# Patient Record
Sex: MALE | Race: WHITE | NOT HISPANIC OR LATINO | URBAN - METROPOLITAN AREA
[De-identification: names, ages, dates, MRNs, and addresses within clinical notes are randomized per-mention and may not be internally consistent; named-entity substitution may affect disease eponyms.]

---

## 2018-07-24 VITALS
SYSTOLIC BLOOD PRESSURE: 132 MMHG | TEMPERATURE: 98 F | DIASTOLIC BLOOD PRESSURE: 91 MMHG | HEART RATE: 120 BPM | RESPIRATION RATE: 40 BRPM | HEIGHT: 77 IN | OXYGEN SATURATION: 96 % | WEIGHT: 165.35 LBS

## 2018-07-25 ENCOUNTER — INPATIENT (INPATIENT)
Facility: HOSPITAL | Age: 43
LOS: 48 days | Discharge: HOME HEALTH | End: 2018-09-12
Attending: STUDENT IN AN ORGANIZED HEALTH CARE EDUCATION/TRAINING PROGRAM | Admitting: STUDENT IN AN ORGANIZED HEALTH CARE EDUCATION/TRAINING PROGRAM
Payer: MEDICAID

## 2018-07-25 DIAGNOSIS — J86.9 PYOTHORAX WITHOUT FISTULA: ICD-10-CM

## 2018-07-25 DIAGNOSIS — J91.8 PLEURAL EFFUSION IN OTHER CONDITIONS CLASSIFIED ELSEWHERE: ICD-10-CM

## 2018-07-25 LAB
ALBUMIN SERPL ELPH-MCNC: 2.5 G/DL — LOW (ref 3.3–5)
ALP SERPL-CCNC: 100 U/L — SIGNIFICANT CHANGE UP (ref 40–120)
ALT FLD-CCNC: 76 U/L — HIGH (ref 4–41)
APTT BLD: 38 SEC — HIGH (ref 27.5–37.4)
APTT BLD: 44 SEC — HIGH (ref 27.5–37.4)
APTT BLD: 46.4 SEC — HIGH (ref 27.5–37.4)
APTT BLD: 52.6 SEC — HIGH (ref 27.5–37.4)
AST SERPL-CCNC: 86 U/L — HIGH (ref 4–40)
BASOPHILS # BLD AUTO: 0.04 K/UL — SIGNIFICANT CHANGE UP (ref 0–0.2)
BASOPHILS NFR BLD AUTO: 0.5 % — SIGNIFICANT CHANGE UP (ref 0–2)
BILIRUB SERPL-MCNC: 1.3 MG/DL — HIGH (ref 0.2–1.2)
BLD GP AB SCN SERPL QL: NEGATIVE — SIGNIFICANT CHANGE UP
BUN SERPL-MCNC: 17 MG/DL — SIGNIFICANT CHANGE UP (ref 7–23)
CALCIUM SERPL-MCNC: 7.7 MG/DL — LOW (ref 8.4–10.5)
CHLORIDE SERPL-SCNC: 97 MMOL/L — LOW (ref 98–107)
CO2 SERPL-SCNC: 20 MMOL/L — LOW (ref 22–31)
CREAT SERPL-MCNC: 0.73 MG/DL — SIGNIFICANT CHANGE UP (ref 0.5–1.3)
EOSINOPHIL # BLD AUTO: 0 K/UL — SIGNIFICANT CHANGE UP (ref 0–0.5)
EOSINOPHIL NFR BLD AUTO: 0 % — SIGNIFICANT CHANGE UP (ref 0–6)
GLUCOSE SERPL-MCNC: 110 MG/DL — HIGH (ref 70–99)
HCT VFR BLD CALC: 44 % — SIGNIFICANT CHANGE UP (ref 39–50)
HCT VFR BLD CALC: 44.2 % — SIGNIFICANT CHANGE UP (ref 39–50)
HCT VFR BLD CALC: 46.4 % — SIGNIFICANT CHANGE UP (ref 39–50)
HGB BLD-MCNC: 15 G/DL — SIGNIFICANT CHANGE UP (ref 13–17)
HGB BLD-MCNC: 15.2 G/DL — SIGNIFICANT CHANGE UP (ref 13–17)
HGB BLD-MCNC: 16.1 G/DL — SIGNIFICANT CHANGE UP (ref 13–17)
IMM GRANULOCYTES # BLD AUTO: 0.12 # — SIGNIFICANT CHANGE UP
IMM GRANULOCYTES NFR BLD AUTO: 1.6 % — HIGH (ref 0–1.5)
INR BLD: 1.3 — HIGH (ref 0.88–1.17)
INR BLD: 1.44 — HIGH (ref 0.88–1.17)
INR BLD: 1.68 — HIGH (ref 0.88–1.17)
LYMPHOCYTES # BLD AUTO: 0.49 K/UL — LOW (ref 1–3.3)
LYMPHOCYTES # BLD AUTO: 6.4 % — LOW (ref 13–44)
MAGNESIUM SERPL-MCNC: 1.9 MG/DL — SIGNIFICANT CHANGE UP (ref 1.6–2.6)
MCHC RBC-ENTMCNC: 33.9 % — SIGNIFICANT CHANGE UP (ref 32–36)
MCHC RBC-ENTMCNC: 34.3 PG — HIGH (ref 27–34)
MCHC RBC-ENTMCNC: 34.3 PG — HIGH (ref 27–34)
MCHC RBC-ENTMCNC: 34.4 PG — HIGH (ref 27–34)
MCHC RBC-ENTMCNC: 34.5 % — SIGNIFICANT CHANGE UP (ref 32–36)
MCHC RBC-ENTMCNC: 34.7 % — SIGNIFICANT CHANGE UP (ref 32–36)
MCV RBC AUTO: 101.1 FL — HIGH (ref 80–100)
MCV RBC AUTO: 98.7 FL — SIGNIFICANT CHANGE UP (ref 80–100)
MCV RBC AUTO: 99.5 FL — SIGNIFICANT CHANGE UP (ref 80–100)
MONOCYTES # BLD AUTO: 0.68 K/UL — SIGNIFICANT CHANGE UP (ref 0–0.9)
MONOCYTES NFR BLD AUTO: 8.9 % — SIGNIFICANT CHANGE UP (ref 2–14)
NEUTROPHILS # BLD AUTO: 6.33 K/UL — SIGNIFICANT CHANGE UP (ref 1.8–7.4)
NEUTROPHILS NFR BLD AUTO: 82.6 % — HIGH (ref 43–77)
NRBC # FLD: 0 — SIGNIFICANT CHANGE UP
NRBC # FLD: 0 — SIGNIFICANT CHANGE UP
NRBC # FLD: 0.03 — SIGNIFICANT CHANGE UP
PHOSPHATE SERPL-MCNC: 5.8 MG/DL — HIGH (ref 2.5–4.5)
PLATELET # BLD AUTO: 125 K/UL — LOW (ref 150–400)
PLATELET # BLD AUTO: 164 K/UL — SIGNIFICANT CHANGE UP (ref 150–400)
PLATELET # BLD AUTO: 168 K/UL — SIGNIFICANT CHANGE UP (ref 150–400)
PMV BLD: 10.7 FL — SIGNIFICANT CHANGE UP (ref 7–13)
PMV BLD: 10.9 FL — SIGNIFICANT CHANGE UP (ref 7–13)
PMV BLD: 10.9 FL — SIGNIFICANT CHANGE UP (ref 7–13)
POTASSIUM SERPL-MCNC: 3.4 MMOL/L — LOW (ref 3.5–5.3)
POTASSIUM SERPL-SCNC: 3.4 MMOL/L — LOW (ref 3.5–5.3)
PROT SERPL-MCNC: 5.9 G/DL — LOW (ref 6–8.3)
PROTHROM AB SERPL-ACNC: 15 SEC — HIGH (ref 9.8–13.1)
PROTHROM AB SERPL-ACNC: 16.7 SEC — HIGH (ref 9.8–13.1)
PROTHROM AB SERPL-ACNC: 18.8 SEC — HIGH (ref 9.8–13.1)
RBC # BLD: 4.37 M/UL — SIGNIFICANT CHANGE UP (ref 4.2–5.8)
RBC # BLD: 4.42 M/UL — SIGNIFICANT CHANGE UP (ref 4.2–5.8)
RBC # BLD: 4.7 M/UL — SIGNIFICANT CHANGE UP (ref 4.2–5.8)
RBC # FLD: 12 % — SIGNIFICANT CHANGE UP (ref 10.3–14.5)
RBC # FLD: 12 % — SIGNIFICANT CHANGE UP (ref 10.3–14.5)
RBC # FLD: 12.3 % — SIGNIFICANT CHANGE UP (ref 10.3–14.5)
RH IG SCN BLD-IMP: NEGATIVE — SIGNIFICANT CHANGE UP
RH IG SCN BLD-IMP: NEGATIVE — SIGNIFICANT CHANGE UP
SODIUM SERPL-SCNC: 137 MMOL/L — SIGNIFICANT CHANGE UP (ref 135–145)
WBC # BLD: 5.26 K/UL — SIGNIFICANT CHANGE UP (ref 3.8–10.5)
WBC # BLD: 7.66 K/UL — SIGNIFICANT CHANGE UP (ref 3.8–10.5)
WBC # BLD: 8.89 K/UL — SIGNIFICANT CHANGE UP (ref 3.8–10.5)
WBC # FLD AUTO: 5.26 K/UL — SIGNIFICANT CHANGE UP (ref 3.8–10.5)
WBC # FLD AUTO: 7.66 K/UL — SIGNIFICANT CHANGE UP (ref 3.8–10.5)
WBC # FLD AUTO: 8.89 K/UL — SIGNIFICANT CHANGE UP (ref 3.8–10.5)

## 2018-07-25 PROCEDURE — 71045 X-RAY EXAM CHEST 1 VIEW: CPT | Mod: 26,76

## 2018-07-25 PROCEDURE — 71275 CT ANGIOGRAPHY CHEST: CPT | Mod: 26

## 2018-07-25 PROCEDURE — 99291 CRITICAL CARE FIRST HOUR: CPT

## 2018-07-25 PROCEDURE — 99223 1ST HOSP IP/OBS HIGH 75: CPT | Mod: 57

## 2018-07-25 PROCEDURE — 75635 CT ANGIO ABDOMINAL ARTERIES: CPT | Mod: 26

## 2018-07-25 PROCEDURE — 99222 1ST HOSP IP/OBS MODERATE 55: CPT

## 2018-07-25 RX ORDER — THIAMINE MONONITRATE (VIT B1) 100 MG
100 TABLET ORAL DAILY
Qty: 0 | Refills: 0 | Status: DISCONTINUED | OUTPATIENT
Start: 2018-07-25 | End: 2018-07-29

## 2018-07-25 RX ORDER — HYDROMORPHONE HYDROCHLORIDE 2 MG/ML
1 INJECTION INTRAMUSCULAR; INTRAVENOUS; SUBCUTANEOUS ONCE
Qty: 0 | Refills: 0 | Status: DISCONTINUED | OUTPATIENT
Start: 2018-07-25 | End: 2018-07-25

## 2018-07-25 RX ORDER — MORPHINE SULFATE 50 MG/1
4 CAPSULE, EXTENDED RELEASE ORAL ONCE
Qty: 0 | Refills: 0 | Status: DISCONTINUED | OUTPATIENT
Start: 2018-07-25 | End: 2018-07-25

## 2018-07-25 RX ORDER — SODIUM CHLORIDE 9 MG/ML
1000 INJECTION, SOLUTION INTRAVENOUS
Qty: 0 | Refills: 0 | Status: DISCONTINUED | OUTPATIENT
Start: 2018-07-25 | End: 2018-07-25

## 2018-07-25 RX ORDER — HYDROMORPHONE HYDROCHLORIDE 2 MG/ML
0.5 INJECTION INTRAMUSCULAR; INTRAVENOUS; SUBCUTANEOUS ONCE
Qty: 0 | Refills: 0 | Status: DISCONTINUED | OUTPATIENT
Start: 2018-07-25 | End: 2018-07-25

## 2018-07-25 RX ORDER — PIPERACILLIN AND TAZOBACTAM 4; .5 G/20ML; G/20ML
3.38 INJECTION, POWDER, LYOPHILIZED, FOR SOLUTION INTRAVENOUS ONCE
Qty: 0 | Refills: 0 | Status: COMPLETED | OUTPATIENT
Start: 2018-07-25 | End: 2018-07-25

## 2018-07-25 RX ORDER — POTASSIUM CHLORIDE 20 MEQ
10 PACKET (EA) ORAL
Qty: 0 | Refills: 0 | Status: COMPLETED | OUTPATIENT
Start: 2018-07-25 | End: 2018-07-25

## 2018-07-25 RX ORDER — KETOROLAC TROMETHAMINE 30 MG/ML
30 SYRINGE (ML) INJECTION ONCE
Qty: 0 | Refills: 0 | Status: DISCONTINUED | OUTPATIENT
Start: 2018-07-25 | End: 2018-07-25

## 2018-07-25 RX ORDER — ACETAMINOPHEN 500 MG
1000 TABLET ORAL ONCE
Qty: 0 | Refills: 0 | Status: COMPLETED | OUTPATIENT
Start: 2018-07-24 | End: 2018-07-25

## 2018-07-25 RX ORDER — PANTOPRAZOLE SODIUM 20 MG/1
40 TABLET, DELAYED RELEASE ORAL DAILY
Qty: 0 | Refills: 0 | Status: DISCONTINUED | OUTPATIENT
Start: 2018-07-25 | End: 2018-09-06

## 2018-07-25 RX ORDER — HEPARIN SODIUM 5000 [USP'U]/ML
1000 INJECTION INTRAVENOUS; SUBCUTANEOUS
Qty: 25000 | Refills: 0 | Status: DISCONTINUED | OUTPATIENT
Start: 2018-07-25 | End: 2018-07-25

## 2018-07-25 RX ORDER — VANCOMYCIN HCL 1 G
1000 VIAL (EA) INTRAVENOUS ONCE
Qty: 0 | Refills: 0 | Status: COMPLETED | OUTPATIENT
Start: 2018-07-25 | End: 2018-07-25

## 2018-07-25 RX ORDER — SENNA PLUS 8.6 MG/1
2 TABLET ORAL AT BEDTIME
Qty: 0 | Refills: 0 | Status: DISCONTINUED | OUTPATIENT
Start: 2018-07-25 | End: 2018-07-28

## 2018-07-25 RX ORDER — HEPARIN SODIUM 5000 [USP'U]/ML
2500 INJECTION INTRAVENOUS; SUBCUTANEOUS ONCE
Qty: 0 | Refills: 0 | Status: COMPLETED | OUTPATIENT
Start: 2018-07-25 | End: 2018-07-25

## 2018-07-25 RX ORDER — SODIUM CHLORIDE 9 MG/ML
3 INJECTION INTRAMUSCULAR; INTRAVENOUS; SUBCUTANEOUS EVERY 8 HOURS
Qty: 0 | Refills: 0 | Status: DISCONTINUED | OUTPATIENT
Start: 2018-07-25 | End: 2018-09-12

## 2018-07-25 RX ORDER — MAGNESIUM SULFATE 500 MG/ML
1 VIAL (ML) INJECTION ONCE
Qty: 0 | Refills: 0 | Status: COMPLETED | OUTPATIENT
Start: 2018-07-25 | End: 2018-07-25

## 2018-07-25 RX ORDER — THIAMINE MONONITRATE (VIT B1) 100 MG
100 TABLET ORAL ONCE
Qty: 0 | Refills: 0 | Status: COMPLETED | OUTPATIENT
Start: 2018-07-25 | End: 2018-07-25

## 2018-07-25 RX ORDER — HEPARIN SODIUM 5000 [USP'U]/ML
5000 INJECTION INTRAVENOUS; SUBCUTANEOUS EVERY 8 HOURS
Qty: 0 | Refills: 0 | Status: DISCONTINUED | OUTPATIENT
Start: 2018-07-25 | End: 2018-07-25

## 2018-07-25 RX ORDER — ACETAMINOPHEN 500 MG
1000 TABLET ORAL ONCE
Qty: 0 | Refills: 0 | Status: COMPLETED | OUTPATIENT
Start: 2018-07-25 | End: 2018-07-25

## 2018-07-25 RX ORDER — VANCOMYCIN HCL 1 G
1000 VIAL (EA) INTRAVENOUS EVERY 12 HOURS
Qty: 0 | Refills: 0 | Status: DISCONTINUED | OUTPATIENT
Start: 2018-07-25 | End: 2018-07-28

## 2018-07-25 RX ORDER — OXYCODONE AND ACETAMINOPHEN 5; 325 MG/1; MG/1
2 TABLET ORAL EVERY 6 HOURS
Qty: 0 | Refills: 0 | Status: DISCONTINUED | OUTPATIENT
Start: 2018-07-25 | End: 2018-07-29

## 2018-07-25 RX ORDER — IPRATROPIUM BROMIDE 0.2 MG/ML
500 SOLUTION, NON-ORAL INHALATION EVERY 6 HOURS
Qty: 0 | Refills: 0 | Status: DISCONTINUED | OUTPATIENT
Start: 2018-07-25 | End: 2018-07-27

## 2018-07-25 RX ORDER — PIPERACILLIN AND TAZOBACTAM 4; .5 G/20ML; G/20ML
3.38 INJECTION, POWDER, LYOPHILIZED, FOR SOLUTION INTRAVENOUS EVERY 8 HOURS
Qty: 0 | Refills: 0 | Status: DISCONTINUED | OUTPATIENT
Start: 2018-07-25 | End: 2018-07-30

## 2018-07-25 RX ORDER — HEPARIN SODIUM 5000 [USP'U]/ML
1200 INJECTION INTRAVENOUS; SUBCUTANEOUS
Qty: 25000 | Refills: 0 | Status: DISCONTINUED | OUTPATIENT
Start: 2018-07-25 | End: 2018-07-26

## 2018-07-25 RX ORDER — SODIUM CHLORIDE 9 MG/ML
1000 INJECTION, SOLUTION INTRAVENOUS
Qty: 0 | Refills: 0 | Status: DISCONTINUED | OUTPATIENT
Start: 2018-07-25 | End: 2018-07-27

## 2018-07-25 RX ORDER — SODIUM CHLORIDE 9 MG/ML
500 INJECTION INTRAMUSCULAR; INTRAVENOUS; SUBCUTANEOUS ONCE
Qty: 0 | Refills: 0 | Status: COMPLETED | OUTPATIENT
Start: 2018-07-25 | End: 2018-07-25

## 2018-07-25 RX ORDER — METOCLOPRAMIDE HCL 10 MG
10 TABLET ORAL ONCE
Qty: 0 | Refills: 0 | Status: COMPLETED | OUTPATIENT
Start: 2018-07-25 | End: 2018-07-25

## 2018-07-25 RX ORDER — THIAMINE MONONITRATE (VIT B1) 100 MG
TABLET ORAL
Qty: 0 | Refills: 0 | Status: DISCONTINUED | OUTPATIENT
Start: 2018-07-25 | End: 2018-07-29

## 2018-07-25 RX ORDER — ALBUTEROL 90 UG/1
2 AEROSOL, METERED ORAL EVERY 4 HOURS
Qty: 0 | Refills: 0 | Status: DISCONTINUED | OUTPATIENT
Start: 2018-07-25 | End: 2018-07-27

## 2018-07-25 RX ORDER — VANCOMYCIN HCL 1 G
VIAL (EA) INTRAVENOUS
Qty: 0 | Refills: 0 | Status: DISCONTINUED | OUTPATIENT
Start: 2018-07-25 | End: 2018-07-28

## 2018-07-25 RX ORDER — DOCUSATE SODIUM 100 MG
100 CAPSULE ORAL THREE TIMES A DAY
Qty: 0 | Refills: 0 | Status: DISCONTINUED | OUTPATIENT
Start: 2018-07-25 | End: 2018-07-28

## 2018-07-25 RX ADMIN — HEPARIN SODIUM 10 UNIT(S)/HR: 5000 INJECTION INTRAVENOUS; SUBCUTANEOUS at 09:40

## 2018-07-25 RX ADMIN — SODIUM CHLORIDE 3 MILLILITER(S): 9 INJECTION INTRAMUSCULAR; INTRAVENOUS; SUBCUTANEOUS at 13:04

## 2018-07-25 RX ADMIN — MORPHINE SULFATE 4 MILLIGRAM(S): 50 CAPSULE, EXTENDED RELEASE ORAL at 08:45

## 2018-07-25 RX ADMIN — SODIUM CHLORIDE 1000 MILLILITER(S): 9 INJECTION INTRAMUSCULAR; INTRAVENOUS; SUBCUTANEOUS at 14:32

## 2018-07-25 RX ADMIN — MORPHINE SULFATE 4 MILLIGRAM(S): 50 CAPSULE, EXTENDED RELEASE ORAL at 09:00

## 2018-07-25 RX ADMIN — Medication 500 MICROGRAM(S): at 16:07

## 2018-07-25 RX ADMIN — HYDROMORPHONE HYDROCHLORIDE 0.5 MILLIGRAM(S): 2 INJECTION INTRAMUSCULAR; INTRAVENOUS; SUBCUTANEOUS at 21:35

## 2018-07-25 RX ADMIN — MORPHINE SULFATE 4 MILLIGRAM(S): 50 CAPSULE, EXTENDED RELEASE ORAL at 12:30

## 2018-07-25 RX ADMIN — Medication 500 MICROGRAM(S): at 10:01

## 2018-07-25 RX ADMIN — Medication 500 MICROGRAM(S): at 05:42

## 2018-07-25 RX ADMIN — Medication 250 MILLIGRAM(S): at 21:05

## 2018-07-25 RX ADMIN — HEPARIN SODIUM 5000 UNIT(S): 5000 INJECTION INTRAVENOUS; SUBCUTANEOUS at 06:00

## 2018-07-25 RX ADMIN — SODIUM CHLORIDE 3 MILLILITER(S): 9 INJECTION INTRAMUSCULAR; INTRAVENOUS; SUBCUTANEOUS at 22:07

## 2018-07-25 RX ADMIN — Medication 10 MILLIGRAM(S): at 05:31

## 2018-07-25 RX ADMIN — HYDROMORPHONE HYDROCHLORIDE 1 MILLIGRAM(S): 2 INJECTION INTRAMUSCULAR; INTRAVENOUS; SUBCUTANEOUS at 05:30

## 2018-07-25 RX ADMIN — Medication 500 MICROGRAM(S): at 22:20

## 2018-07-25 RX ADMIN — HYDROMORPHONE HYDROCHLORIDE 1 MILLIGRAM(S): 2 INJECTION INTRAMUSCULAR; INTRAVENOUS; SUBCUTANEOUS at 05:45

## 2018-07-25 RX ADMIN — Medication 100 MILLIEQUIVALENT(S): at 06:00

## 2018-07-25 RX ADMIN — OXYCODONE AND ACETAMINOPHEN 2 TABLET(S): 5; 325 TABLET ORAL at 05:00

## 2018-07-25 RX ADMIN — SODIUM CHLORIDE 3 MILLILITER(S): 9 INJECTION INTRAMUSCULAR; INTRAVENOUS; SUBCUTANEOUS at 06:00

## 2018-07-25 RX ADMIN — OXYCODONE AND ACETAMINOPHEN 2 TABLET(S): 5; 325 TABLET ORAL at 04:30

## 2018-07-25 RX ADMIN — Medication 30 MILLIGRAM(S): at 21:35

## 2018-07-25 RX ADMIN — PIPERACILLIN AND TAZOBACTAM 25 GRAM(S): 4; .5 INJECTION, POWDER, LYOPHILIZED, FOR SOLUTION INTRAVENOUS at 22:10

## 2018-07-25 RX ADMIN — Medication 100 MILLIEQUIVALENT(S): at 07:15

## 2018-07-25 RX ADMIN — HYDROMORPHONE HYDROCHLORIDE 0.5 MILLIGRAM(S): 2 INJECTION INTRAMUSCULAR; INTRAVENOUS; SUBCUTANEOUS at 21:01

## 2018-07-25 RX ADMIN — HEPARIN SODIUM 2500 UNIT(S): 5000 INJECTION INTRAVENOUS; SUBCUTANEOUS at 09:39

## 2018-07-25 RX ADMIN — SODIUM CHLORIDE 50 MILLILITER(S): 9 INJECTION, SOLUTION INTRAVENOUS at 07:17

## 2018-07-25 RX ADMIN — Medication 100 MILLIGRAM(S): at 21:05

## 2018-07-25 RX ADMIN — Medication 100 MILLIGRAM(S): at 06:53

## 2018-07-25 RX ADMIN — Medication 100 MILLIGRAM(S): at 11:55

## 2018-07-25 RX ADMIN — Medication 100 MILLIEQUIVALENT(S): at 04:45

## 2018-07-25 RX ADMIN — MORPHINE SULFATE 4 MILLIGRAM(S): 50 CAPSULE, EXTENDED RELEASE ORAL at 12:45

## 2018-07-25 RX ADMIN — HYDROMORPHONE HYDROCHLORIDE 1 MILLIGRAM(S): 2 INJECTION INTRAMUSCULAR; INTRAVENOUS; SUBCUTANEOUS at 02:45

## 2018-07-25 RX ADMIN — Medication 250 MILLIGRAM(S): at 10:15

## 2018-07-25 RX ADMIN — Medication 100 MILLIGRAM(S): at 13:04

## 2018-07-25 RX ADMIN — SODIUM CHLORIDE 100 MILLILITER(S): 9 INJECTION, SOLUTION INTRAVENOUS at 21:01

## 2018-07-25 RX ADMIN — PIPERACILLIN AND TAZOBACTAM 25 GRAM(S): 4; .5 INJECTION, POWDER, LYOPHILIZED, FOR SOLUTION INTRAVENOUS at 14:31

## 2018-07-25 RX ADMIN — PIPERACILLIN AND TAZOBACTAM 200 GRAM(S): 4; .5 INJECTION, POWDER, LYOPHILIZED, FOR SOLUTION INTRAVENOUS at 12:31

## 2018-07-25 RX ADMIN — Medication 100 MILLIGRAM(S): at 06:00

## 2018-07-25 RX ADMIN — HEPARIN SODIUM 12 UNIT(S)/HR: 5000 INJECTION INTRAVENOUS; SUBCUTANEOUS at 16:50

## 2018-07-25 RX ADMIN — Medication 400 MILLIGRAM(S): at 00:00

## 2018-07-25 RX ADMIN — Medication 400 MILLIGRAM(S): at 21:03

## 2018-07-25 RX ADMIN — HYDROMORPHONE HYDROCHLORIDE 1 MILLIGRAM(S): 2 INJECTION INTRAMUSCULAR; INTRAVENOUS; SUBCUTANEOUS at 02:30

## 2018-07-25 RX ADMIN — Medication 1000 MILLIGRAM(S): at 21:35

## 2018-07-25 RX ADMIN — Medication 1000 MILLIGRAM(S): at 00:15

## 2018-07-25 RX ADMIN — PANTOPRAZOLE SODIUM 40 MILLIGRAM(S): 20 TABLET, DELAYED RELEASE ORAL at 11:55

## 2018-07-25 RX ADMIN — Medication 30 MILLIGRAM(S): at 21:01

## 2018-07-25 RX ADMIN — Medication 100 GRAM(S): at 04:45
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
## 2018-07-25 NOTE — PROVIDER CONTACT NOTE (OTHER) - ASSESSMENT
Pt tachypneic and tachycardic at this time, with complaints of right sided chest pain, otherwise stable.

## 2018-07-25 NOTE — H&P ADULT - NSHPPHYSICALEXAM_GEN_ALL_CORE
General: WN/WD NAD  Neurology: A&Ox3, nonfocal, SWAN x 4  Eyes: PERRLA/ EOMI, Gross vision intact  ENT/Neck: Neck supple, trachea midline, No JVD, Gross hearing intact  Respiratory: decreased right lateral   CV: RRR, S1S2, no murmurs, rubs or gallops  Abdominal: Soft, NT, ND +BS,   Extremities: No edema, + peripheral pulses  Skin: No Rashes, Hematoma, Ecchymosis

## 2018-07-25 NOTE — H&P ADULT - NSHPLABSRESULTS_GEN_ALL_CORE
CT chest: multiple pleural loculations some with gas concerning for empyema.  The largest collection is noted in the right paraspinal region, associated with consolidation and possible associated necrosis of the posterior segment of the right upper lobe.  Also noted on CT scan age indeterminant pulmonary arterial filling defects.  Also there is dependent right lower lobe airspace consolidation. CT chest: multiple pleural loculations some with gas concerning for empyema.  The largest collection is noted in the right paraspinal region, associated with consolidation and possible associated necrosis of the posterior segment of the right upper lobe.  Also noted on CT scan age indeterminant pulmonary arterial filling defects.  Also there is dependent right lower lobe airspace consolidation.  (07-25 @ 03:00)                      15.2  8.89 )-----------( 168                 44.0    Neutrophils = -- (--%)  Lymphocytes = -- (--%)  Eosinophils = -- (--%)  Basophils = -- (--%)  Monocytes = -- (--%)  Bands = --%    07-25    137  |  97<L>  |  17  ----------------------------<  110<H>  3.4<L>   |  20<L>  |  0.73    Ca    7.7<L>      25 Jul 2018 03:00  Phos  5.8     07-25  Mg     1.9     07-25    TPro  5.9<L>  /  Alb  2.5<L>  /  TBili  1.3<H>  /  DBili  x   /  AST  86<H>  /  ALT  76<H>  /  AlkPhos  100  07-25    ( 25 Jul 2018 03:00 )   PT: 15.0 SEC;   INR: 1.30 ;       PTT:44.0 SEC
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
## 2018-07-25 NOTE — H&P ADULT - NSHPREVIEWOFSYSTEMS_GEN_ALL_CORE
REVIEW OF SYSTEMS      General:No Weight change/ Fatigue/ HA/Dizzy	    Skin/Breast: No Rashes/ Lesions/ Masses  	  Ophthalmologic: No Blurry vision/ Glaucoma/ Blindness  	  ENMT: No Hearing loss/ Drainage/ Lesions	    Respiratory and Thorax: No Cough/ Wheezing/ SOB/ Hemoptysis/ Sputum production  	  Cardiovascular: No Chest pain/ Palpitations/ Diaphoresis	    Gastrointestinal: No Nausea/ Vomiting/ Constipation/ Appetite Change +right upper quadrant pain radiating to back 	    Genitourinary: No Heamturia/ Dysuria/ Frequency change/ Impotence	    Musculoskeletal: No Pain/ Weakness/ Claudication	    Neurological: No Seizures/ TIA/CVA/ Parastesias	    Psychiatric: No Dementia/ Depression/ SI/HI	    Hematology/Lymphatics: No hx of bleeding/ Edema	    Endocrine:	No Hyperglycemia/ Hypoglycemia    Allergic/Immunologic:	 No Anaphylaxis/ Intolerance/ Recent illnesses
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
## 2018-07-26 ENCOUNTER — TRANSCRIPTION ENCOUNTER (OUTPATIENT)
Age: 43
End: 2018-07-26

## 2018-07-26 LAB
ALBUMIN SERPL ELPH-MCNC: 2 G/DL — LOW (ref 3.3–5)
ALP SERPL-CCNC: 63 U/L — SIGNIFICANT CHANGE UP (ref 40–120)
ALT FLD-CCNC: 49 U/L — HIGH (ref 4–41)
ANISOCYTOSIS BLD QL: SLIGHT — SIGNIFICANT CHANGE UP
APTT BLD: 53.6 SEC — HIGH (ref 27.5–37.4)
APTT BLD: 57.5 SEC — HIGH (ref 27.5–37.4)
AST SERPL-CCNC: 57 U/L — HIGH (ref 4–40)
BASE EXCESS BLDA CALC-SCNC: 0.9 MMOL/L — SIGNIFICANT CHANGE UP
BASOPHILS # BLD AUTO: 0.02 K/UL — SIGNIFICANT CHANGE UP (ref 0–0.2)
BASOPHILS NFR BLD AUTO: 0.3 % — SIGNIFICANT CHANGE UP (ref 0–2)
BASOPHILS NFR SPEC: 0 % — SIGNIFICANT CHANGE UP (ref 0–2)
BILIRUB SERPL-MCNC: 0.9 MG/DL — SIGNIFICANT CHANGE UP (ref 0.2–1.2)
BLASTS # FLD: 0 % — SIGNIFICANT CHANGE UP (ref 0–0)
BODY FLUID TYPE: SIGNIFICANT CHANGE UP
BUN SERPL-MCNC: 21 MG/DL — SIGNIFICANT CHANGE UP (ref 7–23)
BUN SERPL-MCNC: 22 MG/DL — SIGNIFICANT CHANGE UP (ref 7–23)
BURR CELLS BLD QL SMEAR: PRESENT — SIGNIFICANT CHANGE UP
CALCIUM SERPL-MCNC: 7.7 MG/DL — LOW (ref 8.4–10.5)
CALCIUM SERPL-MCNC: 7.8 MG/DL — LOW (ref 8.4–10.5)
CHLORIDE SERPL-SCNC: 99 MMOL/L — SIGNIFICANT CHANGE UP (ref 98–107)
CHLORIDE SERPL-SCNC: 99 MMOL/L — SIGNIFICANT CHANGE UP (ref 98–107)
CLARITY SPEC: SIGNIFICANT CHANGE UP
CO2 SERPL-SCNC: 24 MMOL/L — SIGNIFICANT CHANGE UP (ref 22–31)
CO2 SERPL-SCNC: 25 MMOL/L — SIGNIFICANT CHANGE UP (ref 22–31)
COLOR FLD: YELLOW — SIGNIFICANT CHANGE UP
CREAT SERPL-MCNC: 0.53 MG/DL — SIGNIFICANT CHANGE UP (ref 0.5–1.3)
CREAT SERPL-MCNC: 0.62 MG/DL — SIGNIFICANT CHANGE UP (ref 0.5–1.3)
EOSINOPHIL # BLD AUTO: 0.06 K/UL — SIGNIFICANT CHANGE UP (ref 0–0.5)
EOSINOPHIL NFR BLD AUTO: 1 % — SIGNIFICANT CHANGE UP (ref 0–6)
EOSINOPHIL NFR FLD: 2 % — SIGNIFICANT CHANGE UP (ref 0–6)
GIANT PLATELETS BLD QL SMEAR: PRESENT — SIGNIFICANT CHANGE UP
GLUCOSE FLD-MCNC: < 2 MG/DL — SIGNIFICANT CHANGE UP
GLUCOSE SERPL-MCNC: 112 MG/DL — HIGH (ref 70–99)
GLUCOSE SERPL-MCNC: 82 MG/DL — SIGNIFICANT CHANGE UP (ref 70–99)
GRAM STN FLD: SIGNIFICANT CHANGE UP
HCO3 BLDA-SCNC: 25 MMOL/L — SIGNIFICANT CHANGE UP (ref 22–26)
HCT VFR BLD CALC: 43.5 % — SIGNIFICANT CHANGE UP (ref 39–50)
HGB BLD-MCNC: 14.8 G/DL — SIGNIFICANT CHANGE UP (ref 13–17)
IMM GRANULOCYTES # BLD AUTO: 0.31 # — SIGNIFICANT CHANGE UP
IMM GRANULOCYTES NFR BLD AUTO: 5.4 % — HIGH (ref 0–1.5)
INR BLD: 1.24 — HIGH (ref 0.88–1.17)
INR BLD: 1.33 — HIGH (ref 0.88–1.17)
LDH SERPL L TO P-CCNC: SIGNIFICANT CHANGE UP U/L
LYMPHOCYTES # BLD AUTO: 0.92 K/UL — LOW (ref 1–3.3)
LYMPHOCYTES # BLD AUTO: 16.1 % — SIGNIFICANT CHANGE UP (ref 13–44)
LYMPHOCYTES NFR FLD: 10 % — SIGNIFICANT CHANGE UP
LYMPHOCYTES NFR SPEC AUTO: 20 % — SIGNIFICANT CHANGE UP (ref 13–44)
MACROCYTES BLD QL: SLIGHT — SIGNIFICANT CHANGE UP
MAGNESIUM SERPL-MCNC: 2.4 MG/DL — SIGNIFICANT CHANGE UP (ref 1.6–2.6)
MCHC RBC-ENTMCNC: 33.7 PG — SIGNIFICANT CHANGE UP (ref 27–34)
MCHC RBC-ENTMCNC: 34 % — SIGNIFICANT CHANGE UP (ref 32–36)
MCV RBC AUTO: 99.1 FL — SIGNIFICANT CHANGE UP (ref 80–100)
METAMYELOCYTES # FLD: 0 % — SIGNIFICANT CHANGE UP (ref 0–1)
MONOCYTES # BLD AUTO: 0.78 K/UL — SIGNIFICANT CHANGE UP (ref 0–0.9)
MONOCYTES # FLD: 22 % — SIGNIFICANT CHANGE UP
MONOCYTES NFR BLD AUTO: 13.6 % — SIGNIFICANT CHANGE UP (ref 2–14)
MONOCYTES NFR BLD: 15 % — HIGH (ref 2–9)
MYELOCYTES NFR BLD: 0 % — SIGNIFICANT CHANGE UP (ref 0–0)
NEUTROPHIL AB SER-ACNC: 55 % — SIGNIFICANT CHANGE UP (ref 43–77)
NEUTROPHILS # BLD AUTO: 3.64 K/UL — SIGNIFICANT CHANGE UP (ref 1.8–7.4)
NEUTROPHILS NFR BLD AUTO: 63.6 % — SIGNIFICANT CHANGE UP (ref 43–77)
NEUTS BAND # BLD: 3 % — SIGNIFICANT CHANGE UP (ref 0–6)
NEUTS SEG NFR FLD MANUAL: 68 % — SIGNIFICANT CHANGE UP
NRBC # FLD: 0.05 — SIGNIFICANT CHANGE UP
OTHER - HEMATOLOGY %: 0 — SIGNIFICANT CHANGE UP
PCO2 BLDA: 42 MMHG — SIGNIFICANT CHANGE UP (ref 35–48)
PH BLDA: 7.4 PH — SIGNIFICANT CHANGE UP (ref 7.35–7.45)
PHOSPHATE SERPL-MCNC: 2.5 MG/DL — SIGNIFICANT CHANGE UP (ref 2.5–4.5)
PLATELET # BLD AUTO: 159 K/UL — SIGNIFICANT CHANGE UP (ref 150–400)
PLATELET COUNT - ESTIMATE: NORMAL — SIGNIFICANT CHANGE UP
PMV BLD: 11.1 FL — SIGNIFICANT CHANGE UP (ref 7–13)
PO2 BLDA: 64 MMHG — LOW (ref 83–108)
POIKILOCYTOSIS BLD QL AUTO: SLIGHT — SIGNIFICANT CHANGE UP
POLYCHROMASIA BLD QL SMEAR: SLIGHT — SIGNIFICANT CHANGE UP
POTASSIUM SERPL-MCNC: 3.3 MMOL/L — LOW (ref 3.5–5.3)
POTASSIUM SERPL-MCNC: 3.7 MMOL/L — SIGNIFICANT CHANGE UP (ref 3.5–5.3)
POTASSIUM SERPL-SCNC: 3.3 MMOL/L — LOW (ref 3.5–5.3)
POTASSIUM SERPL-SCNC: 3.7 MMOL/L — SIGNIFICANT CHANGE UP (ref 3.5–5.3)
PROMYELOCYTES # FLD: 0 % — SIGNIFICANT CHANGE UP (ref 0–0)
PROT SERPL-MCNC: 5.7 G/DL — LOW (ref 6–8.3)
PROTHROM AB SERPL-ACNC: 13.8 SEC — HIGH (ref 9.8–13.1)
PROTHROM AB SERPL-ACNC: 15.4 SEC — HIGH (ref 9.8–13.1)
RBC # BLD: 4.39 M/UL — SIGNIFICANT CHANGE UP (ref 4.2–5.8)
RBC # FLD: 12.5 % — SIGNIFICANT CHANGE UP (ref 10.3–14.5)
RCV VOL RI: HIGH CELL/UL (ref 0–5)
SAO2 % BLDA: 91.1 % — LOW (ref 95–99)
SMUDGE CELLS # BLD: PRESENT — SIGNIFICANT CHANGE UP
SODIUM SERPL-SCNC: 136 MMOL/L — SIGNIFICANT CHANGE UP (ref 135–145)
SODIUM SERPL-SCNC: 137 MMOL/L — SIGNIFICANT CHANGE UP (ref 135–145)
SPECIMEN SOURCE: SIGNIFICANT CHANGE UP
SPECIMEN SOURCE: SIGNIFICANT CHANGE UP
TOTAL CELLS COUNTED, BODY FLUID: 100 CELLS — SIGNIFICANT CHANGE UP
TOTAL NUCLEATED CELL COUNT, BODY FLUID: HIGH CELL/UL (ref 0–5)
VANCOMYCIN TROUGH SERPL-MCNC: 5.7 UG/ML — LOW (ref 10–20)
VARIANT LYMPHS # BLD: 4 % — SIGNIFICANT CHANGE UP
WBC # BLD: 5.73 K/UL — SIGNIFICANT CHANGE UP (ref 3.8–10.5)
WBC # FLD AUTO: 5.73 K/UL — SIGNIFICANT CHANGE UP (ref 3.8–10.5)

## 2018-07-26 PROCEDURE — 71045 X-RAY EXAM CHEST 1 VIEW: CPT | Mod: 26

## 2018-07-26 PROCEDURE — 93306 TTE W/DOPPLER COMPLETE: CPT | Mod: 26

## 2018-07-26 PROCEDURE — 99291 CRITICAL CARE FIRST HOUR: CPT

## 2018-07-26 RX ORDER — DEXMEDETOMIDINE HYDROCHLORIDE IN 0.9% SODIUM CHLORIDE 4 UG/ML
0.5 INJECTION INTRAVENOUS
Qty: 200 | Refills: 0 | Status: DISCONTINUED | OUTPATIENT
Start: 2018-07-26 | End: 2018-08-02

## 2018-07-26 RX ORDER — IPRATROPIUM/ALBUTEROL SULFATE 18-103MCG
3 AEROSOL WITH ADAPTER (GRAM) INHALATION EVERY 6 HOURS
Qty: 0 | Refills: 0 | Status: DISCONTINUED | OUTPATIENT
Start: 2018-07-26 | End: 2018-07-26

## 2018-07-26 RX ORDER — POTASSIUM CHLORIDE 20 MEQ
40 PACKET (EA) ORAL EVERY 4 HOURS
Qty: 0 | Refills: 0 | Status: COMPLETED | OUTPATIENT
Start: 2018-07-26 | End: 2018-07-26

## 2018-07-26 RX ORDER — HEPARIN SODIUM 5000 [USP'U]/ML
1350 INJECTION INTRAVENOUS; SUBCUTANEOUS
Qty: 25000 | Refills: 0 | Status: DISCONTINUED | OUTPATIENT
Start: 2018-07-26 | End: 2018-07-26

## 2018-07-26 RX ORDER — ALBUMIN HUMAN 25 %
250 VIAL (ML) INTRAVENOUS ONCE
Qty: 0 | Refills: 0 | Status: COMPLETED | OUTPATIENT
Start: 2018-07-26 | End: 2018-07-26

## 2018-07-26 RX ORDER — HEPARIN SODIUM 5000 [USP'U]/ML
1350 INJECTION INTRAVENOUS; SUBCUTANEOUS
Qty: 25000 | Refills: 0 | Status: DISCONTINUED | OUTPATIENT
Start: 2018-07-26 | End: 2018-08-06

## 2018-07-26 RX ORDER — HYDROMORPHONE HYDROCHLORIDE 2 MG/ML
0.5 INJECTION INTRAMUSCULAR; INTRAVENOUS; SUBCUTANEOUS ONCE
Qty: 0 | Refills: 0 | Status: DISCONTINUED | OUTPATIENT
Start: 2018-07-26 | End: 2018-07-26

## 2018-07-26 RX ORDER — ACETAMINOPHEN 500 MG
1000 TABLET ORAL ONCE
Qty: 0 | Refills: 0 | Status: COMPLETED | OUTPATIENT
Start: 2018-07-26 | End: 2018-07-26

## 2018-07-26 RX ORDER — KETOROLAC TROMETHAMINE 30 MG/ML
15 SYRINGE (ML) INJECTION EVERY 6 HOURS
Qty: 0 | Refills: 0 | Status: DISCONTINUED | OUTPATIENT
Start: 2018-07-26 | End: 2018-07-26

## 2018-07-26 RX ORDER — KETOROLAC TROMETHAMINE 30 MG/ML
30 SYRINGE (ML) INJECTION ONCE
Qty: 0 | Refills: 0 | Status: DISCONTINUED | OUTPATIENT
Start: 2018-07-26 | End: 2018-07-26

## 2018-07-26 RX ADMIN — HYDROMORPHONE HYDROCHLORIDE 0.5 MILLIGRAM(S): 2 INJECTION INTRAMUSCULAR; INTRAVENOUS; SUBCUTANEOUS at 07:10

## 2018-07-26 RX ADMIN — DEXMEDETOMIDINE HYDROCHLORIDE IN 0.9% SODIUM CHLORIDE 9.38 MICROGRAM(S)/KG/HR: 4 INJECTION INTRAVENOUS at 05:49

## 2018-07-26 RX ADMIN — PIPERACILLIN AND TAZOBACTAM 25 GRAM(S): 4; .5 INJECTION, POWDER, LYOPHILIZED, FOR SOLUTION INTRAVENOUS at 14:13

## 2018-07-26 RX ADMIN — Medication 1 MILLIGRAM(S): at 03:36

## 2018-07-26 RX ADMIN — SODIUM CHLORIDE 100 MILLILITER(S): 9 INJECTION, SOLUTION INTRAVENOUS at 08:04

## 2018-07-26 RX ADMIN — Medication 15 MILLIGRAM(S): at 18:46

## 2018-07-26 RX ADMIN — Medication 30 MILLIGRAM(S): at 07:10

## 2018-07-26 RX ADMIN — Medication 1000 MILLILITER(S): at 12:57

## 2018-07-26 RX ADMIN — Medication 400 MILLIGRAM(S): at 16:17

## 2018-07-26 RX ADMIN — Medication 1000 MILLIGRAM(S): at 16:32

## 2018-07-26 RX ADMIN — Medication 1000 MILLILITER(S): at 16:18

## 2018-07-26 RX ADMIN — SODIUM CHLORIDE 3 MILLILITER(S): 9 INJECTION INTRAMUSCULAR; INTRAVENOUS; SUBCUTANEOUS at 21:03

## 2018-07-26 RX ADMIN — DEXMEDETOMIDINE HYDROCHLORIDE IN 0.9% SODIUM CHLORIDE 9.38 MICROGRAM(S)/KG/HR: 4 INJECTION INTRAVENOUS at 08:03

## 2018-07-26 RX ADMIN — HYDROMORPHONE HYDROCHLORIDE 0.5 MILLIGRAM(S): 2 INJECTION INTRAMUSCULAR; INTRAVENOUS; SUBCUTANEOUS at 06:55

## 2018-07-26 RX ADMIN — Medication 500 MICROGRAM(S): at 16:30

## 2018-07-26 RX ADMIN — PIPERACILLIN AND TAZOBACTAM 25 GRAM(S): 4; .5 INJECTION, POWDER, LYOPHILIZED, FOR SOLUTION INTRAVENOUS at 06:00

## 2018-07-26 RX ADMIN — SODIUM CHLORIDE 100 MILLILITER(S): 9 INJECTION, SOLUTION INTRAVENOUS at 20:45

## 2018-07-26 RX ADMIN — HEPARIN SODIUM 13.5 UNIT(S)/HR: 5000 INJECTION INTRAVENOUS; SUBCUTANEOUS at 20:46

## 2018-07-26 RX ADMIN — Medication 500 MICROGRAM(S): at 03:59

## 2018-07-26 RX ADMIN — Medication 100 MILLIGRAM(S): at 12:56

## 2018-07-26 RX ADMIN — Medication 100 MILLIGRAM(S): at 14:13

## 2018-07-26 RX ADMIN — Medication 40 MILLIEQUIVALENT(S): at 21:04

## 2018-07-26 RX ADMIN — Medication 1000 MILLIGRAM(S): at 07:08

## 2018-07-26 RX ADMIN — Medication 250 MILLIGRAM(S): at 10:15

## 2018-07-26 RX ADMIN — Medication 400 MILLIGRAM(S): at 06:53

## 2018-07-26 RX ADMIN — Medication 30 MILLIGRAM(S): at 06:55

## 2018-07-26 RX ADMIN — Medication 40 MILLIEQUIVALENT(S): at 23:32

## 2018-07-26 RX ADMIN — Medication 250 MILLIGRAM(S): at 23:30

## 2018-07-26 RX ADMIN — SODIUM CHLORIDE 3 MILLILITER(S): 9 INJECTION INTRAMUSCULAR; INTRAVENOUS; SUBCUTANEOUS at 14:11

## 2018-07-26 RX ADMIN — PANTOPRAZOLE SODIUM 40 MILLIGRAM(S): 20 TABLET, DELAYED RELEASE ORAL at 12:56

## 2018-07-26 RX ADMIN — SODIUM CHLORIDE 3 MILLILITER(S): 9 INJECTION INTRAMUSCULAR; INTRAVENOUS; SUBCUTANEOUS at 06:00

## 2018-07-26 RX ADMIN — Medication 500 MICROGRAM(S): at 21:42

## 2018-07-26 RX ADMIN — PIPERACILLIN AND TAZOBACTAM 25 GRAM(S): 4; .5 INJECTION, POWDER, LYOPHILIZED, FOR SOLUTION INTRAVENOUS at 21:04

## 2018-07-26 RX ADMIN — Medication 100 MILLIGRAM(S): at 21:04

## 2018-07-26 RX ADMIN — Medication 500 MICROGRAM(S): at 10:55

## 2018-07-26 RX ADMIN — Medication 15 MILLIGRAM(S): at 19:01
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
## 2018-07-27 ENCOUNTER — APPOINTMENT (OUTPATIENT)
Dept: THORACIC SURGERY | Facility: HOSPITAL | Age: 43
End: 2018-07-27

## 2018-07-27 LAB
APTT BLD: 47.2 SEC — HIGH (ref 27.5–37.4)
BUN SERPL-MCNC: 16 MG/DL — SIGNIFICANT CHANGE UP (ref 7–23)
CA-I BLD-SCNC: 1.04 MMOL/L — SIGNIFICANT CHANGE UP (ref 1.03–1.23)
CALCIUM SERPL-MCNC: 8.2 MG/DL — LOW (ref 8.4–10.5)
CHLORIDE SERPL-SCNC: 100 MMOL/L — SIGNIFICANT CHANGE UP (ref 98–107)
CO2 SERPL-SCNC: 23 MMOL/L — SIGNIFICANT CHANGE UP (ref 22–31)
CREAT SERPL-MCNC: 0.5 MG/DL — SIGNIFICANT CHANGE UP (ref 0.5–1.3)
CULTURE - ACID FAST SMEAR CONCENTRATED: SIGNIFICANT CHANGE UP
GLUCOSE SERPL-MCNC: 90 MG/DL — SIGNIFICANT CHANGE UP (ref 70–99)
GRAM STN SPT: SIGNIFICANT CHANGE UP
GRAM STN WND: SIGNIFICANT CHANGE UP
HCT VFR BLD CALC: 39.9 % — SIGNIFICANT CHANGE UP (ref 39–50)
HGB BLD-MCNC: 13.4 G/DL — SIGNIFICANT CHANGE UP (ref 13–17)
INR BLD: 1.19 — HIGH (ref 0.88–1.17)
MAGNESIUM SERPL-MCNC: 2.3 MG/DL — SIGNIFICANT CHANGE UP (ref 1.6–2.6)
MCHC RBC-ENTMCNC: 33 PG — SIGNIFICANT CHANGE UP (ref 27–34)
MCHC RBC-ENTMCNC: 33.6 % — SIGNIFICANT CHANGE UP (ref 32–36)
MCV RBC AUTO: 98.3 FL — SIGNIFICANT CHANGE UP (ref 80–100)
NRBC # FLD: 0.06 — SIGNIFICANT CHANGE UP
PH FLD: 6.6 PH — SIGNIFICANT CHANGE UP
PHOSPHATE SERPL-MCNC: 1.7 MG/DL — LOW (ref 2.5–4.5)
PLATELET # BLD AUTO: 173 K/UL — SIGNIFICANT CHANGE UP (ref 150–400)
PMV BLD: 11 FL — SIGNIFICANT CHANGE UP (ref 7–13)
POTASSIUM SERPL-MCNC: 3.9 MMOL/L — SIGNIFICANT CHANGE UP (ref 3.5–5.3)
POTASSIUM SERPL-SCNC: 3.9 MMOL/L — SIGNIFICANT CHANGE UP (ref 3.5–5.3)
PROTHROM AB SERPL-ACNC: 13.7 SEC — HIGH (ref 9.8–13.1)
RBC # BLD: 4.06 M/UL — LOW (ref 4.2–5.8)
RBC # FLD: 12.4 % — SIGNIFICANT CHANGE UP (ref 10.3–14.5)
SODIUM SERPL-SCNC: 137 MMOL/L — SIGNIFICANT CHANGE UP (ref 135–145)
SPECIMEN SOURCE: SIGNIFICANT CHANGE UP
WBC # BLD: 9.94 K/UL — SIGNIFICANT CHANGE UP (ref 3.8–10.5)
WBC # FLD AUTO: 9.94 K/UL — SIGNIFICANT CHANGE UP (ref 3.8–10.5)

## 2018-07-27 PROCEDURE — 32100 EXPLORATION OF CHEST: CPT | Mod: 59

## 2018-07-27 PROCEDURE — 99291 CRITICAL CARE FIRST HOUR: CPT

## 2018-07-27 PROCEDURE — 88331 PATH CONSLTJ SURG 1 BLK 1SPC: CPT | Mod: 26

## 2018-07-27 PROCEDURE — 31624 DX BRONCHOSCOPE/LAVAGE: CPT

## 2018-07-27 PROCEDURE — 32551 INSERTION OF CHEST TUBE: CPT | Mod: 59

## 2018-07-27 PROCEDURE — 88300 SURGICAL PATH GROSS: CPT | Mod: 26

## 2018-07-27 PROCEDURE — 88305 TISSUE EXAM BY PATHOLOGIST: CPT | Mod: 26

## 2018-07-27 PROCEDURE — 99292 CRITICAL CARE ADDL 30 MIN: CPT

## 2018-07-27 PROCEDURE — 32220 RELEASE OF LUNG: CPT | Mod: 59

## 2018-07-27 PROCEDURE — 36620 INSERTION CATHETER ARTERY: CPT

## 2018-07-27 PROCEDURE — 32124 EXPLORE CHEST FREE ADHESIONS: CPT

## 2018-07-27 PROCEDURE — 71045 X-RAY EXAM CHEST 1 VIEW: CPT | Mod: 26,76

## 2018-07-27 RX ORDER — POTASSIUM CHLORIDE 20 MEQ
10 PACKET (EA) ORAL
Qty: 0 | Refills: 0 | Status: COMPLETED | OUTPATIENT
Start: 2018-07-27 | End: 2018-07-27

## 2018-07-27 RX ORDER — MIDAZOLAM HYDROCHLORIDE 1 MG/ML
0.5 INJECTION, SOLUTION INTRAMUSCULAR; INTRAVENOUS
Qty: 0 | Refills: 0 | Status: DISCONTINUED | OUTPATIENT
Start: 2018-07-27 | End: 2018-07-27

## 2018-07-27 RX ORDER — PROPOFOL 10 MG/ML
5 INJECTION, EMULSION INTRAVENOUS
Qty: 1000 | Refills: 0 | Status: DISCONTINUED | OUTPATIENT
Start: 2018-07-27 | End: 2018-08-27

## 2018-07-27 RX ORDER — FENTANYL CITRATE 50 UG/ML
1 INJECTION INTRAVENOUS
Qty: 2500 | Refills: 0 | Status: DISCONTINUED | OUTPATIENT
Start: 2018-07-27 | End: 2018-07-30

## 2018-07-27 RX ORDER — POTASSIUM CHLORIDE 20 MEQ
40 PACKET (EA) ORAL ONCE
Qty: 0 | Refills: 0 | Status: DISCONTINUED | OUTPATIENT
Start: 2018-07-27 | End: 2018-07-27

## 2018-07-27 RX ORDER — HYDROMORPHONE HYDROCHLORIDE 2 MG/ML
0.5 INJECTION INTRAMUSCULAR; INTRAVENOUS; SUBCUTANEOUS ONCE
Qty: 0 | Refills: 0 | Status: DISCONTINUED | OUTPATIENT
Start: 2018-07-27 | End: 2018-07-27

## 2018-07-27 RX ORDER — IPRATROPIUM BROMIDE 0.2 MG/ML
2 SOLUTION, NON-ORAL INHALATION EVERY 6 HOURS
Qty: 0 | Refills: 0 | Status: DISCONTINUED | OUTPATIENT
Start: 2018-07-27 | End: 2018-09-12

## 2018-07-27 RX ORDER — HALOPERIDOL DECANOATE 100 MG/ML
5 INJECTION INTRAMUSCULAR ONCE
Qty: 0 | Refills: 0 | Status: COMPLETED | OUTPATIENT
Start: 2018-07-27 | End: 2018-07-27

## 2018-07-27 RX ORDER — MIDAZOLAM HYDROCHLORIDE 1 MG/ML
1 INJECTION, SOLUTION INTRAMUSCULAR; INTRAVENOUS ONCE
Qty: 0 | Refills: 0 | Status: DISCONTINUED | OUTPATIENT
Start: 2018-07-27 | End: 2018-07-27

## 2018-07-27 RX ORDER — METOPROLOL TARTRATE 50 MG
2.5 TABLET ORAL ONCE
Qty: 0 | Refills: 0 | Status: COMPLETED | OUTPATIENT
Start: 2018-07-27 | End: 2018-07-27

## 2018-07-27 RX ORDER — ACETAMINOPHEN 500 MG
1000 TABLET ORAL ONCE
Qty: 0 | Refills: 0 | Status: COMPLETED | OUTPATIENT
Start: 2018-07-27 | End: 2018-07-28

## 2018-07-27 RX ORDER — ALBUTEROL 90 UG/1
2 AEROSOL, METERED ORAL EVERY 6 HOURS
Qty: 0 | Refills: 0 | Status: DISCONTINUED | OUTPATIENT
Start: 2018-07-27 | End: 2018-08-17

## 2018-07-27 RX ORDER — ACETAMINOPHEN 500 MG
1000 TABLET ORAL ONCE
Qty: 0 | Refills: 0 | Status: COMPLETED | OUTPATIENT
Start: 2018-07-27 | End: 2018-07-27

## 2018-07-27 RX ADMIN — Medication 2.5 MILLIGRAM(S): at 07:11

## 2018-07-27 RX ADMIN — Medication 250 MILLIGRAM(S): at 21:19

## 2018-07-27 RX ADMIN — Medication 100 MILLIEQUIVALENT(S): at 08:18

## 2018-07-27 RX ADMIN — HYDROMORPHONE HYDROCHLORIDE 0.5 MILLIGRAM(S): 2 INJECTION INTRAMUSCULAR; INTRAVENOUS; SUBCUTANEOUS at 01:15

## 2018-07-27 RX ADMIN — Medication 100 MILLIEQUIVALENT(S): at 09:39

## 2018-07-27 RX ADMIN — Medication 1 MILLIGRAM(S): at 06:20

## 2018-07-27 RX ADMIN — Medication 400 MILLIGRAM(S): at 08:45

## 2018-07-27 RX ADMIN — Medication 2 PUFF(S): at 15:01

## 2018-07-27 RX ADMIN — PIPERACILLIN AND TAZOBACTAM 25 GRAM(S): 4; .5 INJECTION, POWDER, LYOPHILIZED, FOR SOLUTION INTRAVENOUS at 21:20

## 2018-07-27 RX ADMIN — MIDAZOLAM HYDROCHLORIDE 1 MILLIGRAM(S): 1 INJECTION, SOLUTION INTRAMUSCULAR; INTRAVENOUS at 05:00

## 2018-07-27 RX ADMIN — Medication 2 PUFF(S): at 10:56

## 2018-07-27 RX ADMIN — HYDROMORPHONE HYDROCHLORIDE 0.5 MILLIGRAM(S): 2 INJECTION INTRAMUSCULAR; INTRAVENOUS; SUBCUTANEOUS at 01:00

## 2018-07-27 RX ADMIN — HEPARIN SODIUM 14.5 UNIT(S)/HR: 5000 INJECTION INTRAVENOUS; SUBCUTANEOUS at 08:00

## 2018-07-27 RX ADMIN — Medication 2 PUFF(S): at 23:01

## 2018-07-27 RX ADMIN — PROPOFOL 2.25 MICROGRAM(S)/KG/MIN: 10 INJECTION, EMULSION INTRAVENOUS at 21:37

## 2018-07-27 RX ADMIN — HALOPERIDOL DECANOATE 5 MILLIGRAM(S): 100 INJECTION INTRAMUSCULAR at 05:30

## 2018-07-27 RX ADMIN — HALOPERIDOL DECANOATE 5 MILLIGRAM(S): 100 INJECTION INTRAMUSCULAR at 05:45

## 2018-07-27 RX ADMIN — SODIUM CHLORIDE 3 MILLILITER(S): 9 INJECTION INTRAMUSCULAR; INTRAVENOUS; SUBCUTANEOUS at 13:15

## 2018-07-27 RX ADMIN — FENTANYL CITRATE 1 MICROGRAM(S)/KG/HR: 50 INJECTION INTRAVENOUS at 21:38

## 2018-07-27 RX ADMIN — PIPERACILLIN AND TAZOBACTAM 25 GRAM(S): 4; .5 INJECTION, POWDER, LYOPHILIZED, FOR SOLUTION INTRAVENOUS at 13:33

## 2018-07-27 RX ADMIN — PIPERACILLIN AND TAZOBACTAM 25 GRAM(S): 4; .5 INJECTION, POWDER, LYOPHILIZED, FOR SOLUTION INTRAVENOUS at 07:11

## 2018-07-27 RX ADMIN — Medication 500 MICROGRAM(S): at 03:55

## 2018-07-27 RX ADMIN — FENTANYL CITRATE 7.5 MICROGRAM(S)/KG/HR: 50 INJECTION INTRAVENOUS at 21:37

## 2018-07-27 RX ADMIN — Medication 100 MILLIEQUIVALENT(S): at 10:52

## 2018-07-27 RX ADMIN — SODIUM CHLORIDE 3 MILLILITER(S): 9 INJECTION INTRAMUSCULAR; INTRAVENOUS; SUBCUTANEOUS at 05:12

## 2018-07-27 RX ADMIN — Medication 100 MILLIGRAM(S): at 11:06

## 2018-07-27 RX ADMIN — Medication 250 MILLIGRAM(S): at 09:39

## 2018-07-27 RX ADMIN — PANTOPRAZOLE SODIUM 40 MILLIGRAM(S): 20 TABLET, DELAYED RELEASE ORAL at 11:06

## 2018-07-27 RX ADMIN — PROPOFOL 2.25 MICROGRAM(S)/KG/MIN: 10 INJECTION, EMULSION INTRAVENOUS at 08:00

## 2018-07-27 RX ADMIN — SODIUM CHLORIDE 3 MILLILITER(S): 9 INJECTION INTRAMUSCULAR; INTRAVENOUS; SUBCUTANEOUS at 21:19

## 2018-07-27 RX ADMIN — DEXMEDETOMIDINE HYDROCHLORIDE IN 0.9% SODIUM CHLORIDE 9.38 MICROGRAM(S)/KG/HR: 4 INJECTION INTRAVENOUS at 05:00

## 2018-07-27 RX ADMIN — DEXMEDETOMIDINE HYDROCHLORIDE IN 0.9% SODIUM CHLORIDE 9.38 MICROGRAM(S)/KG/HR: 4 INJECTION INTRAVENOUS at 08:00

## 2018-07-27 RX ADMIN — DEXMEDETOMIDINE HYDROCHLORIDE IN 0.9% SODIUM CHLORIDE 9.38 MICROGRAM(S)/KG/HR: 4 INJECTION INTRAVENOUS at 21:37
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
## 2018-07-27 NOTE — BRIEF OPERATIVE NOTE - PROCEDURE
<<-----Click on this checkbox to enter Procedure Drainage of lung abscess  07/27/2018  right upper lobe  Active  AMATTIA  Decortication of right lung  07/27/2018    Active  AMATTIA  Right thoracotomy  07/27/2018    Active  AMATTIA  VATS (video-assisted thoracoscopic surgery)  07/27/2018  right  Active  AMATTIA  Flexible bronchoscopy  07/27/2018    Active  AMATTIA
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
## 2018-07-28 LAB
APTT BLD: 56.1 SEC — HIGH (ref 27.5–37.4)
APTT BLD: 56.9 SEC — HIGH (ref 27.5–37.4)
BASE EXCESS BLDA CALC-SCNC: -0.1 MMOL/L — SIGNIFICANT CHANGE UP
BASE EXCESS BLDA CALC-SCNC: -0.8 MMOL/L — SIGNIFICANT CHANGE UP
BASE EXCESS BLDA CALC-SCNC: 0.2 MMOL/L — SIGNIFICANT CHANGE UP
BUN SERPL-MCNC: 11 MG/DL — SIGNIFICANT CHANGE UP (ref 7–23)
CA-I BLD-SCNC: 1.01 MMOL/L — LOW (ref 1.03–1.23)
CALCIUM SERPL-MCNC: 7.7 MG/DL — LOW (ref 8.4–10.5)
CHLORIDE SERPL-SCNC: 104 MMOL/L — SIGNIFICANT CHANGE UP (ref 98–107)
CO2 SERPL-SCNC: 22 MMOL/L — SIGNIFICANT CHANGE UP (ref 22–31)
CREAT SERPL-MCNC: 0.47 MG/DL — LOW (ref 0.5–1.3)
CULTURE - ACID FAST SMEAR CONCENTRATED: SIGNIFICANT CHANGE UP
GLUCOSE SERPL-MCNC: 100 MG/DL — HIGH (ref 70–99)
HCO3 BLDA-SCNC: 24 MMOL/L — SIGNIFICANT CHANGE UP (ref 22–26)
HCO3 BLDA-SCNC: 24 MMOL/L — SIGNIFICANT CHANGE UP (ref 22–26)
HCO3 BLDA-SCNC: 25 MMOL/L — SIGNIFICANT CHANGE UP (ref 22–26)
HCT VFR BLD CALC: 40.2 % — SIGNIFICANT CHANGE UP (ref 39–50)
HCT VFR BLD CALC: 43.6 % — SIGNIFICANT CHANGE UP (ref 39–50)
HGB BLD-MCNC: 12.9 G/DL — LOW (ref 13–17)
HGB BLD-MCNC: 14.3 G/DL — SIGNIFICANT CHANGE UP (ref 13–17)
LACTATE SERPL-SCNC: 1.5 MMOL/L — SIGNIFICANT CHANGE UP (ref 0.5–2)
MAGNESIUM SERPL-MCNC: 2 MG/DL — SIGNIFICANT CHANGE UP (ref 1.6–2.6)
MCHC RBC-ENTMCNC: 32.1 % — SIGNIFICANT CHANGE UP (ref 32–36)
MCHC RBC-ENTMCNC: 32.4 PG — SIGNIFICANT CHANGE UP (ref 27–34)
MCHC RBC-ENTMCNC: 32.8 % — SIGNIFICANT CHANGE UP (ref 32–36)
MCHC RBC-ENTMCNC: 33.4 PG — SIGNIFICANT CHANGE UP (ref 27–34)
MCV RBC AUTO: 101 FL — HIGH (ref 80–100)
MCV RBC AUTO: 101.9 FL — HIGH (ref 80–100)
NRBC # FLD: 0 — SIGNIFICANT CHANGE UP
NRBC # FLD: 0.02 — SIGNIFICANT CHANGE UP
PCO2 BLDA: 38 MMHG — SIGNIFICANT CHANGE UP (ref 35–48)
PCO2 BLDA: 39 MMHG — SIGNIFICANT CHANGE UP (ref 35–48)
PCO2 BLDA: 41 MMHG — SIGNIFICANT CHANGE UP (ref 35–48)
PH BLDA: 7.39 PH — SIGNIFICANT CHANGE UP (ref 7.35–7.45)
PH BLDA: 7.4 PH — SIGNIFICANT CHANGE UP (ref 7.35–7.45)
PH BLDA: 7.41 PH — SIGNIFICANT CHANGE UP (ref 7.35–7.45)
PHOSPHATE SERPL-MCNC: 3.7 MG/DL — SIGNIFICANT CHANGE UP (ref 2.5–4.5)
PLATELET # BLD AUTO: 159 K/UL — SIGNIFICANT CHANGE UP (ref 150–400)
PLATELET # BLD AUTO: 168 K/UL — SIGNIFICANT CHANGE UP (ref 150–400)
PMV BLD: 10.6 FL — SIGNIFICANT CHANGE UP (ref 7–13)
PMV BLD: 11 FL — SIGNIFICANT CHANGE UP (ref 7–13)
PO2 BLDA: 203 MMHG — HIGH (ref 83–108)
PO2 BLDA: 64 MMHG — LOW (ref 83–108)
PO2 BLDA: 94 MMHG — SIGNIFICANT CHANGE UP (ref 83–108)
POTASSIUM SERPL-MCNC: 4.1 MMOL/L — SIGNIFICANT CHANGE UP (ref 3.5–5.3)
POTASSIUM SERPL-SCNC: 4.1 MMOL/L — SIGNIFICANT CHANGE UP (ref 3.5–5.3)
RBC # BLD: 3.98 M/UL — LOW (ref 4.2–5.8)
RBC # BLD: 4.28 M/UL — SIGNIFICANT CHANGE UP (ref 4.2–5.8)
RBC # FLD: 13.2 % — SIGNIFICANT CHANGE UP (ref 10.3–14.5)
RBC # FLD: 13.2 % — SIGNIFICANT CHANGE UP (ref 10.3–14.5)
RH IG SCN BLD-IMP: NEGATIVE — SIGNIFICANT CHANGE UP
SAO2 % BLDA: 93.4 % — LOW (ref 95–99)
SAO2 % BLDA: 97.5 % — SIGNIFICANT CHANGE UP (ref 95–99)
SAO2 % BLDA: 98.9 % — SIGNIFICANT CHANGE UP (ref 95–99)
SODIUM SERPL-SCNC: 138 MMOL/L — SIGNIFICANT CHANGE UP (ref 135–145)
SPECIMEN SOURCE: SIGNIFICANT CHANGE UP
SPECIMEN SOURCE: SIGNIFICANT CHANGE UP
VANCOMYCIN TROUGH SERPL-MCNC: 5.2 UG/ML — LOW (ref 10–20)
WBC # BLD: 11.2 K/UL — HIGH (ref 3.8–10.5)
WBC # BLD: 13.8 K/UL — HIGH (ref 3.8–10.5)
WBC # FLD AUTO: 11.2 K/UL — HIGH (ref 3.8–10.5)
WBC # FLD AUTO: 13.8 K/UL — HIGH (ref 3.8–10.5)

## 2018-07-28 PROCEDURE — 71045 X-RAY EXAM CHEST 1 VIEW: CPT | Mod: 26

## 2018-07-28 PROCEDURE — 99291 CRITICAL CARE FIRST HOUR: CPT

## 2018-07-28 RX ORDER — SODIUM CHLORIDE 9 MG/ML
1000 INJECTION, SOLUTION INTRAVENOUS
Qty: 0 | Refills: 0 | Status: DISCONTINUED | OUTPATIENT
Start: 2018-07-28 | End: 2018-07-29

## 2018-07-28 RX ORDER — SODIUM CHLORIDE 9 MG/ML
1000 INJECTION, SOLUTION INTRAVENOUS
Qty: 0 | Refills: 0 | Status: DISCONTINUED | OUTPATIENT
Start: 2018-07-28 | End: 2018-07-28

## 2018-07-28 RX ORDER — ACETAMINOPHEN 500 MG
1000 TABLET ORAL ONCE
Qty: 0 | Refills: 0 | Status: COMPLETED | OUTPATIENT
Start: 2018-07-28 | End: 2018-07-29

## 2018-07-28 RX ORDER — DOCUSATE SODIUM 100 MG
300 CAPSULE ORAL AT BEDTIME
Qty: 0 | Refills: 0 | Status: DISCONTINUED | OUTPATIENT
Start: 2018-07-28 | End: 2018-08-03

## 2018-07-28 RX ORDER — IPRATROPIUM BROMIDE 0.2 MG/ML
2 SOLUTION, NON-ORAL INHALATION EVERY 6 HOURS
Qty: 0 | Refills: 0 | Status: DISCONTINUED | OUTPATIENT
Start: 2018-07-28 | End: 2018-07-28

## 2018-07-28 RX ORDER — INSULIN LISPRO 100/ML
VIAL (ML) SUBCUTANEOUS
Qty: 0 | Refills: 0 | Status: DISCONTINUED | OUTPATIENT
Start: 2018-07-28 | End: 2018-07-28

## 2018-07-28 RX ORDER — IPRATROPIUM/ALBUTEROL SULFATE 18-103MCG
3 AEROSOL WITH ADAPTER (GRAM) INHALATION EVERY 6 HOURS
Qty: 0 | Refills: 0 | Status: DISCONTINUED | OUTPATIENT
Start: 2018-07-28 | End: 2018-07-28

## 2018-07-28 RX ORDER — ALBUMIN HUMAN 25 %
250 VIAL (ML) INTRAVENOUS ONCE
Qty: 0 | Refills: 0 | Status: COMPLETED | OUTPATIENT
Start: 2018-07-28 | End: 2018-07-28

## 2018-07-28 RX ORDER — SODIUM CHLORIDE 9 MG/ML
1000 INJECTION, SOLUTION INTRAVENOUS
Qty: 0 | Refills: 0 | Status: DISCONTINUED | OUTPATIENT
Start: 2018-07-28 | End: 2018-07-30

## 2018-07-28 RX ORDER — SODIUM CHLORIDE 9 MG/ML
4 INJECTION INTRAMUSCULAR; INTRAVENOUS; SUBCUTANEOUS EVERY 6 HOURS
Qty: 0 | Refills: 0 | Status: DISCONTINUED | OUTPATIENT
Start: 2018-07-28 | End: 2018-07-29

## 2018-07-28 RX ORDER — SODIUM CHLORIDE 9 MG/ML
250 INJECTION, SOLUTION INTRAVENOUS ONCE
Qty: 0 | Refills: 0 | Status: COMPLETED | OUTPATIENT
Start: 2018-07-28 | End: 2018-07-28

## 2018-07-28 RX ORDER — INSULIN LISPRO 100/ML
VIAL (ML) SUBCUTANEOUS EVERY 6 HOURS
Qty: 0 | Refills: 0 | Status: DISCONTINUED | OUTPATIENT
Start: 2018-07-28 | End: 2018-08-31

## 2018-07-28 RX ORDER — CHLORHEXIDINE GLUCONATE 213 G/1000ML
15 SOLUTION TOPICAL
Qty: 0 | Refills: 0 | Status: DISCONTINUED | OUTPATIENT
Start: 2018-07-28 | End: 2018-09-12

## 2018-07-28 RX ORDER — DORNASE ALFA 1 MG/ML
2.5 SOLUTION RESPIRATORY (INHALATION) DAILY
Qty: 0 | Refills: 0 | Status: DISCONTINUED | OUTPATIENT
Start: 2018-07-28 | End: 2018-07-28

## 2018-07-28 RX ORDER — PHENYLEPHRINE HYDROCHLORIDE 10 MG/ML
0.2 INJECTION INTRAVENOUS
Qty: 40 | Refills: 0 | Status: DISCONTINUED | OUTPATIENT
Start: 2018-07-28 | End: 2018-07-29

## 2018-07-28 RX ORDER — SODIUM CHLORIDE 9 MG/ML
250 INJECTION, SOLUTION INTRAVENOUS
Qty: 0 | Refills: 0 | Status: DISCONTINUED | OUTPATIENT
Start: 2018-07-28 | End: 2018-07-30

## 2018-07-28 RX ORDER — VANCOMYCIN HCL 1 G
1500 VIAL (EA) INTRAVENOUS EVERY 12 HOURS
Qty: 0 | Refills: 0 | Status: DISCONTINUED | OUTPATIENT
Start: 2018-07-28 | End: 2018-07-31

## 2018-07-28 RX ORDER — ALBUMIN HUMAN 25 %
250 VIAL (ML) INTRAVENOUS
Qty: 0 | Refills: 0 | Status: DISCONTINUED | OUTPATIENT
Start: 2018-07-28 | End: 2018-07-29

## 2018-07-28 RX ADMIN — FENTANYL CITRATE 7.5 MICROGRAM(S)/KG/HR: 50 INJECTION INTRAVENOUS at 07:00

## 2018-07-28 RX ADMIN — HEPARIN SODIUM 14.5 UNIT(S)/HR: 5000 INJECTION INTRAVENOUS; SUBCUTANEOUS at 06:00

## 2018-07-28 RX ADMIN — Medication 300 MILLIGRAM(S): at 21:23

## 2018-07-28 RX ADMIN — Medication 2 PUFF(S): at 10:38

## 2018-07-28 RX ADMIN — Medication 300 MILLIGRAM(S): at 14:59

## 2018-07-28 RX ADMIN — FENTANYL CITRATE 7.5 MICROGRAM(S)/KG/HR: 50 INJECTION INTRAVENOUS at 13:31

## 2018-07-28 RX ADMIN — Medication 2 PUFF(S): at 17:21

## 2018-07-28 RX ADMIN — PANTOPRAZOLE SODIUM 40 MILLIGRAM(S): 20 TABLET, DELAYED RELEASE ORAL at 13:30

## 2018-07-28 RX ADMIN — DEXMEDETOMIDINE HYDROCHLORIDE IN 0.9% SODIUM CHLORIDE 9.38 MICROGRAM(S)/KG/HR: 4 INJECTION INTRAVENOUS at 21:23

## 2018-07-28 RX ADMIN — SODIUM CHLORIDE 3 MILLILITER(S): 9 INJECTION INTRAMUSCULAR; INTRAVENOUS; SUBCUTANEOUS at 13:26

## 2018-07-28 RX ADMIN — SODIUM CHLORIDE 1000 MILLILITER(S): 9 INJECTION, SOLUTION INTRAVENOUS at 06:02

## 2018-07-28 RX ADMIN — HEPARIN SODIUM 14.5 UNIT(S)/HR: 5000 INJECTION INTRAVENOUS; SUBCUTANEOUS at 21:24

## 2018-07-28 RX ADMIN — PROPOFOL 2.25 MICROGRAM(S)/KG/MIN: 10 INJECTION, EMULSION INTRAVENOUS at 21:24

## 2018-07-28 RX ADMIN — DEXMEDETOMIDINE HYDROCHLORIDE IN 0.9% SODIUM CHLORIDE 9.38 MICROGRAM(S)/KG/HR: 4 INJECTION INTRAVENOUS at 07:00

## 2018-07-28 RX ADMIN — Medication 100 MILLIGRAM(S): at 13:30

## 2018-07-28 RX ADMIN — SODIUM CHLORIDE 100 MILLILITER(S): 9 INJECTION, SOLUTION INTRAVENOUS at 13:31

## 2018-07-28 RX ADMIN — DEXMEDETOMIDINE HYDROCHLORIDE IN 0.9% SODIUM CHLORIDE 9.38 MICROGRAM(S)/KG/HR: 4 INJECTION INTRAVENOUS at 13:31

## 2018-07-28 RX ADMIN — PIPERACILLIN AND TAZOBACTAM 25 GRAM(S): 4; .5 INJECTION, POWDER, LYOPHILIZED, FOR SOLUTION INTRAVENOUS at 05:36

## 2018-07-28 RX ADMIN — PIPERACILLIN AND TAZOBACTAM 25 GRAM(S): 4; .5 INJECTION, POWDER, LYOPHILIZED, FOR SOLUTION INTRAVENOUS at 13:30

## 2018-07-28 RX ADMIN — FENTANYL CITRATE 7.5 MICROGRAM(S)/KG/HR: 50 INJECTION INTRAVENOUS at 21:23

## 2018-07-28 RX ADMIN — Medication 2 PUFF(S): at 21:42

## 2018-07-28 RX ADMIN — CHLORHEXIDINE GLUCONATE 15 MILLILITER(S): 213 SOLUTION TOPICAL at 18:03

## 2018-07-28 RX ADMIN — Medication 2 PUFF(S): at 04:40

## 2018-07-28 RX ADMIN — Medication 1000 MILLILITER(S): at 13:32

## 2018-07-28 RX ADMIN — PIPERACILLIN AND TAZOBACTAM 25 GRAM(S): 4; .5 INJECTION, POWDER, LYOPHILIZED, FOR SOLUTION INTRAVENOUS at 21:23

## 2018-07-28 RX ADMIN — Medication 1 MILLIGRAM(S): at 17:59

## 2018-07-28 RX ADMIN — SODIUM CHLORIDE 100 MILLILITER(S): 9 INJECTION, SOLUTION INTRAVENOUS at 21:24

## 2018-07-28 RX ADMIN — SODIUM CHLORIDE 3 MILLILITER(S): 9 INJECTION INTRAMUSCULAR; INTRAVENOUS; SUBCUTANEOUS at 05:37

## 2018-07-28 RX ADMIN — Medication 400 MILLIGRAM(S): at 08:00

## 2018-07-28 RX ADMIN — PROPOFOL 2.25 MICROGRAM(S)/KG/MIN: 10 INJECTION, EMULSION INTRAVENOUS at 13:31

## 2018-07-28 RX ADMIN — PROPOFOL 2.25 MICROGRAM(S)/KG/MIN: 10 INJECTION, EMULSION INTRAVENOUS at 07:00

## 2018-07-28 RX ADMIN — FENTANYL CITRATE 1 MICROGRAM(S)/KG/HR: 50 INJECTION INTRAVENOUS at 07:00

## 2018-07-28 RX ADMIN — SODIUM CHLORIDE 3 MILLILITER(S): 9 INJECTION INTRAMUSCULAR; INTRAVENOUS; SUBCUTANEOUS at 21:25

## 2018-07-28 RX ADMIN — Medication 1500 MILLILITER(S): at 19:02
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
## 2018-07-28 NOTE — PROGRESS NOTE ADULT - SUBJECTIVE AND OBJECTIVE BOX
Vascular Surgery Progress Note     S: Patient was seen and examined during morning rounds.      MEDICATIONS  (STANDING):  ALBUTerol    90 MICROgram(s) HFA Inhaler 2 Puff(s) Inhalation every 6 hours  chlorhexidine 0.12% Liquid 15 milliLiter(s) Swish and Spit two times a day  dexmedetomidine Infusion 0.5 MICROgram(s)/kG/Hr (9.375 mL/Hr) IV Continuous <Continuous>  docusate sodium Liquid 300 milliGRAM(s) Oral at bedtime  fentaNYL   Infusion 1 MICROgram(s)/kG/Hr (7.5 mL/Hr) IV Continuous <Continuous>  heparin  Infusion 1350 Unit(s)/Hr (14.5 mL/Hr) IV Continuous <Continuous>  insulin lispro (HumaLOG) corrective regimen sliding scale   SubCutaneous every 6 hours  ipratropium 17 MICROgram(s) HFA Inhaler 2 Puff(s) Inhalation every 6 hours  lactated ringers. 1000 milliLiter(s) (100 mL/Hr) IV Continuous <Continuous>  LORazepam   Injectable 1 milliGRAM(s) IntraMuscular every 6 hours  multivitamin/thiamine/folic acid in sodium chloride 0.9% 1000 milliLiter(s) (100 mL/Hr) IV Continuous <Continuous>  pantoprazole  Injectable 40 milliGRAM(s) IV Push daily  phenylephrine    Infusion 0.2 MICROgram(s)/kG/Min (5.625 mL/Hr) IV Continuous <Continuous>  piperacillin/tazobactam IVPB. 3.375 Gram(s) IV Intermittent every 8 hours  propofol Infusion 5 MICROgram(s)/kG/Min (2.25 mL/Hr) IV Continuous <Continuous>  sodium chloride 0.9% lock flush 3 milliLiter(s) IV Push every 8 hours  sodium chloride 3%  Inhalation 4 milliLiter(s) Inhalation every 6 hours  thiamine Injectable      thiamine Injectable 100 milliGRAM(s) IV Push daily  vancomycin  IVPB 1500 milliGRAM(s) IV Intermittent every 12 hours    MEDICATIONS  (PRN):  acetaminophen  IVPB. 1000 milliGRAM(s) IV Intermittent once PRN Moderate Pain (4 - 6)  albumin human  5% IVPB 250 milliLiter(s) IV Intermittent every 30 minutes PRN for SBP<95  ketorolac   Injectable 15 milliGRAM(s) IV Push every 6 hours PRN Moderate Pain (4 - 6)  lactated ringers Bolus 250 milliLiter(s) IV Bolus every 30 minutes PRN for SBP<195  oxyCODONE    5 mG/acetaminophen 325 mG 2 Tablet(s) Oral every 6 hours PRN Severe Pain (7 - 10)      Vital Signs Last 24 Hrs  T(C): 37.4 (28 Jul 2018 18:00), Max: 38.7 (28 Jul 2018 08:00)  T(F): 99.4 (28 Jul 2018 18:00), Max: 101.6 (28 Jul 2018 08:00)  HR: 86 (28 Jul 2018 19:00) (81 - 94)  BP: 97/61 (28 Jul 2018 19:00) (90/59 - 113/75)  BP(mean): 68 (28 Jul 2018 19:00) (66 - 86)  RR: 27 (28 Jul 2018 19:00) (16 - 33)  SpO2: 95% (28 Jul 2018 19:00) (94% - 100%)    07-27-18 @ 07:01  -  07-28-18 @ 07:00  --------------------------------------------------------  IN: 2512.5 mL / OUT: 1750 mL / NET: 762.5 mL    07-28-18 @ 07:01  -  07-28-18 @ 19:43  --------------------------------------------------------  IN: 3456.7 mL / OUT: 735 mL / NET: 2721.7 mL        Physical Exam  Gen: NAD, awake and alert  Head/Neck: NC/AT  Resp: Respirations nonlabored  Neuro: Speaking in clear, full sentences  Extremeties: Palpable DP on L, vascular exam remains unchanged     LABS:                        12.9   13.80 )-----------( 159      ( 28 Jul 2018 13:00 )             40.2     07-28    138  |  104  |  11  ----------------------------<  100<H>  4.1   |  22  |  0.47<L>    Ca    7.7<L>      28 Jul 2018 02:45  Phos  3.7     07-28  Mg     2.0     07-28 Vascular Surgery Progress Note     S: Patient was seen and examined during morning rounds.      MEDICATIONS  (STANDING):  ALBUTerol    90 MICROgram(s) HFA Inhaler 2 Puff(s) Inhalation every 6 hours  chlorhexidine 0.12% Liquid 15 milliLiter(s) Swish and Spit two times a day  dexmedetomidine Infusion 0.5 MICROgram(s)/kG/Hr (9.375 mL/Hr) IV Continuous <Continuous>  docusate sodium Liquid 300 milliGRAM(s) Oral at bedtime  fentaNYL   Infusion 1 MICROgram(s)/kG/Hr (7.5 mL/Hr) IV Continuous <Continuous>  heparin  Infusion 1350 Unit(s)/Hr (14.5 mL/Hr) IV Continuous <Continuous>  insulin lispro (HumaLOG) corrective regimen sliding scale   SubCutaneous every 6 hours  ipratropium 17 MICROgram(s) HFA Inhaler 2 Puff(s) Inhalation every 6 hours  lactated ringers. 1000 milliLiter(s) (100 mL/Hr) IV Continuous <Continuous>  LORazepam   Injectable 1 milliGRAM(s) IntraMuscular every 6 hours  multivitamin/thiamine/folic acid in sodium chloride 0.9% 1000 milliLiter(s) (100 mL/Hr) IV Continuous <Continuous>  pantoprazole  Injectable 40 milliGRAM(s) IV Push daily  phenylephrine    Infusion 0.2 MICROgram(s)/kG/Min (5.625 mL/Hr) IV Continuous <Continuous>  piperacillin/tazobactam IVPB. 3.375 Gram(s) IV Intermittent every 8 hours  propofol Infusion 5 MICROgram(s)/kG/Min (2.25 mL/Hr) IV Continuous <Continuous>  sodium chloride 0.9% lock flush 3 milliLiter(s) IV Push every 8 hours  sodium chloride 3%  Inhalation 4 milliLiter(s) Inhalation every 6 hours  thiamine Injectable      thiamine Injectable 100 milliGRAM(s) IV Push daily  vancomycin  IVPB 1500 milliGRAM(s) IV Intermittent every 12 hours    MEDICATIONS  (PRN):  acetaminophen  IVPB. 1000 milliGRAM(s) IV Intermittent once PRN Moderate Pain (4 - 6)  albumin human  5% IVPB 250 milliLiter(s) IV Intermittent every 30 minutes PRN for SBP<95  ketorolac   Injectable 15 milliGRAM(s) IV Push every 6 hours PRN Moderate Pain (4 - 6)  lactated ringers Bolus 250 milliLiter(s) IV Bolus every 30 minutes PRN for SBP<195  oxyCODONE    5 mG/acetaminophen 325 mG 2 Tablet(s) Oral every 6 hours PRN Severe Pain (7 - 10)      Vital Signs Last 24 Hrs  T(C): 37.4 (28 Jul 2018 18:00), Max: 38.7 (28 Jul 2018 08:00)  T(F): 99.4 (28 Jul 2018 18:00), Max: 101.6 (28 Jul 2018 08:00)  HR: 86 (28 Jul 2018 19:00) (81 - 94)  BP: 97/61 (28 Jul 2018 19:00) (90/59 - 113/75)  BP(mean): 68 (28 Jul 2018 19:00) (66 - 86)  RR: 27 (28 Jul 2018 19:00) (16 - 33)  SpO2: 95% (28 Jul 2018 19:00) (94% - 100%)    07-27-18 @ 07:01  -  07-28-18 @ 07:00  --------------------------------------------------------  IN: 2512.5 mL / OUT: 1750 mL / NET: 762.5 mL    07-28-18 @ 07:01  -  07-28-18 @ 19:43  --------------------------------------------------------  IN: 3456.7 mL / OUT: 735 mL / NET: 2721.7 mL        Physical Exam  Gen: NAD, awake and alert  Head/Neck: NC/AT  Resp: Respirations nonlabored  Neuro: Speaking in clear, full sentences  Extremeties: On the LLE - DP signal, popliteal signal, palpable femoral    LABS:                        12.9   13.80 )-----------( 159      ( 28 Jul 2018 13:00 )             40.2     07-28    138  |  104  |  11  ----------------------------<  100<H>  4.1   |  22  |  0.47<L>    Ca    7.7<L>      28 Jul 2018 02:45  Phos  3.7     07-28  Mg     2.0     07-28 Vascular Surgery Progress Note     S: Patient was seen and examined during morning rounds.      MEDICATIONS  (STANDING):  ALBUTerol    90 MICROgram(s) HFA Inhaler 2 Puff(s) Inhalation every 6 hours  chlorhexidine 0.12% Liquid 15 milliLiter(s) Swish and Spit two times a day  dexmedetomidine Infusion 0.5 MICROgram(s)/kG/Hr (9.375 mL/Hr) IV Continuous <Continuous>  docusate sodium Liquid 300 milliGRAM(s) Oral at bedtime  fentaNYL   Infusion 1 MICROgram(s)/kG/Hr (7.5 mL/Hr) IV Continuous <Continuous>  heparin  Infusion 1350 Unit(s)/Hr (14.5 mL/Hr) IV Continuous <Continuous>  insulin lispro (HumaLOG) corrective regimen sliding scale   SubCutaneous every 6 hours  ipratropium 17 MICROgram(s) HFA Inhaler 2 Puff(s) Inhalation every 6 hours  lactated ringers. 1000 milliLiter(s) (100 mL/Hr) IV Continuous <Continuous>  LORazepam   Injectable 1 milliGRAM(s) IntraMuscular every 6 hours  multivitamin/thiamine/folic acid in sodium chloride 0.9% 1000 milliLiter(s) (100 mL/Hr) IV Continuous <Continuous>  pantoprazole  Injectable 40 milliGRAM(s) IV Push daily  phenylephrine    Infusion 0.2 MICROgram(s)/kG/Min (5.625 mL/Hr) IV Continuous <Continuous>  piperacillin/tazobactam IVPB. 3.375 Gram(s) IV Intermittent every 8 hours  propofol Infusion 5 MICROgram(s)/kG/Min (2.25 mL/Hr) IV Continuous <Continuous>  sodium chloride 0.9% lock flush 3 milliLiter(s) IV Push every 8 hours  sodium chloride 3%  Inhalation 4 milliLiter(s) Inhalation every 6 hours  thiamine Injectable      thiamine Injectable 100 milliGRAM(s) IV Push daily  vancomycin  IVPB 1500 milliGRAM(s) IV Intermittent every 12 hours    MEDICATIONS  (PRN):  acetaminophen  IVPB. 1000 milliGRAM(s) IV Intermittent once PRN Moderate Pain (4 - 6)  albumin human  5% IVPB 250 milliLiter(s) IV Intermittent every 30 minutes PRN for SBP<95  ketorolac   Injectable 15 milliGRAM(s) IV Push every 6 hours PRN Moderate Pain (4 - 6)  lactated ringers Bolus 250 milliLiter(s) IV Bolus every 30 minutes PRN for SBP<195  oxyCODONE    5 mG/acetaminophen 325 mG 2 Tablet(s) Oral every 6 hours PRN Severe Pain (7 - 10)      Vital Signs Last 24 Hrs  T(C): 37.4 (28 Jul 2018 18:00), Max: 38.7 (28 Jul 2018 08:00)  T(F): 99.4 (28 Jul 2018 18:00), Max: 101.6 (28 Jul 2018 08:00)  HR: 86 (28 Jul 2018 19:00) (81 - 94)  BP: 97/61 (28 Jul 2018 19:00) (90/59 - 113/75)  BP(mean): 68 (28 Jul 2018 19:00) (66 - 86)  RR: 27 (28 Jul 2018 19:00) (16 - 33)  SpO2: 95% (28 Jul 2018 19:00) (94% - 100%)    07-27-18 @ 07:01  -  07-28-18 @ 07:00  --------------------------------------------------------  IN: 2512.5 mL / OUT: 1750 mL / NET: 762.5 mL    07-28-18 @ 07:01  -  07-28-18 @ 19:43  --------------------------------------------------------  IN: 3456.7 mL / OUT: 735 mL / NET: 2721.7 mL        Physical Exam  Gen: NAD, awake and alert  Head/Neck: NC/AT  Resp: Respirations nonlabored  Extremeties: On the LLE - DP signal, popliteal signal, palpable femoral    LABS:                        12.9   13.80 )-----------( 159      ( 28 Jul 2018 13:00 )             40.2     07-28    138  |  104  |  11  ----------------------------<  100<H>  4.1   |  22  |  0.47<L>    Ca    7.7<L>      28 Jul 2018 02:45  Phos  3.7     07-28  Mg     2.0     07-28

## 2018-07-28 NOTE — PROGRESS NOTE ADULT - SUBJECTIVE AND OBJECTIVE BOX
Infectious Diseases progress note:    Subjective: Pt intubated, sedated.  (+) fever.  s/p vats/decortication yesterday    ROS:  Pt intubated, nonverbal    Allergies    No Known Allergies    Intolerances        ANTIBIOTICS/RELEVANT:  antimicrobials  piperacillin/tazobactam IVPB. 3.375 Gram(s) IV Intermittent every 8 hours  vancomycin  IVPB 1500 milliGRAM(s) IV Intermittent every 12 hours    immunologic:    OTHER:  ALBUTerol    90 MICROgram(s) HFA Inhaler 2 Puff(s) Inhalation every 6 hours  dexmedetomidine Infusion 0.5 MICROgram(s)/kG/Hr IV Continuous <Continuous>  docusate sodium 100 milliGRAM(s) Oral three times a day  fentaNYL   Infusion 1 MICROgram(s)/kG/Hr IV Continuous <Continuous>  heparin  Infusion 1350 Unit(s)/Hr IV Continuous <Continuous>  insulin lispro (HumaLOG) corrective regimen sliding scale   SubCutaneous three times a day before meals  ipratropium 17 MICROgram(s) HFA Inhaler 2 Puff(s) Inhalation every 6 hours  ketorolac   Injectable 15 milliGRAM(s) IV Push every 6 hours PRN  lactated ringers. 1000 milliLiter(s) IV Continuous <Continuous>  oxyCODONE    5 mG/acetaminophen 325 mG 2 Tablet(s) Oral every 6 hours PRN  pantoprazole  Injectable 40 milliGRAM(s) IV Push daily  propofol Infusion 5 MICROgram(s)/kG/Min IV Continuous <Continuous>  senna 2 Tablet(s) Oral at bedtime PRN  sodium chloride 0.9% lock flush 3 milliLiter(s) IV Push every 8 hours  thiamine Injectable      thiamine Injectable 100 milliGRAM(s) IV Push daily      Objective:  Vital Signs Last 24 Hrs  T(C): 37.8 (28 Jul 2018 10:00), Max: 38.7 (28 Jul 2018 08:00)  T(F): 100 (28 Jul 2018 10:00), Max: 101.6 (28 Jul 2018 08:00)  HR: 91 (28 Jul 2018 11:13) (81 - 97)  BP: 104/68 (28 Jul 2018 11:00) (91/59 - 126/92)  BP(mean): 77 (28 Jul 2018 11:00) (66 - 100)  RR: 16 (28 Jul 2018 11:00) (15 - 33)  SpO2: 98% (28 Jul 2018 11:13) (98% - 100%)    PHYSICAL EXAM:  Constitutional: intubated  Eyes:MEMO, EOMI  Ear/Nose/Throat: no thrush, mucositis.  Moist mucous membranes, ETT in place  Neck:no JVD, no lymphadenopathy, supple  Respiratory: chest tubes in place  Cardiovascular: S1S2 RRR, no murmurs  Gastrointestinal:soft, nontender,  nondistended (+) BS  Extremities:no e/e/c  Skin:  no rashes, open wounds or ulcerations        LABS:                        14.3   11.20 )-----------( 168      ( 28 Jul 2018 02:45 )             43.6     07-28    138  |  104  |  11  ----------------------------<  100<H>  4.1   |  22  |  0.47<L>    Ca    7.7<L>      28 Jul 2018 02:45  Phos  3.7     07-28  Mg     2.0     07-28      PT/INR - ( 27 Jul 2018 04:20 )   PT: 13.7 SEC;   INR: 1.19          PTT - ( 27 Jul 2018 04:20 )  PTT:47.2 SEC            Vancomycin Level, Trough: 5.2 ug/mL (07-28 @ 09:43)  Vancomycin Level, Trough: 5.7 ug/mL (07-26 @ 22:00)              MICROBIOLOGY:    Culture - Respiratory with Gram Stain (07.27.18 @ 22:26)    Culture - Respiratory:   CULTURE IN PROGRESS, FURTHER REPORT TO FOLLOW.    Gram Stain Sputum:   WBC^White Blood Cells  QNTY CELLS IN GRAM STAIN: RARE (1+)  NOS^No Organisms Seen    Specimen Source: LUNG - LOWER LOBE RIGHT      Culture - Surg Site Aerob/Anaer w/Gm St (07.27.18 @ 22:22)    Gram Stain Wound:   WBC^White Blood Cells  QNTY CELLS IN GRAM STAIN: NO CELLS SEEN  NOS^No Organisms Seen    Specimen Source: ABSCESS      Culture - Blood (07.27.18 @ 11:03)    Culture - Blood:   NO ORGANISMS ISOLATED  NO ORGANISMS ISOLATED AT 24 HOURS    Specimen Source: BLOOD ARTERIAL    Culture - Acid Fast - Body Fluid w/Smear (07.26.18 @ 15:24)    Culture - Acid Fast Smear Concentrated:   AFB SMEAR= NO ACID FAST BACILLI SEEN    Specimen Source: PLEURAL FLUID    Culture - Body Fluid with Gram Stain (07.26.18 @ 10:59)    Culture - Body Fluid:   NO GROWTH - PRELIMINARY RESULTS  NO ORGANISMS ISOLATED AT 24 HOURS    Gram Stain:   NOS^No Organisms Seen  WBC^White Blood Cells  QNTY CELLS IN GRAM STAIN: MANY (4+)    Specimen Source: PLEURAL FLUID    Culture - Blood (07.25.18 @ 03:25)    Culture - Blood:   NO ORGANISMS ISOLATED  NO ORGANISMS ISOLATED AT 72 HRS.    Specimen Source: BLOOD PERIPHERAL          RADIOLOGY & ADDITIONAL STUDIES:    < from: Xray Chest 1 View- PORTABLE-Routine (07.28.18 @ 06:40) >  INTERPRETATION:     Since the last exam, theright-sided pigtail catheter has been removed   and two right-sided Sonny catheters introduced. Decreased right effusion   without complicating pneumothorax.  Left lung free of focal   consolidations. Heart size is stable and the endotracheal tube isin   place.      COMPARISON:  July 27      IMPRESSION:  Follow-up study post removal of pigtail catheter and   right-sided chest tubes placed.    < end of copied text >

## 2018-07-29 LAB
ALBUMIN SERPL ELPH-MCNC: 1.8 G/DL — LOW (ref 3.3–5)
ALP SERPL-CCNC: 179 U/L — HIGH (ref 40–120)
ALT FLD-CCNC: 28 U/L — SIGNIFICANT CHANGE UP (ref 4–41)
APTT BLD: 59.8 SEC — HIGH (ref 27.5–37.4)
AST SERPL-CCNC: 61 U/L — HIGH (ref 4–40)
BASE EXCESS BLDA CALC-SCNC: -1.3 MMOL/L — SIGNIFICANT CHANGE UP
BASE EXCESS BLDA CALC-SCNC: -2.4 MMOL/L — SIGNIFICANT CHANGE UP
BILIRUB SERPL-MCNC: 1.7 MG/DL — HIGH (ref 0.2–1.2)
BUN SERPL-MCNC: 11 MG/DL — SIGNIFICANT CHANGE UP (ref 7–23)
BUN SERPL-MCNC: 7 MG/DL — SIGNIFICANT CHANGE UP (ref 7–23)
CA-I BLD-SCNC: 0.98 MMOL/L — LOW (ref 1.03–1.23)
CA-I BLDA-SCNC: 1.08 MMOL/L — LOW (ref 1.15–1.29)
CALCIUM SERPL-MCNC: 7.2 MG/DL — LOW (ref 8.4–10.5)
CALCIUM SERPL-MCNC: 7.4 MG/DL — LOW (ref 8.4–10.5)
CHLORIDE SERPL-SCNC: 102 MMOL/L — SIGNIFICANT CHANGE UP (ref 98–107)
CHLORIDE SERPL-SCNC: 103 MMOL/L — SIGNIFICANT CHANGE UP (ref 98–107)
CO2 SERPL-SCNC: 20 MMOL/L — LOW (ref 22–31)
CO2 SERPL-SCNC: 21 MMOL/L — LOW (ref 22–31)
CREAT SERPL-MCNC: 0.51 MG/DL — SIGNIFICANT CHANGE UP (ref 0.5–1.3)
CREAT SERPL-MCNC: 0.94 MG/DL — SIGNIFICANT CHANGE UP (ref 0.5–1.3)
GLUCOSE BLDA-MCNC: 113 MG/DL — HIGH (ref 70–99)
GLUCOSE SERPL-MCNC: 111 MG/DL — HIGH (ref 70–99)
GLUCOSE SERPL-MCNC: 170 MG/DL — HIGH (ref 70–99)
HBA1C BLD-MCNC: 5.7 % — HIGH (ref 4–5.6)
HCO3 BLDA-SCNC: 23 MMOL/L — SIGNIFICANT CHANGE UP (ref 22–26)
HCO3 BLDA-SCNC: 23 MMOL/L — SIGNIFICANT CHANGE UP (ref 22–26)
HCT VFR BLD CALC: 36.2 % — LOW (ref 39–50)
HCT VFR BLD CALC: 36.2 % — LOW (ref 39–50)
HCT VFR BLDA CALC: 38.5 % — LOW (ref 39–51)
HGB BLD-MCNC: 11.8 G/DL — LOW (ref 13–17)
HGB BLD-MCNC: 12.1 G/DL — LOW (ref 13–17)
HGB BLDA-MCNC: 12.5 G/DL — LOW (ref 13–17)
INR BLD: 1.26 — HIGH (ref 0.88–1.17)
LACTATE BLDA-SCNC: 1.2 MMOL/L — SIGNIFICANT CHANGE UP (ref 0.5–2)
MAGNESIUM SERPL-MCNC: 1.9 MG/DL — SIGNIFICANT CHANGE UP (ref 1.6–2.6)
MAGNESIUM SERPL-MCNC: 2 MG/DL — SIGNIFICANT CHANGE UP (ref 1.6–2.6)
MCHC RBC-ENTMCNC: 32.5 PG — SIGNIFICANT CHANGE UP (ref 27–34)
MCHC RBC-ENTMCNC: 32.6 % — SIGNIFICANT CHANGE UP (ref 32–36)
MCHC RBC-ENTMCNC: 32.9 PG — SIGNIFICANT CHANGE UP (ref 27–34)
MCHC RBC-ENTMCNC: 33.4 % — SIGNIFICANT CHANGE UP (ref 32–36)
MCV RBC AUTO: 98.4 FL — SIGNIFICANT CHANGE UP (ref 80–100)
MCV RBC AUTO: 99.7 FL — SIGNIFICANT CHANGE UP (ref 80–100)
NRBC # FLD: 0 — SIGNIFICANT CHANGE UP
NRBC # FLD: 0.02 — SIGNIFICANT CHANGE UP
PCO2 BLDA: 36 MMHG — SIGNIFICANT CHANGE UP (ref 35–48)
PCO2 BLDA: 39 MMHG — SIGNIFICANT CHANGE UP (ref 35–48)
PH BLDA: 7.39 PH — SIGNIFICANT CHANGE UP (ref 7.35–7.45)
PH BLDA: 7.4 PH — SIGNIFICANT CHANGE UP (ref 7.35–7.45)
PHOSPHATE SERPL-MCNC: 2.5 MG/DL — SIGNIFICANT CHANGE UP (ref 2.5–4.5)
PHOSPHATE SERPL-MCNC: 4.8 MG/DL — HIGH (ref 2.5–4.5)
PLATELET # BLD AUTO: 150 K/UL — SIGNIFICANT CHANGE UP (ref 150–400)
PLATELET # BLD AUTO: 156 K/UL — SIGNIFICANT CHANGE UP (ref 150–400)
PMV BLD: 11.4 FL — SIGNIFICANT CHANGE UP (ref 7–13)
PMV BLD: 11.4 FL — SIGNIFICANT CHANGE UP (ref 7–13)
PO2 BLDA: 92 MMHG — SIGNIFICANT CHANGE UP (ref 83–108)
PO2 BLDA: 94 MMHG — SIGNIFICANT CHANGE UP (ref 83–108)
POTASSIUM BLDA-SCNC: 3.4 MMOL/L — SIGNIFICANT CHANGE UP (ref 3.4–4.5)
POTASSIUM SERPL-MCNC: 3 MMOL/L — LOW (ref 3.5–5.3)
POTASSIUM SERPL-MCNC: 3.8 MMOL/L — SIGNIFICANT CHANGE UP (ref 3.5–5.3)
POTASSIUM SERPL-SCNC: 3 MMOL/L — LOW (ref 3.5–5.3)
POTASSIUM SERPL-SCNC: 3.8 MMOL/L — SIGNIFICANT CHANGE UP (ref 3.5–5.3)
PROT SERPL-MCNC: 4.9 G/DL — LOW (ref 6–8.3)
PROTHROM AB SERPL-ACNC: 14.5 SEC — HIGH (ref 9.8–13.1)
RBC # BLD: 3.63 M/UL — LOW (ref 4.2–5.8)
RBC # BLD: 3.68 M/UL — LOW (ref 4.2–5.8)
RBC # FLD: 13.2 % — SIGNIFICANT CHANGE UP (ref 10.3–14.5)
RBC # FLD: 13.3 % — SIGNIFICANT CHANGE UP (ref 10.3–14.5)
SAO2 % BLDA: 96.4 % — SIGNIFICANT CHANGE UP (ref 95–99)
SAO2 % BLDA: 97.2 % — SIGNIFICANT CHANGE UP (ref 95–99)
SODIUM BLDA-SCNC: 132 MMOL/L — LOW (ref 136–146)
SODIUM SERPL-SCNC: 136 MMOL/L — SIGNIFICANT CHANGE UP (ref 135–145)
SODIUM SERPL-SCNC: 137 MMOL/L — SIGNIFICANT CHANGE UP (ref 135–145)
VANCOMYCIN TROUGH SERPL-MCNC: 14.3 UG/ML — SIGNIFICANT CHANGE UP (ref 10–20)
WBC # BLD: 15.62 K/UL — HIGH (ref 3.8–10.5)
WBC # BLD: 16.35 K/UL — HIGH (ref 3.8–10.5)
WBC # FLD AUTO: 15.62 K/UL — HIGH (ref 3.8–10.5)
WBC # FLD AUTO: 16.35 K/UL — HIGH (ref 3.8–10.5)

## 2018-07-29 PROCEDURE — 99292 CRITICAL CARE ADDL 30 MIN: CPT

## 2018-07-29 PROCEDURE — 99291 CRITICAL CARE FIRST HOUR: CPT

## 2018-07-29 PROCEDURE — 71045 X-RAY EXAM CHEST 1 VIEW: CPT | Mod: 26

## 2018-07-29 RX ORDER — POTASSIUM CHLORIDE 20 MEQ
10 PACKET (EA) ORAL
Qty: 0 | Refills: 0 | Status: COMPLETED | OUTPATIENT
Start: 2018-07-29 | End: 2018-07-29

## 2018-07-29 RX ORDER — ACETAMINOPHEN 500 MG
1000 TABLET ORAL ONCE
Qty: 0 | Refills: 0 | Status: COMPLETED | OUTPATIENT
Start: 2018-07-29 | End: 2018-07-29

## 2018-07-29 RX ORDER — FUROSEMIDE 40 MG
10 TABLET ORAL ONCE
Qty: 0 | Refills: 0 | Status: COMPLETED | OUTPATIENT
Start: 2018-07-29 | End: 2018-07-29

## 2018-07-29 RX ORDER — MAGNESIUM SULFATE 500 MG/ML
2 VIAL (ML) INJECTION ONCE
Qty: 0 | Refills: 0 | Status: COMPLETED | OUTPATIENT
Start: 2018-07-29 | End: 2018-07-29

## 2018-07-29 RX ORDER — POTASSIUM PHOSPHATE, MONOBASIC POTASSIUM PHOSPHATE, DIBASIC 236; 224 MG/ML; MG/ML
15 INJECTION, SOLUTION INTRAVENOUS ONCE
Qty: 0 | Refills: 0 | Status: COMPLETED | OUTPATIENT
Start: 2018-07-29 | End: 2018-07-29

## 2018-07-29 RX ORDER — CALCIUM GLUCONATE 100 MG/ML
2 VIAL (ML) INTRAVENOUS ONCE
Qty: 0 | Refills: 0 | Status: COMPLETED | OUTPATIENT
Start: 2018-07-29 | End: 2018-07-29

## 2018-07-29 RX ORDER — CALCIUM GLUCONATE 100 MG/ML
1 VIAL (ML) INTRAVENOUS ONCE
Qty: 0 | Refills: 0 | Status: COMPLETED | OUTPATIENT
Start: 2018-07-29 | End: 2018-07-29

## 2018-07-29 RX ORDER — FUROSEMIDE 40 MG
10 TABLET ORAL EVERY 8 HOURS
Qty: 0 | Refills: 0 | Status: COMPLETED | OUTPATIENT
Start: 2018-07-29 | End: 2018-07-30

## 2018-07-29 RX ORDER — ACETAMINOPHEN 500 MG
1000 TABLET ORAL ONCE
Qty: 0 | Refills: 0 | Status: COMPLETED | OUTPATIENT
Start: 2018-07-29 | End: 2018-07-30

## 2018-07-29 RX ORDER — DEXTROSE 50 % IN WATER 50 %
100 SYRINGE (ML) INTRAVENOUS ONCE
Qty: 0 | Refills: 0 | Status: COMPLETED | OUTPATIENT
Start: 2018-07-29 | End: 2018-07-29

## 2018-07-29 RX ADMIN — Medication 1 MILLIGRAM(S): at 00:54

## 2018-07-29 RX ADMIN — Medication 1 MILLIGRAM(S): at 06:08

## 2018-07-29 RX ADMIN — PIPERACILLIN AND TAZOBACTAM 25 GRAM(S): 4; .5 INJECTION, POWDER, LYOPHILIZED, FOR SOLUTION INTRAVENOUS at 22:31

## 2018-07-29 RX ADMIN — PIPERACILLIN AND TAZOBACTAM 25 GRAM(S): 4; .5 INJECTION, POWDER, LYOPHILIZED, FOR SOLUTION INTRAVENOUS at 13:01

## 2018-07-29 RX ADMIN — Medication 400 MILLIGRAM(S): at 21:03

## 2018-07-29 RX ADMIN — Medication 300 MILLIGRAM(S): at 22:31

## 2018-07-29 RX ADMIN — Medication 2 PUFF(S): at 10:50

## 2018-07-29 RX ADMIN — CHLORHEXIDINE GLUCONATE 15 MILLILITER(S): 213 SOLUTION TOPICAL at 06:07

## 2018-07-29 RX ADMIN — PROPOFOL 2.25 MICROGRAM(S)/KG/MIN: 10 INJECTION, EMULSION INTRAVENOUS at 20:32

## 2018-07-29 RX ADMIN — SODIUM CHLORIDE 3 MILLILITER(S): 9 INJECTION INTRAMUSCULAR; INTRAVENOUS; SUBCUTANEOUS at 13:01

## 2018-07-29 RX ADMIN — Medication 50 GRAM(S): at 14:32

## 2018-07-29 RX ADMIN — Medication 200 GRAM(S): at 08:05

## 2018-07-29 RX ADMIN — Medication 100 MILLIEQUIVALENT(S): at 09:11

## 2018-07-29 RX ADMIN — PROPOFOL 2.25 MICROGRAM(S)/KG/MIN: 10 INJECTION, EMULSION INTRAVENOUS at 08:05

## 2018-07-29 RX ADMIN — Medication 300 MILLIGRAM(S): at 01:30

## 2018-07-29 RX ADMIN — Medication 1 MILLIGRAM(S): at 18:35

## 2018-07-29 RX ADMIN — Medication 100 MILLIEQUIVALENT(S): at 08:05

## 2018-07-29 RX ADMIN — Medication 10 MILLIGRAM(S): at 22:32

## 2018-07-29 RX ADMIN — CHLORHEXIDINE GLUCONATE 15 MILLILITER(S): 213 SOLUTION TOPICAL at 18:35

## 2018-07-29 RX ADMIN — Medication 100 MILLIEQUIVALENT(S): at 14:32

## 2018-07-29 RX ADMIN — Medication 10 MILLIGRAM(S): at 08:03

## 2018-07-29 RX ADMIN — HEPARIN SODIUM 14.5 UNIT(S)/HR: 5000 INJECTION INTRAVENOUS; SUBCUTANEOUS at 08:04

## 2018-07-29 RX ADMIN — FENTANYL CITRATE 1 MICROGRAM(S)/KG/HR: 50 INJECTION INTRAVENOUS at 08:15

## 2018-07-29 RX ADMIN — Medication 400 MILLIGRAM(S): at 10:50

## 2018-07-29 RX ADMIN — Medication 100 MILLIEQUIVALENT(S): at 15:23

## 2018-07-29 RX ADMIN — Medication 2 PUFF(S): at 16:45

## 2018-07-29 RX ADMIN — Medication 2 PUFF(S): at 22:35

## 2018-07-29 RX ADMIN — Medication 2 PUFF(S): at 03:23

## 2018-07-29 RX ADMIN — FENTANYL CITRATE 7.5 MICROGRAM(S)/KG/HR: 50 INJECTION INTRAVENOUS at 20:32

## 2018-07-29 RX ADMIN — PANTOPRAZOLE SODIUM 40 MILLIGRAM(S): 20 TABLET, DELAYED RELEASE ORAL at 13:00

## 2018-07-29 RX ADMIN — Medication 10 MILLIGRAM(S): at 20:29

## 2018-07-29 RX ADMIN — Medication 300 MILLIGRAM(S): at 13:49

## 2018-07-29 RX ADMIN — DEXMEDETOMIDINE HYDROCHLORIDE IN 0.9% SODIUM CHLORIDE 9.38 MICROGRAM(S)/KG/HR: 4 INJECTION INTRAVENOUS at 20:32

## 2018-07-29 RX ADMIN — SODIUM CHLORIDE 3 MILLILITER(S): 9 INJECTION INTRAMUSCULAR; INTRAVENOUS; SUBCUTANEOUS at 06:09

## 2018-07-29 RX ADMIN — Medication 10 MILLIGRAM(S): at 12:01

## 2018-07-29 RX ADMIN — Medication 1 MILLIGRAM(S): at 13:00

## 2018-07-29 RX ADMIN — DEXMEDETOMIDINE HYDROCHLORIDE IN 0.9% SODIUM CHLORIDE 9.38 MICROGRAM(S)/KG/HR: 4 INJECTION INTRAVENOUS at 08:04

## 2018-07-29 RX ADMIN — SODIUM CHLORIDE 100 MILLILITER(S): 9 INJECTION, SOLUTION INTRAVENOUS at 22:19

## 2018-07-29 RX ADMIN — Medication 100 MILLIEQUIVALENT(S): at 09:41

## 2018-07-29 RX ADMIN — PIPERACILLIN AND TAZOBACTAM 25 GRAM(S): 4; .5 INJECTION, POWDER, LYOPHILIZED, FOR SOLUTION INTRAVENOUS at 06:07

## 2018-07-29 RX ADMIN — FENTANYL CITRATE 7.5 MICROGRAM(S)/KG/HR: 50 INJECTION INTRAVENOUS at 08:00

## 2018-07-29 RX ADMIN — SODIUM CHLORIDE 3 MILLILITER(S): 9 INJECTION INTRAMUSCULAR; INTRAVENOUS; SUBCUTANEOUS at 22:27

## 2018-07-29 RX ADMIN — Medication 400 MILLIGRAM(S): at 04:00

## 2018-07-29 RX ADMIN — POTASSIUM PHOSPHATE, MONOBASIC POTASSIUM PHOSPHATE, DIBASIC 62.5 MILLIMOLE(S): 236; 224 INJECTION, SOLUTION INTRAVENOUS at 08:04

## 2018-07-29 RX ADMIN — Medication 200 GRAM(S): at 14:32

## 2018-07-29 RX ADMIN — Medication 1000 MILLIGRAM(S): at 04:30

## 2018-07-29 RX ADMIN — Medication 10 MILLIGRAM(S): at 13:00

## 2018-07-29 RX ADMIN — Medication 100 MILLILITER(S): at 01:22

## 2018-07-29 NOTE — DIETITIAN INITIAL EVALUATION ADULT. - OTHER INFO
Nutrition assessment initiated for critical care LOS. Pt. sedated, intubated , noted 1+ L & R hand edema , per medical hx.  no recent wt. change, was on PO diet .
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
## 2018-07-29 NOTE — DIETITIAN INITIAL EVALUATION ADULT. - PERTINENT MEDS FT
Phenylephrine , Precedex, Fentanyl, Propofol, BANANA BAG, Vancomycin, Lasix, Humalog, Colace, Protonix , Zosyn

## 2018-07-30 LAB
ALBUMIN SERPL ELPH-MCNC: 1.6 G/DL — LOW (ref 3.3–5)
ALP SERPL-CCNC: 191 U/L — HIGH (ref 40–120)
ALT FLD-CCNC: 24 U/L — SIGNIFICANT CHANGE UP (ref 4–41)
APTT BLD: 69.4 SEC — HIGH (ref 27.5–37.4)
AST SERPL-CCNC: 53 U/L — HIGH (ref 4–40)
BACTERIA BLD CULT: SIGNIFICANT CHANGE UP
BACTERIA SPT RESP CULT: SIGNIFICANT CHANGE UP
BASE EXCESS BLDA CALC-SCNC: -4.9 MMOL/L — SIGNIFICANT CHANGE UP
BILIRUB SERPL-MCNC: 1.8 MG/DL — HIGH (ref 0.2–1.2)
BUN SERPL-MCNC: 14 MG/DL — SIGNIFICANT CHANGE UP (ref 7–23)
CA-I BLDA-SCNC: 1.08 MMOL/L — LOW (ref 1.15–1.29)
CALCIUM SERPL-MCNC: 7.5 MG/DL — LOW (ref 8.4–10.5)
CHLORIDE SERPL-SCNC: 100 MMOL/L — SIGNIFICANT CHANGE UP (ref 98–107)
CO2 SERPL-SCNC: 19 MMOL/L — LOW (ref 22–31)
CREAT SERPL-MCNC: 1.33 MG/DL — HIGH (ref 0.5–1.3)
GLUCOSE BLDA-MCNC: 130 MG/DL — HIGH (ref 70–99)
GLUCOSE SERPL-MCNC: 131 MG/DL — HIGH (ref 70–99)
HCO3 BLDA-SCNC: 21 MMOL/L — LOW (ref 22–26)
HCT VFR BLD CALC: 36.9 % — LOW (ref 39–50)
HCT VFR BLDA CALC: 36.5 % — LOW (ref 39–51)
HGB BLD-MCNC: 12.2 G/DL — LOW (ref 13–17)
HGB BLDA-MCNC: 11.9 G/DL — LOW (ref 13–17)
INR BLD: 1.21 — HIGH (ref 0.88–1.17)
LACTATE BLDA-SCNC: 1.3 MMOL/L — SIGNIFICANT CHANGE UP (ref 0.5–2)
MAGNESIUM SERPL-MCNC: 2.2 MG/DL — SIGNIFICANT CHANGE UP (ref 1.6–2.6)
MCHC RBC-ENTMCNC: 32.4 PG — SIGNIFICANT CHANGE UP (ref 27–34)
MCHC RBC-ENTMCNC: 33.1 % — SIGNIFICANT CHANGE UP (ref 32–36)
MCV RBC AUTO: 98.1 FL — SIGNIFICANT CHANGE UP (ref 80–100)
NRBC # FLD: 0 — SIGNIFICANT CHANGE UP
PCO2 BLDA: 35 MMHG — SIGNIFICANT CHANGE UP (ref 35–48)
PH BLDA: 7.36 PH — SIGNIFICANT CHANGE UP (ref 7.35–7.45)
PHOSPHATE SERPL-MCNC: 4.7 MG/DL — HIGH (ref 2.5–4.5)
PLATELET # BLD AUTO: 186 K/UL — SIGNIFICANT CHANGE UP (ref 150–400)
PMV BLD: 11.5 FL — SIGNIFICANT CHANGE UP (ref 7–13)
PO2 BLDA: 140 MMHG — HIGH (ref 83–108)
POTASSIUM BLDA-SCNC: 2.8 MMOL/L — CRITICAL LOW (ref 3.4–4.5)
POTASSIUM SERPL-MCNC: 3.2 MMOL/L — LOW (ref 3.5–5.3)
POTASSIUM SERPL-SCNC: 3.2 MMOL/L — LOW (ref 3.5–5.3)
PROT SERPL-MCNC: 4.9 G/DL — LOW (ref 6–8.3)
PROTHROM AB SERPL-ACNC: 13.5 SEC — HIGH (ref 9.8–13.1)
RBC # BLD: 3.76 M/UL — LOW (ref 4.2–5.8)
RBC # FLD: 13.5 % — SIGNIFICANT CHANGE UP (ref 10.3–14.5)
SAO2 % BLDA: 98.6 % — SIGNIFICANT CHANGE UP (ref 95–99)
SODIUM BLDA-SCNC: 131 MMOL/L — LOW (ref 136–146)
SODIUM SERPL-SCNC: 135 MMOL/L — SIGNIFICANT CHANGE UP (ref 135–145)
WBC # BLD: 21.48 K/UL — HIGH (ref 3.8–10.5)
WBC # FLD AUTO: 21.48 K/UL — HIGH (ref 3.8–10.5)

## 2018-07-30 PROCEDURE — 71045 X-RAY EXAM CHEST 1 VIEW: CPT | Mod: 26

## 2018-07-30 PROCEDURE — 99292 CRITICAL CARE ADDL 30 MIN: CPT

## 2018-07-30 PROCEDURE — 99291 CRITICAL CARE FIRST HOUR: CPT

## 2018-07-30 RX ORDER — MEROPENEM 1 G/30ML
500 INJECTION INTRAVENOUS ONCE
Qty: 0 | Refills: 0 | Status: COMPLETED | OUTPATIENT
Start: 2018-07-30 | End: 2018-07-30

## 2018-07-30 RX ORDER — POTASSIUM CHLORIDE 20 MEQ
10 PACKET (EA) ORAL
Qty: 0 | Refills: 0 | Status: COMPLETED | OUTPATIENT
Start: 2018-07-30 | End: 2018-07-30

## 2018-07-30 RX ORDER — PHENYLEPHRINE HYDROCHLORIDE 10 MG/ML
0.1 INJECTION INTRAVENOUS
Qty: 40 | Refills: 0 | Status: DISCONTINUED | OUTPATIENT
Start: 2018-07-30 | End: 2018-08-07

## 2018-07-30 RX ORDER — SODIUM CHLORIDE 9 MG/ML
1000 INJECTION, SOLUTION INTRAVENOUS
Qty: 0 | Refills: 0 | Status: DISCONTINUED | OUTPATIENT
Start: 2018-07-31 | End: 2018-08-06

## 2018-07-30 RX ORDER — ACETAMINOPHEN 500 MG
1000 TABLET ORAL ONCE
Qty: 0 | Refills: 0 | Status: DISCONTINUED | OUTPATIENT
Start: 2018-07-30 | End: 2018-08-02

## 2018-07-30 RX ORDER — MEROPENEM 1 G/30ML
INJECTION INTRAVENOUS
Qty: 0 | Refills: 0 | Status: DISCONTINUED | OUTPATIENT
Start: 2018-07-30 | End: 2018-08-01

## 2018-07-30 RX ORDER — MEROPENEM 1 G/30ML
500 INJECTION INTRAVENOUS EVERY 8 HOURS
Qty: 0 | Refills: 0 | Status: DISCONTINUED | OUTPATIENT
Start: 2018-07-31 | End: 2018-08-01

## 2018-07-30 RX ORDER — CALCIUM GLUCONATE 100 MG/ML
1 VIAL (ML) INTRAVENOUS ONCE
Qty: 0 | Refills: 0 | Status: COMPLETED | OUTPATIENT
Start: 2018-07-30 | End: 2018-07-30

## 2018-07-30 RX ORDER — METOPROLOL TARTRATE 50 MG
2.5 TABLET ORAL EVERY 6 HOURS
Qty: 0 | Refills: 0 | Status: DISCONTINUED | OUTPATIENT
Start: 2018-07-30 | End: 2018-08-06

## 2018-07-30 RX ORDER — ONDANSETRON 8 MG/1
4 TABLET, FILM COATED ORAL EVERY 6 HOURS
Qty: 0 | Refills: 0 | Status: DISCONTINUED | OUTPATIENT
Start: 2018-07-30 | End: 2018-09-12

## 2018-07-30 RX ORDER — METOPROLOL TARTRATE 50 MG
2.5 TABLET ORAL EVERY 4 HOURS
Qty: 0 | Refills: 0 | Status: DISCONTINUED | OUTPATIENT
Start: 2018-07-30 | End: 2018-07-30

## 2018-07-30 RX ORDER — SODIUM CHLORIDE 9 MG/ML
1000 INJECTION, SOLUTION INTRAVENOUS
Qty: 0 | Refills: 0 | Status: DISCONTINUED | OUTPATIENT
Start: 2018-07-30 | End: 2018-07-30

## 2018-07-30 RX ORDER — FENTANYL CITRATE 50 UG/ML
1 INJECTION INTRAVENOUS
Qty: 2500 | Refills: 0 | Status: DISCONTINUED | OUTPATIENT
Start: 2018-07-30 | End: 2018-08-06

## 2018-07-30 RX ADMIN — PIPERACILLIN AND TAZOBACTAM 25 GRAM(S): 4; .5 INJECTION, POWDER, LYOPHILIZED, FOR SOLUTION INTRAVENOUS at 05:19

## 2018-07-30 RX ADMIN — PHENYLEPHRINE HYDROCHLORIDE 2.81 MICROGRAM(S)/KG/MIN: 10 INJECTION INTRAVENOUS at 01:07

## 2018-07-30 RX ADMIN — Medication 2 PUFF(S): at 23:19

## 2018-07-30 RX ADMIN — PHENYLEPHRINE HYDROCHLORIDE 2.81 MICROGRAM(S)/KG/MIN: 10 INJECTION INTRAVENOUS at 19:54

## 2018-07-30 RX ADMIN — HEPARIN SODIUM 14.5 UNIT(S)/HR: 5000 INJECTION INTRAVENOUS; SUBCUTANEOUS at 08:00

## 2018-07-30 RX ADMIN — Medication 1 MILLIGRAM(S): at 23:44

## 2018-07-30 RX ADMIN — Medication 100 MILLIEQUIVALENT(S): at 04:55

## 2018-07-30 RX ADMIN — PANTOPRAZOLE SODIUM 40 MILLIGRAM(S): 20 TABLET, DELAYED RELEASE ORAL at 12:18

## 2018-07-30 RX ADMIN — Medication 300 MILLIGRAM(S): at 14:36

## 2018-07-30 RX ADMIN — SODIUM CHLORIDE 3 MILLILITER(S): 9 INJECTION INTRAMUSCULAR; INTRAVENOUS; SUBCUTANEOUS at 14:14

## 2018-07-30 RX ADMIN — Medication 1 MILLIGRAM(S): at 18:38

## 2018-07-30 RX ADMIN — Medication 100 MILLIEQUIVALENT(S): at 06:17

## 2018-07-30 RX ADMIN — Medication 1 MILLIGRAM(S): at 12:19

## 2018-07-30 RX ADMIN — Medication 2.5 MILLIGRAM(S): at 09:45

## 2018-07-30 RX ADMIN — SODIUM CHLORIDE 3 MILLILITER(S): 9 INJECTION INTRAMUSCULAR; INTRAVENOUS; SUBCUTANEOUS at 05:15

## 2018-07-30 RX ADMIN — Medication 300 MILLIGRAM(S): at 22:08

## 2018-07-30 RX ADMIN — Medication 1 MILLIGRAM(S): at 05:19

## 2018-07-30 RX ADMIN — DEXMEDETOMIDINE HYDROCHLORIDE IN 0.9% SODIUM CHLORIDE 9.38 MICROGRAM(S)/KG/HR: 4 INJECTION INTRAVENOUS at 19:55

## 2018-07-30 RX ADMIN — FENTANYL CITRATE 1 MICROGRAM(S)/KG/HR: 50 INJECTION INTRAVENOUS at 08:15

## 2018-07-30 RX ADMIN — Medication 2 PUFF(S): at 16:30

## 2018-07-30 RX ADMIN — FENTANYL CITRATE 7.5 MICROGRAM(S)/KG/HR: 50 INJECTION INTRAVENOUS at 19:54

## 2018-07-30 RX ADMIN — PHENYLEPHRINE HYDROCHLORIDE 2.81 MICROGRAM(S)/KG/MIN: 10 INJECTION INTRAVENOUS at 08:00

## 2018-07-30 RX ADMIN — Medication 10 MILLIGRAM(S): at 22:09

## 2018-07-30 RX ADMIN — Medication 200 GRAM(S): at 04:08

## 2018-07-30 RX ADMIN — CHLORHEXIDINE GLUCONATE 15 MILLILITER(S): 213 SOLUTION TOPICAL at 05:15

## 2018-07-30 RX ADMIN — Medication 300 MILLIGRAM(S): at 01:11

## 2018-07-30 RX ADMIN — Medication 10 MILLIGRAM(S): at 14:27

## 2018-07-30 RX ADMIN — HEPARIN SODIUM 14.5 UNIT(S)/HR: 5000 INJECTION INTRAVENOUS; SUBCUTANEOUS at 19:55

## 2018-07-30 RX ADMIN — FENTANYL CITRATE 1 MICROGRAM(S)/KG/HR: 50 INJECTION INTRAVENOUS at 21:36

## 2018-07-30 RX ADMIN — PIPERACILLIN AND TAZOBACTAM 25 GRAM(S): 4; .5 INJECTION, POWDER, LYOPHILIZED, FOR SOLUTION INTRAVENOUS at 16:04

## 2018-07-30 RX ADMIN — Medication 400 MILLIGRAM(S): at 09:45

## 2018-07-30 RX ADMIN — Medication 2 PUFF(S): at 03:32

## 2018-07-30 RX ADMIN — Medication 100 MILLIEQUIVALENT(S): at 04:08

## 2018-07-30 RX ADMIN — CHLORHEXIDINE GLUCONATE 15 MILLILITER(S): 213 SOLUTION TOPICAL at 18:37

## 2018-07-30 RX ADMIN — PROPOFOL 2.25 MICROGRAM(S)/KG/MIN: 10 INJECTION, EMULSION INTRAVENOUS at 08:00

## 2018-07-30 RX ADMIN — Medication 1 MILLIGRAM(S): at 01:12

## 2018-07-30 RX ADMIN — MEROPENEM 100 MILLIGRAM(S): 1 INJECTION INTRAVENOUS at 22:56

## 2018-07-30 RX ADMIN — FENTANYL CITRATE 7.5 MICROGRAM(S)/KG/HR: 50 INJECTION INTRAVENOUS at 08:00

## 2018-07-30 RX ADMIN — Medication 2 PUFF(S): at 10:50

## 2018-07-30 RX ADMIN — SODIUM CHLORIDE 3 MILLILITER(S): 9 INJECTION INTRAMUSCULAR; INTRAVENOUS; SUBCUTANEOUS at 21:36

## 2018-07-30 RX ADMIN — Medication 1000 MILLIGRAM(S): at 10:00

## 2018-07-30 RX ADMIN — Medication 10 MILLIGRAM(S): at 05:19

## 2018-07-30 NOTE — PROGRESS NOTE ADULT - SUBJECTIVE AND OBJECTIVE BOX
Infectious Diseases progress note:    Subjective:  Intubated/sedated.  Pt noted to be combative/agitated earlier and placed back on propofol.  WBC elevated, pt with fever.      ROS:  intubated/sedated.  Unable to assess    Allergies    No Known Allergies    Intolerances        ANTIBIOTICS/RELEVANT:  antimicrobials  piperacillin/tazobactam IVPB. 3.375 Gram(s) IV Intermittent every 8 hours  vancomycin  IVPB 1500 milliGRAM(s) IV Intermittent every 12 hours    immunologic:    OTHER:  ALBUTerol    90 MICROgram(s) HFA Inhaler 2 Puff(s) Inhalation every 6 hours  chlorhexidine 0.12% Liquid 15 milliLiter(s) Swish and Spit two times a day  dexmedetomidine Infusion 0.5 MICROgram(s)/kG/Hr IV Continuous <Continuous>  docusate sodium Liquid 300 milliGRAM(s) Oral at bedtime  fentaNYL   Infusion 1 MICROgram(s)/kG/Hr IV Continuous <Continuous>  furosemide   Injectable 10 milliGRAM(s) IV Push every 8 hours  heparin  Infusion 1350 Unit(s)/Hr IV Continuous <Continuous>  insulin lispro (HumaLOG) corrective regimen sliding scale   SubCutaneous every 6 hours  ipratropium 17 MICROgram(s) HFA Inhaler 2 Puff(s) Inhalation every 6 hours  ketorolac   Injectable 15 milliGRAM(s) IV Push every 6 hours PRN  lactated ringers Bolus 250 milliLiter(s) IV Bolus every 30 minutes PRN  LORazepam   Injectable 1 milliGRAM(s) IV Push every 4 hours PRN  LORazepam   Injectable 1 milliGRAM(s) IV Push every 6 hours  metoprolol tartrate Injectable 2.5 milliGRAM(s) IV Push every 6 hours PRN  ondansetron Injectable 4 milliGRAM(s) IV Push every 6 hours PRN  pantoprazole  Injectable 40 milliGRAM(s) IV Push daily  phenylephrine    Infusion 0.1 MICROgram(s)/kG/Min IV Continuous <Continuous>  propofol Infusion 5 MICROgram(s)/kG/Min IV Continuous <Continuous>  sodium chloride 0.9% lock flush 3 milliLiter(s) IV Push every 8 hours      Objective:  Vital Signs Last 24 Hrs  T(C): 37.8 (30 Jul 2018 12:00), Max: 38.2 (30 Jul 2018 08:00)  T(F): 100 (30 Jul 2018 12:00), Max: 100.7 (30 Jul 2018 08:00)  HR: 89 (30 Jul 2018 15:00) (73 - 119)  BP: 106/65 (30 Jul 2018 13:05) (93/59 - 123/78)  BP(mean): 73 (30 Jul 2018 13:05) (64 - 89)  RR: 22 (30 Jul 2018 15:00) (18 - 37)  SpO2: 100% (30 Jul 2018 15:00) (95% - 100%)    PHYSICAL EXAM:  Constitutional: intubated/sedated  Eyes:MEMO, EOMI  Ear/Nose/Throat: no thrush, mucositis.  Moist mucous membranes, ETT in place	  Neck:no JVD, no lymphadenopathy, supple  Respiratory: Chest tubes x 4  Cardiovascular: S1S2 RRR, no murmurs  Gastrointestinal:soft, nontender,  nondistended (+) BS  Extremities:no e/e/c  Skin:  no rashes, open wounds or ulcerations        LABS:                        12.2   21.48 )-----------( 186      ( 30 Jul 2018 02:20 )             36.9     07-30    135  |  100  |  14  ----------------------------<  131<H>  3.2<L>   |  19<L>  |  1.33<H>    Ca    7.5<L>      30 Jul 2018 02:20  Phos  4.7     07-30  Mg     2.2     07-30    TPro  4.9<L>  /  Alb  1.6<L>  /  TBili  1.8<H>  /  DBili  x   /  AST  53<H>  /  ALT  24  /  AlkPhos  191<H>  07-30    PT/INR - ( 30 Jul 2018 02:20 )   PT: 13.5 SEC;   INR: 1.21          PTT - ( 30 Jul 2018 02:20 )  PTT:69.4 SEC            Vancomycin Level, Trough: 14.3 ug/mL (07-29 @ 11:18)  Vancomycin Level, Trough: 5.2 ug/mL (07-28 @ 09:43)  Vancomycin Level, Trough: 5.7 ug/mL (07-26 @ 22:00)              MICROBIOLOGY:      Culture - Acid Fast Smear Concentrated (07.27.18 @ 22:26)    Culture - Acid Fast Smear Concentrated:   AFB SMEAR= NO ACID FAST BACILLI SEEN    Specimen Source: LUNG - LOWER LOBE RIGHT    Culture - Respiratory with Gram Stain (07.27.18 @ 22:26)    Gram Stain Sputum:   WBC^White Blood Cells  QNTY CELLS IN GRAM STAIN: RARE (1+)  NOS^No Organisms Seen    Culture - Respiratory:   NO ORGANISMS ISOLATED AT 24 HOURS  NO ORGANISMS ISOLATED AT 48 HRS.    Specimen Source: LUNG - LOWER LOBE RIGHT    Culture - Surg Site Aerob/Anaer w/Gm St (07.27.18 @ 22:22)    Gram Stain Wound:   WBC^White Blood Cells  QNTY CELLS IN GRAM STAIN: NO CELLS SEEN  NOS^No Organisms Seen    Culture - Surgical Site:   NO ORGANISMS ISOLATED AT 24 HOURS  CULTURE IN PROGRESS, FURTHER REPORT TO FOLLOW.    Specimen Source: ABSCESS    Culture - Blood (07.27.18 @ 11:03)    Culture - Blood:   NO ORGANISMS ISOLATED  NO ORGANISMS ISOLATED AT 72 HRS.    Specimen Source: BLOOD ARTERIAL    Culture - Acid Fast - Body Fluid w/Smear (07.26.18 @ 15:24)    Culture - Acid Fast Smear Concentrated:   AFB SMEAR= NO ACID FAST BACILLI SEEN    Specimen Source: PLEURAL FLUID    Culture - Blood (07.25.18 @ 03:25)    Culture - Blood:   NO ORGANISMS ISOLATED    Specimen Source: BLOOD PERIPHERAL        RADIOLOGY & ADDITIONAL STUDIES:    < from: Xray Chest 1 View- PORTABLE-Routine (07.30.18 @ 07:16) >  INTERPRETATION:     The endotracheal, entericand right-sided chest tubes are unchanged from   the last study. Loculated right effusion appears slightly decreased from   the last exam but there is interstitial edema are about the same. Heart   size is stable. No pneumothorax.      COMPARISON:  July 29      IMPRESSION:  Follow-up with tubes in place and slight improvement in   loculated right effusion although pulmonary edema appears to be present.    < end of copied text >
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
## 2018-07-31 LAB
APTT BLD: 56.3 SEC — HIGH (ref 27.5–37.4)
BACTERIA FLD CULT: SIGNIFICANT CHANGE UP
BASE EXCESS BLDA CALC-SCNC: -5.3 MMOL/L — SIGNIFICANT CHANGE UP
BUN SERPL-MCNC: 21 MG/DL — SIGNIFICANT CHANGE UP (ref 7–23)
CA-I BLD-SCNC: 0.99 MMOL/L — LOW (ref 1.03–1.23)
CALCIUM SERPL-MCNC: 7.5 MG/DL — LOW (ref 8.4–10.5)
CHLORIDE SERPL-SCNC: 101 MMOL/L — SIGNIFICANT CHANGE UP (ref 98–107)
CO2 SERPL-SCNC: 18 MMOL/L — LOW (ref 22–31)
CREAT SERPL-MCNC: 1.42 MG/DL — HIGH (ref 0.5–1.3)
CULTURE - SURGICAL SITE: SIGNIFICANT CHANGE UP
GLUCOSE SERPL-MCNC: 131 MG/DL — HIGH (ref 70–99)
HCO3 BLDA-SCNC: 20 MMOL/L — LOW (ref 22–26)
HCT VFR BLD CALC: 32.4 % — LOW (ref 39–50)
HGB BLD-MCNC: 10.7 G/DL — LOW (ref 13–17)
INR BLD: 1.2 — HIGH (ref 0.88–1.17)
MAGNESIUM SERPL-MCNC: 2.2 MG/DL — SIGNIFICANT CHANGE UP (ref 1.6–2.6)
MCHC RBC-ENTMCNC: 32.5 PG — SIGNIFICANT CHANGE UP (ref 27–34)
MCHC RBC-ENTMCNC: 33 % — SIGNIFICANT CHANGE UP (ref 32–36)
MCV RBC AUTO: 98.5 FL — SIGNIFICANT CHANGE UP (ref 80–100)
NRBC # FLD: 0.04 — SIGNIFICANT CHANGE UP
PCO2 BLDA: 33 MMHG — LOW (ref 35–48)
PH BLDA: 7.38 PH — SIGNIFICANT CHANGE UP (ref 7.35–7.45)
PHOSPHATE SERPL-MCNC: 5.6 MG/DL — HIGH (ref 2.5–4.5)
PLATELET # BLD AUTO: 207 K/UL — SIGNIFICANT CHANGE UP (ref 150–400)
PMV BLD: 11.6 FL — SIGNIFICANT CHANGE UP (ref 7–13)
PO2 BLDA: 111 MMHG — HIGH (ref 83–108)
POTASSIUM SERPL-MCNC: 3.8 MMOL/L — SIGNIFICANT CHANGE UP (ref 3.5–5.3)
POTASSIUM SERPL-SCNC: 3.8 MMOL/L — SIGNIFICANT CHANGE UP (ref 3.5–5.3)
PROTHROM AB SERPL-ACNC: 13.9 SEC — HIGH (ref 9.8–13.1)
RBC # BLD: 3.29 M/UL — LOW (ref 4.2–5.8)
RBC # FLD: 13.8 % — SIGNIFICANT CHANGE UP (ref 10.3–14.5)
SAO2 % BLDA: 98.1 % — SIGNIFICANT CHANGE UP (ref 95–99)
SODIUM SERPL-SCNC: 136 MMOL/L — SIGNIFICANT CHANGE UP (ref 135–145)
SPECIMEN SOURCE: SIGNIFICANT CHANGE UP
SPECIMEN SOURCE: SIGNIFICANT CHANGE UP
VANCOMYCIN TROUGH SERPL-MCNC: 20.8 UG/ML — HIGH (ref 10–20)
VANCOMYCIN TROUGH SERPL-MCNC: 33.3 UG/ML — CRITICAL HIGH (ref 10–20)
WBC # BLD: 18.37 K/UL — HIGH (ref 3.8–10.5)
WBC # FLD AUTO: 18.37 K/UL — HIGH (ref 3.8–10.5)

## 2018-07-31 PROCEDURE — 99292 CRITICAL CARE ADDL 30 MIN: CPT

## 2018-07-31 PROCEDURE — 71045 X-RAY EXAM CHEST 1 VIEW: CPT | Mod: 26

## 2018-07-31 PROCEDURE — 99291 CRITICAL CARE FIRST HOUR: CPT

## 2018-07-31 RX ORDER — CALCIUM GLUCONATE 100 MG/ML
2 VIAL (ML) INTRAVENOUS ONCE
Qty: 0 | Refills: 0 | Status: COMPLETED | OUTPATIENT
Start: 2018-07-31 | End: 2018-07-31

## 2018-07-31 RX ORDER — FUROSEMIDE 40 MG
10 TABLET ORAL ONCE
Qty: 0 | Refills: 0 | Status: COMPLETED | OUTPATIENT
Start: 2018-07-31 | End: 2018-07-31

## 2018-07-31 RX ORDER — FUROSEMIDE 40 MG
20 TABLET ORAL EVERY 6 HOURS
Qty: 0 | Refills: 0 | Status: DISCONTINUED | OUTPATIENT
Start: 2018-07-31 | End: 2018-07-31

## 2018-07-31 RX ORDER — VANCOMYCIN HCL 1 G
1250 VIAL (EA) INTRAVENOUS EVERY 12 HOURS
Qty: 0 | Refills: 0 | Status: DISCONTINUED | OUTPATIENT
Start: 2018-07-31 | End: 2018-08-05

## 2018-07-31 RX ORDER — FUROSEMIDE 40 MG
20 TABLET ORAL ONCE
Qty: 0 | Refills: 0 | Status: COMPLETED | OUTPATIENT
Start: 2018-07-31 | End: 2018-07-31

## 2018-07-31 RX ADMIN — Medication 20 MILLIGRAM(S): at 05:08

## 2018-07-31 RX ADMIN — Medication 1 MILLIGRAM(S): at 11:18

## 2018-07-31 RX ADMIN — Medication 2 PUFF(S): at 15:11

## 2018-07-31 RX ADMIN — Medication 200 GRAM(S): at 06:51

## 2018-07-31 RX ADMIN — FENTANYL CITRATE 7.5 MICROGRAM(S)/KG/HR: 50 INJECTION INTRAVENOUS at 11:14

## 2018-07-31 RX ADMIN — Medication 1 MILLIGRAM(S): at 23:49

## 2018-07-31 RX ADMIN — Medication 1 MILLIGRAM(S): at 18:17

## 2018-07-31 RX ADMIN — CHLORHEXIDINE GLUCONATE 15 MILLILITER(S): 213 SOLUTION TOPICAL at 17:51

## 2018-07-31 RX ADMIN — MEROPENEM 100 MILLIGRAM(S): 1 INJECTION INTRAVENOUS at 13:08

## 2018-07-31 RX ADMIN — FENTANYL CITRATE 1 MICROGRAM(S)/KG/HR: 50 INJECTION INTRAVENOUS at 20:30

## 2018-07-31 RX ADMIN — PHENYLEPHRINE HYDROCHLORIDE 2.81 MICROGRAM(S)/KG/MIN: 10 INJECTION INTRAVENOUS at 11:14

## 2018-07-31 RX ADMIN — PROPOFOL 2.25 MICROGRAM(S)/KG/MIN: 10 INJECTION, EMULSION INTRAVENOUS at 11:15

## 2018-07-31 RX ADMIN — PANTOPRAZOLE SODIUM 40 MILLIGRAM(S): 20 TABLET, DELAYED RELEASE ORAL at 11:18

## 2018-07-31 RX ADMIN — SODIUM CHLORIDE 40 MILLILITER(S): 9 INJECTION, SOLUTION INTRAVENOUS at 23:49

## 2018-07-31 RX ADMIN — Medication 1 MILLIGRAM(S): at 05:08

## 2018-07-31 RX ADMIN — SODIUM CHLORIDE 40 MILLILITER(S): 9 INJECTION, SOLUTION INTRAVENOUS at 19:47

## 2018-07-31 RX ADMIN — DEXMEDETOMIDINE HYDROCHLORIDE IN 0.9% SODIUM CHLORIDE 9.38 MICROGRAM(S)/KG/HR: 4 INJECTION INTRAVENOUS at 19:47

## 2018-07-31 RX ADMIN — Medication 300 MILLIGRAM(S): at 22:31

## 2018-07-31 RX ADMIN — Medication 2 PUFF(S): at 10:11

## 2018-07-31 RX ADMIN — MEROPENEM 100 MILLIGRAM(S): 1 INJECTION INTRAVENOUS at 22:31

## 2018-07-31 RX ADMIN — DEXMEDETOMIDINE HYDROCHLORIDE IN 0.9% SODIUM CHLORIDE 9.38 MICROGRAM(S)/KG/HR: 4 INJECTION INTRAVENOUS at 11:14

## 2018-07-31 RX ADMIN — PROPOFOL 2.25 MICROGRAM(S)/KG/MIN: 10 INJECTION, EMULSION INTRAVENOUS at 02:47

## 2018-07-31 RX ADMIN — Medication 2 PUFF(S): at 03:29

## 2018-07-31 RX ADMIN — PROPOFOL 2.25 MICROGRAM(S)/KG/MIN: 10 INJECTION, EMULSION INTRAVENOUS at 19:48

## 2018-07-31 RX ADMIN — HEPARIN SODIUM 14.5 UNIT(S)/HR: 5000 INJECTION INTRAVENOUS; SUBCUTANEOUS at 19:49

## 2018-07-31 RX ADMIN — MEROPENEM 100 MILLIGRAM(S): 1 INJECTION INTRAVENOUS at 05:39

## 2018-07-31 RX ADMIN — FENTANYL CITRATE 1 MICROGRAM(S)/KG/HR: 50 INJECTION INTRAVENOUS at 11:37

## 2018-07-31 RX ADMIN — Medication 20 MILLIGRAM(S): at 22:31

## 2018-07-31 RX ADMIN — Medication 2 PUFF(S): at 22:26

## 2018-07-31 RX ADMIN — SODIUM CHLORIDE 3 MILLILITER(S): 9 INJECTION INTRAMUSCULAR; INTRAVENOUS; SUBCUTANEOUS at 22:28

## 2018-07-31 RX ADMIN — SODIUM CHLORIDE 40 MILLILITER(S): 9 INJECTION, SOLUTION INTRAVENOUS at 01:00

## 2018-07-31 RX ADMIN — SODIUM CHLORIDE 3 MILLILITER(S): 9 INJECTION INTRAMUSCULAR; INTRAVENOUS; SUBCUTANEOUS at 05:08

## 2018-07-31 RX ADMIN — Medication 10 MILLIGRAM(S): at 18:53

## 2018-07-31 RX ADMIN — PHENYLEPHRINE HYDROCHLORIDE 2.81 MICROGRAM(S)/KG/MIN: 10 INJECTION INTRAVENOUS at 19:52

## 2018-07-31 RX ADMIN — SODIUM CHLORIDE 3 MILLILITER(S): 9 INJECTION INTRAMUSCULAR; INTRAVENOUS; SUBCUTANEOUS at 11:13

## 2018-07-31 RX ADMIN — CHLORHEXIDINE GLUCONATE 15 MILLILITER(S): 213 SOLUTION TOPICAL at 05:08

## 2018-07-31 RX ADMIN — FENTANYL CITRATE 7.5 MICROGRAM(S)/KG/HR: 50 INJECTION INTRAVENOUS at 19:47

## 2018-07-31 NOTE — CHART NOTE - NSCHARTNOTEFT_GEN_A_CORE
Source:  nursing & EMR     Diet : NPO with tube feed - Jevity 1.2 @ 55 ml/hr 24hrs     Patient remans intubated, sedated, on EN @ GOAL , TOLERATING , will continue EN .      Enteral : 1584 kcal & 73 gm protein /d       Current Weight: Weight (kg): 75 (07-27 @ 17:24)- % Weight Change    Pertinent Medications: MEDICATIONS  (STANDING):  ALBUTerol    90 MICROgram(s) HFA Inhaler 2 Puff(s) Inhalation every 6 hours  chlorhexidine 0.12% Liquid 15 milliLiter(s) Swish and Spit two times a day  dexmedetomidine Infusion 0.5 MICROgram(s)/kG/Hr (9.375 mL/Hr) IV Continuous <Continuous>  docusate sodium Liquid 300 milliGRAM(s) Oral at bedtime  fentaNYL   Infusion 1 MICROgram(s)/kG/Hr (7.5 mL/Hr) IV Continuous <Continuous>  heparin  Infusion 1350 Unit(s)/Hr (14.5 mL/Hr) IV Continuous <Continuous>  insulin lispro (HumaLOG) corrective regimen sliding scale   SubCutaneous every 6 hours  ipratropium 17 MICROgram(s) HFA Inhaler 2 Puff(s) Inhalation every 6 hours  LORazepam   Injectable 1 milliGRAM(s) IV Push every 6 hours  meropenem  IVPB      meropenem  IVPB 500 milliGRAM(s) IV Intermittent every 8 hours  multivitamin/thiamine/folic acid in sodium chloride 0.9% 1000 milliLiter(s) (40 mL/Hr) IV Continuous <Continuous>  pantoprazole  Injectable 40 milliGRAM(s) IV Push daily  phenylephrine    Infusion 0.1 MICROgram(s)/kG/Min (2.813 mL/Hr) IV Continuous <Continuous>  propofol Infusion 5 MICROgram(s)/kG/Min (2.25 mL/Hr) IV Continuous <Continuous>  sodium chloride 0.9% lock flush 3 milliLiter(s) IV Push every 8 hours  vancomycin  IVPB 1500 milliGRAM(s) IV Intermittent every 12 hours    MEDICATIONS  (PRN):  acetaminophen  IVPB. 1000 milliGRAM(s) IV Intermittent once PRN Moderate Pain (4 - 6)  ketorolac   Injectable 15 milliGRAM(s) IV Push every 6 hours PRN Moderate Pain (4 - 6)  metoprolol tartrate Injectable 2.5 milliGRAM(s) IV Push every 6 hours PRN for HR>110  ondansetron Injectable 4 milliGRAM(s) IV Push every 6 hours PRN Nausea and/or Vomiting    Pertinent Labs:  07-31 Na136 mmol/L Glu 131 mg/dL<H> K+ 3.8 mmol/L Cr  1.42 mg/dL<H> BUN 21 mg/dL 07-31 Phos 5.6 mg/dL<H> 07-30 Alb 1.6 g/dL<L> 07-29 [id number] 5.7 %<H>      Skin: intact    Estimated Needs:  -  no change since previous assessment       Recommend - current EN     Monitoring and Evaluation:  Tolerance to diet prescription, weights and follow up per protocol
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
## 2018-07-31 NOTE — PROGRESS NOTE ADULT - SUBJECTIVE AND OBJECTIVE BOX
Infectious Diseases progress note:    Subjective: Pt remains intubated and sedated.  Still with occasional fevers.  WBC improved today.  No reported diarrhea.  Vanco held due to supratherapeutic level today.      ROS:  nonverbal, intubated/sedated    Allergies    No Known Allergies    Intolerances        ANTIBIOTICS/RELEVANT:  antimicrobials  meropenem  IVPB      meropenem  IVPB 500 milliGRAM(s) IV Intermittent every 8 hours  vancomycin  IVPB 1250 milliGRAM(s) IV Intermittent every 12 hours    immunologic:    OTHER:  acetaminophen  IVPB. 1000 milliGRAM(s) IV Intermittent once PRN  ALBUTerol    90 MICROgram(s) HFA Inhaler 2 Puff(s) Inhalation every 6 hours  chlorhexidine 0.12% Liquid 15 milliLiter(s) Swish and Spit two times a day  dexmedetomidine Infusion 0.5 MICROgram(s)/kG/Hr IV Continuous <Continuous>  docusate sodium Liquid 300 milliGRAM(s) Oral at bedtime  fentaNYL   Infusion 1 MICROgram(s)/kG/Hr IV Continuous <Continuous>  heparin  Infusion 1350 Unit(s)/Hr IV Continuous <Continuous>  insulin lispro (HumaLOG) corrective regimen sliding scale   SubCutaneous every 6 hours  ipratropium 17 MICROgram(s) HFA Inhaler 2 Puff(s) Inhalation every 6 hours  ketorolac   Injectable 15 milliGRAM(s) IV Push every 6 hours PRN  LORazepam   Injectable 1 milliGRAM(s) IV Push every 6 hours  metoprolol tartrate Injectable 2.5 milliGRAM(s) IV Push every 6 hours PRN  multivitamin/thiamine/folic acid in sodium chloride 0.9% 1000 milliLiter(s) IV Continuous <Continuous>  ondansetron Injectable 4 milliGRAM(s) IV Push every 6 hours PRN  pantoprazole  Injectable 40 milliGRAM(s) IV Push daily  phenylephrine    Infusion 0.1 MICROgram(s)/kG/Min IV Continuous <Continuous>  propofol Infusion 5 MICROgram(s)/kG/Min IV Continuous <Continuous>  sodium chloride 0.9% lock flush 3 milliLiter(s) IV Push every 8 hours      Objective:  Vital Signs Last 24 Hrs  T(C): 36.9 (31 Jul 2018 16:00), Max: 38.5 (31 Jul 2018 10:00)  T(F): 98.5 (31 Jul 2018 16:00), Max: 101.3 (31 Jul 2018 10:00)  HR: 87 (31 Jul 2018 19:07) (80 - 98)  BP: 102/57 (31 Jul 2018 11:00) (102/57 - 102/57)  BP(mean): 68 (31 Jul 2018 11:00) (68 - 68)  RR: 21 (31 Jul 2018 19:00) (18 - 24)  SpO2: 98% (31 Jul 2018 19:07) (96% - 100%)    PHYSICAL EXAM:  Constitutional: Intubated/sedated  Eyes:MEMO, EOMI  Ear/Nose/Throat: no thrush, mucositis.  Moist mucous membranes	  Neck:no JVD, no lymphadenopathy, supple  Respiratory: rt sided chest tubes x 2  Cardiovascular: S1S2 RRR, no murmurs  Gastrointestinal:soft, nontender,  nondistended (+) BS  Extremities:no e/e/c  Skin:  no rashes, open wounds or ulcerations        LABS:                        10.7   18.37 )-----------( 207      ( 31 Jul 2018 03:11 )             32.4     07-31    136  |  101  |  21  ----------------------------<  131<H>  3.8   |  18<L>  |  1.42<H>    Ca    7.5<L>      31 Jul 2018 03:11  Phos  5.6     07-31  Mg     2.2     07-31    TPro  4.9<L>  /  Alb  1.6<L>  /  TBili  1.8<H>  /  DBili  x   /  AST  53<H>  /  ALT  24  /  AlkPhos  191<H>  07-30    PT/INR - ( 31 Jul 2018 04:00 )   PT: 13.9 SEC;   INR: 1.20          PTT - ( 31 Jul 2018 04:00 )  PTT:56.3 SEC            Vancomycin Level, Trough: 20.8 ug/mL (07-31 @ 11:26)  Vancomycin Level, Trough: 33.3 ug/mL (07-30 @ 23:45)  Vancomycin Level, Trough: 14.3 ug/mL (07-29 @ 11:18)  Vancomycin Level, Trough: 5.2 ug/mL (07-28 @ 09:43)  Vancomycin Level, Trough: 5.7 ug/mL (07-26 @ 22:00)              MICROBIOLOGY:    Culture - Yeast and Fungus (07.27.18 @ 22:26)    Culture - Yeast and Fungus:   CULTURE NEGATIVE FOR YEASTS AND MOLDS   AFTER 3 DAYS    Specimen Source: LUNG - LOWER LOBE RIGHT    Culture - Acid Fast Smear Concentrated (07.27.18 @ 22:26)    Specimen Source: LUNG - LOWER LOBE RIGHT    Culture - Acid Fast Smear Concentrated:   AFB SMEAR= NO ACID FAST BACILLI SEEN    Culture - Respiratory with Gram Stain (07.27.18 @ 22:26)    Gram Stain Sputum:   WBC^White Blood Cells  QNTY CELLS IN GRAM STAIN: RARE (1+)  NOS^No Organisms Seen    Culture - Respiratory:   NO ORGANISMS ISOLATED AT 24 HOURS  NO ORGANISMS ISOLATED AT 48 HRS.    Specimen Source: LUNG - LOWER LOBE RIGHT    Culture - Yeast and Fungus (07.27.18 @ 22:22)    Culture - Yeast and Fungus:   CULTURE NEGATIVE FOR YEASTS AND MOLDS   AFTER 3 DAYS    Specimen Source: ABSCESS    Culture - Body Fluid with Gram Stain (07.26.18 @ 10:59)    Culture - Body Fluid:   NO GROWTH - PRELIMINARY RESULTS  NO ORGANISMS ISOLATED AT 24 HOURS  CULTURE IN PROGRESS, FURTHER REPORT TO FOLLOW.  PRESUMPTIVE ANAEROBE  PRE SANJUANITA^PREVOTELLA ORIS  STRAAC^Streptococcus constellatus  FNUC^Fusobacterium nucleatum    Gram Stain:   NOS^No Organisms Seen  WBC^White Blood Cells  QNTY CELLS IN GRAM STAIN: MANY (4+)    Specimen Source: PLEURAL FLUID          RADIOLOGY & ADDITIONAL STUDIES:    < from: Xray Chest 1 View- PORTABLE-Routine (07.31.18 @ 07:48) >  IMPRESSION: There is an ET tube with the tip above the luzmaria. Enteric   tube courses below the left hemidiaphragm although the tip is not imaged.   There is a right-sided chest as on the prior study. There is a small   loculated right pleural effusion likely associated with right lung base   areas of atelectasis predominantly unchanged since the prior study. There   is no pneumothorax.    < end of copied text >
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
## 2018-07-31 NOTE — PROGRESS NOTE ADULT - SUBJECTIVE AND OBJECTIVE BOX
Surgery Progress Note    S: Patient seen and examined. Patient started on kip for hypotension.     O:T(C): 37.3 (07-31-18 @ 04:00), Max: 37.8 (07-30-18 @ 12:00)  HR: 91 (07-31-18 @ 07:04) (80 - 119)  BP: 106/65 (07-30-18 @ 13:05) (97/48 - 106/65)  BP(mean): 73 (07-30-18 @ 13:05) (64 - 73)  ABP: 106/61 (07-31-18 @ 07:00) (98/49 - 136/73)  ABP(mean): 75 (07-31-18 @ 07:00) (65 - 102)  RR: 23 (07-31-18 @ 07:00) (20 - 27)  SpO2: 97% (07-31-18 @ 07:04) (96% - 100%)  Wt(kg): --  CVP(mm Hg): --  CI: --  CAPILLARY BLOOD GLUCOSE      POCT Blood Glucose.: 121 mg/dL (31 Jul 2018 05:16)  POCT Blood Glucose.: 115 mg/dL (30 Jul 2018 23:43)  POCT Blood Glucose.: 107 mg/dL (30 Jul 2018 18:55)  POCT Blood Glucose.: 101 mg/dL (30 Jul 2018 12:16)   N/A      07-30 @ 07:01  -  07-31 @ 07:00  --------------------------------------------------------  IN:    dexmedetomidine Infusion: 169.2 mL    Enteral Tube Flush: 180 mL    fentaNYL  Infusion: 67.5 mL    fentaNYL  Infusion: 22.5 mL    heparin Infusion: 348 mL    IV PiggyBack: 900 mL    Jevity: 1210 mL    multivitamin/thiamine/folic acid in sodium chloride 0.9%: 100 mL    multivitamin/thiamine/folic acid in sodium chloride 0.9%: 280 mL    phenylephrine   Infusion: 382.4 mL    propofol Infusion: 126 mL  Total IN: 3785.6 mL    OUT:    Chest Tube: 55 mL    Chest Tube: 30 mL    Indwelling Catheter - Urethral: 1600 mL  Total OUT: 1685 mL    Total NET: 2100.6 mL            MEDICATIONS  (STANDING):  acetaminophen  IVPB 1000 milliGRAM(s) IV Intermittent once  ALBUTerol    90 MICROgram(s) HFA Inhaler 2 Puff(s) Inhalation every 6 hours  dexmedetomidine Infusion 0.5 MICROgram(s)/kG/Hr (9.375 mL/Hr) IV Continuous <Continuous>  docusate sodium 100 milliGRAM(s) Oral three times a day  heparin  Infusion 1350 Unit(s)/Hr (14.5 mL/Hr) IV Continuous <Continuous>  ipratropium 17 MICROgram(s) HFA Inhaler 2 Puff(s) Inhalation every 6 hours  pantoprazole  Injectable 40 milliGRAM(s) IV Push daily  piperacillin/tazobactam IVPB. 3.375 Gram(s) IV Intermittent every 8 hours  potassium chloride  10 mEq/100 mL IVPB 10 milliEquivalent(s) IV Intermittent every 1 hour  propofol Infusion 5 MICROgram(s)/kG/Min (2.25 mL/Hr) IV Continuous <Continuous>  sodium chloride 0.9% lock flush 3 milliLiter(s) IV Push every 8 hours  thiamine Injectable      thiamine Injectable 100 milliGRAM(s) IV Push daily  vancomycin  IVPB 1000 milliGRAM(s) IV Intermittent every 12 hours  vancomycin  IVPB        MEDICATIONS  (PRN):  ketorolac   Injectable 15 milliGRAM(s) IV Push every 6 hours PRN Moderate Pain (4 - 6)  oxyCODONE    5 mG/acetaminophen 325 mG 2 Tablet(s) Oral every 6 hours PRN Severe Pain (7 - 10)  senna 2 Tablet(s) Oral at bedtime PRN Constipation                            13.4   9.94  )-----------( 173      ( 27 Jul 2018 04:20 )             39.9       07-27    137  |  100  |  16  ----------------------------<  90  3.9   |  23  |  0.50    Ca    8.2<L>      27 Jul 2018 04:20  Phos  1.7     07-27  Mg     2.3     07-27    TPro  5.7<L>  /  Alb  2.0<L>  /  TBili  0.9  /  DBili  x   /  AST  57<H>  /  ALT  49<H>  /  AlkPhos  63  07-26      Physical Exam:  Gen: Laying in bed, NAD  Resp: Unlabored breathing, intubated  Abd: soft, NTND  Extremities: feet cool L > R, 2-3 second capillary refill bilaterally  Vascular: R leg with palpable femoral, popliteal, PT, and DP; L leg palpable femoral, doppler popliteal, doppler AT & DP
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
## 2018-08-01 LAB
APTT BLD: 55.6 SEC — HIGH (ref 27.5–37.4)
BACTERIA BLD CULT: SIGNIFICANT CHANGE UP
BASE EXCESS BLDA CALC-SCNC: -3.4 MMOL/L — SIGNIFICANT CHANGE UP
BUN SERPL-MCNC: 28 MG/DL — HIGH (ref 7–23)
CA-I BLD-SCNC: 1.02 MMOL/L — LOW (ref 1.03–1.23)
CALCIUM SERPL-MCNC: 7.8 MG/DL — LOW (ref 8.4–10.5)
CHLORIDE SERPL-SCNC: 104 MMOL/L — SIGNIFICANT CHANGE UP (ref 98–107)
CO2 SERPL-SCNC: 18 MMOL/L — LOW (ref 22–31)
CREAT SERPL-MCNC: 1.37 MG/DL — HIGH (ref 0.5–1.3)
GLUCOSE SERPL-MCNC: 124 MG/DL — HIGH (ref 70–99)
HCO3 BLDA-SCNC: 22 MMOL/L — SIGNIFICANT CHANGE UP (ref 22–26)
HCT VFR BLD CALC: 31.4 % — LOW (ref 39–50)
HGB BLD-MCNC: 10.3 G/DL — LOW (ref 13–17)
INR BLD: 1.16 — SIGNIFICANT CHANGE UP (ref 0.88–1.17)
MAGNESIUM SERPL-MCNC: 2.3 MG/DL — SIGNIFICANT CHANGE UP (ref 1.6–2.6)
MCHC RBC-ENTMCNC: 32.4 PG — SIGNIFICANT CHANGE UP (ref 27–34)
MCHC RBC-ENTMCNC: 32.8 % — SIGNIFICANT CHANGE UP (ref 32–36)
MCV RBC AUTO: 98.7 FL — SIGNIFICANT CHANGE UP (ref 80–100)
NRBC # FLD: 0.04 — SIGNIFICANT CHANGE UP
PCO2 BLDA: 38 MMHG — SIGNIFICANT CHANGE UP (ref 35–48)
PH BLDA: 7.37 PH — SIGNIFICANT CHANGE UP (ref 7.35–7.45)
PHOSPHATE SERPL-MCNC: 4.9 MG/DL — HIGH (ref 2.5–4.5)
PLATELET # BLD AUTO: 256 K/UL — SIGNIFICANT CHANGE UP (ref 150–400)
PMV BLD: 11.3 FL — SIGNIFICANT CHANGE UP (ref 7–13)
PO2 BLDA: 98 MMHG — SIGNIFICANT CHANGE UP (ref 83–108)
POTASSIUM SERPL-MCNC: 4.2 MMOL/L — SIGNIFICANT CHANGE UP (ref 3.5–5.3)
POTASSIUM SERPL-SCNC: 4.2 MMOL/L — SIGNIFICANT CHANGE UP (ref 3.5–5.3)
PROTHROM AB SERPL-ACNC: 12.9 SEC — SIGNIFICANT CHANGE UP (ref 9.8–13.1)
RBC # BLD: 3.18 M/UL — LOW (ref 4.2–5.8)
RBC # FLD: 13.9 % — SIGNIFICANT CHANGE UP (ref 10.3–14.5)
SAO2 % BLDA: 96.6 % — SIGNIFICANT CHANGE UP (ref 95–99)
SODIUM SERPL-SCNC: 137 MMOL/L — SIGNIFICANT CHANGE UP (ref 135–145)
SPECIMEN SOURCE: SIGNIFICANT CHANGE UP
VANCOMYCIN TROUGH SERPL-MCNC: 14 UG/ML — SIGNIFICANT CHANGE UP (ref 10–20)
WBC # BLD: 21.2 K/UL — HIGH (ref 3.8–10.5)
WBC # FLD AUTO: 21.2 K/UL — HIGH (ref 3.8–10.5)

## 2018-08-01 PROCEDURE — 71045 X-RAY EXAM CHEST 1 VIEW: CPT | Mod: 26,76

## 2018-08-01 PROCEDURE — 99291 CRITICAL CARE FIRST HOUR: CPT

## 2018-08-01 RX ORDER — NYSTATIN CREAM 100000 [USP'U]/G
1 CREAM TOPICAL
Qty: 0 | Refills: 0 | Status: DISCONTINUED | OUTPATIENT
Start: 2018-08-01 | End: 2018-09-12

## 2018-08-01 RX ORDER — AZTREONAM 2 G
1000 VIAL (EA) INJECTION EVERY 8 HOURS
Qty: 0 | Refills: 0 | Status: DISCONTINUED | OUTPATIENT
Start: 2018-08-01 | End: 2018-08-14

## 2018-08-01 RX ORDER — AZTREONAM 2 G
VIAL (EA) INJECTION
Qty: 0 | Refills: 0 | Status: DISCONTINUED | OUTPATIENT
Start: 2018-08-01 | End: 2018-08-14

## 2018-08-01 RX ORDER — AZTREONAM 2 G
1000 VIAL (EA) INJECTION ONCE
Qty: 0 | Refills: 0 | Status: COMPLETED | OUTPATIENT
Start: 2018-08-01 | End: 2018-08-01

## 2018-08-01 RX ORDER — METRONIDAZOLE 500 MG
500 TABLET ORAL EVERY 8 HOURS
Qty: 0 | Refills: 0 | Status: DISCONTINUED | OUTPATIENT
Start: 2018-08-01 | End: 2018-08-08

## 2018-08-01 RX ORDER — ACETAMINOPHEN 500 MG
1000 TABLET ORAL ONCE
Qty: 0 | Refills: 0 | Status: COMPLETED | OUTPATIENT
Start: 2018-08-01 | End: 2018-08-02

## 2018-08-01 RX ORDER — ACETAMINOPHEN 500 MG
1000 TABLET ORAL ONCE
Qty: 0 | Refills: 0 | Status: COMPLETED | OUTPATIENT
Start: 2018-08-01 | End: 2018-08-01

## 2018-08-01 RX ADMIN — PANTOPRAZOLE SODIUM 40 MILLIGRAM(S): 20 TABLET, DELAYED RELEASE ORAL at 11:26

## 2018-08-01 RX ADMIN — Medication 400 MILLIGRAM(S): at 08:07

## 2018-08-01 RX ADMIN — Medication 100 MILLIGRAM(S): at 15:00

## 2018-08-01 RX ADMIN — DEXMEDETOMIDINE HYDROCHLORIDE IN 0.9% SODIUM CHLORIDE 9.38 MICROGRAM(S)/KG/HR: 4 INJECTION INTRAVENOUS at 11:27

## 2018-08-01 RX ADMIN — Medication 50 MILLIGRAM(S): at 13:27

## 2018-08-01 RX ADMIN — SODIUM CHLORIDE 40 MILLILITER(S): 9 INJECTION, SOLUTION INTRAVENOUS at 19:31

## 2018-08-01 RX ADMIN — CHLORHEXIDINE GLUCONATE 15 MILLILITER(S): 213 SOLUTION TOPICAL at 05:31

## 2018-08-01 RX ADMIN — FENTANYL CITRATE 1 MICROGRAM(S)/KG/HR: 50 INJECTION INTRAVENOUS at 19:32

## 2018-08-01 RX ADMIN — MEROPENEM 100 MILLIGRAM(S): 1 INJECTION INTRAVENOUS at 05:31

## 2018-08-01 RX ADMIN — PROPOFOL 2.25 MICROGRAM(S)/KG/MIN: 10 INJECTION, EMULSION INTRAVENOUS at 11:27

## 2018-08-01 RX ADMIN — NYSTATIN CREAM 1 APPLICATION(S): 100000 CREAM TOPICAL at 17:34

## 2018-08-01 RX ADMIN — CHLORHEXIDINE GLUCONATE 15 MILLILITER(S): 213 SOLUTION TOPICAL at 17:14

## 2018-08-01 RX ADMIN — SODIUM CHLORIDE 3 MILLILITER(S): 9 INJECTION INTRAMUSCULAR; INTRAVENOUS; SUBCUTANEOUS at 22:21

## 2018-08-01 RX ADMIN — Medication 2 PUFF(S): at 05:10

## 2018-08-01 RX ADMIN — SODIUM CHLORIDE 3 MILLILITER(S): 9 INJECTION INTRAMUSCULAR; INTRAVENOUS; SUBCUTANEOUS at 13:19

## 2018-08-01 RX ADMIN — Medication 250 MILLIGRAM(S): at 17:34

## 2018-08-01 RX ADMIN — Medication 1 MILLIGRAM(S): at 05:31

## 2018-08-01 RX ADMIN — Medication 300 MILLIGRAM(S): at 22:21

## 2018-08-01 RX ADMIN — HEPARIN SODIUM 14.5 UNIT(S)/HR: 5000 INJECTION INTRAVENOUS; SUBCUTANEOUS at 19:31

## 2018-08-01 RX ADMIN — Medication 1 MILLIGRAM(S): at 11:26

## 2018-08-01 RX ADMIN — Medication 50 MILLIGRAM(S): at 22:20

## 2018-08-01 RX ADMIN — FENTANYL CITRATE 1 MICROGRAM(S)/KG/HR: 50 INJECTION INTRAVENOUS at 11:28

## 2018-08-01 RX ADMIN — PROPOFOL 2.25 MICROGRAM(S)/KG/MIN: 10 INJECTION, EMULSION INTRAVENOUS at 19:32

## 2018-08-01 RX ADMIN — PHENYLEPHRINE HYDROCHLORIDE 2.81 MICROGRAM(S)/KG/MIN: 10 INJECTION INTRAVENOUS at 11:27

## 2018-08-01 RX ADMIN — PHENYLEPHRINE HYDROCHLORIDE 2.81 MICROGRAM(S)/KG/MIN: 10 INJECTION INTRAVENOUS at 19:31

## 2018-08-01 RX ADMIN — Medication 250 MILLIGRAM(S): at 05:31

## 2018-08-01 RX ADMIN — Medication 1 MILLIGRAM(S): at 18:13

## 2018-08-01 RX ADMIN — SODIUM CHLORIDE 3 MILLILITER(S): 9 INJECTION INTRAMUSCULAR; INTRAVENOUS; SUBCUTANEOUS at 05:31

## 2018-08-01 RX ADMIN — HEPARIN SODIUM 14.5 UNIT(S)/HR: 5000 INJECTION INTRAVENOUS; SUBCUTANEOUS at 11:27

## 2018-08-01 RX ADMIN — DEXMEDETOMIDINE HYDROCHLORIDE IN 0.9% SODIUM CHLORIDE 9.38 MICROGRAM(S)/KG/HR: 4 INJECTION INTRAVENOUS at 19:33

## 2018-08-01 RX ADMIN — Medication 2 PUFF(S): at 16:31

## 2018-08-01 RX ADMIN — FENTANYL CITRATE 7.5 MICROGRAM(S)/KG/HR: 50 INJECTION INTRAVENOUS at 11:27

## 2018-08-01 RX ADMIN — Medication 100 MILLIGRAM(S): at 22:20

## 2018-08-01 RX ADMIN — Medication 2 PUFF(S): at 11:46

## 2018-08-01 RX ADMIN — Medication 2 PUFF(S): at 22:23

## 2018-08-01 RX ADMIN — FENTANYL CITRATE 7.5 MICROGRAM(S)/KG/HR: 50 INJECTION INTRAVENOUS at 19:31
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
## 2018-08-01 NOTE — PROGRESS NOTE ADULT - SUBJECTIVE AND OBJECTIVE BOX
Surgery Progress Note    S: Patient seen and examined. Patient not tolerating CPAP trials.     O:T(C): 37.3 (07-31-18 @ 04:00), Max: 37.8 (07-30-18 @ 12:00)  HR: 91 (07-31-18 @ 07:04) (80 - 119)  BP: 106/65 (07-30-18 @ 13:05) (97/48 - 106/65)  BP(mean): 73 (07-30-18 @ 13:05) (64 - 73)  ABP: 106/61 (07-31-18 @ 07:00) (98/49 - 136/73)  ABP(mean): 75 (07-31-18 @ 07:00) (65 - 102)  RR: 23 (07-31-18 @ 07:00) (20 - 27)  SpO2: 97% (07-31-18 @ 07:04) (96% - 100%)  Wt(kg): --  CVP(mm Hg): --  CI: --  CAPILLARY BLOOD GLUCOSE      POCT Blood Glucose.: 121 mg/dL (31 Jul 2018 05:16)  POCT Blood Glucose.: 115 mg/dL (30 Jul 2018 23:43)  POCT Blood Glucose.: 107 mg/dL (30 Jul 2018 18:55)  POCT Blood Glucose.: 101 mg/dL (30 Jul 2018 12:16)   N/A      07-30 @ 07:01  -  07-31 @ 07:00  --------------------------------------------------------  IN:    dexmedetomidine Infusion: 169.2 mL    Enteral Tube Flush: 180 mL    fentaNYL  Infusion: 67.5 mL    fentaNYL  Infusion: 22.5 mL    heparin Infusion: 348 mL    IV PiggyBack: 900 mL    Jevity: 1210 mL    multivitamin/thiamine/folic acid in sodium chloride 0.9%: 100 mL    multivitamin/thiamine/folic acid in sodium chloride 0.9%: 280 mL    phenylephrine   Infusion: 382.4 mL    propofol Infusion: 126 mL  Total IN: 3785.6 mL    OUT:    Chest Tube: 55 mL    Chest Tube: 30 mL    Indwelling Catheter - Urethral: 1600 mL  Total OUT: 1685 mL    Total NET: 2100.6 mL            MEDICATIONS  (STANDING):  acetaminophen  IVPB 1000 milliGRAM(s) IV Intermittent once  ALBUTerol    90 MICROgram(s) HFA Inhaler 2 Puff(s) Inhalation every 6 hours  dexmedetomidine Infusion 0.5 MICROgram(s)/kG/Hr (9.375 mL/Hr) IV Continuous <Continuous>  docusate sodium 100 milliGRAM(s) Oral three times a day  heparin  Infusion 1350 Unit(s)/Hr (14.5 mL/Hr) IV Continuous <Continuous>  ipratropium 17 MICROgram(s) HFA Inhaler 2 Puff(s) Inhalation every 6 hours  pantoprazole  Injectable 40 milliGRAM(s) IV Push daily  piperacillin/tazobactam IVPB. 3.375 Gram(s) IV Intermittent every 8 hours  potassium chloride  10 mEq/100 mL IVPB 10 milliEquivalent(s) IV Intermittent every 1 hour  propofol Infusion 5 MICROgram(s)/kG/Min (2.25 mL/Hr) IV Continuous <Continuous>  sodium chloride 0.9% lock flush 3 milliLiter(s) IV Push every 8 hours  thiamine Injectable      thiamine Injectable 100 milliGRAM(s) IV Push daily  vancomycin  IVPB 1000 milliGRAM(s) IV Intermittent every 12 hours  vancomycin  IVPB        MEDICATIONS  (PRN):  ketorolac   Injectable 15 milliGRAM(s) IV Push every 6 hours PRN Moderate Pain (4 - 6)  oxyCODONE    5 mG/acetaminophen 325 mG 2 Tablet(s) Oral every 6 hours PRN Severe Pain (7 - 10)  senna 2 Tablet(s) Oral at bedtime PRN Constipation                            13.4   9.94  )-----------( 173      ( 27 Jul 2018 04:20 )             39.9       07-27    137  |  100  |  16  ----------------------------<  90  3.9   |  23  |  0.50    Ca    8.2<L>      27 Jul 2018 04:20  Phos  1.7     07-27  Mg     2.3     07-27    TPro  5.7<L>  /  Alb  2.0<L>  /  TBili  0.9  /  DBili  x   /  AST  57<H>  /  ALT  49<H>  /  AlkPhos  63  07-26      Physical Exam:  Gen: Laying in bed, NAD on fent/prop  Resp: Unlabored breathing, intubated  Abd: soft, NTND  Extremities: feet cool L > R, 2-3 second capillary refill bilaterally  Vascular: R leg with palpable femoral, popliteal, PT, and DP; L leg palpable femoral, doppler popliteal, doppler AT & DP

## 2018-08-01 NOTE — PROGRESS NOTE ADULT - SUBJECTIVE AND OBJECTIVE BOX
Infectious Diseases progress note:    Subjective: WBC elevated.  Still with fever 101.7 this AM.  s/p chest tube removal x 1.  Noted to have maculpapular rash over b/l forearms, L thigh, lower abd and lower back.  Meropenem d/c'd and switched to azactam/flagyl after d/w CTU attg.    ROS:  intubated, sedated    Allergies    No Known Allergies    Intolerances        ANTIBIOTICS/RELEVANT:  antimicrobials  aztreonam  IVPB      aztreonam  IVPB 1000 milliGRAM(s) IV Intermittent every 8 hours  metroNIDAZOLE  IVPB 500 milliGRAM(s) IV Intermittent every 8 hours  vancomycin  IVPB 1250 milliGRAM(s) IV Intermittent every 12 hours    immunologic:    OTHER:  acetaminophen  IVPB. 1000 milliGRAM(s) IV Intermittent once PRN  ALBUTerol    90 MICROgram(s) HFA Inhaler 2 Puff(s) Inhalation every 6 hours  chlorhexidine 0.12% Liquid 15 milliLiter(s) Swish and Spit two times a day  dexmedetomidine Infusion 0.5 MICROgram(s)/kG/Hr IV Continuous <Continuous>  docusate sodium Liquid 300 milliGRAM(s) Oral at bedtime  fentaNYL   Infusion 1 MICROgram(s)/kG/Hr IV Continuous <Continuous>  heparin  Infusion 1350 Unit(s)/Hr IV Continuous <Continuous>  insulin lispro (HumaLOG) corrective regimen sliding scale   SubCutaneous every 6 hours  ipratropium 17 MICROgram(s) HFA Inhaler 2 Puff(s) Inhalation every 6 hours  LORazepam   Injectable 1 milliGRAM(s) IV Push every 6 hours  metoprolol tartrate Injectable 2.5 milliGRAM(s) IV Push every 6 hours PRN  multivitamin/thiamine/folic acid in sodium chloride 0.9% 1000 milliLiter(s) IV Continuous <Continuous>  nystatin Powder 1 Application(s) Topical two times a day  ondansetron Injectable 4 milliGRAM(s) IV Push every 6 hours PRN  pantoprazole  Injectable 40 milliGRAM(s) IV Push daily  phenylephrine    Infusion 0.1 MICROgram(s)/kG/Min IV Continuous <Continuous>  propofol Infusion 5 MICROgram(s)/kG/Min IV Continuous <Continuous>  sodium chloride 0.9% lock flush 3 milliLiter(s) IV Push every 8 hours      Objective:  Vital Signs Last 24 Hrs  T(C): 37.4 (01 Aug 2018 16:00), Max: 38.7 (01 Aug 2018 08:00)  T(F): 99.4 (01 Aug 2018 16:00), Max: 101.7 (01 Aug 2018 08:00)  HR: 93 (01 Aug 2018 18:00) (80 - 100)  BP: 98/60 (01 Aug 2018 08:00) (98/60 - 102/63)  BP(mean): 69 (01 Aug 2018 08:00) (69 - 71)  RR: 20 (01 Aug 2018 18:00) (16 - 28)  SpO2: 96% (01 Aug 2018 18:00) (94% - 100%)    PHYSICAL EXAM:  Constitutional: intubated/sedated  Eyes:MEMO, EOMI  Ear/Nose/Throat: no thrush, mucositis.  Moist mucous membranes	  Neck:no JVD, no lymphadenopathy, supple  Respiratory: CTA emi, chest tube x 1  Cardiovascular: S1S2 RRR, no murmurs  Gastrointestinal:soft, nontender,  nondistended (+) BS  Extremities:no e/e/c  Skin:  erythematous maculopapular blanching rash over b/l forearms/upper arms, L thigh, lower abd/groin and b/l torso        LABS:                        10.3   21.20 )-----------( 256      ( 01 Aug 2018 03:00 )             31.4     08-01    137  |  104  |  28<H>  ----------------------------<  124<H>  4.2   |  18<L>  |  1.37<H>    Ca    7.8<L>      01 Aug 2018 03:00  Phos  4.9     08-01  Mg     2.3     08-01      PT/INR - ( 01 Aug 2018 03:00 )   PT: 12.9 SEC;   INR: 1.16          PTT - ( 01 Aug 2018 03:00 )  PTT:55.6 SEC            Vancomycin Level, Trough: 14.0 ug/mL (08-01 @ 03:00)  Vancomycin Level, Trough: 20.8 ug/mL (07-31 @ 11:26)  Vancomycin Level, Trough: 33.3 ug/mL (07-30 @ 23:45)  Vancomycin Level, Trough: 14.3 ug/mL (07-29 @ 11:18)  Vancomycin Level, Trough: 5.2 ug/mL (07-28 @ 09:43)            Culture - Yeast and Fungus (07.27.18 @ 22:22)    Culture - Yeast and Fungus:   CULTURE NEGATIVE FOR YEASTS AND MOLDS   AFTER 3 DAYS    Specimen Source: ABSCESS      MICROBIOLOGY:    Culture - Blood (07.31.18 @ 07:06)    Culture - Blood:   NO ORGANISMS ISOLATED  NO ORGANISMS ISOLATED AT 24 HOURS    Specimen Source: BLOOD    Culture - Yeast and Fungus (07.27.18 @ 22:26)    Culture - Yeast and Fungus:   CULTURE NEGATIVE FOR YEASTS AND MOLDS   AFTER 3 DAYS    Specimen Source: LUNG - LOWER LOBE RIGHT    Culture - Acid Fast Smear Concentrated (07.27.18 @ 22:26)    Specimen Source: LUNG - LOWER LOBE RIGHT    Culture - Acid Fast Smear Concentrated:   AFB SMEAR= NO ACID FAST BACILLI SEEN    Culture - Respiratory with Gram Stain (07.27.18 @ 22:26)    Culture - Respiratory:   NO ORGANISMS ISOLATED AT 24 HOURS  NO ORGANISMS ISOLATED AT 48 HRS.    Gram Stain Sputum:   WBC^White Blood Cells  QNTY CELLS IN GRAM STAIN: RARE (1+)  NOS^No Organisms Seen    Specimen Source: LUNG - LOWER LOBE RIGHT    Culture - Surg Site Aerob/Anaer w/Gm St (07.27.18 @ 22:22)    Culture - Surgical Site:   NO ORGANISMS ISOLATED AT 24 HOURS  CULTURE IN PROGRESS, FURTHER REPORT TO FOLLOW.  STRAAC^Streptococcus constellatus    Gram Stain Wound:   WBC^White Blood Cells  QNTY CELLS IN GRAM STAIN: NO CELLS SEEN  NOS^No Organisms Seen    Specimen Source: ABSCESS    Culture - Body Fluid with Gram Stain (07.26.18 @ 10:59)    Culture - Body Fluid:   NO GROWTH - PRELIMINARY RESULTS  NO ORGANISMS ISOLATED AT 24 HOURS  CULTURE IN PROGRESS, FURTHER REPORT TO FOLLOW.  PRESUMPTIVE ANAEROBE  PRE SANJUANITA^PREVOTELLA ORIS  STRAAC^Streptococcus constellatus  FNUC^Fusobacterium nucleatum    Gram Stain:   NOS^No Organisms Seen  WBC^White Blood Cells  QNTY CELLS IN GRAM STAIN: MANY (4+)    Specimen Source: PLEURAL FLUID    Culture - Blood (07.25.18 @ 03:25)    Culture - Blood:   NO ORGANISMS ISOLATED    Specimen Source: BLOOD PERIPHERAL          RADIOLOGY & ADDITIONAL STUDIES:    < from: Xray Chest 1 View- PORTABLE-Routine (08.01.18 @ 06:53) >    IMPRESSION:  Follow-up with chest tubes and place and residual pleural   fluid on the right side.    < end of copied text >

## 2018-08-02 LAB
APTT BLD: 51.6 SEC — HIGH (ref 27.5–37.4)
BASE EXCESS BLDA CALC-SCNC: -2.4 MMOL/L — SIGNIFICANT CHANGE UP
BLD GP AB SCN SERPL QL: NEGATIVE — SIGNIFICANT CHANGE UP
BUN SERPL-MCNC: 33 MG/DL — HIGH (ref 7–23)
C DIFF TOX GENS STL QL NAA+PROBE: SIGNIFICANT CHANGE UP
CA-I BLD-SCNC: 1.07 MMOL/L — SIGNIFICANT CHANGE UP (ref 1.03–1.23)
CALCIUM SERPL-MCNC: 7.7 MG/DL — LOW (ref 8.4–10.5)
CHLORIDE BLDA-SCNC: 111 MMOL/L — HIGH (ref 96–108)
CHLORIDE SERPL-SCNC: 105 MMOL/L — SIGNIFICANT CHANGE UP (ref 98–107)
CO2 SERPL-SCNC: 20 MMOL/L — LOW (ref 22–31)
CREAT BLDA-MCNC: 1.3 MG/DL — SIGNIFICANT CHANGE UP (ref 0.5–1.3)
CREAT SERPL-MCNC: 1.26 MG/DL — SIGNIFICANT CHANGE UP (ref 0.5–1.3)
GLUCOSE BLDA-MCNC: 117 MG/DL — HIGH (ref 70–99)
GLUCOSE SERPL-MCNC: 112 MG/DL — HIGH (ref 70–99)
GRAM STN SPT: SIGNIFICANT CHANGE UP
HCO3 BLDA-SCNC: 22 MMOL/L — SIGNIFICANT CHANGE UP (ref 22–26)
HCT VFR BLD CALC: 29.7 % — LOW (ref 39–50)
HCT VFR BLDA CALC: 30.4 % — LOW (ref 39–51)
HGB BLD-MCNC: 10 G/DL — LOW (ref 13–17)
HGB BLDA-MCNC: 9.8 G/DL — LOW (ref 13–17)
INR BLD: 1.21 — HIGH (ref 0.88–1.17)
LACTATE BLDA-SCNC: 1.4 MMOL/L — SIGNIFICANT CHANGE UP (ref 0.5–2)
MAGNESIUM SERPL-MCNC: 2.6 MG/DL — SIGNIFICANT CHANGE UP (ref 1.6–2.6)
MCHC RBC-ENTMCNC: 33.3 PG — SIGNIFICANT CHANGE UP (ref 27–34)
MCHC RBC-ENTMCNC: 33.7 % — SIGNIFICANT CHANGE UP (ref 32–36)
MCV RBC AUTO: 99 FL — SIGNIFICANT CHANGE UP (ref 80–100)
NRBC # FLD: 0.03 — SIGNIFICANT CHANGE UP
PCO2 BLDA: 38 MMHG — SIGNIFICANT CHANGE UP (ref 35–48)
PH BLDA: 7.38 PH — SIGNIFICANT CHANGE UP (ref 7.35–7.45)
PHOSPHATE SERPL-MCNC: 4.6 MG/DL — HIGH (ref 2.5–4.5)
PLATELET # BLD AUTO: 276 K/UL — SIGNIFICANT CHANGE UP (ref 150–400)
PMV BLD: 11.3 FL — SIGNIFICANT CHANGE UP (ref 7–13)
PO2 BLDA: 120 MMHG — HIGH (ref 83–108)
POTASSIUM BLDA-SCNC: 3.9 MMOL/L — SIGNIFICANT CHANGE UP (ref 3.4–4.5)
POTASSIUM SERPL-MCNC: 4.3 MMOL/L — SIGNIFICANT CHANGE UP (ref 3.5–5.3)
POTASSIUM SERPL-SCNC: 4.3 MMOL/L — SIGNIFICANT CHANGE UP (ref 3.5–5.3)
PROTHROM AB SERPL-ACNC: 14 SEC — HIGH (ref 9.8–13.1)
RBC # BLD: 3 M/UL — LOW (ref 4.2–5.8)
RBC # FLD: 14 % — SIGNIFICANT CHANGE UP (ref 10.3–14.5)
RH IG SCN BLD-IMP: NEGATIVE — SIGNIFICANT CHANGE UP
SAO2 % BLDA: 98.2 % — SIGNIFICANT CHANGE UP (ref 95–99)
SODIUM BLDA-SCNC: 136 MMOL/L — SIGNIFICANT CHANGE UP (ref 136–146)
SODIUM SERPL-SCNC: 137 MMOL/L — SIGNIFICANT CHANGE UP (ref 135–145)
SPECIMEN SOURCE: SIGNIFICANT CHANGE UP
WBC # BLD: 19.09 K/UL — HIGH (ref 3.8–10.5)
WBC # FLD AUTO: 19.09 K/UL — HIGH (ref 3.8–10.5)

## 2018-08-02 PROCEDURE — 99291 CRITICAL CARE FIRST HOUR: CPT

## 2018-08-02 PROCEDURE — 74176 CT ABD & PELVIS W/O CONTRAST: CPT | Mod: 26

## 2018-08-02 PROCEDURE — 71250 CT THORAX DX C-: CPT | Mod: 26

## 2018-08-02 PROCEDURE — 71045 X-RAY EXAM CHEST 1 VIEW: CPT | Mod: 26

## 2018-08-02 RX ORDER — ALBUMIN HUMAN 25 %
250 VIAL (ML) INTRAVENOUS ONCE
Qty: 0 | Refills: 0 | Status: COMPLETED | OUTPATIENT
Start: 2018-08-02 | End: 2018-08-02

## 2018-08-02 RX ORDER — MIDAZOLAM HYDROCHLORIDE 1 MG/ML
0.03 INJECTION, SOLUTION INTRAMUSCULAR; INTRAVENOUS
Qty: 100 | Refills: 0 | Status: DISCONTINUED | OUTPATIENT
Start: 2018-08-02 | End: 2018-08-03

## 2018-08-02 RX ORDER — ACETAMINOPHEN 500 MG
1000 TABLET ORAL ONCE
Qty: 0 | Refills: 0 | Status: COMPLETED | OUTPATIENT
Start: 2018-08-02 | End: 2018-08-02

## 2018-08-02 RX ADMIN — FENTANYL CITRATE 7.5 MICROGRAM(S)/KG/HR: 50 INJECTION INTRAVENOUS at 08:56

## 2018-08-02 RX ADMIN — MIDAZOLAM HYDROCHLORIDE 2 MG/KG/HR: 1 INJECTION, SOLUTION INTRAMUSCULAR; INTRAVENOUS at 19:36

## 2018-08-02 RX ADMIN — Medication 50 MILLIGRAM(S): at 22:00

## 2018-08-02 RX ADMIN — SODIUM CHLORIDE 3 MILLILITER(S): 9 INJECTION INTRAMUSCULAR; INTRAVENOUS; SUBCUTANEOUS at 22:19

## 2018-08-02 RX ADMIN — Medication 400 MILLIGRAM(S): at 00:01

## 2018-08-02 RX ADMIN — Medication 100 MILLIGRAM(S): at 14:42

## 2018-08-02 RX ADMIN — SODIUM CHLORIDE 40 MILLILITER(S): 9 INJECTION, SOLUTION INTRAVENOUS at 08:56

## 2018-08-02 RX ADMIN — SODIUM CHLORIDE 3 MILLILITER(S): 9 INJECTION INTRAMUSCULAR; INTRAVENOUS; SUBCUTANEOUS at 14:09

## 2018-08-02 RX ADMIN — HEPARIN SODIUM 14.5 UNIT(S)/HR: 5000 INJECTION INTRAVENOUS; SUBCUTANEOUS at 08:57

## 2018-08-02 RX ADMIN — Medication 1 MILLIGRAM(S): at 05:45

## 2018-08-02 RX ADMIN — Medication 400 MILLIGRAM(S): at 08:00

## 2018-08-02 RX ADMIN — Medication 2 PUFF(S): at 03:48

## 2018-08-02 RX ADMIN — SODIUM CHLORIDE 40 MILLILITER(S): 9 INJECTION, SOLUTION INTRAVENOUS at 19:38

## 2018-08-02 RX ADMIN — Medication 2 PUFF(S): at 23:49

## 2018-08-02 RX ADMIN — FENTANYL CITRATE 7.5 MICROGRAM(S)/KG/HR: 50 INJECTION INTRAVENOUS at 19:37

## 2018-08-02 RX ADMIN — Medication 2.5 MILLIGRAM(S): at 23:58

## 2018-08-02 RX ADMIN — Medication 250 MILLIGRAM(S): at 17:34

## 2018-08-02 RX ADMIN — PROPOFOL 2.25 MICROGRAM(S)/KG/MIN: 10 INJECTION, EMULSION INTRAVENOUS at 19:37

## 2018-08-02 RX ADMIN — NYSTATIN CREAM 1 APPLICATION(S): 100000 CREAM TOPICAL at 05:46

## 2018-08-02 RX ADMIN — Medication 50 MILLIGRAM(S): at 14:42

## 2018-08-02 RX ADMIN — Medication 50 MILLIGRAM(S): at 05:46

## 2018-08-02 RX ADMIN — PANTOPRAZOLE SODIUM 40 MILLIGRAM(S): 20 TABLET, DELAYED RELEASE ORAL at 11:40

## 2018-08-02 RX ADMIN — Medication 100 MILLIGRAM(S): at 05:46

## 2018-08-02 RX ADMIN — Medication 100 MILLIGRAM(S): at 22:30

## 2018-08-02 RX ADMIN — CHLORHEXIDINE GLUCONATE 15 MILLILITER(S): 213 SOLUTION TOPICAL at 05:47

## 2018-08-02 RX ADMIN — Medication 250 MILLILITER(S): at 16:38

## 2018-08-02 RX ADMIN — SODIUM CHLORIDE 3 MILLILITER(S): 9 INJECTION INTRAMUSCULAR; INTRAVENOUS; SUBCUTANEOUS at 05:32

## 2018-08-02 RX ADMIN — Medication 2 PUFF(S): at 15:50

## 2018-08-02 RX ADMIN — PROPOFOL 2.25 MICROGRAM(S)/KG/MIN: 10 INJECTION, EMULSION INTRAVENOUS at 08:57

## 2018-08-02 RX ADMIN — HEPARIN SODIUM 14.5 UNIT(S)/HR: 5000 INJECTION INTRAVENOUS; SUBCUTANEOUS at 19:37

## 2018-08-02 RX ADMIN — Medication 1 MILLIGRAM(S): at 00:01

## 2018-08-02 RX ADMIN — Medication 2 PUFF(S): at 11:43

## 2018-08-02 RX ADMIN — Medication 250 MILLIGRAM(S): at 06:47

## 2018-08-02 RX ADMIN — MIDAZOLAM HYDROCHLORIDE 2 MG/KG/HR: 1 INJECTION, SOLUTION INTRAMUSCULAR; INTRAVENOUS at 12:03

## 2018-08-02 RX ADMIN — NYSTATIN CREAM 1 APPLICATION(S): 100000 CREAM TOPICAL at 18:55

## 2018-08-02 RX ADMIN — SODIUM CHLORIDE 40 MILLILITER(S): 9 INJECTION, SOLUTION INTRAVENOUS at 00:01

## 2018-08-02 RX ADMIN — CHLORHEXIDINE GLUCONATE 15 MILLILITER(S): 213 SOLUTION TOPICAL at 17:34

## 2018-08-02 RX ADMIN — DEXMEDETOMIDINE HYDROCHLORIDE IN 0.9% SODIUM CHLORIDE 9.38 MICROGRAM(S)/KG/HR: 4 INJECTION INTRAVENOUS at 08:56

## 2018-08-02 NOTE — BRIEF OPERATIVE NOTE - PROCEDURE
<<-----Click on this checkbox to enter Procedure Flexible bronchoscopy in adult  08/02/2018    Active  [id number]
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
## 2018-08-02 NOTE — PROGRESS NOTE ADULT - SUBJECTIVE AND OBJECTIVE BOX
Infectious Diseases progress note:    Subjective:  WBC slightly improved.  Rash appears worse.  s/p CT chest/a/p - new collection in right posterior chest wall.  Pt intubated/sedated    ROS:  intubated    Allergies    No Known Allergies    Intolerances        ANTIBIOTICS/RELEVANT:  antimicrobials  aztreonam  IVPB      aztreonam  IVPB 1000 milliGRAM(s) IV Intermittent every 8 hours  metroNIDAZOLE  IVPB 500 milliGRAM(s) IV Intermittent every 8 hours  vancomycin  IVPB 1250 milliGRAM(s) IV Intermittent every 12 hours    immunologic:    OTHER:  ALBUTerol    90 MICROgram(s) HFA Inhaler 2 Puff(s) Inhalation every 6 hours  chlorhexidine 0.12% Liquid 15 milliLiter(s) Swish and Spit two times a day  docusate sodium Liquid 300 milliGRAM(s) Oral at bedtime  fentaNYL   Infusion 1 MICROgram(s)/kG/Hr IV Continuous <Continuous>  heparin  Infusion 1350 Unit(s)/Hr IV Continuous <Continuous>  insulin lispro (HumaLOG) corrective regimen sliding scale   SubCutaneous every 6 hours  ipratropium 17 MICROgram(s) HFA Inhaler 2 Puff(s) Inhalation every 6 hours  metoprolol tartrate Injectable 2.5 milliGRAM(s) IV Push every 6 hours PRN  midazolam Infusion 0.027 mG/kG/Hr IV Continuous <Continuous>  multivitamin/thiamine/folic acid in sodium chloride 0.9% 1000 milliLiter(s) IV Continuous <Continuous>  nystatin Powder 1 Application(s) Topical two times a day  ondansetron Injectable 4 milliGRAM(s) IV Push every 6 hours PRN  pantoprazole  Injectable 40 milliGRAM(s) IV Push daily  phenylephrine    Infusion 0.1 MICROgram(s)/kG/Min IV Continuous <Continuous>  propofol Infusion 5 MICROgram(s)/kG/Min IV Continuous <Continuous>  sodium chloride 0.9% lock flush 3 milliLiter(s) IV Push every 8 hours      Objective:  Vital Signs Last 24 Hrs  T(C): 37.7 (02 Aug 2018 20:00), Max: 39.6 (02 Aug 2018 08:00)  T(F): 99.9 (02 Aug 2018 20:00), Max: 103.2 (02 Aug 2018 08:00)  HR: 119 (02 Aug 2018 23:50) (84 - 119)  BP: 110/61 (02 Aug 2018 14:00) (110/61 - 116/65)  BP(mean): 71 (02 Aug 2018 14:00) (71 - 77)  RR: 22 (02 Aug 2018 23:00) (18 - 25)  SpO2: 98% (02 Aug 2018 23:50) (94% - 100%)    PHYSICAL EXAM:  Constitutional: intubated  Eyes:MEMO, EOMI  Ear/Nose/Throat: no thrush, mucositis.  Moist mucous membranes	  Neck:no JVD, no lymphadenopathy, supple  Respiratory: CTA emi, chest tubes in place  Cardiovascular: S1S2 RRR, no murmurs  Gastrointestinal:soft, nontender,  nondistended (+) BS  Extremities:no e/e/c  Skin:  erythematous rash over chest abd, extremities, back        LABS:                        10.0   19.09 )-----------( 276      ( 02 Aug 2018 02:30 )             29.7     08-02    137  |  105  |  33<H>  ----------------------------<  112<H>  4.3   |  20<L>  |  1.26    Ca    7.7<L>      02 Aug 2018 02:30  Phos  4.6     08-02  Mg     2.6     08-02      PT/INR - ( 02 Aug 2018 02:30 )   PT: 14.0 SEC;   INR: 1.21          PTT - ( 02 Aug 2018 02:30 )  PTT:51.6 SEC            Vancomycin Level, Trough: 14.0 ug/mL (08-01 @ 03:00)  Vancomycin Level, Trough: 20.8 ug/mL (07-31 @ 11:26)  Vancomycin Level, Trough: 33.3 ug/mL (07-30 @ 23:45)  Vancomycin Level, Trough: 14.3 ug/mL (07-29 @ 11:18)              MICROBIOLOGY:    Culture - Respiratory with Gram Stain (08.02.18 @ 12:43)    Gram Stain Sputum:   NOS^No Organisms Seen  WBC^White Blood Cells  QNTY CELLS IN GRAM STAIN: RARE (1+)    Specimen Source: BRONCHIAL LAVAGE    Culture - Blood (07.31.18 @ 07:06)    Culture - Blood:   NO ORGANISMS ISOLATED  NO ORGANISMS ISOLATED AT 48 HRS.    Specimen Source: BLOOD    Culture - Yeast and Fungus (07.27.18 @ 22:26)    Culture - Yeast and Fungus:   CULTURE NEGATIVE FOR YEASTS AND MOLDS   AFTER 3 DAYS    Specimen Source: LUNG - LOWER LOBE RIGHT    Culture - Acid Fast Smear Concentrated (07.27.18 @ 22:26)    Specimen Source: LUNG - LOWER LOBE RIGHT    Culture - Acid Fast Smear Concentrated:   AFB SMEAR= NO ACID FAST BACILLI SEEN    Culture - Respiratory with Gram Stain (07.27.18 @ 22:26)    Culture - Respiratory:   NO ORGANISMS ISOLATED AT 24 HOURS  NO ORGANISMS ISOLATED AT 48 HRS.    Gram Stain Sputum:   WBC^White Blood Cells  QNTY CELLS IN GRAM STAIN: RARE (1+)  NOS^No Organisms Seen    Specimen Source: LUNG - LOWER LOBE RIGHT    Culture - Surg Site Aerob/Anaer w/Gm St (07.27.18 @ 22:22)    Gram Stain Wound:   WBC^White Blood Cells  QNTY CELLS IN GRAM STAIN: NO CELLS SEEN  NOS^No Organisms Seen    Culture - Surgical Site:   NO ORGANISMS ISOLATED AT 24 HOURS  CULTURE IN PROGRESS, FURTHER REPORT TO FOLLOW.  STRAAC^Streptococcus constellatus    Specimen Source: ABSCESS          RADIOLOGY & ADDITIONAL STUDIES:  < from: CT Chest No Cont (08.02.18 @ 13:55) >  IMPRESSION:   New dense collection in the right posterior chest wall, measuring 9.2 x   8.7 cm, probably hematoma.    Decreased size of complex loculated right pleural effusion with largest   component measuring up to 9.3 cm, possibly postsurgical change or   persistent empyema. A right chest tube is in place, not in communication   with this collection.    Pulmonary edema.    Trace pelvic ascites.    < end of copied text >
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
## 2018-08-03 LAB
ALBUMIN SERPL ELPH-MCNC: 1.9 G/DL — LOW (ref 3.3–5)
ALP SERPL-CCNC: 224 U/L — HIGH (ref 40–120)
ALT FLD-CCNC: 46 U/L — HIGH (ref 4–41)
APTT BLD: 62.4 SEC — HIGH (ref 27.5–37.4)
AST SERPL-CCNC: 81 U/L — HIGH (ref 4–40)
BASE EXCESS BLDA CALC-SCNC: -3 MMOL/L — SIGNIFICANT CHANGE UP
BASE EXCESS BLDA CALC-SCNC: -3.1 MMOL/L — SIGNIFICANT CHANGE UP
BILIRUB SERPL-MCNC: 1.7 MG/DL — HIGH (ref 0.2–1.2)
BUN SERPL-MCNC: 36 MG/DL — HIGH (ref 7–23)
CA-I BLDA-SCNC: 1.1 MMOL/L — LOW (ref 1.15–1.29)
CA-I BLDA-SCNC: 1.16 MMOL/L — SIGNIFICANT CHANGE UP (ref 1.15–1.29)
CALCIUM SERPL-MCNC: 7.8 MG/DL — LOW (ref 8.4–10.5)
CHLORIDE SERPL-SCNC: 104 MMOL/L — SIGNIFICANT CHANGE UP (ref 98–107)
CO2 SERPL-SCNC: 20 MMOL/L — LOW (ref 22–31)
CREAT SERPL-MCNC: 1.3 MG/DL — SIGNIFICANT CHANGE UP (ref 0.5–1.3)
GLUCOSE BLDA-MCNC: 107 MG/DL — HIGH (ref 70–99)
GLUCOSE BLDA-MCNC: 108 MG/DL — HIGH (ref 70–99)
GLUCOSE SERPL-MCNC: 105 MG/DL — HIGH (ref 70–99)
HCO3 BLDA-SCNC: 22 MMOL/L — SIGNIFICANT CHANGE UP (ref 22–26)
HCO3 BLDA-SCNC: 22 MMOL/L — SIGNIFICANT CHANGE UP (ref 22–26)
HCT VFR BLD CALC: 23.1 % — LOW (ref 39–50)
HCT VFR BLD CALC: 24.2 % — LOW (ref 39–50)
HCT VFR BLDA CALC: 24.2 % — LOW (ref 39–51)
HCT VFR BLDA CALC: 25.8 % — LOW (ref 39–51)
HGB BLD-MCNC: 7.7 G/DL — LOW (ref 13–17)
HGB BLD-MCNC: 8 G/DL — LOW (ref 13–17)
HGB BLDA-MCNC: 7.8 G/DL — LOW (ref 13–17)
HGB BLDA-MCNC: 8.3 G/DL — LOW (ref 13–17)
INR BLD: 1.27 — HIGH (ref 0.88–1.17)
LACTATE BLDA-SCNC: 1.3 MMOL/L — SIGNIFICANT CHANGE UP (ref 0.5–2)
LACTATE BLDA-SCNC: 1.4 MMOL/L — SIGNIFICANT CHANGE UP (ref 0.5–2)
MAGNESIUM SERPL-MCNC: 2.5 MG/DL — SIGNIFICANT CHANGE UP (ref 1.6–2.6)
MCHC RBC-ENTMCNC: 32.4 PG — SIGNIFICANT CHANGE UP (ref 27–34)
MCHC RBC-ENTMCNC: 33.1 % — SIGNIFICANT CHANGE UP (ref 32–36)
MCHC RBC-ENTMCNC: 33.3 % — SIGNIFICANT CHANGE UP (ref 32–36)
MCHC RBC-ENTMCNC: 33.8 PG — SIGNIFICANT CHANGE UP (ref 27–34)
MCV RBC AUTO: 101.3 FL — HIGH (ref 80–100)
MCV RBC AUTO: 98 FL — SIGNIFICANT CHANGE UP (ref 80–100)
NRBC # FLD: 0.03 — SIGNIFICANT CHANGE UP
NRBC # FLD: 0.04 — SIGNIFICANT CHANGE UP
PCO2 BLDA: 37 MMHG — SIGNIFICANT CHANGE UP (ref 35–48)
PCO2 BLDA: 40 MMHG — SIGNIFICANT CHANGE UP (ref 35–48)
PH BLDA: 7.35 PH — SIGNIFICANT CHANGE UP (ref 7.35–7.45)
PH BLDA: 7.38 PH — SIGNIFICANT CHANGE UP (ref 7.35–7.45)
PHOSPHATE SERPL-MCNC: 4.7 MG/DL — HIGH (ref 2.5–4.5)
PLATELET # BLD AUTO: 307 K/UL — SIGNIFICANT CHANGE UP (ref 150–400)
PLATELET # BLD AUTO: 333 K/UL — SIGNIFICANT CHANGE UP (ref 150–400)
PMV BLD: 11.1 FL — SIGNIFICANT CHANGE UP (ref 7–13)
PMV BLD: 11.3 FL — SIGNIFICANT CHANGE UP (ref 7–13)
PO2 BLDA: 115 MMHG — HIGH (ref 83–108)
PO2 BLDA: 163 MMHG — HIGH (ref 83–108)
POTASSIUM BLDA-SCNC: 3.8 MMOL/L — SIGNIFICANT CHANGE UP (ref 3.4–4.5)
POTASSIUM BLDA-SCNC: 3.9 MMOL/L — SIGNIFICANT CHANGE UP (ref 3.4–4.5)
POTASSIUM SERPL-MCNC: 4.1 MMOL/L — SIGNIFICANT CHANGE UP (ref 3.5–5.3)
POTASSIUM SERPL-SCNC: 4.1 MMOL/L — SIGNIFICANT CHANGE UP (ref 3.5–5.3)
PROT SERPL-MCNC: 5.2 G/DL — LOW (ref 6–8.3)
PROTHROM AB SERPL-ACNC: 14.2 SEC — HIGH (ref 9.8–13.1)
RBC # BLD: 2.28 M/UL — LOW (ref 4.2–5.8)
RBC # BLD: 2.47 M/UL — LOW (ref 4.2–5.8)
RBC # FLD: 14.2 % — SIGNIFICANT CHANGE UP (ref 10.3–14.5)
RBC # FLD: 14.2 % — SIGNIFICANT CHANGE UP (ref 10.3–14.5)
SAO2 % BLDA: 98 % — SIGNIFICANT CHANGE UP (ref 95–99)
SAO2 % BLDA: 99.1 % — HIGH (ref 95–99)
SODIUM BLDA-SCNC: 134 MMOL/L — LOW (ref 136–146)
SODIUM BLDA-SCNC: 138 MMOL/L — SIGNIFICANT CHANGE UP (ref 136–146)
SODIUM SERPL-SCNC: 138 MMOL/L — SIGNIFICANT CHANGE UP (ref 135–145)
SPECIMEN SOURCE: SIGNIFICANT CHANGE UP
VANCOMYCIN TROUGH SERPL-MCNC: 27.8 UG/ML — CRITICAL HIGH (ref 10–20)
WBC # BLD: 19.71 K/UL — HIGH (ref 3.8–10.5)
WBC # BLD: 20.1 K/UL — HIGH (ref 3.8–10.5)
WBC # FLD AUTO: 19.71 K/UL — HIGH (ref 3.8–10.5)
WBC # FLD AUTO: 20.1 K/UL — HIGH (ref 3.8–10.5)

## 2018-08-03 PROCEDURE — 71045 X-RAY EXAM CHEST 1 VIEW: CPT | Mod: 26

## 2018-08-03 PROCEDURE — 99292 CRITICAL CARE ADDL 30 MIN: CPT

## 2018-08-03 PROCEDURE — 99291 CRITICAL CARE FIRST HOUR: CPT

## 2018-08-03 RX ORDER — DIPHENHYDRAMINE HCL 50 MG
50 CAPSULE ORAL ONCE
Qty: 0 | Refills: 0 | Status: COMPLETED | OUTPATIENT
Start: 2018-08-03 | End: 2018-08-03

## 2018-08-03 RX ORDER — HYDROCORTISONE 1 %
1 OINTMENT (GRAM) TOPICAL ONCE
Qty: 0 | Refills: 0 | Status: COMPLETED | OUTPATIENT
Start: 2018-08-03 | End: 2018-08-03

## 2018-08-03 RX ORDER — ACETAMINOPHEN 500 MG
1000 TABLET ORAL ONCE
Qty: 0 | Refills: 0 | Status: COMPLETED | OUTPATIENT
Start: 2018-08-03 | End: 2018-08-03

## 2018-08-03 RX ORDER — MIDAZOLAM HYDROCHLORIDE 1 MG/ML
0.04 INJECTION, SOLUTION INTRAMUSCULAR; INTRAVENOUS
Qty: 100 | Refills: 0 | Status: DISCONTINUED | OUTPATIENT
Start: 2018-08-03 | End: 2018-08-08

## 2018-08-03 RX ORDER — DIPHENHYDRAMINE HCL 50 MG
25 CAPSULE ORAL EVERY 6 HOURS
Qty: 0 | Refills: 0 | Status: COMPLETED | OUTPATIENT
Start: 2018-08-03 | End: 2018-08-05

## 2018-08-03 RX ADMIN — Medication 100 MILLIGRAM(S): at 21:41

## 2018-08-03 RX ADMIN — Medication 50 MILLIGRAM(S): at 05:22

## 2018-08-03 RX ADMIN — Medication 2 PUFF(S): at 16:44

## 2018-08-03 RX ADMIN — Medication 50 MILLIGRAM(S): at 14:34

## 2018-08-03 RX ADMIN — Medication 100 MILLIGRAM(S): at 14:14

## 2018-08-03 RX ADMIN — Medication 25 MILLIGRAM(S): at 14:03

## 2018-08-03 RX ADMIN — Medication 2 PUFF(S): at 04:53

## 2018-08-03 RX ADMIN — Medication 50 MILLIGRAM(S): at 01:36

## 2018-08-03 RX ADMIN — Medication 400 MILLIGRAM(S): at 20:15

## 2018-08-03 RX ADMIN — SODIUM CHLORIDE 3 MILLILITER(S): 9 INJECTION INTRAMUSCULAR; INTRAVENOUS; SUBCUTANEOUS at 05:22

## 2018-08-03 RX ADMIN — CHLORHEXIDINE GLUCONATE 15 MILLILITER(S): 213 SOLUTION TOPICAL at 05:21

## 2018-08-03 RX ADMIN — Medication 100 MILLIGRAM(S): at 05:22

## 2018-08-03 RX ADMIN — HEPARIN SODIUM 14.5 UNIT(S)/HR: 5000 INJECTION INTRAVENOUS; SUBCUTANEOUS at 08:02

## 2018-08-03 RX ADMIN — SODIUM CHLORIDE 3 MILLILITER(S): 9 INJECTION INTRAMUSCULAR; INTRAVENOUS; SUBCUTANEOUS at 14:33

## 2018-08-03 RX ADMIN — Medication 2 PUFF(S): at 23:17

## 2018-08-03 RX ADMIN — SODIUM CHLORIDE 40 MILLILITER(S): 9 INJECTION, SOLUTION INTRAVENOUS at 08:00

## 2018-08-03 RX ADMIN — Medication 25 MILLIGRAM(S): at 18:15

## 2018-08-03 RX ADMIN — NYSTATIN CREAM 1 APPLICATION(S): 100000 CREAM TOPICAL at 18:15

## 2018-08-03 RX ADMIN — SODIUM CHLORIDE 3 MILLILITER(S): 9 INJECTION INTRAMUSCULAR; INTRAVENOUS; SUBCUTANEOUS at 21:41

## 2018-08-03 RX ADMIN — Medication 50 MILLIGRAM(S): at 21:41

## 2018-08-03 RX ADMIN — Medication 400 MILLIGRAM(S): at 08:00

## 2018-08-03 RX ADMIN — CHLORHEXIDINE GLUCONATE 15 MILLILITER(S): 213 SOLUTION TOPICAL at 18:15

## 2018-08-03 RX ADMIN — PANTOPRAZOLE SODIUM 40 MILLIGRAM(S): 20 TABLET, DELAYED RELEASE ORAL at 12:01

## 2018-08-03 RX ADMIN — Medication 2 PUFF(S): at 10:52

## 2018-08-03 RX ADMIN — PROPOFOL 2.25 MICROGRAM(S)/KG/MIN: 10 INJECTION, EMULSION INTRAVENOUS at 08:00

## 2018-08-03 RX ADMIN — NYSTATIN CREAM 1 APPLICATION(S): 100000 CREAM TOPICAL at 05:22

## 2018-08-03 RX ADMIN — MIDAZOLAM HYDROCHLORIDE 2 MG/KG/HR: 1 INJECTION, SOLUTION INTRAMUSCULAR; INTRAVENOUS at 08:00

## 2018-08-03 RX ADMIN — Medication 1 APPLICATION(S): at 03:19

## 2018-08-03 RX ADMIN — FENTANYL CITRATE 7.5 MICROGRAM(S)/KG/HR: 50 INJECTION INTRAVENOUS at 08:00

## 2018-08-03 NOTE — PROGRESS NOTE ADULT - SUBJECTIVE AND OBJECTIVE BOX
Infectious Diseases progress note:    Subjective: WBC elevated, still with fever.  Pt with diffuse rash, looks the same as yesterday.  Intubated    ROS:  intubated/sedated    Allergies    No Known Allergies    Intolerances        ANTIBIOTICS/RELEVANT:  antimicrobials  aztreonam  IVPB      aztreonam  IVPB 1000 milliGRAM(s) IV Intermittent every 8 hours  metroNIDAZOLE  IVPB 500 milliGRAM(s) IV Intermittent every 8 hours  vancomycin  IVPB 1250 milliGRAM(s) IV Intermittent every 12 hours    immunologic:    OTHER:  ALBUTerol    90 MICROgram(s) HFA Inhaler 2 Puff(s) Inhalation every 6 hours  chlorhexidine 0.12% Liquid 15 milliLiter(s) Swish and Spit two times a day  diphenhydrAMINE   Injectable 25 milliGRAM(s) IV Push every 6 hours  fentaNYL   Infusion 1 MICROgram(s)/kG/Hr IV Continuous <Continuous>  heparin  Infusion 1350 Unit(s)/Hr IV Continuous <Continuous>  insulin lispro (HumaLOG) corrective regimen sliding scale   SubCutaneous every 6 hours  ipratropium 17 MICROgram(s) HFA Inhaler 2 Puff(s) Inhalation every 6 hours  metoprolol tartrate Injectable 2.5 milliGRAM(s) IV Push every 6 hours PRN  midazolam Infusion 0.04 mG/kG/Hr IV Continuous <Continuous>  multivitamin/thiamine/folic acid in sodium chloride 0.9% 1000 milliLiter(s) IV Continuous <Continuous>  nystatin Powder 1 Application(s) Topical two times a day  ondansetron Injectable 4 milliGRAM(s) IV Push every 6 hours PRN  pantoprazole  Injectable 40 milliGRAM(s) IV Push daily  phenylephrine    Infusion 0.1 MICROgram(s)/kG/Min IV Continuous <Continuous>  propofol Infusion 5 MICROgram(s)/kG/Min IV Continuous <Continuous>  sodium chloride 0.9% lock flush 3 milliLiter(s) IV Push every 8 hours      Objective:  Vital Signs Last 24 Hrs  T(C): 38.2 (03 Aug 2018 16:00), Max: 39 (03 Aug 2018 08:00)  T(F): 100.7 (03 Aug 2018 16:00), Max: 102.2 (03 Aug 2018 08:00)  HR: 108 (03 Aug 2018 17:00) (99 - 119)  BP: --  BP(mean): --  RR: 33 (03 Aug 2018 17:00) (18 - 35)  SpO2: 98% (03 Aug 2018 17:00) (97% - 100%)    PHYSICAL EXAM:  Constitutional: intubated  Eyes:MEMO, EOMI  Ear/Nose/Throat: no thrush, mucositis.  Moist mucous membranes	  Neck:no JVD, no lymphadenopathy, supple  Respiratory: CTA emi, chest tube  Cardiovascular: S1S2 RRR, no murmurs  Gastrointestinal:soft, nontender,  nondistended (+) BS  Extremities:no e/e/c  Skin:  no rashes, open wounds or ulcerations        LABS:                        8.0    19.71 )-----------( 307      ( 03 Aug 2018 03:00 )             24.2     08-03    138  |  104  |  36<H>  ----------------------------<  105<H>  4.1   |  20<L>  |  1.30    Ca    7.8<L>      03 Aug 2018 03:00  Phos  4.7     08-03  Mg     2.5     08-03    TPro  5.2<L>  /  Alb  1.9<L>  /  TBili  1.7<H>  /  DBili  x   /  AST  81<H>  /  ALT  46<H>  /  AlkPhos  224<H>  08-03    PT/INR - ( 03 Aug 2018 03:00 )   PT: 14.2 SEC;   INR: 1.27          PTT - ( 03 Aug 2018 03:00 )  PTT:62.4 SEC            Vancomycin Level, Trough: 27.8 ug/mL (08-03 @ 03:00)  Vancomycin Level, Trough: 14.0 ug/mL (08-01 @ 03:00)  Vancomycin Level, Trough: 20.8 ug/mL (07-31 @ 11:26)  Vancomycin Level, Trough: 33.3 ug/mL (07-30 @ 23:45)              MICROBIOLOGY:      Culture - Respiratory with Gram Stain (08.02.18 @ 12:43)    Culture - Respiratory:   NO GROWTH - PRELIMINARY RESULTS    Gram Stain Sputum:   NOS^No Organisms Seen  WBC^White Blood Cells  QNTY CELLS IN GRAM STAIN: RARE (1+)    Specimen Source: BRONCHIAL LAVAGE    Culture - Blood (07.31.18 @ 07:06)    Culture - Blood:   NO ORGANISMS ISOLATED  NO ORGANISMS ISOLATED AT 72 HRS.    Specimen Source: BLOOD    Culture - Yeast and Fungus (07.27.18 @ 22:26)    Culture - Yeast and Fungus:   CULTURE NEGATIVE FOR YEASTS AND MOLDS   AFTER 3 DAYS  CULTURE NEGATIVE FOR YEASTS AND MOLDS   AFTER 1 WEEK    Specimen Source: LUNG - LOWER LOBE RIGHT        RADIOLOGY & ADDITIONAL STUDIES:    < from: Xray Chest 1 View- PORTABLE-Routine (08.03.18 @ 06:55) >  IMPRESSION: There is an ET tube with the tip above the luzmaria. Enteric   tube courses below the left hemidiaphragm although the tip is not imaged.   There is a right-sided chest as on the prior study. There is a small   loculated right pleural effusion predominantly unchanged since the prior   study. There is no pneumothorax.    < end of copied text >
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
## 2018-08-03 NOTE — PROGRESS NOTE ADULT - SUBJECTIVE AND OBJECTIVE BOX
Surgery Progress Note    S: Patient seen and examined. S/p BAL at bedside.     O:  Vital Signs Last 24 Hrs  T(C): 37.9 (03 Aug 2018 10:00), Max: 39 (03 Aug 2018 08:00)  T(F): 100.2 (03 Aug 2018 10:00), Max: 102.2 (03 Aug 2018 08:00)  HR: 108 (03 Aug 2018 10:50) (91 - 119)  BP: 110/61 (02 Aug 2018 14:00) (110/61 - 110/61)  BP(mean): 71 (02 Aug 2018 14:00) (71 - 71)  RR: 23 (03 Aug 2018 05:00) (18 - 25)  SpO2: 98% (03 Aug 2018 10:50) (95% - 100%)    CBC (08-03 @ 03:00)                          8.0<L>                   19.71<H>  )--------------(  307        --    % Neuts, --    % Lymphs, ANC: --                              24.2<L>  CBC (08-02 @ 02:30)                          10.0<L>                   19.09<H>  )--------------(  276        --    % Neuts, --    % Lymphs, ANC: --                              29.7<L>    BMP (08-03 @ 03:00)       138     |  104     |  36<H> 			Ca++ --      Ca 7.8<L>       ---------------------------------( 105<H>		Mg 2.5          4.1     |  20<L>   |  1.30  			Ph 4.7<H>  BMP (08-02 @ 02:30)       137     |  105     |  33<H> 			Ca++ 1.07    Ca 7.7<L>       ---------------------------------( 112<H>		Mg 2.6          4.3     |  20<L>   |  1.26  			Ph 4.6<H>    LFTs (08-03 @ 03:00)      TPro 5.2<L> / Alb 1.9<L> / TBili 1.7<H> / DBili -- / AST 81<H> / ALT 46<H> / AlkPhos 224<H>    Coags (08-03 @ 03:00)  aPTT 62.4<H> / INR 1.27<H> / PT 14.2<H>  Coags (08-02 @ 02:30)  aPTT 51.6<H> / INR 1.21<H> / PT 14.0<H>      ABG (08-03 @ 03:00)     7.38 / 37 / 163<H> / 22 / -3.0 / 99.1<H>%     Lactate: 1.3   ABG (08-02 @ 02:30)     7.38 / 38 / 120<H> / 22 / -2.4 / 98.2%     Lactate: 1.4         -> BRONCHIAL LAVAGE Culture (08-02 @ 12:43)       NOS^No Organisms Seen  WBC^White Blood Cells  QNTY CELLS IN GRAM STAIN: RARE (1+)      NO GROWTH - PRELIMINARY RESULTS  NG    -> BLOOD Culture (07-31 @ 07:06)     NG    NG  NG    -> LUNG - LOWER LOBE RIGHT Culture (07-27 @ 22:26)       WBC^White Blood Cells  QNTY CELLS IN GRAM STAIN: RARE (1+)  NOS^No Organisms Seen      NO ORGANISMS ISOLATED AT 24 HOURS  NO ORGANISMS ISOLATED AT 48 HRS.  NG    -> ABSCESS Culture (07-27 @ 22:22)       WBC^White Blood Cells  QNTY CELLS IN GRAM STAIN: NO CELLS SEEN  NOS^No Organisms Seen      NO ORGANISMS ISOLATED AT 24 HOURS  CULTURE IN PROGRESS, FURTHER REPORT TO FOLLOW.  STRAAC^Streptococcus constellatus  NG    -> BLOOD ARTERIAL Culture (07-27 @ 11:03)     NG    NG  NG    -> PLEURAL FLUID Culture (07-26 @ 15:24)     NG    NG  NG    -> PLEURAL FLUID Culture (07-26 @ 10:59)       NOS^No Organisms Seen  WBC^White Blood Cells  QNTY CELLS IN GRAM STAIN: MANY (4+)      NO GROWTH - PRELIMINARY RESULTS  NO ORGANISMS ISOLATED AT 24 HOURS  CULTURE IN PROGRESS, FURTHER REPORT TO FOLLOW.  PRESUMPTIVE ANAEROBE  PRE SANJUANITA^PREVOTELLA ORIS  STRAAC^Streptococcus constellatus  FNUC^Fusobacterium nucleatum  NG    Physical Exam:  Gen: Laying in bed, NAD on fent/prop  Resp: Unlabored breathing, intubated  Abd: soft, NTND  Extremities: feet cool L > R, 2-3 second capillary refill bilaterally  Vascular: R leg with femoral, popliteal, PT, and DP; L leg femoral, doppler popliteal, doppler AT & DP
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
## 2018-08-04 LAB
ALBUMIN SERPL ELPH-MCNC: 2 G/DL — LOW (ref 3.3–5)
ALP SERPL-CCNC: 235 U/L — HIGH (ref 40–120)
ALT FLD-CCNC: 43 U/L — HIGH (ref 4–41)
APTT BLD: 65.3 SEC — HIGH (ref 27.5–37.4)
AST SERPL-CCNC: 75 U/L — HIGH (ref 4–40)
BASE EXCESS BLDA CALC-SCNC: -3.3 MMOL/L — SIGNIFICANT CHANGE UP
BILIRUB SERPL-MCNC: 1.7 MG/DL — HIGH (ref 0.2–1.2)
BUN SERPL-MCNC: 37 MG/DL — HIGH (ref 7–23)
CA-I BLDA-SCNC: 1.19 MMOL/L — SIGNIFICANT CHANGE UP (ref 1.15–1.29)
CALCIUM SERPL-MCNC: 7.8 MG/DL — LOW (ref 8.4–10.5)
CHLORIDE SERPL-SCNC: 106 MMOL/L — SIGNIFICANT CHANGE UP (ref 98–107)
CO2 SERPL-SCNC: 21 MMOL/L — LOW (ref 22–31)
CREAT SERPL-MCNC: 1.29 MG/DL — SIGNIFICANT CHANGE UP (ref 0.5–1.3)
GLUCOSE BLDA-MCNC: 113 MG/DL — HIGH (ref 70–99)
GLUCOSE SERPL-MCNC: 112 MG/DL — HIGH (ref 70–99)
HCO3 BLDA-SCNC: 22 MMOL/L — SIGNIFICANT CHANGE UP (ref 22–26)
HCT VFR BLD CALC: 25.1 % — LOW (ref 39–50)
HCT VFR BLDA CALC: 23.8 % — LOW (ref 39–51)
HGB BLD-MCNC: 8.4 G/DL — LOW (ref 13–17)
HGB BLDA-MCNC: 7.6 G/DL — LOW (ref 13–17)
INR BLD: 1.26 — HIGH (ref 0.88–1.17)
LACTATE BLDA-SCNC: 1.8 MMOL/L — SIGNIFICANT CHANGE UP (ref 0.5–2)
MAGNESIUM SERPL-MCNC: 2.8 MG/DL — HIGH (ref 1.6–2.6)
MCHC RBC-ENTMCNC: 33.5 % — SIGNIFICANT CHANGE UP (ref 32–36)
MCHC RBC-ENTMCNC: 34.1 PG — HIGH (ref 27–34)
MCV RBC AUTO: 102 FL — HIGH (ref 80–100)
NRBC # FLD: 0.06 — SIGNIFICANT CHANGE UP
PCO2 BLDA: 44 MMHG — SIGNIFICANT CHANGE UP (ref 35–48)
PH BLDA: 7.32 PH — LOW (ref 7.35–7.45)
PHOSPHATE SERPL-MCNC: 4.7 MG/DL — HIGH (ref 2.5–4.5)
PLATELET # BLD AUTO: 348 K/UL — SIGNIFICANT CHANGE UP (ref 150–400)
PMV BLD: 11.1 FL — SIGNIFICANT CHANGE UP (ref 7–13)
PO2 BLDA: 121 MMHG — HIGH (ref 83–108)
POTASSIUM BLDA-SCNC: 4.2 MMOL/L — SIGNIFICANT CHANGE UP (ref 3.4–4.5)
POTASSIUM SERPL-MCNC: 4.2 MMOL/L — SIGNIFICANT CHANGE UP (ref 3.5–5.3)
POTASSIUM SERPL-SCNC: 4.2 MMOL/L — SIGNIFICANT CHANGE UP (ref 3.5–5.3)
PROT SERPL-MCNC: 5.5 G/DL — LOW (ref 6–8.3)
PROTHROM AB SERPL-ACNC: 14.5 SEC — HIGH (ref 9.8–13.1)
RBC # BLD: 2.46 M/UL — LOW (ref 4.2–5.8)
RBC # FLD: 14.3 % — SIGNIFICANT CHANGE UP (ref 10.3–14.5)
SAO2 % BLDA: 98.5 % — SIGNIFICANT CHANGE UP (ref 95–99)
SODIUM BLDA-SCNC: 141 MMOL/L — SIGNIFICANT CHANGE UP (ref 136–146)
SODIUM SERPL-SCNC: 140 MMOL/L — SIGNIFICANT CHANGE UP (ref 135–145)
VANCOMYCIN TROUGH SERPL-MCNC: 13.3 UG/ML — SIGNIFICANT CHANGE UP (ref 10–20)
WBC # BLD: 21.29 K/UL — HIGH (ref 3.8–10.5)
WBC # FLD AUTO: 21.29 K/UL — HIGH (ref 3.8–10.5)

## 2018-08-04 PROCEDURE — 99291 CRITICAL CARE FIRST HOUR: CPT

## 2018-08-04 PROCEDURE — 71045 X-RAY EXAM CHEST 1 VIEW: CPT | Mod: 26

## 2018-08-04 RX ORDER — FUROSEMIDE 40 MG
20 TABLET ORAL ONCE
Qty: 0 | Refills: 0 | Status: COMPLETED | OUTPATIENT
Start: 2018-08-04 | End: 2018-08-04

## 2018-08-04 RX ORDER — ACETAMINOPHEN 500 MG
650 TABLET ORAL EVERY 6 HOURS
Qty: 0 | Refills: 0 | Status: DISCONTINUED | OUTPATIENT
Start: 2018-08-04 | End: 2018-09-06

## 2018-08-04 RX ORDER — ACETAMINOPHEN 500 MG
1000 TABLET ORAL ONCE
Qty: 0 | Refills: 0 | Status: COMPLETED | OUTPATIENT
Start: 2018-08-04 | End: 2018-08-04

## 2018-08-04 RX ORDER — ACETAMINOPHEN 500 MG
1000 TABLET ORAL ONCE
Qty: 0 | Refills: 0 | Status: COMPLETED | OUTPATIENT
Start: 2018-08-03 | End: 2018-08-03

## 2018-08-04 RX ORDER — ACETAMINOPHEN 500 MG
650 TABLET ORAL EVERY 6 HOURS
Qty: 0 | Refills: 0 | Status: DISCONTINUED | OUTPATIENT
Start: 2018-08-04 | End: 2018-08-04

## 2018-08-04 RX ADMIN — PANTOPRAZOLE SODIUM 40 MILLIGRAM(S): 20 TABLET, DELAYED RELEASE ORAL at 13:01

## 2018-08-04 RX ADMIN — Medication 25 MILLIGRAM(S): at 13:01

## 2018-08-04 RX ADMIN — Medication 25 MILLIGRAM(S): at 17:50

## 2018-08-04 RX ADMIN — FENTANYL CITRATE 7.5 MICROGRAM(S)/KG/HR: 50 INJECTION INTRAVENOUS at 21:07

## 2018-08-04 RX ADMIN — CHLORHEXIDINE GLUCONATE 15 MILLILITER(S): 213 SOLUTION TOPICAL at 05:24

## 2018-08-04 RX ADMIN — SODIUM CHLORIDE 3 MILLILITER(S): 9 INJECTION INTRAMUSCULAR; INTRAVENOUS; SUBCUTANEOUS at 13:35

## 2018-08-04 RX ADMIN — Medication 50 MILLIGRAM(S): at 06:40

## 2018-08-04 RX ADMIN — PROPOFOL 2.25 MICROGRAM(S)/KG/MIN: 10 INJECTION, EMULSION INTRAVENOUS at 21:08

## 2018-08-04 RX ADMIN — HEPARIN SODIUM 14.5 UNIT(S)/HR: 5000 INJECTION INTRAVENOUS; SUBCUTANEOUS at 21:07

## 2018-08-04 RX ADMIN — Medication 25 MILLIGRAM(S): at 05:23

## 2018-08-04 RX ADMIN — SODIUM CHLORIDE 40 MILLILITER(S): 9 INJECTION, SOLUTION INTRAVENOUS at 21:07

## 2018-08-04 RX ADMIN — Medication 250 MILLIGRAM(S): at 17:58

## 2018-08-04 RX ADMIN — Medication 2 PUFF(S): at 15:58

## 2018-08-04 RX ADMIN — Medication 100 MILLIGRAM(S): at 21:25

## 2018-08-04 RX ADMIN — Medication 2 PUFF(S): at 10:25

## 2018-08-04 RX ADMIN — Medication 650 MILLIGRAM(S): at 20:00

## 2018-08-04 RX ADMIN — NYSTATIN CREAM 1 APPLICATION(S): 100000 CREAM TOPICAL at 17:50

## 2018-08-04 RX ADMIN — SODIUM CHLORIDE 3 MILLILITER(S): 9 INJECTION INTRAMUSCULAR; INTRAVENOUS; SUBCUTANEOUS at 22:00

## 2018-08-04 RX ADMIN — Medication 50 MILLIGRAM(S): at 14:38

## 2018-08-04 RX ADMIN — SODIUM CHLORIDE 3 MILLILITER(S): 9 INJECTION INTRAMUSCULAR; INTRAVENOUS; SUBCUTANEOUS at 05:24

## 2018-08-04 RX ADMIN — Medication 25 MILLIGRAM(S): at 00:30

## 2018-08-04 RX ADMIN — MIDAZOLAM HYDROCHLORIDE 3 MG/KG/HR: 1 INJECTION, SOLUTION INTRAMUSCULAR; INTRAVENOUS at 21:07

## 2018-08-04 RX ADMIN — Medication 250 MILLIGRAM(S): at 07:00

## 2018-08-04 RX ADMIN — Medication 50 MILLIGRAM(S): at 21:27

## 2018-08-04 RX ADMIN — Medication 2 PUFF(S): at 04:04

## 2018-08-04 RX ADMIN — NYSTATIN CREAM 1 APPLICATION(S): 100000 CREAM TOPICAL at 05:23

## 2018-08-04 RX ADMIN — Medication 100 MILLIGRAM(S): at 05:24

## 2018-08-04 RX ADMIN — Medication 20 MILLIGRAM(S): at 03:00

## 2018-08-04 RX ADMIN — CHLORHEXIDINE GLUCONATE 15 MILLILITER(S): 213 SOLUTION TOPICAL at 17:50

## 2018-08-04 RX ADMIN — Medication 100 MILLIGRAM(S): at 13:36

## 2018-08-04 RX ADMIN — Medication 400 MILLIGRAM(S): at 10:00

## 2018-08-04 RX ADMIN — Medication 2 PUFF(S): at 22:06
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
## 2018-08-04 NOTE — PROGRESS NOTE ADULT - SUBJECTIVE AND OBJECTIVE BOX
Infectious Diseases progress note:    Subjective: Elevated WBC, intermittent fevers.  Pt with bodily rash, appears about the same.      ROS:  intubated, unable to assess    Allergies    No Known Allergies    Intolerances        ANTIBIOTICS/RELEVANT:  antimicrobials  aztreonam  IVPB      aztreonam  IVPB 1000 milliGRAM(s) IV Intermittent every 8 hours  metroNIDAZOLE  IVPB 500 milliGRAM(s) IV Intermittent every 8 hours  vancomycin  IVPB 1250 milliGRAM(s) IV Intermittent every 12 hours    immunologic:    OTHER:  acetaminophen    Suspension 650 milliGRAM(s) Oral every 6 hours PRN  ALBUTerol    90 MICROgram(s) HFA Inhaler 2 Puff(s) Inhalation every 6 hours  chlorhexidine 0.12% Liquid 15 milliLiter(s) Swish and Spit two times a day  diphenhydrAMINE   Injectable 25 milliGRAM(s) IV Push every 6 hours  fentaNYL   Infusion 1 MICROgram(s)/kG/Hr IV Continuous <Continuous>  heparin  Infusion 1350 Unit(s)/Hr IV Continuous <Continuous>  insulin lispro (HumaLOG) corrective regimen sliding scale   SubCutaneous every 6 hours  ipratropium 17 MICROgram(s) HFA Inhaler 2 Puff(s) Inhalation every 6 hours  metoprolol tartrate Injectable 2.5 milliGRAM(s) IV Push every 6 hours PRN  midazolam Infusion 0.04 mG/kG/Hr IV Continuous <Continuous>  multivitamin/thiamine/folic acid in sodium chloride 0.9% 1000 milliLiter(s) IV Continuous <Continuous>  nystatin Powder 1 Application(s) Topical two times a day  ondansetron Injectable 4 milliGRAM(s) IV Push every 6 hours PRN  pantoprazole  Injectable 40 milliGRAM(s) IV Push daily  phenylephrine    Infusion 0.1 MICROgram(s)/kG/Min IV Continuous <Continuous>  propofol Infusion 5 MICROgram(s)/kG/Min IV Continuous <Continuous>  sodium chloride 0.9% lock flush 3 milliLiter(s) IV Push every 8 hours      Objective:  Vital Signs Last 24 Hrs  T(C): 38.6 (04 Aug 2018 19:23), Max: 38.6 (04 Aug 2018 19:23)  T(F): 101.4 (04 Aug 2018 19:23), Max: 101.4 (04 Aug 2018 19:23)  HR: 108 (04 Aug 2018 19:00) (86 - 112)  BP: 110/55 (03 Aug 2018 21:00) (110/55 - 110/55)  BP(mean): 69 (03 Aug 2018 21:00) (69 - 69)  RR: 33 (04 Aug 2018 19:00) (16 - 33)  SpO2: 100% (04 Aug 2018 19:00) (95% - 100%)    PHYSICAL EXAM:  Constitutional: intubated  Eyes:MEMO, EOMI  Ear/Nose/Throat: no thrush, mucositis.  Moist mucous membranes	  Neck:no JVD, no lymphadenopathy, supple  Respiratory: CTA emi  Cardiovascular: S1S2 RRR, no murmurs  Gastrointestinal:soft, nontender,  nondistended (+) BS  Extremities:no e/e/c  Skin:  erythamatous bodily rash, appears the same        LABS:                        8.4    21.29 )-----------( 348      ( 04 Aug 2018 03:30 )             25.1     08-04    140  |  106  |  37<H>  ----------------------------<  112<H>  4.2   |  21<L>  |  1.29    Ca    7.8<L>      04 Aug 2018 03:30  Phos  4.7     08-04  Mg     2.8     08-04    TPro  5.5<L>  /  Alb  2.0<L>  /  TBili  1.7<H>  /  DBili  x   /  AST  75<H>  /  ALT  43<H>  /  AlkPhos  235<H>  08-04    PT/INR - ( 04 Aug 2018 03:30 )   PT: 14.5 SEC;   INR: 1.26          PTT - ( 04 Aug 2018 03:30 )  PTT:65.3 SEC            Vancomycin Level, Trough: 13.3 ug/mL (08-04 @ 05:20)  Vancomycin Level, Trough: 27.8 ug/mL (08-03 @ 03:00)  Vancomycin Level, Trough: 14.0 ug/mL (08-01 @ 03:00)  Vancomycin Level, Trough: 20.8 ug/mL (07-31 @ 11:26)  Vancomycin Level, Trough: 33.3 ug/mL (07-30 @ 23:45)              MICROBIOLOGY:    Culture - Respiratory with Gram Stain (08.02.18 @ 12:43)    Culture - Respiratory:   NO ORGANISMS ISOLATED AT 24 HOURS    Gram Stain Sputum:   NOS^No Organisms Seen  WBC^White Blood Cells  QNTY CELLS IN GRAM STAIN: RARE (1+)    Specimen Source: BRONCHIAL LAVAGE    Culture - Blood (07.31.18 @ 07:06)    Culture - Blood:   NO ORGANISMS ISOLATED  NO ORGANISMS ISOLATED AT 96 HOURS    Specimen Source: BLOOD    Culture - Yeast and Fungus (07.27.18 @ 22:26)    Culture - Yeast and Fungus:   CULTURE NEGATIVE FOR YEASTS AND MOLDS   AFTER 3 DAYS  CULTURE NEGATIVE FOR YEASTS AND MOLDS   AFTER 1 WEEK    Specimen Source: LUNG - LOWER LOBE RIGHT    Culture - Acid Fast - Other w/Smear (07.27.18 @ 22:26)    Culture - Acid Fast - Other w/Smear:   ------------------ PRELIMINARY --------------------             NO ACID FAST BACILLI ISOLATED  AFTER ONE WEEK'S INCUBATION    Specimen Source: LUNG - LOWER LOBE RIGHT          RADIOLOGY & ADDITIONAL STUDIES:      < from: Xray Chest 1 View- PORTABLE-Routine (08.04.18 @ 05:54) >    IMPRESSION:   1. Tubes and lines in place.  2. Decreasing postop right effusion.     < end of copied text >      < from: Xray Chest 1 View- PORTABLE-Routine (08.03.18 @ 06:55) >  IMPRESSION: There is an ET tube with the tip above the luzmaria. Enteric   tube courses below the left hemidiaphragm although the tip is not imaged.   There is a right-sided chest as on the prior study. There is a small   loculated right pleural effusion predominantly unchanged since the prior   study. There is no pneumothorax.    < end of copied text >          < from: CT Chest No Cont (08.02.18 @ 13:55) >  IMPRESSION:   New dense collection in the right posterior chest wall, measuring 9.2 x   8.7 cm, probably hematoma.    Decreased size of complex loculated right pleural effusion with largest   component measuring up to 9.3 cm, possibly postsurgical change or   persistent empyema. A right chest tube is in place, not in communication   with this collection.    Pulmonary edema.    Trace pelvic ascites.    < end of copied text >

## 2018-08-04 NOTE — PROGRESS NOTE ADULT - SUBJECTIVE AND OBJECTIVE BOX
Surgery Progress Note    S: Patient seen and examined. Continues to require ventilatory support.     O:  Vital Signs Last 24 Hrs  T(C): 38.4 (04 Aug 2018 12:00), Max: 38.4 (04 Aug 2018 08:00)  T(F): 101.1 (04 Aug 2018 12:00), Max: 101.1 (04 Aug 2018 08:00)  HR: 101 (04 Aug 2018 15:56) (86 - 112)  BP: 110/55 (03 Aug 2018 21:00) (110/55 - 110/55)  BP(mean): 69 (03 Aug 2018 21:00) (69 - 69)  RR: 22 (04 Aug 2018 15:00) (16 - 33)  SpO2: 99% (04 Aug 2018 15:56) (95% - 100%)    Physical Exam:  Gen: Laying in bed, NAD on fent/prop  Resp: Unlabored breathing, intubated  Abd: soft, NTND  Extremities: feet cool L > R, 2-3 second capillary refill bilaterally  Vascular: R leg with femoral, popliteal, PT, and DP; L leg femoral, doppler popliteal, doppler AT & DP    CBC (08-04 @ 03:30)                          8.4<L>                   21.29<H>  )--------------(  348        --    % Neuts, --    % Lymphs, ANC: --                              25.1<L>  CBC (08-03 @ 23:24)                          7.7<L>                   20.10<H>  )--------------(  333        --    % Neuts, --    % Lymphs, ANC: --                              23.1<L>    BMP (08-04 @ 03:30)       140     |  106     |  37<H> 			Ca++ --      Ca 7.8<L>       ---------------------------------( 112<H>		Mg 2.8<H>       4.2     |  21<L>   |  1.29  			Ph 4.7<H>    LFTs (08-04 @ 03:30)      TPro 5.5<L> / Alb 2.0<L> / TBili 1.7<H> / DBili -- / AST 75<H> / ALT 43<H> / AlkPhos 235<H>    Coags (08-04 @ 03:30)  aPTT 65.3<H> / INR 1.26<H> / PT 14.5<H>    ABG (08-04 @ 03:30)     7.32<L> / 44 / 121<H> / 22 / -3.3 / 98.5%     Lactate: 1.8   ABG (08-03 @ 23:24)     7.35 / 40 / 115<H> / 22 / -3.1 / 98.0%     Lactate: 1.4     -> BRONCHIAL LAVAGE Culture (08-02 @ 12:43)       NOS^No Organisms Seen  WBC^White Blood Cells  QNTY CELLS IN GRAM STAIN: RARE (1+)      NO ORGANISMS ISOLATED AT 24 HOURS  NG    -> BLOOD Culture (07-31 @ 07:06)     NG    NG  NG    -> LUNG - LOWER LOBE RIGHT Culture (07-27 @ 22:26)       WBC^White Blood Cells  QNTY CELLS IN GRAM STAIN: RARE (1+)  NOS^No Organisms Seen      NO ORGANISMS ISOLATED AT 24 HOURS  NO ORGANISMS ISOLATED AT 48 HRS.  NG    -> ABSCESS Culture (07-27 @ 22:22)       WBC^White Blood Cells  QNTY CELLS IN GRAM STAIN: NO CELLS SEEN  NOS^No Organisms Seen      NO ORGANISMS ISOLATED AT 24 HOURS  CULTURE IN PROGRESS, FURTHER REPORT TO FOLLOW.  STRAAC^Streptococcus constellatus  NG    -> BLOOD ARTERIAL Culture (07-27 @ 11:03)     NG    NG  NG    -> PLEURAL FLUID Culture (07-26 @ 15:24)     NG    NG  NG    -> PLEURAL FLUID Culture (07-26 @ 10:59)       NOS^No Organisms Seen  WBC^White Blood Cells  QNTY CELLS IN GRAM STAIN: MANY (4+)      NO GROWTH - PRELIMINARY RESULTS  NO ORGANISMS ISOLATED AT 24 HOURS  CULTURE IN PROGRESS, FURTHER REPORT TO FOLLOW.  PRESUMPTIVE ANAEROBE  PRE SANJUANITA^PREVOTELLA ORIS  STRAAC^Streptococcus constellatus  FNUC^Fusobacterium nucleatum  NG
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
## 2018-08-04 NOTE — PROGRESS NOTE ADULT - SUBJECTIVE AND OBJECTIVE BOX
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 with  Protonix   Zofran / Reglan for nausea - PRN	  NPO    =======================    ENDOCRIN  =====================  Glycemic monitoring  F/S with coverage  ===================HEMATOLOGIC/ONCOLOGIC =============  Monitor chest tube output. No signs of active bleeding.   Follow CBC, coags  in AM  DVT prophylaxis with SCD, sc Heparin    ========================INFECTIOUS DISEASE===============   Monitor for fever / leukocytosis.  All surgical incision / chest tube  sites look clean  Pleural fluid cultures growing Strep constellatus and Fusobacterium.  Fungal/AFB negative   ABx Aztreonam, Flagyl, Vanco  Benadryl PRN for rash  MEropenem d/c ed due to maculopapular rash  ID Dr Simons on board        Pertinent clinical, laboratory, radiographic, hemodynamic, echocardiographic, respiratory data, microbiologic data and chart were reviewed and analyzed frequently throughout the course of the day and night. GI and DVT prophylaxis, glycemic control, head of bed elevation and skin care issues were addressed.  Patient seen, examined and plan discussed with CT Surgery / CTICU team during rounds.  Pt remains critically ill in imminent risk of  deterioration and requires very careful cardio- pulmonary monitoring and support.    I have spent    60      minutes of critical care time with this pt between 12  am    and     9  am         minutes spent on total encounter; more than 50% of the visit was spent counseling and/or coordinating care by the attending physician.        PRATIK Montes MD

## 2018-08-05 ENCOUNTER — TRANSCRIPTION ENCOUNTER (OUTPATIENT)
Age: 43
End: 2018-08-05

## 2018-08-05 LAB
BACTERIA BLD CULT: SIGNIFICANT CHANGE UP
BACTERIA SPT RESP CULT: SIGNIFICANT CHANGE UP
BUN SERPL-MCNC: 41 MG/DL — HIGH (ref 7–23)
CALCIUM SERPL-MCNC: 7.7 MG/DL — LOW (ref 8.4–10.5)
CHLORIDE SERPL-SCNC: 106 MMOL/L — SIGNIFICANT CHANGE UP (ref 98–107)
CO2 SERPL-SCNC: 20 MMOL/L — LOW (ref 22–31)
CREAT SERPL-MCNC: 1.35 MG/DL — HIGH (ref 0.5–1.3)
GLUCOSE SERPL-MCNC: 124 MG/DL — HIGH (ref 70–99)
HCT VFR BLD CALC: 24.4 % — LOW (ref 39–50)
HGB BLD-MCNC: 7.8 G/DL — LOW (ref 13–17)
MAGNESIUM SERPL-MCNC: 2.7 MG/DL — HIGH (ref 1.6–2.6)
MCHC RBC-ENTMCNC: 32 % — SIGNIFICANT CHANGE UP (ref 32–36)
MCHC RBC-ENTMCNC: 32.4 PG — SIGNIFICANT CHANGE UP (ref 27–34)
MCV RBC AUTO: 101.2 FL — HIGH (ref 80–100)
NRBC # FLD: 0.12 — SIGNIFICANT CHANGE UP
PHOSPHATE SERPL-MCNC: 5 MG/DL — HIGH (ref 2.5–4.5)
PLATELET # BLD AUTO: 391 K/UL — SIGNIFICANT CHANGE UP (ref 150–400)
PMV BLD: 10.6 FL — SIGNIFICANT CHANGE UP (ref 7–13)
POTASSIUM SERPL-MCNC: 4.5 MMOL/L — SIGNIFICANT CHANGE UP (ref 3.5–5.3)
POTASSIUM SERPL-SCNC: 4.5 MMOL/L — SIGNIFICANT CHANGE UP (ref 3.5–5.3)
RBC # BLD: 2.41 M/UL — LOW (ref 4.2–5.8)
RBC # FLD: 14.5 % — SIGNIFICANT CHANGE UP (ref 10.3–14.5)
SODIUM SERPL-SCNC: 140 MMOL/L — SIGNIFICANT CHANGE UP (ref 135–145)
VANCOMYCIN TROUGH SERPL-MCNC: 18 UG/ML — SIGNIFICANT CHANGE UP (ref 10–20)
VANCOMYCIN TROUGH SERPL-MCNC: 24.6 UG/ML — HIGH (ref 10–20)
WBC # BLD: 23.25 K/UL — HIGH (ref 3.8–10.5)
WBC # FLD AUTO: 23.25 K/UL — HIGH (ref 3.8–10.5)

## 2018-08-05 PROCEDURE — 99291 CRITICAL CARE FIRST HOUR: CPT

## 2018-08-05 PROCEDURE — 71045 X-RAY EXAM CHEST 1 VIEW: CPT | Mod: 26

## 2018-08-05 PROCEDURE — 71045 X-RAY EXAM CHEST 1 VIEW: CPT | Mod: 26,77

## 2018-08-05 PROCEDURE — 36569 INSJ PICC 5 YR+ W/O IMAGING: CPT

## 2018-08-05 RX ORDER — FUROSEMIDE 40 MG
10 TABLET ORAL ONCE
Qty: 0 | Refills: 0 | Status: COMPLETED | OUTPATIENT
Start: 2018-08-05 | End: 2018-08-05

## 2018-08-05 RX ORDER — VANCOMYCIN HCL 1 G
1000 VIAL (EA) INTRAVENOUS EVERY 12 HOURS
Qty: 0 | Refills: 0 | Status: DISCONTINUED | OUTPATIENT
Start: 2018-08-05 | End: 2018-08-07

## 2018-08-05 RX ORDER — ACETAMINOPHEN 500 MG
1000 TABLET ORAL ONCE
Qty: 0 | Refills: 0 | Status: COMPLETED | OUTPATIENT
Start: 2018-08-05 | End: 2018-08-05

## 2018-08-05 RX ADMIN — PROPOFOL 2.25 MICROGRAM(S)/KG/MIN: 10 INJECTION, EMULSION INTRAVENOUS at 20:53

## 2018-08-05 RX ADMIN — PHENYLEPHRINE HYDROCHLORIDE 2.81 MICROGRAM(S)/KG/MIN: 10 INJECTION INTRAVENOUS at 20:54

## 2018-08-05 RX ADMIN — SODIUM CHLORIDE 3 MILLILITER(S): 9 INJECTION INTRAMUSCULAR; INTRAVENOUS; SUBCUTANEOUS at 14:18

## 2018-08-05 RX ADMIN — Medication 50 MILLIGRAM(S): at 14:43

## 2018-08-05 RX ADMIN — Medication 250 MILLIGRAM(S): at 18:39

## 2018-08-05 RX ADMIN — Medication 100 MILLIGRAM(S): at 14:00

## 2018-08-05 RX ADMIN — Medication 25 MILLIGRAM(S): at 00:23

## 2018-08-05 RX ADMIN — SODIUM CHLORIDE 3 MILLILITER(S): 9 INJECTION INTRAMUSCULAR; INTRAVENOUS; SUBCUTANEOUS at 07:23

## 2018-08-05 RX ADMIN — Medication 50 MILLIGRAM(S): at 06:00

## 2018-08-05 RX ADMIN — Medication 2 PUFF(S): at 15:35

## 2018-08-05 RX ADMIN — Medication 2 PUFF(S): at 04:21

## 2018-08-05 RX ADMIN — Medication 2 PUFF(S): at 09:26

## 2018-08-05 RX ADMIN — NYSTATIN CREAM 1 APPLICATION(S): 100000 CREAM TOPICAL at 18:39

## 2018-08-05 RX ADMIN — Medication 100 MILLIGRAM(S): at 07:24

## 2018-08-05 RX ADMIN — CHLORHEXIDINE GLUCONATE 15 MILLILITER(S): 213 SOLUTION TOPICAL at 18:39

## 2018-08-05 RX ADMIN — Medication 2 PUFF(S): at 21:38

## 2018-08-05 RX ADMIN — NYSTATIN CREAM 1 APPLICATION(S): 100000 CREAM TOPICAL at 07:24

## 2018-08-05 RX ADMIN — SODIUM CHLORIDE 3 MILLILITER(S): 9 INJECTION INTRAMUSCULAR; INTRAVENOUS; SUBCUTANEOUS at 22:10

## 2018-08-05 RX ADMIN — FENTANYL CITRATE 7.5 MICROGRAM(S)/KG/HR: 50 INJECTION INTRAVENOUS at 20:53

## 2018-08-05 RX ADMIN — MIDAZOLAM HYDROCHLORIDE 3 MG/KG/HR: 1 INJECTION, SOLUTION INTRAMUSCULAR; INTRAVENOUS at 20:53

## 2018-08-05 RX ADMIN — CHLORHEXIDINE GLUCONATE 15 MILLILITER(S): 213 SOLUTION TOPICAL at 07:24

## 2018-08-05 RX ADMIN — Medication 10 MILLIGRAM(S): at 10:24

## 2018-08-05 RX ADMIN — Medication 100 MILLIGRAM(S): at 23:42

## 2018-08-05 RX ADMIN — PANTOPRAZOLE SODIUM 40 MILLIGRAM(S): 20 TABLET, DELAYED RELEASE ORAL at 12:45

## 2018-08-05 RX ADMIN — Medication 25 MILLIGRAM(S): at 06:00

## 2018-08-05 RX ADMIN — HEPARIN SODIUM 14.5 UNIT(S)/HR: 5000 INJECTION INTRAVENOUS; SUBCUTANEOUS at 20:54

## 2018-08-05 RX ADMIN — Medication 50 MILLIGRAM(S): at 22:12

## 2018-08-05 RX ADMIN — Medication 400 MILLIGRAM(S): at 19:27

## 2018-08-05 RX ADMIN — Medication 650 MILLIGRAM(S): at 04:30
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
## 2018-08-05 NOTE — CHART NOTE - NSCHARTNOTEFT_GEN_A_CORE
Pt placed in left lateral decubitus position.  Dressing removed, anterior aspect of wound deep dermal layer opened.  Some old blood evacuated.  Kerlix packed under latissimus dorsi and in wound. Dressing replaced.  Pt tolerated procedure well.

## 2018-08-05 NOTE — PROGRESS NOTE ADULT - SUBJECTIVE AND OBJECTIVE BOX
Surgery Progress Note    S: Patient seen and examined, no events overnight. Continues to require ventilatory support.     O:  Vital Signs Last 24 Hrs  T(C): 38.4 (05 Aug 2018 04:00), Max: 38.6 (04 Aug 2018 19:23)  T(F): 101.2 (05 Aug 2018 04:00), Max: 101.4 (04 Aug 2018 19:23)  HR: 105 (05 Aug 2018 07:36) (95 - 112)  BP: 112/75 (05 Aug 2018 02:00) (112/75 - 115/63)  BP(mean): 82 (05 Aug 2018 02:00) (75 - 82)  RR: 25 (05 Aug 2018 07:00) (17 - 33)  SpO2: 100% (05 Aug 2018 07:36) (98% - 100%)    Physical Exam:  Gen: Laying in bed, NAD on fent/prop  Resp: Unlabored breathing, intubated  Abd: soft, NTND  Extremities: feet cool L > R, 2-3 second capillary refill bilaterally  Vascular: R leg with femoral, popliteal, PT, and DP; L leg femoral, doppler popliteal, doppler AT & DP    Lab:  CBC (08-05 @ 03:27)                          7.8<L>                   23.25<H>  )--------------(  391        --    % Neuts, --    % Lymphs, ANC: --                              24.4<L>  CBC (08-04 @ 03:30)                          8.4<L>                   21.29<H>  )--------------(  348        --    % Neuts, --    % Lymphs, ANC: --                              25.1<L>    BMP (08-05 @ 03:27)       140     |  106     |  41<H> 			Ca++ --      Ca 7.7<L>       ---------------------------------( 124<H>		Mg 2.7<H>       4.5     |  20<L>   |  1.35<H>			Ph 5.0<H>  BMP (08-04 @ 03:30)       140     |  106     |  37<H> 			Ca++ --      Ca 7.8<L>       ---------------------------------( 112<H>		Mg 2.8<H>       4.2     |  21<L>   |  1.29  			Ph 4.7<H>    LFTs (08-04 @ 03:30)      TPro 5.5<L> / Alb 2.0<L> / TBili 1.7<H> / DBili -- / AST 75<H> / ALT 43<H> / AlkPhos 235<H>    Coags (08-04 @ 03:30)  aPTT 65.3<H> / INR 1.26<H> / PT 14.5<H>      ABG (08-04 @ 03:30)     7.32<L> / 44 / 121<H> / 22 / -3.3 / 98.5%     Lactate: 1.8   ABG (08-03 @ 23:24)     7.35 / 40 / 115<H> / 22 / -3.1 / 98.0%     Lactate: 1.4         -> BRONCHIAL LAVAGE Culture (08-02 @ 12:43)       NOS^No Organisms Seen  WBC^White Blood Cells  QNTY CELLS IN GRAM STAIN: RARE (1+)      NO ORGANISMS ISOLATED AT 24 HOURS  NG    -> BLOOD Culture (07-31 @ 07:06)     NG    NG  NG    -> LUNG - LOWER LOBE RIGHT Culture (07-27 @ 22:26)       WBC^White Blood Cells  QNTY CELLS IN GRAM STAIN: RARE (1+)  NOS^No Organisms Seen      NO ORGANISMS ISOLATED AT 24 HOURS  NO ORGANISMS ISOLATED AT 48 HRS.  NG    -> ABSCESS Culture (07-27 @ 22:22)       WBC^White Blood Cells  QNTY CELLS IN GRAM STAIN: NO CELLS SEEN  NOS^No Organisms Seen      NO ORGANISMS ISOLATED AT 24 HOURS  CULTURE IN PROGRESS, FURTHER REPORT TO FOLLOW.  STRAAC^Streptococcus constellatus  NG    -> BLOOD ARTERIAL Culture (07-27 @ 11:03)     NG    NG  NG    -> PLEURAL FLUID Culture (07-26 @ 15:24)     NG    NG  NG    -> PLEURAL FLUID Culture (07-26 @ 10:59)       NOS^No Organisms Seen  WBC^White Blood Cells  QNTY CELLS IN GRAM STAIN: MANY (4+)      NO GROWTH - PRELIMINARY RESULTS  NO ORGANISMS ISOLATED AT 24 HOURS  CULTURE IN PROGRESS, FURTHER REPORT TO FOLLOW.  PRESUMPTIVE ANAEROBE  PRE SANJUANITA^PREVOTELLA ORIS  STRAAC^Streptococcus constellatus  FNUC^Fusobacterium nucleatum  NG
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
## 2018-08-06 ENCOUNTER — RESULT REVIEW (OUTPATIENT)
Age: 43
End: 2018-08-06

## 2018-08-06 LAB
ALBUMIN SERPL ELPH-MCNC: 1.7 G/DL — LOW (ref 3.3–5)
ALBUMIN SERPL ELPH-MCNC: 1.7 G/DL — LOW (ref 3.3–5)
ALBUMIN SERPL ELPH-MCNC: 1.8 G/DL — LOW (ref 3.3–5)
ALBUMIN SERPL ELPH-MCNC: 1.9 G/DL — LOW (ref 3.3–5)
ALP SERPL-CCNC: 165 U/L — HIGH (ref 40–120)
ALP SERPL-CCNC: 168 U/L — HIGH (ref 40–120)
ALP SERPL-CCNC: 180 U/L — HIGH (ref 40–120)
ALP SERPL-CCNC: 248 U/L — HIGH (ref 40–120)
ALT FLD-CCNC: 29 U/L — SIGNIFICANT CHANGE UP (ref 4–41)
ALT FLD-CCNC: 29 U/L — SIGNIFICANT CHANGE UP (ref 4–41)
ALT FLD-CCNC: 30 U/L — SIGNIFICANT CHANGE UP (ref 4–41)
ALT FLD-CCNC: 37 U/L — SIGNIFICANT CHANGE UP (ref 4–41)
APTT BLD: 36.6 SEC — SIGNIFICANT CHANGE UP (ref 27.5–37.4)
APTT BLD: 41.4 SEC — HIGH (ref 27.5–37.4)
APTT BLD: 61.1 SEC — HIGH (ref 27.5–37.4)
AST SERPL-CCNC: 102 U/L — HIGH (ref 4–40)
AST SERPL-CCNC: 109 U/L — HIGH (ref 4–40)
AST SERPL-CCNC: 62 U/L — HIGH (ref 4–40)
AST SERPL-CCNC: 72 U/L — HIGH (ref 4–40)
BASE EXCESS BLDA CALC-SCNC: -3.3 MMOL/L — SIGNIFICANT CHANGE UP
BASE EXCESS BLDA CALC-SCNC: -3.7 MMOL/L — SIGNIFICANT CHANGE UP
BASE EXCESS BLDA CALC-SCNC: -3.8 MMOL/L — SIGNIFICANT CHANGE UP
BASE EXCESS BLDA CALC-SCNC: -4.5 MMOL/L — SIGNIFICANT CHANGE UP
BASE EXCESS BLDA CALC-SCNC: -5.3 MMOL/L — SIGNIFICANT CHANGE UP
BASE EXCESS BLDA CALC-SCNC: -5.5 MMOL/L — SIGNIFICANT CHANGE UP
BASOPHILS # BLD AUTO: 0.06 K/UL — SIGNIFICANT CHANGE UP (ref 0–0.2)
BASOPHILS NFR BLD AUTO: 0.2 % — SIGNIFICANT CHANGE UP (ref 0–2)
BILIRUB SERPL-MCNC: 1.5 MG/DL — HIGH (ref 0.2–1.2)
BILIRUB SERPL-MCNC: 1.8 MG/DL — HIGH (ref 0.2–1.2)
BILIRUB SERPL-MCNC: 1.9 MG/DL — HIGH (ref 0.2–1.2)
BILIRUB SERPL-MCNC: 2.1 MG/DL — HIGH (ref 0.2–1.2)
BLD GP AB SCN SERPL QL: NEGATIVE — SIGNIFICANT CHANGE UP
BUN SERPL-MCNC: 49 MG/DL — HIGH (ref 7–23)
BUN SERPL-MCNC: 52 MG/DL — HIGH (ref 7–23)
CA-I BLDA-SCNC: 1.14 MMOL/L — LOW (ref 1.15–1.29)
CA-I BLDA-SCNC: 1.16 MMOL/L — SIGNIFICANT CHANGE UP (ref 1.15–1.29)
CALCIUM SERPL-MCNC: 7.7 MG/DL — LOW (ref 8.4–10.5)
CALCIUM SERPL-MCNC: 7.8 MG/DL — LOW (ref 8.4–10.5)
CALCIUM SERPL-MCNC: 7.9 MG/DL — LOW (ref 8.4–10.5)
CALCIUM SERPL-MCNC: 8 MG/DL — LOW (ref 8.4–10.5)
CHLORIDE BLDA-SCNC: 113 MMOL/L — HIGH (ref 96–108)
CHLORIDE BLDA-SCNC: 113 MMOL/L — HIGH (ref 96–108)
CHLORIDE SERPL-SCNC: 106 MMOL/L — SIGNIFICANT CHANGE UP (ref 98–107)
CHLORIDE SERPL-SCNC: 107 MMOL/L — SIGNIFICANT CHANGE UP (ref 98–107)
CHLORIDE SERPL-SCNC: 107 MMOL/L — SIGNIFICANT CHANGE UP (ref 98–107)
CHLORIDE SERPL-SCNC: 109 MMOL/L — HIGH (ref 98–107)
CO2 SERPL-SCNC: 18 MMOL/L — LOW (ref 22–31)
CO2 SERPL-SCNC: 19 MMOL/L — LOW (ref 22–31)
CO2 SERPL-SCNC: 20 MMOL/L — LOW (ref 22–31)
CO2 SERPL-SCNC: 20 MMOL/L — LOW (ref 22–31)
CREAT BLDA-MCNC: 1.5 MG/DL — HIGH (ref 0.5–1.3)
CREAT SERPL-MCNC: 1.53 MG/DL — HIGH (ref 0.5–1.3)
CREAT SERPL-MCNC: 1.54 MG/DL — HIGH (ref 0.5–1.3)
CREAT SERPL-MCNC: 1.6 MG/DL — HIGH (ref 0.5–1.3)
CREAT SERPL-MCNC: 1.67 MG/DL — HIGH (ref 0.5–1.3)
EOSINOPHIL # BLD AUTO: 0.32 K/UL — SIGNIFICANT CHANGE UP (ref 0–0.5)
EOSINOPHIL NFR BLD AUTO: 1.2 % — SIGNIFICANT CHANGE UP (ref 0–6)
GLUCOSE BLDA-MCNC: 103 MG/DL — HIGH (ref 70–99)
GLUCOSE BLDA-MCNC: 113 MG/DL — HIGH (ref 70–99)
GLUCOSE BLDA-MCNC: 117 MG/DL — HIGH (ref 70–99)
GLUCOSE BLDA-MCNC: 127 MG/DL — HIGH (ref 70–99)
GLUCOSE BLDA-MCNC: 131 MG/DL — HIGH (ref 70–99)
GLUCOSE SERPL-MCNC: 118 MG/DL — HIGH (ref 70–99)
GLUCOSE SERPL-MCNC: 122 MG/DL — HIGH (ref 70–99)
GLUCOSE SERPL-MCNC: 129 MG/DL — HIGH (ref 70–99)
GLUCOSE SERPL-MCNC: 138 MG/DL — HIGH (ref 70–99)
GRAM STN SPT: SIGNIFICANT CHANGE UP
HCO3 BLDA-SCNC: 20 MMOL/L — LOW (ref 22–26)
HCO3 BLDA-SCNC: 21 MMOL/L — LOW (ref 22–26)
HCO3 BLDA-SCNC: 21 MMOL/L — LOW (ref 22–26)
HCO3 BLDA-SCNC: 22 MMOL/L — SIGNIFICANT CHANGE UP (ref 22–26)
HCT VFR BLD CALC: 20.5 % — CRITICAL LOW (ref 39–50)
HCT VFR BLD CALC: 24.9 % — LOW (ref 39–50)
HCT VFR BLD CALC: 26.2 % — LOW (ref 39–50)
HCT VFR BLDA CALC: 22.3 % — LOW (ref 39–51)
HCT VFR BLDA CALC: 23.8 % — LOW (ref 39–51)
HCT VFR BLDA CALC: 24 % — LOW (ref 39–51)
HCT VFR BLDA CALC: 27.2 % — LOW (ref 39–51)
HCT VFR BLDA CALC: 31 % — LOW (ref 39–51)
HGB BLD-MCNC: 6.7 G/DL — CRITICAL LOW (ref 13–17)
HGB BLD-MCNC: 8.2 G/DL — LOW (ref 13–17)
HGB BLD-MCNC: 8.5 G/DL — LOW (ref 13–17)
HGB BLDA-MCNC: 10 G/DL — LOW (ref 13–17)
HGB BLDA-MCNC: 7.1 G/DL — LOW (ref 13–17)
HGB BLDA-MCNC: 7.6 G/DL — LOW (ref 13–17)
HGB BLDA-MCNC: 7.7 G/DL — LOW (ref 13–17)
HGB BLDA-MCNC: 8.8 G/DL — LOW (ref 13–17)
IMM GRANULOCYTES # BLD AUTO: 0.83 # — SIGNIFICANT CHANGE UP
IMM GRANULOCYTES NFR BLD AUTO: 3.1 % — HIGH (ref 0–1.5)
INR BLD: 1.27 — HIGH (ref 0.88–1.17)
INR BLD: 1.34 — HIGH (ref 0.88–1.17)
INR BLD: 1.4 — HIGH (ref 0.88–1.17)
LACTATE BLDA-SCNC: 1.8 MMOL/L — SIGNIFICANT CHANGE UP (ref 0.5–2)
LACTATE BLDA-SCNC: 2.6 MMOL/L — HIGH (ref 0.5–2)
LACTATE SERPL-SCNC: 2.7 MMOL/L — HIGH (ref 0.5–2)
LYMPHOCYTES # BLD AUTO: 2.01 K/UL — SIGNIFICANT CHANGE UP (ref 1–3.3)
LYMPHOCYTES # BLD AUTO: 7.4 % — LOW (ref 13–44)
MCHC RBC-ENTMCNC: 30.5 PG — SIGNIFICANT CHANGE UP (ref 27–34)
MCHC RBC-ENTMCNC: 30.8 PG — SIGNIFICANT CHANGE UP (ref 27–34)
MCHC RBC-ENTMCNC: 32.4 % — SIGNIFICANT CHANGE UP (ref 32–36)
MCHC RBC-ENTMCNC: 32.7 % — SIGNIFICANT CHANGE UP (ref 32–36)
MCHC RBC-ENTMCNC: 32.9 % — SIGNIFICANT CHANGE UP (ref 32–36)
MCHC RBC-ENTMCNC: 33.5 PG — SIGNIFICANT CHANGE UP (ref 27–34)
MCV RBC AUTO: 102.5 FL — HIGH (ref 80–100)
MCV RBC AUTO: 93.6 FL — SIGNIFICANT CHANGE UP (ref 80–100)
MCV RBC AUTO: 93.9 FL — SIGNIFICANT CHANGE UP (ref 80–100)
MONOCYTES # BLD AUTO: 1.82 K/UL — HIGH (ref 0–0.9)
MONOCYTES NFR BLD AUTO: 6.7 % — SIGNIFICANT CHANGE UP (ref 2–14)
NEUTROPHILS # BLD AUTO: 21.94 K/UL — HIGH (ref 1.8–7.4)
NEUTROPHILS NFR BLD AUTO: 81.4 % — HIGH (ref 43–77)
NRBC # FLD: 0.15 — SIGNIFICANT CHANGE UP
NRBC # FLD: 0.18 — SIGNIFICANT CHANGE UP
NRBC # FLD: 0.21 — SIGNIFICANT CHANGE UP
PCO2 BLDA: 36 MMHG — SIGNIFICANT CHANGE UP (ref 35–48)
PCO2 BLDA: 39 MMHG — SIGNIFICANT CHANGE UP (ref 35–48)
PCO2 BLDA: 40 MMHG — SIGNIFICANT CHANGE UP (ref 35–48)
PCO2 BLDA: 43 MMHG — SIGNIFICANT CHANGE UP (ref 35–48)
PCO2 BLDA: 45 MMHG — SIGNIFICANT CHANGE UP (ref 35–48)
PCO2 BLDA: 54 MMHG — HIGH (ref 35–48)
PH BLDA: 7.23 PH — LOW (ref 7.35–7.45)
PH BLDA: 7.28 PH — LOW (ref 7.35–7.45)
PH BLDA: 7.29 PH — LOW (ref 7.35–7.45)
PH BLDA: 7.34 PH — LOW (ref 7.35–7.45)
PH BLDA: 7.36 PH — SIGNIFICANT CHANGE UP (ref 7.35–7.45)
PH BLDA: 7.37 PH — SIGNIFICANT CHANGE UP (ref 7.35–7.45)
PLATELET # BLD AUTO: 370 K/UL — SIGNIFICANT CHANGE UP (ref 150–400)
PLATELET # BLD AUTO: 431 K/UL — HIGH (ref 150–400)
PLATELET # BLD AUTO: 434 K/UL — HIGH (ref 150–400)
PMV BLD: 10.7 FL — SIGNIFICANT CHANGE UP (ref 7–13)
PMV BLD: 10.8 FL — SIGNIFICANT CHANGE UP (ref 7–13)
PMV BLD: 10.9 FL — SIGNIFICANT CHANGE UP (ref 7–13)
PO2 BLDA: 101 MMHG — SIGNIFICANT CHANGE UP (ref 83–108)
PO2 BLDA: 67 MMHG — LOW (ref 83–108)
PO2 BLDA: 82 MMHG — LOW (ref 83–108)
PO2 BLDA: 85 MMHG — SIGNIFICANT CHANGE UP (ref 83–108)
PO2 BLDA: 90 MMHG — SIGNIFICANT CHANGE UP (ref 83–108)
PO2 BLDA: 91 MMHG — SIGNIFICANT CHANGE UP (ref 83–108)
POTASSIUM BLDA-SCNC: 4.5 MMOL/L — SIGNIFICANT CHANGE UP (ref 3.4–4.5)
POTASSIUM BLDA-SCNC: 4.8 MMOL/L — HIGH (ref 3.4–4.5)
POTASSIUM BLDA-SCNC: 4.8 MMOL/L — HIGH (ref 3.4–4.5)
POTASSIUM BLDA-SCNC: 5 MMOL/L — HIGH (ref 3.4–4.5)
POTASSIUM BLDA-SCNC: 5.1 MMOL/L — HIGH (ref 3.4–4.5)
POTASSIUM SERPL-MCNC: 4.7 MMOL/L — SIGNIFICANT CHANGE UP (ref 3.5–5.3)
POTASSIUM SERPL-MCNC: 5.5 MMOL/L — HIGH (ref 3.5–5.3)
POTASSIUM SERPL-MCNC: 5.5 MMOL/L — HIGH (ref 3.5–5.3)
POTASSIUM SERPL-MCNC: 5.6 MMOL/L — HIGH (ref 3.5–5.3)
POTASSIUM SERPL-SCNC: 4.7 MMOL/L — SIGNIFICANT CHANGE UP (ref 3.5–5.3)
POTASSIUM SERPL-SCNC: 5.5 MMOL/L — HIGH (ref 3.5–5.3)
POTASSIUM SERPL-SCNC: 5.5 MMOL/L — HIGH (ref 3.5–5.3)
POTASSIUM SERPL-SCNC: 5.6 MMOL/L — HIGH (ref 3.5–5.3)
PREALB SERPL-MCNC: 7 MG/DL — LOW (ref 20–40)
PROT SERPL-MCNC: 4.8 G/DL — LOW (ref 6–8.3)
PROT SERPL-MCNC: 4.9 G/DL — LOW (ref 6–8.3)
PROT SERPL-MCNC: 5 G/DL — LOW (ref 6–8.3)
PROT SERPL-MCNC: 5.3 G/DL — LOW (ref 6–8.3)
PROTHROM AB SERPL-ACNC: 14.7 SEC — HIGH (ref 9.8–13.1)
PROTHROM AB SERPL-ACNC: 15 SEC — HIGH (ref 9.8–13.1)
PROTHROM AB SERPL-ACNC: 16.2 SEC — HIGH (ref 9.8–13.1)
RBC # BLD: 2 M/UL — LOW (ref 4.2–5.8)
RBC # BLD: 2.66 M/UL — LOW (ref 4.2–5.8)
RBC # BLD: 2.79 M/UL — LOW (ref 4.2–5.8)
RBC # FLD: 14.7 % — HIGH (ref 10.3–14.5)
RBC # FLD: 18.3 % — HIGH (ref 10.3–14.5)
RBC # FLD: 18.7 % — HIGH (ref 10.3–14.5)
RH IG SCN BLD-IMP: NEGATIVE — SIGNIFICANT CHANGE UP
SAO2 % BLDA: 92 % — LOW (ref 95–99)
SAO2 % BLDA: 95.7 % — SIGNIFICANT CHANGE UP (ref 95–99)
SAO2 % BLDA: 96.5 % — SIGNIFICANT CHANGE UP (ref 95–99)
SAO2 % BLDA: 96.5 % — SIGNIFICANT CHANGE UP (ref 95–99)
SAO2 % BLDA: 97 % — SIGNIFICANT CHANGE UP (ref 95–99)
SAO2 % BLDA: 97.3 % — SIGNIFICANT CHANGE UP (ref 95–99)
SODIUM BLDA-SCNC: 136 MMOL/L — SIGNIFICANT CHANGE UP (ref 136–146)
SODIUM BLDA-SCNC: 137 MMOL/L — SIGNIFICANT CHANGE UP (ref 136–146)
SODIUM BLDA-SCNC: 137 MMOL/L — SIGNIFICANT CHANGE UP (ref 136–146)
SODIUM SERPL-SCNC: 139 MMOL/L — SIGNIFICANT CHANGE UP (ref 135–145)
SODIUM SERPL-SCNC: 140 MMOL/L — SIGNIFICANT CHANGE UP (ref 135–145)
SODIUM SERPL-SCNC: 141 MMOL/L — SIGNIFICANT CHANGE UP (ref 135–145)
SODIUM SERPL-SCNC: 141 MMOL/L — SIGNIFICANT CHANGE UP (ref 135–145)
SPECIMEN SOURCE: SIGNIFICANT CHANGE UP
SPECIMEN SOURCE: SIGNIFICANT CHANGE UP
WBC # BLD: 26.98 K/UL — HIGH (ref 3.8–10.5)
WBC # BLD: 33.17 K/UL — HIGH (ref 3.8–10.5)
WBC # BLD: 34.04 K/UL — HIGH (ref 3.8–10.5)
WBC # FLD AUTO: 26.98 K/UL — HIGH (ref 3.8–10.5)
WBC # FLD AUTO: 33.17 K/UL — HIGH (ref 3.8–10.5)
WBC # FLD AUTO: 34.04 K/UL — HIGH (ref 3.8–10.5)

## 2018-08-06 PROCEDURE — 99291 CRITICAL CARE FIRST HOUR: CPT

## 2018-08-06 PROCEDURE — 36556 INSERT NON-TUNNEL CV CATH: CPT

## 2018-08-06 PROCEDURE — 32120 RE-EXPLORATION OF CHEST: CPT | Mod: 78

## 2018-08-06 PROCEDURE — 32110 EXPLORE/REPAIR CHEST: CPT | Mod: 59,78

## 2018-08-06 PROCEDURE — 71045 X-RAY EXAM CHEST 1 VIEW: CPT | Mod: 26

## 2018-08-06 PROCEDURE — 32601 THORACOSCOPY DIAGNOSTIC: CPT | Mod: 59,78

## 2018-08-06 PROCEDURE — 88300 SURGICAL PATH GROSS: CPT | Mod: 26

## 2018-08-06 PROCEDURE — 99292 CRITICAL CARE ADDL 30 MIN: CPT

## 2018-08-06 PROCEDURE — 88305 TISSUE EXAM BY PATHOLOGIST: CPT | Mod: 26

## 2018-08-06 RX ORDER — INSULIN HUMAN 100 [IU]/ML
10 INJECTION, SOLUTION SUBCUTANEOUS ONCE
Qty: 0 | Refills: 0 | Status: COMPLETED | OUTPATIENT
Start: 2018-08-06 | End: 2018-08-06

## 2018-08-06 RX ORDER — SODIUM CHLORIDE 9 MG/ML
1000 INJECTION, SOLUTION INTRAVENOUS
Qty: 0 | Refills: 0 | Status: DISCONTINUED | OUTPATIENT
Start: 2018-08-06 | End: 2018-08-07

## 2018-08-06 RX ORDER — DEXTROSE 50 % IN WATER 50 %
50 SYRINGE (ML) INTRAVENOUS ONCE
Qty: 0 | Refills: 0 | Status: COMPLETED | OUTPATIENT
Start: 2018-08-06 | End: 2018-08-06

## 2018-08-06 RX ORDER — SODIUM BICARBONATE 1 MEQ/ML
50 SYRINGE (ML) INTRAVENOUS ONCE
Qty: 0 | Refills: 0 | Status: COMPLETED | OUTPATIENT
Start: 2018-08-06 | End: 2018-08-06

## 2018-08-06 RX ORDER — ALBUMIN HUMAN 25 %
250 VIAL (ML) INTRAVENOUS ONCE
Qty: 0 | Refills: 0 | Status: COMPLETED | OUTPATIENT
Start: 2018-08-06 | End: 2018-08-06

## 2018-08-06 RX ORDER — FUROSEMIDE 40 MG
10 TABLET ORAL ONCE
Qty: 0 | Refills: 0 | Status: COMPLETED | OUTPATIENT
Start: 2018-08-06 | End: 2018-08-06

## 2018-08-06 RX ORDER — FUROSEMIDE 40 MG
20 TABLET ORAL ONCE
Qty: 0 | Refills: 0 | Status: COMPLETED | OUTPATIENT
Start: 2018-08-06 | End: 2018-08-07

## 2018-08-06 RX ORDER — CHLORHEXIDINE GLUCONATE 213 G/1000ML
1 SOLUTION TOPICAL
Qty: 0 | Refills: 0 | Status: DISCONTINUED | OUTPATIENT
Start: 2018-08-06 | End: 2018-09-12

## 2018-08-06 RX ADMIN — SODIUM CHLORIDE 3 MILLILITER(S): 9 INJECTION INTRAMUSCULAR; INTRAVENOUS; SUBCUTANEOUS at 21:34

## 2018-08-06 RX ADMIN — MIDAZOLAM HYDROCHLORIDE 3 MG/KG/HR: 1 INJECTION, SOLUTION INTRAMUSCULAR; INTRAVENOUS at 15:49

## 2018-08-06 RX ADMIN — Medication 100 MILLIGRAM(S): at 22:44

## 2018-08-06 RX ADMIN — Medication 100 MILLIGRAM(S): at 06:41

## 2018-08-06 RX ADMIN — SODIUM CHLORIDE 3 MILLILITER(S): 9 INJECTION INTRAMUSCULAR; INTRAVENOUS; SUBCUTANEOUS at 06:42

## 2018-08-06 RX ADMIN — INSULIN HUMAN 10 UNIT(S): 100 INJECTION, SOLUTION SUBCUTANEOUS at 23:58

## 2018-08-06 RX ADMIN — CHLORHEXIDINE GLUCONATE 1 APPLICATION(S): 213 SOLUTION TOPICAL at 05:00

## 2018-08-06 RX ADMIN — Medication 2 PUFF(S): at 21:25

## 2018-08-06 RX ADMIN — Medication 10 MILLIGRAM(S): at 16:06

## 2018-08-06 RX ADMIN — Medication 50 MILLILITER(S): at 23:58

## 2018-08-06 RX ADMIN — CHLORHEXIDINE GLUCONATE 15 MILLILITER(S): 213 SOLUTION TOPICAL at 06:40

## 2018-08-06 RX ADMIN — NYSTATIN CREAM 1 APPLICATION(S): 100000 CREAM TOPICAL at 06:42

## 2018-08-06 RX ADMIN — Medication 50 MILLIGRAM(S): at 15:48

## 2018-08-06 RX ADMIN — Medication 50 MILLIGRAM(S): at 06:40

## 2018-08-06 RX ADMIN — FENTANYL CITRATE 7.5 MICROGRAM(S)/KG/HR: 50 INJECTION INTRAVENOUS at 15:48

## 2018-08-06 RX ADMIN — SODIUM CHLORIDE 3 MILLILITER(S): 9 INJECTION INTRAMUSCULAR; INTRAVENOUS; SUBCUTANEOUS at 15:00

## 2018-08-06 RX ADMIN — Medication 250 MILLILITER(S): at 15:56

## 2018-08-06 RX ADMIN — PHENYLEPHRINE HYDROCHLORIDE 2.81 MICROGRAM(S)/KG/MIN: 10 INJECTION INTRAVENOUS at 15:48

## 2018-08-06 RX ADMIN — Medication 50 MILLIGRAM(S): at 22:43

## 2018-08-06 RX ADMIN — Medication 650 MILLIGRAM(S): at 04:31

## 2018-08-06 RX ADMIN — Medication 2 PUFF(S): at 03:54

## 2018-08-06 RX ADMIN — Medication 2 PUFF(S): at 15:37

## 2018-08-06 RX ADMIN — Medication 650 MILLIGRAM(S): at 23:50

## 2018-08-06 RX ADMIN — NYSTATIN CREAM 1 APPLICATION(S): 100000 CREAM TOPICAL at 17:46

## 2018-08-06 RX ADMIN — Medication 250 MILLIGRAM(S): at 17:46

## 2018-08-06 RX ADMIN — Medication 50 MILLIEQUIVALENT(S): at 15:55

## 2018-08-06 RX ADMIN — CHLORHEXIDINE GLUCONATE 15 MILLILITER(S): 213 SOLUTION TOPICAL at 17:46

## 2018-08-06 RX ADMIN — SODIUM CHLORIDE 75 MILLILITER(S): 9 INJECTION, SOLUTION INTRAVENOUS at 20:13

## 2018-08-06 RX ADMIN — Medication 100 MILLIGRAM(S): at 15:55

## 2018-08-06 RX ADMIN — PANTOPRAZOLE SODIUM 40 MILLIGRAM(S): 20 TABLET, DELAYED RELEASE ORAL at 17:47

## 2018-08-06 NOTE — CHART NOTE - NSCHARTNOTEFT_GEN_A_CORE
Source:  nursing and EMR     Diet : NPO with tube feed - Jevity 1.2 @ 55 ml/hr 24hrs     Patient tolerating EN , receiving adequate energy , will benefit from additional protein to promote wound healing . Noted 1+ generalized edema, 2+ L & R arm, leg and foot edema  and 3+ scrotal edema . Pt. w/ sacral DTI .    Enteral : EM providing  1584 kcal & 73 gm protein/d        Current Weight: 92.2 kg on 8/6/18; Admit wt. - 75 kg     Pertinent Medications: MEDICATIONS  (STANDING):  ALBUTerol    90 MICROgram(s) HFA Inhaler 2 Puff(s) Inhalation every 6 hours  aztreonam  IVPB      aztreonam  IVPB 1000 milliGRAM(s) IV Intermittent every 8 hours  chlorhexidine 0.12% Liquid 15 milliLiter(s) Swish and Spit two times a day  chlorhexidine 4% Liquid 1 Application(s) Topical <User Schedule>  fentaNYL   Infusion 1 MICROgram(s)/kG/Hr (7.5 mL/Hr) IV Continuous <Continuous>  insulin lispro (HumaLOG) corrective regimen sliding scale   SubCutaneous every 6 hours  ipratropium 17 MICROgram(s) HFA Inhaler 2 Puff(s) Inhalation every 6 hours  metroNIDAZOLE  IVPB 500 milliGRAM(s) IV Intermittent every 8 hours  midazolam Infusion 0.04 mG/kG/Hr (3 mL/Hr) IV Continuous <Continuous>  nystatin Powder 1 Application(s) Topical two times a day  pantoprazole  Injectable 40 milliGRAM(s) IV Push daily  phenylephrine    Infusion 0.1 MICROgram(s)/kG/Min (2.813 mL/Hr) IV Continuous <Continuous>  propofol Infusion 5 MICROgram(s)/kG/Min (2.25 mL/Hr) IV Continuous <Continuous>  sodium chloride 0.9% lock flush 3 milliLiter(s) IV Push every 8 hours  vancomycin  IVPB 1000 milliGRAM(s) IV Intermittent every 12 hours    MEDICATIONS  (PRN):  acetaminophen    Suspension 650 milliGRAM(s) Oral every 6 hours PRN For Temp greater than 38 C (100.4 F)  metoprolol tartrate Injectable 2.5 milliGRAM(s) IV Push every 6 hours PRN for HR>110  ondansetron Injectable 4 milliGRAM(s) IV Push every 6 hours PRN Nausea and/or Vomiting    Pertinent Labs:  08-06 Na139 mmol/L Glu 118 mg/dL<H> K+ 4.7 mmol/L Cr  1.54 mg/dL<H> BUN 49 mg/dL<H> 08-05 Phos 5.0 mg/dL<H> 08-06 Alb 1.7 g/dL<L> 08-06 PAB 7 mg/dL<L> 07-29 [id number] 5.7 %<H>        Estimated Needs:   no change since previous assessment      New Nutrition Diagnosis:  Increased Nutrient Needs      Related to: pressure ulcer   As evidenced by:  sacral DTI   Interventions: add additional protein to promote wound healing     Recommend - No carb Pro source 1 pack daily to increase protein intake to 88 gm/d     Monitoring and Evaluation: wound healing , Tolerance to diet prescription, weights and  follow up per protocol
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
## 2018-08-06 NOTE — PROGRESS NOTE ADULT - SUBJECTIVE AND OBJECTIVE BOX
Surgery Progress Note    S: Patient seen and examined, no events overnight. Continues to require ventilatory support. Primary team discussing trach/PEG.     O:  Vital Signs Last 24 Hrs  T(C): 37.4 (06 Aug 2018 06:39), Max: 39.4 (05 Aug 2018 18:00)  T(F): 99.4 (06 Aug 2018 06:39), Max: 102.9 (05 Aug 2018 18:00)  HR: 92 (06 Aug 2018 07:30) (92 - 127)  BP: 99/51 (06 Aug 2018 06:39) (99/51 - 99/51)  BP(mean): --  RR: 19 (06 Aug 2018 07:30) (10 - 39)  SpO2: 100% (06 Aug 2018 07:30) (100% - 100%)    Physical Exam:  Gen: Laying in bed, NAD on fent/prop  Resp: Unlabored breathing, intubated  Abd: soft, NTND  Extremities: feet cool L > R, 2-3 second capillary refill bilaterally  Vascular: R leg with femoral, popliteal, PT, and DP; L leg femoral, doppler popliteal, doppler AT & DP    Lab:  CBC (08-06 @ 04:00)                          6.7<LL>                   26.98<H>  )--------------(  434<H>     81.4<H>% Neuts, 7.4<L>% Lymphs, ANC: 21.94<H>                          20.5<LL>  CBC (08-05 @ 03:27)                          7.8<L>                   23.25<H>  )--------------(  391        --    % Neuts, --    % Lymphs, ANC: --                              24.4<L>    BMP (08-06 @ 04:00)       139     |  107     |  49<H> 			Ca++ --      Ca 7.8<L>       ---------------------------------( 118<H>		Mg --           4.7     |  20<L>   |  1.54<H>			Ph --      BMP (08-05 @ 03:27)       140     |  106     |  41<H> 			Ca++ --      Ca 7.7<L>       ---------------------------------( 124<H>		Mg 2.7<H>       4.5     |  20<L>   |  1.35<H>			Ph 5.0<H>    LFTs (08-06 @ 04:00)      TPro 5.3<L> / Alb 1.7<L> / TBili 1.5<H> / DBili -- / AST 62<H> / ALT 37 / AlkPhos 248<H>    Coags (08-06 @ 04:00)  aPTT 61.1<H> / INR 1.40<H> / PT 16.2<H>      ABG (08-06 @ 04:00)     7.36 / 39 / 91 / 22 / -3.3 / 97.0%     Lactate: 1.8         -> BRONCHIAL LAVAGE Culture (08-02 @ 12:43)       NOS^No Organisms Seen  WBC^White Blood Cells  QNTY CELLS IN GRAM STAIN: RARE (1+)      NO ORGANISMS ISOLATED AT 24 HOURS  NO ORGANISMS ISOLATED AT 48 HRS.  NO ORGANISMS ISOLATED AT 72 HRS.  NG    -> BLOOD Culture (07-31 @ 07:06)     NG    NG  NG    -> LUNG - LOWER LOBE RIGHT Culture (07-27 @ 22:26)       WBC^White Blood Cells  QNTY CELLS IN GRAM STAIN: RARE (1+)  NOS^No Organisms Seen      NO ORGANISMS ISOLATED AT 24 HOURS  NO ORGANISMS ISOLATED AT 48 HRS.  NG    -> ABSCESS Culture (07-27 @ 22:22)       WBC^White Blood Cells  QNTY CELLS IN GRAM STAIN: NO CELLS SEEN  NOS^No Organisms Seen      NO ORGANISMS ISOLATED AT 24 HOURS  CULTURE IN PROGRESS, FURTHER REPORT TO FOLLOW.  STRAAC^Streptococcus constellatus  NG    -> BLOOD ARTERIAL Culture (07-27 @ 11:03)     NG    NG  NG

## 2018-08-06 NOTE — BRIEF OPERATIVE NOTE - PROCEDURE
<<-----Click on this checkbox to enter Procedure Bronchoscopy with bronchoalveolar lavage  08/06/2018    Active  AMATTIA  Drainage of hemothorax using video-assisted thoracoscopic surgery (VATS)  08/06/2018    Active  AMATTIA  Right thoracotomy  07/27/2018    Active  Maik Garcia  VATS (video-assisted thoracoscopic surgery)  07/27/2018  right  Active  Maik Garcia
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
## 2018-08-06 NOTE — PROGRESS NOTE ADULT - SUBJECTIVE AND OBJECTIVE BOX
Infectious Diseases progress note:    Subjective: WBC rising.  Pt reported to have loose stools, cdiff neg.  s/p OR VATS for evacuation of chest hematoma, 1.5L drained.  Rash improving     ROS:  CONSTITUTIONAL:  No fever, chills, rigors  CARDIOVASCULAR:  No chest pain or palpitations  RESPIRATORY:   No SOB, cough, dyspnea on exertion.  No wheezing  GASTROINTESTINAL:  No abd pain, N/V, diarrhea/constipation  EXTREMITIES:  No swelling or joint pain  GENITOURINARY:  No burning on urination, increased frequency or urgency.  No flank pain  NEUROLOGIC:  No HA, visual disturbances  SKIN: No rashes    Allergies    No Known Allergies    Intolerances        ANTIBIOTICS/RELEVANT:  antimicrobials  aztreonam  IVPB      aztreonam  IVPB 1000 milliGRAM(s) IV Intermittent every 8 hours  metroNIDAZOLE  IVPB 500 milliGRAM(s) IV Intermittent every 8 hours  vancomycin  IVPB 1000 milliGRAM(s) IV Intermittent every 12 hours    immunologic:    OTHER:  acetaminophen    Suspension 650 milliGRAM(s) Oral every 6 hours PRN  ALBUTerol    90 MICROgram(s) HFA Inhaler 2 Puff(s) Inhalation every 6 hours  chlorhexidine 0.12% Liquid 15 milliLiter(s) Swish and Spit two times a day  chlorhexidine 4% Liquid 1 Application(s) Topical <User Schedule>  fentaNYL   Infusion 1 MICROgram(s)/kG/Hr IV Continuous <Continuous>  insulin lispro (HumaLOG) corrective regimen sliding scale   SubCutaneous every 6 hours  ipratropium 17 MICROgram(s) HFA Inhaler 2 Puff(s) Inhalation every 6 hours  metoprolol tartrate Injectable 2.5 milliGRAM(s) IV Push every 6 hours PRN  midazolam Infusion 0.04 mG/kG/Hr IV Continuous <Continuous>  nystatin Powder 1 Application(s) Topical two times a day  ondansetron Injectable 4 milliGRAM(s) IV Push every 6 hours PRN  pantoprazole  Injectable 40 milliGRAM(s) IV Push daily  phenylephrine    Infusion 0.1 MICROgram(s)/kG/Min IV Continuous <Continuous>  propofol Infusion 5 MICROgram(s)/kG/Min IV Continuous <Continuous>  sodium chloride 0.9% lock flush 3 milliLiter(s) IV Push every 8 hours      Objective:  Vital Signs Last 24 Hrs  T(C): 36.9 (06 Aug 2018 13:15), Max: 39.4 (05 Aug 2018 18:00)  T(F): 98.5 (06 Aug 2018 13:15), Max: 102.9 (05 Aug 2018 18:00)  HR: 96 (06 Aug 2018 15:35) (92 - 127)  BP: 94/60 (06 Aug 2018 14:00) (94/60 - 99/51)  BP(mean): 68 (06 Aug 2018 14:00) (68 - 70)  RR: 19 (06 Aug 2018 15:00) (0 - 39)  SpO2: 100% (06 Aug 2018 15:35) (96% - 100%)    PHYSICAL EXAM:  Constitutional:  intubated  Eyes:MEMO, EOMI  Ear/Nose/Throat: no thrush, mucositis.  Moist mucous membranes	  Neck:no JVD, no lymphadenopathy, supple, ETT in place  Respiratory: CTA emi, chest tubes  Cardiovascular: S1S2 RRR, no murmurs  Gastrointestinal:soft, nontender,  nondistended (+) BS  Extremities:no e/e/c  Skin:  Rash improved        LABS:                        8.5    33.17 )-----------( 370      ( 06 Aug 2018 14:45 )             26.2     08-06    141  |  109<H>  |  52<H>  ----------------------------<  122<H>  5.5<H>   |  19<L>  |  1.67<H>    Ca    8.0<L>      06 Aug 2018 14:45  Phos  5.0     08-05  Mg     2.7     08-05    TPro  x   /  Alb  1.7<L>  /  TBili  1.8<H>  /  DBili  x   /  AST  72<H>  /  ALT  29  /  AlkPhos  180<H>  08-06    PT/INR - ( 06 Aug 2018 14:45 )   PT: 15.0 SEC;   INR: 1.34          PTT - ( 06 Aug 2018 14:45 )  PTT:36.6 SEC            Vancomycin Level, Trough: 18.0 ug/mL (08-05 @ 17:09)  Vancomycin Level, Trough: 24.6 ug/mL (08-05 @ 03:25)  Vancomycin Level, Trough: 13.3 ug/mL (08-04 @ 05:20)  Vancomycin Level, Trough: 27.8 ug/mL (08-03 @ 03:00)              MICROBIOLOGY:    Culture - Respiratory with Gram Stain (08.02.18 @ 12:43)    Culture - Respiratory:   NO ORGANISMS ISOLATED AT 24 HOURS  NO ORGANISMS ISOLATED AT 48 HRS.  NO ORGANISMS ISOLATED AT 72 HRS.    Gram Stain Sputum:   NOS^No Organisms Seen  WBC^White Blood Cells  QNTY CELLS IN GRAM STAIN: RARE (1+)    Specimen Source: BRONCHIAL LAVAGE    Culture - Blood (07.31.18 @ 07:06)    Culture - Blood:   NO ORGANISMS ISOLATED    Specimen Source: BLOOD    Culture - Yeast and Fungus (07.27.18 @ 22:26)    Culture - Yeast and Fungus:   CULTURE NEGATIVE FOR YEASTS AND MOLDS   AFTER 3 DAYS  CULTURE NEGATIVE FOR YEASTS AND MOLDS   AFTER 1 WEEK    Specimen Source: LUNG - LOWER LOBE RIGHT    Culture - Acid Fast - Other w/Smear (07.27.18 @ 22:26)    Culture - Acid Fast - Other w/Smear:   ------------------ PRELIMINARY --------------------             NO ACID FAST BACILLI ISOLATED  AFTER ONE WEEK'S INCUBATION    Specimen Source: LUNG - LOWER LOBE RIGHT    Culture - Acid Fast Smear Concentrated (07.27.18 @ 22:26)    Specimen Source: LUNG - LOWER LOBE RIGHT    Culture - Acid Fast Smear Concentrated:   AFB SMEAR= NO ACID FAST BACILLI SEEN          RADIOLOGY & ADDITIONAL STUDIES:    < from: Xray Chest 1 View- PORTABLE-Urgent (08.06.18 @ 13:43) >  IMPRESSION:  Three right chest tubes now seen as above. No pneumothorax.   Minimal right subcutaneous emphysema.    Loculated right pleural effusion with passive atelectasis is not   significantly changed.    Previous right perihilar opacity, however, is no longer appreciated.   Continued hazy opacity overlying most of the right chest may be due to   atelectasis and/or layering fluid.    Linear atelectasis in left midlung.    A catheter overlies the right side of the neck and right thoracic inlet,   not seen on the prior image. Clinically correlate as to whether this is   external to the patient. ET tube and enteric tube as above.    < end of copied text >

## 2018-08-07 LAB
ALBUMIN SERPL ELPH-MCNC: 1.5 G/DL — LOW (ref 3.3–5)
ALP SERPL-CCNC: 172 U/L — HIGH (ref 40–120)
ALT FLD-CCNC: 31 U/L — SIGNIFICANT CHANGE UP (ref 4–41)
APTT BLD: 35.1 SEC — SIGNIFICANT CHANGE UP (ref 27.5–37.4)
AST SERPL-CCNC: 112 U/L — HIGH (ref 4–40)
BASE EXCESS BLDA CALC-SCNC: -1.6 MMOL/L — SIGNIFICANT CHANGE UP
BASE EXCESS BLDA CALC-SCNC: -3.8 MMOL/L — SIGNIFICANT CHANGE UP
BASOPHILS # BLD AUTO: 0.17 K/UL — SIGNIFICANT CHANGE UP (ref 0–0.2)
BASOPHILS NFR BLD AUTO: 0.5 % — SIGNIFICANT CHANGE UP (ref 0–2)
BILIRUB SERPL-MCNC: 1.5 MG/DL — HIGH (ref 0.2–1.2)
BUN SERPL-MCNC: 52 MG/DL — HIGH (ref 7–23)
BUN SERPL-MCNC: 54 MG/DL — HIGH (ref 7–23)
CALCIUM SERPL-MCNC: 7.1 MG/DL — LOW (ref 8.4–10.5)
CALCIUM SERPL-MCNC: 7.6 MG/DL — LOW (ref 8.4–10.5)
CHLORIDE BLDA-SCNC: 112 MMOL/L — HIGH (ref 96–108)
CHLORIDE SERPL-SCNC: 106 MMOL/L — SIGNIFICANT CHANGE UP (ref 98–107)
CHLORIDE SERPL-SCNC: 106 MMOL/L — SIGNIFICANT CHANGE UP (ref 98–107)
CO2 SERPL-SCNC: 18 MMOL/L — LOW (ref 22–31)
CO2 SERPL-SCNC: 22 MMOL/L — SIGNIFICANT CHANGE UP (ref 22–31)
CREAT BLDA-MCNC: 1.58 MG/DL — HIGH (ref 0.5–1.3)
CREAT SERPL-MCNC: 1.49 MG/DL — HIGH (ref 0.5–1.3)
CREAT SERPL-MCNC: 1.52 MG/DL — HIGH (ref 0.5–1.3)
CULTURE - ACID FAST SMEAR CONCENTRATED: SIGNIFICANT CHANGE UP
EOSINOPHIL # BLD AUTO: 0.18 K/UL — SIGNIFICANT CHANGE UP (ref 0–0.5)
EOSINOPHIL NFR BLD AUTO: 0.6 % — SIGNIFICANT CHANGE UP (ref 0–6)
GLUCOSE BLDA-MCNC: 148 MG/DL — HIGH (ref 70–99)
GLUCOSE SERPL-MCNC: 142 MG/DL — HIGH (ref 70–99)
GLUCOSE SERPL-MCNC: 148 MG/DL — HIGH (ref 70–99)
HCO3 BLDA-SCNC: 21 MMOL/L — LOW (ref 22–26)
HCO3 BLDA-SCNC: 23 MMOL/L — SIGNIFICANT CHANGE UP (ref 22–26)
HCT VFR BLD CALC: 26.8 % — LOW (ref 39–50)
HCT VFR BLD CALC: 26.9 % — LOW (ref 39–50)
HCT VFR BLDA CALC: 28.6 % — LOW (ref 39–51)
HGB BLD-MCNC: 8.9 G/DL — LOW (ref 13–17)
HGB BLD-MCNC: 9.1 G/DL — LOW (ref 13–17)
HGB BLDA-MCNC: 9.2 G/DL — LOW (ref 13–17)
IMM GRANULOCYTES # BLD AUTO: 0.94 # — SIGNIFICANT CHANGE UP
IMM GRANULOCYTES NFR BLD AUTO: 2.9 % — HIGH (ref 0–1.5)
INR BLD: 1.38 — HIGH (ref 0.88–1.17)
LACTATE BLDA-SCNC: 3.2 MMOL/L — HIGH (ref 0.5–2)
LACTATE SERPL-SCNC: 2.3 MMOL/L — HIGH (ref 0.5–2)
LYMPHOCYTES # BLD AUTO: 1.85 K/UL — SIGNIFICANT CHANGE UP (ref 1–3.3)
LYMPHOCYTES # BLD AUTO: 5.8 % — LOW (ref 13–44)
MAGNESIUM SERPL-MCNC: 2.6 MG/DL — SIGNIFICANT CHANGE UP (ref 1.6–2.6)
MCHC RBC-ENTMCNC: 30.6 PG — SIGNIFICANT CHANGE UP (ref 27–34)
MCHC RBC-ENTMCNC: 30.7 PG — SIGNIFICANT CHANGE UP (ref 27–34)
MCHC RBC-ENTMCNC: 33.2 % — SIGNIFICANT CHANGE UP (ref 32–36)
MCHC RBC-ENTMCNC: 33.8 % — SIGNIFICANT CHANGE UP (ref 32–36)
MCV RBC AUTO: 90.6 FL — SIGNIFICANT CHANGE UP (ref 80–100)
MCV RBC AUTO: 92.4 FL — SIGNIFICANT CHANGE UP (ref 80–100)
MONOCYTES # BLD AUTO: 3.13 K/UL — HIGH (ref 0–0.9)
MONOCYTES NFR BLD AUTO: 9.7 % — SIGNIFICANT CHANGE UP (ref 2–14)
NEUTROPHILS # BLD AUTO: 25.9 K/UL — HIGH (ref 1.8–7.4)
NEUTROPHILS NFR BLD AUTO: 80.5 % — HIGH (ref 43–77)
NRBC # FLD: 0.22 — SIGNIFICANT CHANGE UP
NRBC # FLD: 0.31 — SIGNIFICANT CHANGE UP
NRBC FLD-RTO: 1.4 — SIGNIFICANT CHANGE UP
PCO2 BLDA: 34 MMHG — LOW (ref 35–48)
PCO2 BLDA: 38 MMHG — SIGNIFICANT CHANGE UP (ref 35–48)
PH BLDA: 7.36 PH — SIGNIFICANT CHANGE UP (ref 7.35–7.45)
PH BLDA: 7.43 PH — SIGNIFICANT CHANGE UP (ref 7.35–7.45)
PLATELET # BLD AUTO: 364 K/UL — SIGNIFICANT CHANGE UP (ref 150–400)
PLATELET # BLD AUTO: 427 K/UL — HIGH (ref 150–400)
PMV BLD: 10.7 FL — SIGNIFICANT CHANGE UP (ref 7–13)
PMV BLD: 10.7 FL — SIGNIFICANT CHANGE UP (ref 7–13)
PO2 BLDA: 91 MMHG — SIGNIFICANT CHANGE UP (ref 83–108)
PO2 BLDA: 99 MMHG — SIGNIFICANT CHANGE UP (ref 83–108)
POTASSIUM BLDA-SCNC: 4.8 MMOL/L — HIGH (ref 3.4–4.5)
POTASSIUM SERPL-MCNC: 4.8 MMOL/L — SIGNIFICANT CHANGE UP (ref 3.5–5.3)
POTASSIUM SERPL-MCNC: 5.1 MMOL/L — SIGNIFICANT CHANGE UP (ref 3.5–5.3)
POTASSIUM SERPL-SCNC: 4.8 MMOL/L — SIGNIFICANT CHANGE UP (ref 3.5–5.3)
POTASSIUM SERPL-SCNC: 5.1 MMOL/L — SIGNIFICANT CHANGE UP (ref 3.5–5.3)
PROT SERPL-MCNC: 4.7 G/DL — LOW (ref 6–8.3)
PROTHROM AB SERPL-ACNC: 15.4 SEC — HIGH (ref 9.8–13.1)
RBC # BLD: 2.9 M/UL — LOW (ref 4.2–5.8)
RBC # BLD: 2.97 M/UL — LOW (ref 4.2–5.8)
RBC # FLD: 17.2 % — HIGH (ref 10.3–14.5)
RBC # FLD: 17.9 % — HIGH (ref 10.3–14.5)
SAO2 % BLDA: 97.2 % — SIGNIFICANT CHANGE UP (ref 95–99)
SAO2 % BLDA: 97.8 % — SIGNIFICANT CHANGE UP (ref 95–99)
SODIUM BLDA-SCNC: 139 MMOL/L — SIGNIFICANT CHANGE UP (ref 136–146)
SODIUM SERPL-SCNC: 139 MMOL/L — SIGNIFICANT CHANGE UP (ref 135–145)
SODIUM SERPL-SCNC: 141 MMOL/L — SIGNIFICANT CHANGE UP (ref 135–145)
SPECIMEN SOURCE: SIGNIFICANT CHANGE UP
SPECIMEN SOURCE: SIGNIFICANT CHANGE UP
VANCOMYCIN TROUGH SERPL-MCNC: 17.6 UG/ML — SIGNIFICANT CHANGE UP (ref 10–20)
VANCOMYCIN TROUGH SERPL-MCNC: 26.7 UG/ML — CRITICAL HIGH (ref 10–20)
WBC # BLD: 21.64 K/UL — HIGH (ref 3.8–10.5)
WBC # BLD: 32.17 K/UL — HIGH (ref 3.8–10.5)
WBC # FLD AUTO: 21.64 K/UL — HIGH (ref 3.8–10.5)
WBC # FLD AUTO: 32.17 K/UL — HIGH (ref 3.8–10.5)

## 2018-08-07 PROCEDURE — 99291 CRITICAL CARE FIRST HOUR: CPT

## 2018-08-07 PROCEDURE — 99292 CRITICAL CARE ADDL 30 MIN: CPT

## 2018-08-07 PROCEDURE — 71045 X-RAY EXAM CHEST 1 VIEW: CPT | Mod: 26

## 2018-08-07 RX ORDER — FUROSEMIDE 40 MG
20 TABLET ORAL ONCE
Qty: 0 | Refills: 0 | Status: COMPLETED | OUTPATIENT
Start: 2018-08-07 | End: 2018-08-07

## 2018-08-07 RX ORDER — VANCOMYCIN HCL 1 G
750 VIAL (EA) INTRAVENOUS EVERY 12 HOURS
Qty: 0 | Refills: 0 | Status: DISCONTINUED | OUTPATIENT
Start: 2018-08-07 | End: 2018-08-08

## 2018-08-07 RX ORDER — ALBUMIN HUMAN 25 %
50 VIAL (ML) INTRAVENOUS EVERY 6 HOURS
Qty: 0 | Refills: 0 | Status: COMPLETED | OUTPATIENT
Start: 2018-08-07 | End: 2018-08-08

## 2018-08-07 RX ORDER — PHENYLEPHRINE HYDROCHLORIDE 10 MG/ML
1.6 INJECTION INTRAVENOUS
Qty: 160 | Refills: 0 | Status: DISCONTINUED | OUTPATIENT
Start: 2018-08-07 | End: 2018-08-17

## 2018-08-07 RX ORDER — FENTANYL CITRATE 50 UG/ML
1 INJECTION INTRAVENOUS
Qty: 2500 | Refills: 0 | Status: DISCONTINUED | OUTPATIENT
Start: 2018-08-07 | End: 2018-08-14

## 2018-08-07 RX ADMIN — PROPOFOL 2.25 MICROGRAM(S)/KG/MIN: 10 INJECTION, EMULSION INTRAVENOUS at 20:00

## 2018-08-07 RX ADMIN — NYSTATIN CREAM 1 APPLICATION(S): 100000 CREAM TOPICAL at 17:59

## 2018-08-07 RX ADMIN — Medication 100 MILLIGRAM(S): at 13:21

## 2018-08-07 RX ADMIN — Medication 1 APPLICATION(S): at 22:59

## 2018-08-07 RX ADMIN — MIDAZOLAM HYDROCHLORIDE 3 MG/KG/HR: 1 INJECTION, SOLUTION INTRAMUSCULAR; INTRAVENOUS at 20:00

## 2018-08-07 RX ADMIN — Medication 2 PUFF(S): at 16:30

## 2018-08-07 RX ADMIN — SODIUM CHLORIDE 3 MILLILITER(S): 9 INJECTION INTRAMUSCULAR; INTRAVENOUS; SUBCUTANEOUS at 15:55

## 2018-08-07 RX ADMIN — PROPOFOL 2.25 MICROGRAM(S)/KG/MIN: 10 INJECTION, EMULSION INTRAVENOUS at 08:00

## 2018-08-07 RX ADMIN — NYSTATIN CREAM 1 APPLICATION(S): 100000 CREAM TOPICAL at 05:09

## 2018-08-07 RX ADMIN — PANTOPRAZOLE SODIUM 40 MILLIGRAM(S): 20 TABLET, DELAYED RELEASE ORAL at 12:19

## 2018-08-07 RX ADMIN — Medication 2: at 05:30

## 2018-08-07 RX ADMIN — Medication 2 PUFF(S): at 20:58

## 2018-08-07 RX ADMIN — PHENYLEPHRINE HYDROCHLORIDE 22.5 MICROGRAM(S)/KG/MIN: 10 INJECTION INTRAVENOUS at 08:00

## 2018-08-07 RX ADMIN — FENTANYL CITRATE 1 MICROGRAM(S)/KG/HR: 50 INJECTION INTRAVENOUS at 20:00

## 2018-08-07 RX ADMIN — Medication 50 MILLILITER(S): at 21:40

## 2018-08-07 RX ADMIN — PHENYLEPHRINE HYDROCHLORIDE 22.5 MICROGRAM(S)/KG/MIN: 10 INJECTION INTRAVENOUS at 20:00

## 2018-08-07 RX ADMIN — Medication 50 MILLIGRAM(S): at 05:00

## 2018-08-07 RX ADMIN — CHLORHEXIDINE GLUCONATE 1 APPLICATION(S): 213 SOLUTION TOPICAL at 05:00

## 2018-08-07 RX ADMIN — Medication 2: at 12:20

## 2018-08-07 RX ADMIN — Medication 20 MILLIGRAM(S): at 17:58

## 2018-08-07 RX ADMIN — Medication 250 MILLIGRAM(S): at 19:42

## 2018-08-07 RX ADMIN — Medication 20 MILLIGRAM(S): at 00:26

## 2018-08-07 RX ADMIN — Medication 100 MILLIGRAM(S): at 05:09

## 2018-08-07 RX ADMIN — FENTANYL CITRATE 7.5 MICROGRAM(S)/KG/HR: 50 INJECTION INTRAVENOUS at 08:00

## 2018-08-07 RX ADMIN — Medication 2 PUFF(S): at 03:39

## 2018-08-07 RX ADMIN — MIDAZOLAM HYDROCHLORIDE 3 MG/KG/HR: 1 INJECTION, SOLUTION INTRAMUSCULAR; INTRAVENOUS at 08:00

## 2018-08-07 RX ADMIN — FENTANYL CITRATE 7.5 MICROGRAM(S)/KG/HR: 50 INJECTION INTRAVENOUS at 20:00

## 2018-08-07 RX ADMIN — CHLORHEXIDINE GLUCONATE 15 MILLILITER(S): 213 SOLUTION TOPICAL at 05:09

## 2018-08-07 RX ADMIN — Medication 100 MILLIGRAM(S): at 22:45

## 2018-08-07 RX ADMIN — Medication 50 MILLILITER(S): at 16:00

## 2018-08-07 RX ADMIN — Medication 2 PUFF(S): at 09:48

## 2018-08-07 RX ADMIN — SODIUM CHLORIDE 3 MILLILITER(S): 9 INJECTION INTRAMUSCULAR; INTRAVENOUS; SUBCUTANEOUS at 05:28

## 2018-08-07 RX ADMIN — CHLORHEXIDINE GLUCONATE 15 MILLILITER(S): 213 SOLUTION TOPICAL at 17:59

## 2018-08-07 RX ADMIN — SODIUM CHLORIDE 3 MILLILITER(S): 9 INJECTION INTRAMUSCULAR; INTRAVENOUS; SUBCUTANEOUS at 21:29

## 2018-08-07 RX ADMIN — Medication 50 MILLIGRAM(S): at 13:21

## 2018-08-07 RX ADMIN — Medication 50 MILLIGRAM(S): at 21:33
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
## 2018-08-07 NOTE — PROGRESS NOTE ADULT - SUBJECTIVE AND OBJECTIVE BOX
POST ANESTHESIA EVALUATION    42y Male POSTOP DAY 1 S/P     MENTAL STATUS: Patient participation [  ] Awake     [  ] Arousable     [ x ] Sedated    AIRWAY PATENCY: [  ] Satisfactory  [ x] Other: Intubated    Vital Signs Last 24 Hrs  T(C): 37.5 (07 Aug 2018 08:00), Max: 38.8 (07 Aug 2018 00:00)  T(F): 99.5 (07 Aug 2018 08:00), Max: 101.9 (07 Aug 2018 00:00)  HR: 102 (07 Aug 2018 08:00) (96 - 105)  BP: 96/57 (07 Aug 2018 02:00) (94/60 - 96/57)  BP(mean): 67 (07 Aug 2018 02:00) (67 - 70)  RR: 31 (07 Aug 2018 08:00) (17 - 37)  SpO2: 97% (07 Aug 2018 08:00) (96% - 100%)  I&O's Summary    06 Aug 2018 07:01  -  07 Aug 2018 07:00  --------------------------------------------------------  IN: 3921.6 mL / OUT: 2055 mL / NET: 1866.6 mL    07 Aug 2018 07:01  -  07 Aug 2018 09:01  --------------------------------------------------------  IN: 169.6 mL / OUT: 195 mL / NET: -25.4 mL          NAUSEA/ VOMITTING:  [ x ] NONE  [  ] CONTROLLED [  ] OTHER     PAIN: [ x ] CONTROLLED WITH CURRENT REGIMEN  [  ] OTHER    [ x ] NO APPARENT ANESTHESIA COMPLICATIONS: Seen 08:01      Comments:
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
## 2018-08-07 NOTE — PROGRESS NOTE ADULT - SUBJECTIVE AND OBJECTIVE BOX
Infectious Diseases progress note:    Subjective: Intubated, sedated.  WBC slightly elevated.  Fecal tube with soft brown stool.      ROS:  intubated/sedated    Allergies    No Known Allergies    Intolerances        ANTIBIOTICS/RELEVANT:  antimicrobials  aztreonam  IVPB      aztreonam  IVPB 1000 milliGRAM(s) IV Intermittent every 8 hours  metroNIDAZOLE  IVPB 500 milliGRAM(s) IV Intermittent every 8 hours  vancomycin  IVPB 750 milliGRAM(s) IV Intermittent every 12 hours    immunologic:    OTHER:  acetaminophen    Suspension 650 milliGRAM(s) Oral every 6 hours PRN  albumin human 25% IVPB 50 milliLiter(s) IV Intermittent every 6 hours  ALBUTerol    90 MICROgram(s) HFA Inhaler 2 Puff(s) Inhalation every 6 hours  chlorhexidine 0.12% Liquid 15 milliLiter(s) Swish and Spit two times a day  chlorhexidine 4% Liquid 1 Application(s) Topical <User Schedule>  fentaNYL   Infusion 1 MICROgram(s)/kG/Hr IV Continuous <Continuous>  insulin lispro (HumaLOG) corrective regimen sliding scale   SubCutaneous every 6 hours  ipratropium 17 MICROgram(s) HFA Inhaler 2 Puff(s) Inhalation every 6 hours  midazolam Infusion 0.04 mG/kG/Hr IV Continuous <Continuous>  nystatin Powder 1 Application(s) Topical two times a day  ondansetron Injectable 4 milliGRAM(s) IV Push every 6 hours PRN  pantoprazole  Injectable 40 milliGRAM(s) IV Push daily  petrolatum Ophthalmic Ointment 1 Application(s) Both EYES three times a day  phenylephrine    Infusion 1.6 MICROgram(s)/kG/Min IV Continuous <Continuous>  propofol Infusion 5 MICROgram(s)/kG/Min IV Continuous <Continuous>  sodium chloride 0.9% lock flush 3 milliLiter(s) IV Push every 8 hours      Objective:  Vital Signs Last 24 Hrs  T(C): 37.8 (07 Aug 2018 20:00), Max: 39.5 (07 Aug 2018 18:00)  T(F): 100 (07 Aug 2018 20:00), Max: 103.1 (07 Aug 2018 18:00)  HR: 94 (07 Aug 2018 22:05) (94 - 111)  BP: 96/57 (07 Aug 2018 02:00) (96/57 - 96/57)  BP(mean): 67 (07 Aug 2018 02:00) (67 - 67)  RR: 26 (07 Aug 2018 20:00) (19 - 46)  SpO2: 100% (07 Aug 2018 22:05) (97% - 100%)    PHYSICAL EXAM:  Constitutional: intubated/sedated  Eyes:MEMO, EOMI  Ear/Nose/Throat: no thrush, mucositis.  Moist mucous membranes	  Neck:no JVD, no lymphadenopathy, supple, ETT in place  Respiratory: rt sided Chest tubes  Cardiovascular: S1S2 RRR, no murmurs  Gastrointestinal:soft, nontender,  nondistended (+) BS, fecal tube with soft brown stool  Extremities:no e/e/c  Skin:  rash improving        LABS:                        9.1    21.64 )-----------( 364      ( 07 Aug 2018 13:10 )             26.9     08-07    141  |  106  |  54<H>  ----------------------------<  142<H>  4.8   |  22  |  1.49<H>    Ca    7.1<L>      07 Aug 2018 13:10  Mg     2.6     08-07    TPro  4.7<L>  /  Alb  1.5<L>  /  TBili  1.5<H>  /  DBili  x   /  AST  112<H>  /  ALT  31  /  AlkPhos  172<H>  08-07    PT/INR - ( 07 Aug 2018 02:20 )   PT: 15.4 SEC;   INR: 1.38          PTT - ( 07 Aug 2018 02:20 )  PTT:35.1 SEC            Vancomycin Level, Trough: 17.6 ug/mL (08-07 @ 17:15)  Vancomycin Level, Trough: 26.7 ug/mL (08-07 @ 02:20)  Vancomycin Level, Trough: 18.0 ug/mL (08-05 @ 17:09)  Vancomycin Level, Trough: 24.6 ug/mL (08-05 @ 03:25)  Vancomycin Level, Trough: 13.3 ug/mL (08-04 @ 05:20)  Vancomycin Level, Trough: 27.8 ug/mL (08-03 @ 03:00)              MICROBIOLOGY:    Culture - Blood (08.06.18 @ 20:38)    Culture - Blood:   NO ORGANISMS ISOLATED  NO ORGANISMS ISOLATED AT 24 HOURS    Specimen Source: BLOOD-CATHETER    Culture - Acid Fast Smear Concentrated (08.06.18 @ 15:41)    Culture - Acid Fast Smear Concentrated:   AFB SMEAR= NO ACID FAST BACILLI SEEN    Specimen Source: BRONCHIAL LAVAGE    Culture - Respiratory with Gram Stain (08.06.18 @ 15:41)    Gram Stain Sputum:   NOS^No Organisms Seen  WBC^White Blood Cells  QNTY CELLS IN GRAM STAIN: RARE (1+)    Culture - Respiratory:   CULTURE IN PROGRESS, FURTHER REPORT TO FOLLOW.    Specimen Source: BRONCHIAL LAVAGE    Culture - Blood (08.05.18 @ 20:06)    Culture - Blood:   NO ORGANISMS ISOLATED  NO ORGANISMS ISOLATED AT 48 HRS.    Specimen Source: BLOOD PERIPHERAL    Culture - Respiratory with Gram Stain (08.02.18 @ 12:43)    Gram Stain Sputum:   NOS^No Organisms Seen  WBC^White Blood Cells  QNTY CELLS IN GRAM STAIN: RARE (1+)    Culture - Respiratory:   NO ORGANISMS ISOLATED AT 24 HOURS  NO ORGANISMS ISOLATED AT 48 HRS.  NO ORGANISMS ISOLATED AT 72 HRS.    Specimen Source: BRONCHIAL LAVAGE          RADIOLOGY & ADDITIONAL STUDIES:    < from: Xray Chest 1 View- PORTABLE-Routine (08.07.18 @ 08:26) >  IMPRESSION:  Right IJ line with tip in SVC. No pneumothorax. ET tube and   right chest tubes as above.    No significant interval change in loculated right pleural effusion with   likely associated passive atelectasis. Continued hazy opacity of the   remainder of the right chest likely layering pleural fluid and/or   atelectasis.    < end of copied text >
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
## 2018-08-07 NOTE — PROVIDER CONTACT NOTE (OTHER) - ASSESSMENT
Patient sedation, on vent AV tv 450/PEEP5/RR18/fio2 40, bp maintained on 1.5mcg/kg/min DESEAN map 65.

## 2018-08-08 ENCOUNTER — TRANSCRIPTION ENCOUNTER (OUTPATIENT)
Age: 43
End: 2018-08-08

## 2018-08-08 LAB
ALBUMIN SERPL ELPH-MCNC: 1.9 G/DL — LOW (ref 3.3–5)
ALP SERPL-CCNC: 258 U/L — HIGH (ref 40–120)
ALT FLD-CCNC: 30 U/L — SIGNIFICANT CHANGE UP (ref 4–41)
AST SERPL-CCNC: 105 U/L — HIGH (ref 4–40)
BASE EXCESS BLDA CALC-SCNC: -0.6 MMOL/L — SIGNIFICANT CHANGE UP
BASOPHILS # BLD AUTO: 0.1 K/UL — SIGNIFICANT CHANGE UP (ref 0–0.2)
BASOPHILS NFR BLD AUTO: 0.6 % — SIGNIFICANT CHANGE UP (ref 0–2)
BILIRUB SERPL-MCNC: 2 MG/DL — HIGH (ref 0.2–1.2)
BUN SERPL-MCNC: 57 MG/DL — HIGH (ref 7–23)
CALCIUM SERPL-MCNC: 7.3 MG/DL — LOW (ref 8.4–10.5)
CHLORIDE BLDA-SCNC: 113 MMOL/L — HIGH (ref 96–108)
CHLORIDE SERPL-SCNC: 106 MMOL/L — SIGNIFICANT CHANGE UP (ref 98–107)
CO2 SERPL-SCNC: 21 MMOL/L — LOW (ref 22–31)
CREAT SERPL-MCNC: 1.52 MG/DL — HIGH (ref 0.5–1.3)
EOSINOPHIL # BLD AUTO: 0.84 K/UL — HIGH (ref 0–0.5)
EOSINOPHIL NFR BLD AUTO: 5 % — SIGNIFICANT CHANGE UP (ref 0–6)
GLUCOSE BLDA-MCNC: 130 MG/DL — HIGH (ref 70–99)
GLUCOSE SERPL-MCNC: 121 MG/DL — HIGH (ref 70–99)
HCO3 BLDA-SCNC: 24 MMOL/L — SIGNIFICANT CHANGE UP (ref 22–26)
HCT VFR BLD CALC: 24.4 % — LOW (ref 39–50)
HCT VFR BLDA CALC: 26.6 % — LOW (ref 39–51)
HGB BLD-MCNC: 8.2 G/DL — LOW (ref 13–17)
HGB BLDA-MCNC: 8.6 G/DL — LOW (ref 13–17)
IMM GRANULOCYTES # BLD AUTO: 0.96 # — SIGNIFICANT CHANGE UP
IMM GRANULOCYTES NFR BLD AUTO: 5.7 % — HIGH (ref 0–1.5)
LACTATE BLDA-SCNC: 1.7 MMOL/L — SIGNIFICANT CHANGE UP (ref 0.5–2)
LYMPHOCYTES # BLD AUTO: 1.84 K/UL — SIGNIFICANT CHANGE UP (ref 1–3.3)
LYMPHOCYTES # BLD AUTO: 10.9 % — LOW (ref 13–44)
MCHC RBC-ENTMCNC: 30.5 PG — SIGNIFICANT CHANGE UP (ref 27–34)
MCHC RBC-ENTMCNC: 33.6 % — SIGNIFICANT CHANGE UP (ref 32–36)
MCV RBC AUTO: 90.7 FL — SIGNIFICANT CHANGE UP (ref 80–100)
MONOCYTES # BLD AUTO: 1.19 K/UL — HIGH (ref 0–0.9)
MONOCYTES NFR BLD AUTO: 7.1 % — SIGNIFICANT CHANGE UP (ref 2–14)
NEUTROPHILS # BLD AUTO: 11.92 K/UL — HIGH (ref 1.8–7.4)
NEUTROPHILS NFR BLD AUTO: 70.7 % — SIGNIFICANT CHANGE UP (ref 43–77)
NRBC # FLD: 0.32 — SIGNIFICANT CHANGE UP
NRBC FLD-RTO: 1.9 — SIGNIFICANT CHANGE UP
PCO2 BLDA: 38 MMHG — SIGNIFICANT CHANGE UP (ref 35–48)
PH BLDA: 7.41 PH — SIGNIFICANT CHANGE UP (ref 7.35–7.45)
PLATELET # BLD AUTO: 330 K/UL — SIGNIFICANT CHANGE UP (ref 150–400)
PMV BLD: 10.5 FL — SIGNIFICANT CHANGE UP (ref 7–13)
PO2 BLDA: 97 MMHG — SIGNIFICANT CHANGE UP (ref 83–108)
POTASSIUM BLDA-SCNC: 4.1 MMOL/L — SIGNIFICANT CHANGE UP (ref 3.4–4.5)
POTASSIUM SERPL-MCNC: 4.4 MMOL/L — SIGNIFICANT CHANGE UP (ref 3.5–5.3)
POTASSIUM SERPL-SCNC: 4.4 MMOL/L — SIGNIFICANT CHANGE UP (ref 3.5–5.3)
PROT SERPL-MCNC: 5.1 G/DL — LOW (ref 6–8.3)
RBC # BLD: 2.69 M/UL — LOW (ref 4.2–5.8)
RBC # FLD: 17.2 % — HIGH (ref 10.3–14.5)
SAO2 % BLDA: 97.7 % — SIGNIFICANT CHANGE UP (ref 95–99)
SODIUM BLDA-SCNC: 138 MMOL/L — SIGNIFICANT CHANGE UP (ref 136–146)
SODIUM SERPL-SCNC: 141 MMOL/L — SIGNIFICANT CHANGE UP (ref 135–145)
WBC # BLD: 16.85 K/UL — HIGH (ref 3.8–10.5)
WBC # FLD AUTO: 16.85 K/UL — HIGH (ref 3.8–10.5)

## 2018-08-08 PROCEDURE — 99292 CRITICAL CARE ADDL 30 MIN: CPT

## 2018-08-08 PROCEDURE — 71045 X-RAY EXAM CHEST 1 VIEW: CPT | Mod: 26

## 2018-08-08 PROCEDURE — 99291 CRITICAL CARE FIRST HOUR: CPT

## 2018-08-08 PROCEDURE — 74176 CT ABD & PELVIS W/O CONTRAST: CPT | Mod: 26

## 2018-08-08 RX ORDER — TIGECYCLINE 50 MG/5ML
INJECTION, POWDER, LYOPHILIZED, FOR SOLUTION INTRAVENOUS
Qty: 0 | Refills: 0 | Status: DISCONTINUED | OUTPATIENT
Start: 2018-08-08 | End: 2018-08-14

## 2018-08-08 RX ORDER — TIGECYCLINE 50 MG/5ML
50 INJECTION, POWDER, LYOPHILIZED, FOR SOLUTION INTRAVENOUS EVERY 12 HOURS
Qty: 0 | Refills: 0 | Status: DISCONTINUED | OUTPATIENT
Start: 2018-08-09 | End: 2018-08-14

## 2018-08-08 RX ORDER — TIGECYCLINE 50 MG/5ML
100 INJECTION, POWDER, LYOPHILIZED, FOR SOLUTION INTRAVENOUS ONCE
Qty: 0 | Refills: 0 | Status: COMPLETED | OUTPATIENT
Start: 2018-08-08 | End: 2018-08-08

## 2018-08-08 RX ORDER — FUROSEMIDE 40 MG
20 TABLET ORAL ONCE
Qty: 0 | Refills: 0 | Status: COMPLETED | OUTPATIENT
Start: 2018-08-08 | End: 2018-08-08

## 2018-08-08 RX ORDER — ACETAMINOPHEN 500 MG
1000 TABLET ORAL ONCE
Qty: 0 | Refills: 0 | Status: COMPLETED | OUTPATIENT
Start: 2018-08-08 | End: 2018-08-08

## 2018-08-08 RX ADMIN — PHENYLEPHRINE HYDROCHLORIDE 22.5 MICROGRAM(S)/KG/MIN: 10 INJECTION INTRAVENOUS at 07:42

## 2018-08-08 RX ADMIN — Medication 100 MILLIGRAM(S): at 13:44

## 2018-08-08 RX ADMIN — Medication 250 MILLIGRAM(S): at 07:42

## 2018-08-08 RX ADMIN — PROPOFOL 2.25 MICROGRAM(S)/KG/MIN: 10 INJECTION, EMULSION INTRAVENOUS at 07:43

## 2018-08-08 RX ADMIN — CHLORHEXIDINE GLUCONATE 15 MILLILITER(S): 213 SOLUTION TOPICAL at 18:20

## 2018-08-08 RX ADMIN — CHLORHEXIDINE GLUCONATE 15 MILLILITER(S): 213 SOLUTION TOPICAL at 05:25

## 2018-08-08 RX ADMIN — FENTANYL CITRATE 7.5 MICROGRAM(S)/KG/HR: 50 INJECTION INTRAVENOUS at 20:25

## 2018-08-08 RX ADMIN — Medication 50 MILLILITER(S): at 18:40

## 2018-08-08 RX ADMIN — FENTANYL CITRATE 1 MICROGRAM(S)/KG/HR: 50 INJECTION INTRAVENOUS at 20:30

## 2018-08-08 RX ADMIN — PHENYLEPHRINE HYDROCHLORIDE 22.5 MICROGRAM(S)/KG/MIN: 10 INJECTION INTRAVENOUS at 20:25

## 2018-08-08 RX ADMIN — PROPOFOL 2.25 MICROGRAM(S)/KG/MIN: 10 INJECTION, EMULSION INTRAVENOUS at 20:26

## 2018-08-08 RX ADMIN — NYSTATIN CREAM 1 APPLICATION(S): 100000 CREAM TOPICAL at 05:24

## 2018-08-08 RX ADMIN — TIGECYCLINE 110 MILLIGRAM(S): 50 INJECTION, POWDER, LYOPHILIZED, FOR SOLUTION INTRAVENOUS at 20:25

## 2018-08-08 RX ADMIN — Medication 2 PUFF(S): at 10:37

## 2018-08-08 RX ADMIN — Medication 250 MILLIGRAM(S): at 18:21

## 2018-08-08 RX ADMIN — Medication 50 MILLIGRAM(S): at 05:37

## 2018-08-08 RX ADMIN — Medication 50 MILLILITER(S): at 04:45

## 2018-08-08 RX ADMIN — CHLORHEXIDINE GLUCONATE 1 APPLICATION(S): 213 SOLUTION TOPICAL at 04:00

## 2018-08-08 RX ADMIN — Medication 1 APPLICATION(S): at 05:39

## 2018-08-08 RX ADMIN — Medication 50 MILLILITER(S): at 13:44

## 2018-08-08 RX ADMIN — FENTANYL CITRATE 1 MICROGRAM(S)/KG/HR: 50 INJECTION INTRAVENOUS at 07:58

## 2018-08-08 RX ADMIN — SODIUM CHLORIDE 3 MILLILITER(S): 9 INJECTION INTRAMUSCULAR; INTRAVENOUS; SUBCUTANEOUS at 05:25

## 2018-08-08 RX ADMIN — Medication 2 PUFF(S): at 16:17

## 2018-08-08 RX ADMIN — Medication 400 MILLIGRAM(S): at 22:00

## 2018-08-08 RX ADMIN — Medication 50 MILLIGRAM(S): at 13:46

## 2018-08-08 RX ADMIN — SODIUM CHLORIDE 3 MILLILITER(S): 9 INJECTION INTRAMUSCULAR; INTRAVENOUS; SUBCUTANEOUS at 21:17

## 2018-08-08 RX ADMIN — Medication 50 MILLILITER(S): at 21:36

## 2018-08-08 RX ADMIN — Medication 50 MILLIGRAM(S): at 21:19

## 2018-08-08 RX ADMIN — Medication 100 MILLIGRAM(S): at 06:23

## 2018-08-08 RX ADMIN — Medication 650 MILLIGRAM(S): at 13:45

## 2018-08-08 RX ADMIN — MIDAZOLAM HYDROCHLORIDE 3 MG/KG/HR: 1 INJECTION, SOLUTION INTRAMUSCULAR; INTRAVENOUS at 07:42

## 2018-08-08 RX ADMIN — NYSTATIN CREAM 1 APPLICATION(S): 100000 CREAM TOPICAL at 18:20

## 2018-08-08 RX ADMIN — Medication 1 APPLICATION(S): at 21:38

## 2018-08-08 RX ADMIN — Medication 2 PUFF(S): at 02:50

## 2018-08-08 RX ADMIN — SODIUM CHLORIDE 3 MILLILITER(S): 9 INJECTION INTRAMUSCULAR; INTRAVENOUS; SUBCUTANEOUS at 13:36

## 2018-08-08 RX ADMIN — Medication 1 APPLICATION(S): at 13:45

## 2018-08-08 RX ADMIN — Medication 2 PUFF(S): at 20:55

## 2018-08-08 RX ADMIN — PANTOPRAZOLE SODIUM 40 MILLIGRAM(S): 20 TABLET, DELAYED RELEASE ORAL at 12:13

## 2018-08-08 RX ADMIN — Medication 20 MILLIGRAM(S): at 05:37

## 2018-08-08 RX ADMIN — FENTANYL CITRATE 7.5 MICROGRAM(S)/KG/HR: 50 INJECTION INTRAVENOUS at 07:43

## 2018-08-08 NOTE — PROGRESS NOTE ADULT - SUBJECTIVE AND OBJECTIVE BOX
Infectious Diseases progress note:    Subjective: WBC downtrending, still with febrile episodes.  Recent cultures ngtd    ROS:  INtubated/sedated    Allergies    No Known Allergies    Intolerances        ANTIBIOTICS/RELEVANT:  antimicrobials  aztreonam  IVPB      aztreonam  IVPB 1000 milliGRAM(s) IV Intermittent every 8 hours  tigecycline IVPB        immunologic:    OTHER:  acetaminophen    Suspension 650 milliGRAM(s) Oral every 6 hours PRN  albumin human 25% IVPB 50 milliLiter(s) IV Intermittent every 6 hours  ALBUTerol    90 MICROgram(s) HFA Inhaler 2 Puff(s) Inhalation every 6 hours  chlorhexidine 0.12% Liquid 15 milliLiter(s) Swish and Spit two times a day  chlorhexidine 4% Liquid 1 Application(s) Topical <User Schedule>  fentaNYL   Infusion 1 MICROgram(s)/kG/Hr IV Continuous <Continuous>  insulin lispro (HumaLOG) corrective regimen sliding scale   SubCutaneous every 6 hours  ipratropium 17 MICROgram(s) HFA Inhaler 2 Puff(s) Inhalation every 6 hours  nystatin Powder 1 Application(s) Topical two times a day  ondansetron Injectable 4 milliGRAM(s) IV Push every 6 hours PRN  pantoprazole  Injectable 40 milliGRAM(s) IV Push daily  petrolatum Ophthalmic Ointment 1 Application(s) Both EYES three times a day  phenylephrine    Infusion 1.6 MICROgram(s)/kG/Min IV Continuous <Continuous>  propofol Infusion 5 MICROgram(s)/kG/Min IV Continuous <Continuous>  sodium chloride 0.9% lock flush 3 milliLiter(s) IV Push every 8 hours      Objective:  Vital Signs Last 24 Hrs  T(C): 37.7 (08 Aug 2018 16:30), Max: 38.5 (08 Aug 2018 13:45)  T(F): 99.8 (08 Aug 2018 16:30), Max: 101.3 (08 Aug 2018 13:45)  HR: 104 (08 Aug 2018 19:00) (90 - 107)  BP: 104/65 (07 Aug 2018 20:00) (104/65 - 104/65)  BP(mean): 67 (07 Aug 2018 20:00) (67 - 67)  RR: 28 (08 Aug 2018 19:00) (21 - 32)  SpO2: 100% (08 Aug 2018 19:00) (95% - 100%)    PHYSICAL EXAM:  Constitutional:  intubated/sedated  Eyes:MEMO, EOMI  Ear/Nose/Throat: no thrush, mucositis.  Moist mucous membranes	  Neck:no JVD, no lymphadenopathy, supple  Respiratory: chest tubes x 3  Cardiovascular: S1S2 RRR, no murmurs  Gastrointestinal:soft, nontender,  nondistended (+) BS, rectal tube with soft brown stools  Extremities:no e/e/c  Skin:  rash improving        LABS:                        8.2    16.85 )-----------( 330      ( 08 Aug 2018 02:40 )             24.4     08-08    141  |  106  |  57<H>  ----------------------------<  121<H>  4.4   |  21<L>  |  1.52<H>    Ca    7.3<L>      08 Aug 2018 02:40  Mg     2.6     08-07    TPro  5.1<L>  /  Alb  1.9<L>  /  TBili  2.0<H>  /  DBili  x   /  AST  105<H>  /  ALT  30  /  AlkPhos  258<H>  08-08    PT/INR - ( 07 Aug 2018 02:20 )   PT: 15.4 SEC;   INR: 1.38          PTT - ( 07 Aug 2018 02:20 )  PTT:35.1 SEC            Vancomycin Level, Trough: 17.6 ug/mL (08-07 @ 17:15)  Vancomycin Level, Trough: 26.7 ug/mL (08-07 @ 02:20)  Vancomycin Level, Trough: 18.0 ug/mL (08-05 @ 17:09)  Vancomycin Level, Trough: 24.6 ug/mL (08-05 @ 03:25)  Vancomycin Level, Trough: 13.3 ug/mL (08-04 @ 05:20)              MICROBIOLOGY:    Culture - Blood (08.06.18 @ 20:38)    Culture - Blood:   NO ORGANISMS ISOLATED  NO ORGANISMS ISOLATED AT 24 HOURS    Specimen Source: BLOOD-CATHETER    Culture - Acid Fast Smear Concentrated (08.06.18 @ 15:41)    Culture - Acid Fast Smear Concentrated:   AFB SMEAR= NO ACID FAST BACILLI SEEN    Specimen Source: BRONCHIAL LAVAGE    Culture - Respiratory with Gram Stain (08.06.18 @ 15:41)    Gram Stain Sputum:   NOS^No Organisms Seen  WBC^White Blood Cells  QNTY CELLS IN GRAM STAIN: RARE (1+)    Culture - Respiratory:   NO ORGANISMS ISOLATED AT 24 HOURS    Specimen Source: BRONCHIAL LAVAGE    Culture - Blood (08.05.18 @ 20:06)    Culture - Blood:   NO ORGANISMS ISOLATED  NO ORGANISMS ISOLATED AT 48 HRS.    Specimen Source: BLOOD PERIPHERAL    Culture - Respiratory with Gram Stain (07.27.18 @ 22:26)    Culture - Respiratory:   NO ORGANISMS ISOLATED AT 24 HOURS  NO ORGANISMS ISOLATED AT 48 HRS.    Gram Stain Sputum:   WBC^White Blood Cells  QNTY CELLS IN GRAM STAIN: RARE (1+)  NOS^No Organisms Seen    Specimen Source: LUNG - LOWER LOBE RIGHT          RADIOLOGY & ADDITIONAL STUDIES:    < from: CT Abdomen and Pelvis w/ Oral Cont (08.08.18 @ 15:56) >  IMPRESSION:     Three chest tubes in a loculated right pleural effusion with a partially   imaged right pneumothorax.   Increased diffuse groundglass and patchy opacities in the right middle   and lower lobes.  Small volume abdominal and pelvic ascites is unchanged.    < end of copied text >
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
## 2018-08-08 NOTE — PROGRESS NOTE ADULT - SUBJECTIVE AND OBJECTIVE BOX
Surgery Progress Note    S: Patient seen and examined, no events overnight. Continues to require ventilatory support. Nursing staff expressed concern that doppler signals quieter/more difficult to find.     O:  Vital Signs Last 24 Hrs  T(C): 37.4 (06 Aug 2018 06:39), Max: 39.4 (05 Aug 2018 18:00)  T(F): 99.4 (06 Aug 2018 06:39), Max: 102.9 (05 Aug 2018 18:00)  HR: 92 (06 Aug 2018 07:30) (92 - 127)  BP: 99/51 (06 Aug 2018 06:39) (99/51 - 99/51)  BP(mean): --  RR: 19 (06 Aug 2018 07:30) (10 - 39)  SpO2: 100% (06 Aug 2018 07:30) (100% - 100%)    Physical Exam:  Gen: Laying in bed, NAD on fent/prop  Resp: Unlabored breathing, intubated  Abd: soft, NTND  Extremities: feet cool L > R, 2-3 second capillary refill bilaterally  Vascular: R leg with femoral, popliteal, PT, and DP; L leg femoral, doppler popliteal, doppler AT & DP    Lab:  CBC (08-08 @ 02:40)                          8.2<L>                   16.85<H>  )--------------(  330        70.7  % Neuts, 10.9<L>% Lymphs, ANC: 11.92<H>                          24.4<L>  CBC (08-07 @ 13:10)                          9.1<L>                   21.64<H>  )--------------(  364        --    % Neuts, --    % Lymphs, ANC: --                              26.9<L>    BMP (08-08 @ 02:40)       141     |  106     |  57<H> 			Ca++ --      Ca 7.3<L>       ---------------------------------( 121<H>		Mg --           4.4     |  21<L>   |  1.52<H>			Ph --      BMP (08-07 @ 13:10)       141     |  106     |  54<H> 			Ca++ --      Ca 7.1<L>       ---------------------------------( 142<H>		Mg --           4.8     |  22      |  1.49<H>			Ph --        LFTs (08-08 @ 02:40)      TPro 5.1<L> / Alb 1.9<L> / TBili 2.0<H> / DBili -- / <H> / ALT 30 / AlkPhos 258<H>  LFTs (08-07 @ 02:20)      TPro 4.7<L> / Alb 1.5<L> / TBili 1.5<H> / DBili -- / <H> / ALT 31 / AlkPhos 172<H>    Coags (08-07 @ 02:20)  aPTT 35.1 / INR 1.38<H> / PT 15.4<H>      ABG (08-08 @ 02:40)     7.41 / 38 / 97 / 24 / -0.6 / 97.7%     Lactate: 1.7   ABG (08-07 @ 13:10)     7.43 / 34<L> / 99 / 23 / -1.6 / 97.8%     Lactate:  2.3<H>        -> BLOOD-CATHETER Culture (08-06 @ 20:38)     NG    NG  NG    -> BRONCHIAL LAVAGE Culture (08-06 @ 15:41)       NOS^No Organisms Seen  WBC^White Blood Cells  QNTY CELLS IN GRAM STAIN: RARE (1+)      CULTURE IN PROGRESS, FURTHER REPORT TO FOLLOW.  NG    -> BLOOD PERIPHERAL Culture (08-05 @ 20:06)     NG    NG  NG    -> BRONCHIAL LAVAGE Culture (08-02 @ 12:43)       NOS^No Organisms Seen  WBC^White Blood Cells  QNTY CELLS IN GRAM STAIN: RARE (1+)      NO ORGANISMS ISOLATED AT 24 HOURS  NO ORGANISMS ISOLATED AT 48 HRS.  NO ORGANISMS ISOLATED AT 72 HRS.  NG    -> BLOOD Culture (07-31 @ 07:06)     NG    NG  NG
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
## 2018-08-09 ENCOUNTER — APPOINTMENT (OUTPATIENT)
Dept: THORACIC SURGERY | Facility: HOSPITAL | Age: 43
End: 2018-08-09

## 2018-08-09 LAB
ALBUMIN SERPL ELPH-MCNC: 2.3 G/DL — LOW (ref 3.3–5)
ALP SERPL-CCNC: 420 U/L — HIGH (ref 40–120)
ALT FLD-CCNC: 32 U/L — SIGNIFICANT CHANGE UP (ref 4–41)
AST SERPL-CCNC: 105 U/L — HIGH (ref 4–40)
BASE EXCESS BLDA CALC-SCNC: -2.3 MMOL/L — SIGNIFICANT CHANGE UP
BASOPHILS # BLD AUTO: 0.08 K/UL — SIGNIFICANT CHANGE UP (ref 0–0.2)
BASOPHILS NFR BLD AUTO: 0.5 % — SIGNIFICANT CHANGE UP (ref 0–2)
BILIRUB SERPL-MCNC: 3.1 MG/DL — HIGH (ref 0.2–1.2)
BLD GP AB SCN SERPL QL: NEGATIVE — SIGNIFICANT CHANGE UP
BUN SERPL-MCNC: 61 MG/DL — HIGH (ref 7–23)
CALCIUM SERPL-MCNC: 7.5 MG/DL — LOW (ref 8.4–10.5)
CHLORIDE BLDA-SCNC: 113 MMOL/L — HIGH (ref 96–108)
CHLORIDE SERPL-SCNC: 106 MMOL/L — SIGNIFICANT CHANGE UP (ref 98–107)
CO2 SERPL-SCNC: 21 MMOL/L — LOW (ref 22–31)
CREAT SERPL-MCNC: 1.46 MG/DL — HIGH (ref 0.5–1.3)
EOSINOPHIL # BLD AUTO: 0.97 K/UL — HIGH (ref 0–0.5)
EOSINOPHIL NFR BLD AUTO: 6.3 % — HIGH (ref 0–6)
GLUCOSE BLDA-MCNC: 116 MG/DL — HIGH (ref 70–99)
GLUCOSE SERPL-MCNC: 110 MG/DL — HIGH (ref 70–99)
HCO3 BLDA-SCNC: 23 MMOL/L — SIGNIFICANT CHANGE UP (ref 22–26)
HCT VFR BLD CALC: 25.3 % — LOW (ref 39–50)
HCT VFR BLDA CALC: 26.2 % — LOW (ref 39–51)
HGB BLD-MCNC: 8.2 G/DL — LOW (ref 13–17)
HGB BLDA-MCNC: 8.4 G/DL — LOW (ref 13–17)
IMM GRANULOCYTES # BLD AUTO: 0.96 # — SIGNIFICANT CHANGE UP
IMM GRANULOCYTES NFR BLD AUTO: 6.2 % — HIGH (ref 0–1.5)
LACTATE BLDA-SCNC: 1.8 MMOL/L — SIGNIFICANT CHANGE UP (ref 0.5–2)
LYMPHOCYTES # BLD AUTO: 1.35 K/UL — SIGNIFICANT CHANGE UP (ref 1–3.3)
LYMPHOCYTES # BLD AUTO: 8.8 % — LOW (ref 13–44)
MCHC RBC-ENTMCNC: 29.8 PG — SIGNIFICANT CHANGE UP (ref 27–34)
MCHC RBC-ENTMCNC: 32.4 % — SIGNIFICANT CHANGE UP (ref 32–36)
MCV RBC AUTO: 92 FL — SIGNIFICANT CHANGE UP (ref 80–100)
MONOCYTES # BLD AUTO: 0.82 K/UL — SIGNIFICANT CHANGE UP (ref 0–0.9)
MONOCYTES NFR BLD AUTO: 5.3 % — SIGNIFICANT CHANGE UP (ref 2–14)
NEUTROPHILS # BLD AUTO: 11.21 K/UL — HIGH (ref 1.8–7.4)
NEUTROPHILS NFR BLD AUTO: 72.9 % — SIGNIFICANT CHANGE UP (ref 43–77)
NRBC # FLD: 0.45 — SIGNIFICANT CHANGE UP
NRBC FLD-RTO: 2.9 — SIGNIFICANT CHANGE UP
PCO2 BLDA: 35 MMHG — SIGNIFICANT CHANGE UP (ref 35–48)
PH BLDA: 7.41 PH — SIGNIFICANT CHANGE UP (ref 7.35–7.45)
PLATELET # BLD AUTO: 300 K/UL — SIGNIFICANT CHANGE UP (ref 150–400)
PMV BLD: 10.5 FL — SIGNIFICANT CHANGE UP (ref 7–13)
PO2 BLDA: 88 MMHG — SIGNIFICANT CHANGE UP (ref 83–108)
POTASSIUM BLDA-SCNC: 4 MMOL/L — SIGNIFICANT CHANGE UP (ref 3.4–4.5)
POTASSIUM SERPL-MCNC: 4.1 MMOL/L — SIGNIFICANT CHANGE UP (ref 3.5–5.3)
POTASSIUM SERPL-SCNC: 4.1 MMOL/L — SIGNIFICANT CHANGE UP (ref 3.5–5.3)
PROT SERPL-MCNC: 5.3 G/DL — LOW (ref 6–8.3)
RBC # BLD: 2.75 M/UL — LOW (ref 4.2–5.8)
RBC # FLD: 17.2 % — HIGH (ref 10.3–14.5)
RH IG SCN BLD-IMP: NEGATIVE — SIGNIFICANT CHANGE UP
SAO2 % BLDA: 97.5 % — SIGNIFICANT CHANGE UP (ref 95–99)
SODIUM BLDA-SCNC: 138 MMOL/L — SIGNIFICANT CHANGE UP (ref 136–146)
SODIUM SERPL-SCNC: 140 MMOL/L — SIGNIFICANT CHANGE UP (ref 135–145)
SPECIMEN SOURCE: SIGNIFICANT CHANGE UP
WBC # BLD: 15.39 K/UL — HIGH (ref 3.8–10.5)
WBC # FLD AUTO: 15.39 K/UL — HIGH (ref 3.8–10.5)

## 2018-08-09 PROCEDURE — 99292 CRITICAL CARE ADDL 30 MIN: CPT

## 2018-08-09 PROCEDURE — 31624 DX BRONCHOSCOPE/LAVAGE: CPT | Mod: 78

## 2018-08-09 PROCEDURE — 99291 CRITICAL CARE FIRST HOUR: CPT

## 2018-08-09 PROCEDURE — 71045 X-RAY EXAM CHEST 1 VIEW: CPT | Mod: 26

## 2018-08-09 PROCEDURE — 31600 PLANNED TRACHEOSTOMY: CPT | Mod: 78

## 2018-08-09 PROCEDURE — 43246 EGD PLACE GASTROSTOMY TUBE: CPT | Mod: 78

## 2018-08-09 RX ORDER — HEPARIN SODIUM 5000 [USP'U]/ML
5000 INJECTION INTRAVENOUS; SUBCUTANEOUS EVERY 12 HOURS
Qty: 0 | Refills: 0 | Status: DISCONTINUED | OUTPATIENT
Start: 2018-08-10 | End: 2018-08-17

## 2018-08-09 RX ORDER — ACETAMINOPHEN 500 MG
1000 TABLET ORAL ONCE
Qty: 0 | Refills: 0 | Status: COMPLETED | OUTPATIENT
Start: 2018-08-09 | End: 2018-08-09

## 2018-08-09 RX ORDER — FUROSEMIDE 40 MG
20 TABLET ORAL ONCE
Qty: 0 | Refills: 0 | Status: COMPLETED | OUTPATIENT
Start: 2018-08-09 | End: 2018-08-09

## 2018-08-09 RX ORDER — ACETAMINOPHEN 500 MG
1000 TABLET ORAL ONCE
Qty: 0 | Refills: 0 | Status: COMPLETED | OUTPATIENT
Start: 2018-08-10 | End: 2018-08-10

## 2018-08-09 RX ADMIN — Medication 1 APPLICATION(S): at 13:09

## 2018-08-09 RX ADMIN — Medication 1 APPLICATION(S): at 05:52

## 2018-08-09 RX ADMIN — Medication 50 MILLIGRAM(S): at 05:52

## 2018-08-09 RX ADMIN — FENTANYL CITRATE 7.5 MICROGRAM(S)/KG/HR: 50 INJECTION INTRAVENOUS at 21:03

## 2018-08-09 RX ADMIN — Medication 2 PUFF(S): at 16:19

## 2018-08-09 RX ADMIN — Medication 50 MILLIGRAM(S): at 21:04

## 2018-08-09 RX ADMIN — PHENYLEPHRINE HYDROCHLORIDE 22.5 MICROGRAM(S)/KG/MIN: 10 INJECTION INTRAVENOUS at 21:03

## 2018-08-09 RX ADMIN — CHLORHEXIDINE GLUCONATE 1 APPLICATION(S): 213 SOLUTION TOPICAL at 04:48

## 2018-08-09 RX ADMIN — FENTANYL CITRATE 1 MICROGRAM(S)/KG/HR: 50 INJECTION INTRAVENOUS at 07:49

## 2018-08-09 RX ADMIN — FENTANYL CITRATE 7.5 MICROGRAM(S)/KG/HR: 50 INJECTION INTRAVENOUS at 07:49

## 2018-08-09 RX ADMIN — NYSTATIN CREAM 1 APPLICATION(S): 100000 CREAM TOPICAL at 05:51

## 2018-08-09 RX ADMIN — NYSTATIN CREAM 1 APPLICATION(S): 100000 CREAM TOPICAL at 18:14

## 2018-08-09 RX ADMIN — FENTANYL CITRATE 1 MICROGRAM(S)/KG/HR: 50 INJECTION INTRAVENOUS at 22:15

## 2018-08-09 RX ADMIN — SODIUM CHLORIDE 3 MILLILITER(S): 9 INJECTION INTRAMUSCULAR; INTRAVENOUS; SUBCUTANEOUS at 05:40

## 2018-08-09 RX ADMIN — Medication 20 MILLIGRAM(S): at 06:16

## 2018-08-09 RX ADMIN — Medication 2 PUFF(S): at 10:56

## 2018-08-09 RX ADMIN — PROPOFOL 2.25 MICROGRAM(S)/KG/MIN: 10 INJECTION, EMULSION INTRAVENOUS at 21:03

## 2018-08-09 RX ADMIN — Medication 50 MILLIGRAM(S): at 13:09

## 2018-08-09 RX ADMIN — CHLORHEXIDINE GLUCONATE 15 MILLILITER(S): 213 SOLUTION TOPICAL at 05:40

## 2018-08-09 RX ADMIN — TIGECYCLINE 105 MILLIGRAM(S): 50 INJECTION, POWDER, LYOPHILIZED, FOR SOLUTION INTRAVENOUS at 07:48

## 2018-08-09 RX ADMIN — PROPOFOL 2.25 MICROGRAM(S)/KG/MIN: 10 INJECTION, EMULSION INTRAVENOUS at 07:49

## 2018-08-09 RX ADMIN — Medication 400 MILLIGRAM(S): at 21:03

## 2018-08-09 RX ADMIN — PANTOPRAZOLE SODIUM 40 MILLIGRAM(S): 20 TABLET, DELAYED RELEASE ORAL at 13:09

## 2018-08-09 RX ADMIN — CHLORHEXIDINE GLUCONATE 15 MILLILITER(S): 213 SOLUTION TOPICAL at 18:14

## 2018-08-09 RX ADMIN — Medication 2 PUFF(S): at 22:36

## 2018-08-09 RX ADMIN — Medication 1 APPLICATION(S): at 21:03

## 2018-08-09 RX ADMIN — Medication 2 PUFF(S): at 02:28

## 2018-08-09 RX ADMIN — TIGECYCLINE 105 MILLIGRAM(S): 50 INJECTION, POWDER, LYOPHILIZED, FOR SOLUTION INTRAVENOUS at 18:14

## 2018-08-09 RX ADMIN — SODIUM CHLORIDE 3 MILLILITER(S): 9 INJECTION INTRAMUSCULAR; INTRAVENOUS; SUBCUTANEOUS at 13:10

## 2018-08-09 RX ADMIN — PHENYLEPHRINE HYDROCHLORIDE 22.5 MICROGRAM(S)/KG/MIN: 10 INJECTION INTRAVENOUS at 07:49

## 2018-08-09 RX ADMIN — SODIUM CHLORIDE 3 MILLILITER(S): 9 INJECTION INTRAMUSCULAR; INTRAVENOUS; SUBCUTANEOUS at 21:04

## 2018-08-09 NOTE — BRIEF OPERATIVE NOTE - PROCEDURE
<<-----Click on this checkbox to enter Procedure EGD  08/09/2018    Active  AMATTIA  PEG (percutaneous endoscopic gastrostomy)  08/09/2018    Active  AMNAYELI  Tracheostomy  08/09/2018    Active  AMY  Flexible bronchoscopy  08/09/2018    Active  AMY
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
## 2018-08-09 NOTE — PROGRESS NOTE ADULT - SUBJECTIVE AND OBJECTIVE BOX
Surgery Progress Note    S: Patient seen and examined, no events overnight. Continues to require ventilatory support.     O:  Vital Signs Last 24 Hrs  T(C): 38.1 (09 Aug 2018 08:00), Max: 39.4 (08 Aug 2018 22:00)  T(F): 100.6 (09 Aug 2018 08:00), Max: 102.9 (08 Aug 2018 22:00)  HR: 99 (09 Aug 2018 08:00) (90 - 109)  BP: 111/66 (09 Aug 2018 08:00) (102/58 - 115/74)  BP(mean): 76 (09 Aug 2018 08:00) (68 - 84)  RR: 24 (09 Aug 2018 08:00) (22 - 37)  SpO2: 100% (09 Aug 2018 08:00) (98% - 100%)    Physical Exam:  Gen: Laying in bed, NAD on fent/prop  Resp: Unlabored breathing, intubated  Abd: soft, NTND  Extremities: feet cool L > R, 2-3 second capillary refill bilaterally  Vascular: R leg with femoral, popliteal, PT, and DP; L leg femoral, doppler popliteal, doppler AT & DP    Lab:  CBC (08-09 @ 03:30)                          8.2<L>                   15.39<H>  )--------------(  300        72.9  % Neuts, 8.8<L>% Lymphs, ANC: 11.21<H>                          25.3<L>  CBC (08-08 @ 02:40)                          8.2<L>                   16.85<H>  )--------------(  330        70.7  % Neuts, 10.9<L>% Lymphs, ANC: 11.92<H>                          24.4<L>    BMP (08-09 @ 03:30)       140     |  106     |  61<H> 			Ca++ --      Ca 7.5<L>       ---------------------------------( 110<H>		Mg --           4.1     |  21<L>   |  1.46<H>			Ph --      BMP (08-08 @ 02:40)       141     |  106     |  57<H> 			Ca++ --      Ca 7.3<L>       ---------------------------------( 121<H>		Mg --           4.4     |  21<L>   |  1.52<H>			Ph --        LFTs (08-09 @ 03:30)      TPro 5.3<L> / Alb 2.3<L> / TBili 3.1<H> / DBili -- / <H> / ALT 32 / AlkPhos 420<H>  LFTs (08-08 @ 02:40)      TPro 5.1<L> / Alb 1.9<L> / TBili 2.0<H> / DBili -- / <H> / ALT 30 / AlkPhos 258<H>    ABG (08-09 @ 03:30)     7.41 / 35 / 88 / 23 / -2.3 / 97.5%     Lactate: 1.8   ABG (08-08 @ 02:40)     7.41 / 38 / 97 / 24 / -0.6 / 97.7%     Lactate: 1.7     -> BLOOD-CATHETER Culture (08-06 @ 20:38)     NG    NG  NG    -> BRONCHIAL LAVAGE Culture (08-06 @ 15:41)       NOS^No Organisms Seen  WBC^White Blood Cells  QNTY CELLS IN GRAM STAIN: RARE (1+)      NO ORGANISMS ISOLATED AT 24 HOURS  NG    -> BLOOD PERIPHERAL Culture (08-05 @ 20:06)     NG    NG  NG    -> BRONCHIAL LAVAGE Culture (08-02 @ 12:43)       NOS^No Organisms Seen  WBC^White Blood Cells  QNTY CELLS IN GRAM STAIN: RARE (1+)      NO ORGANISMS ISOLATED AT 24 HOURS  NO ORGANISMS ISOLATED AT 48 HRS.  NO ORGANISMS ISOLATED AT 72 HRS.  NG    -> BLOOD Culture (07-31 @ 07:06)     NG    NG  NG

## 2018-08-09 NOTE — PROGRESS NOTE ADULT - SUBJECTIVE AND OBJECTIVE BOX
Infectious Diseases progress note:    Subjective: s/p tracheostomy/PEG.  (+) fevers 102 last night.      ROS:  nonverbal    Allergies    No Known Allergies    Intolerances        ANTIBIOTICS/RELEVANT:  antimicrobials  aztreonam  IVPB      aztreonam  IVPB 1000 milliGRAM(s) IV Intermittent every 8 hours  tigecycline IVPB 50 milliGRAM(s) IV Intermittent every 12 hours  tigecycline IVPB        immunologic:    OTHER:  acetaminophen    Suspension 650 milliGRAM(s) Oral every 6 hours PRN  ALBUTerol    90 MICROgram(s) HFA Inhaler 2 Puff(s) Inhalation every 6 hours  chlorhexidine 0.12% Liquid 15 milliLiter(s) Swish and Spit two times a day  chlorhexidine 4% Liquid 1 Application(s) Topical <User Schedule>  fentaNYL   Infusion 1 MICROgram(s)/kG/Hr IV Continuous <Continuous>  insulin lispro (HumaLOG) corrective regimen sliding scale   SubCutaneous every 6 hours  ipratropium 17 MICROgram(s) HFA Inhaler 2 Puff(s) Inhalation every 6 hours  nystatin Powder 1 Application(s) Topical two times a day  ondansetron Injectable 4 milliGRAM(s) IV Push every 6 hours PRN  pantoprazole  Injectable 40 milliGRAM(s) IV Push daily  petrolatum Ophthalmic Ointment 1 Application(s) Both EYES three times a day  phenylephrine    Infusion 1.6 MICROgram(s)/kG/Min IV Continuous <Continuous>  propofol Infusion 5 MICROgram(s)/kG/Min IV Continuous <Continuous>  sodium chloride 0.9% lock flush 3 milliLiter(s) IV Push every 8 hours      Objective:  Vital Signs Last 24 Hrs  T(C): 38.3 (09 Aug 2018 20:00), Max: 39.1 (09 Aug 2018 00:00)  T(F): 101 (09 Aug 2018 20:00), Max: 102.4 (09 Aug 2018 00:00)  HR: 99 (09 Aug 2018 22:37) (97 - 109)  BP: 117/75 (09 Aug 2018 21:00) (102/58 - 120/72)  BP(mean): 84 (09 Aug 2018 21:00) (68 - 86)  RR: 22 (09 Aug 2018 21:00) (3 - 37)  SpO2: 95% (09 Aug 2018 22:37) (95% - 100%)    PHYSICAL EXAM:  Constitutional: tracheostomy  Eyes:MEMO, EOMI  Ear/Nose/Throat: no thrush, mucositis.  Moist mucous membranes	  Neck:no JVD, no lymphadenopathy, supple  Respiratory: CTA emi  Cardiovascular: S1S2 RRR, no murmurs  Gastrointestinal:soft, nontender,  nondistended (+) BS, peg, rectal tube  Extremities:no e/e/c  Skin:  no rashes, open wounds or ulcerations        LABS:                        8.2    15.39 )-----------( 300      ( 09 Aug 2018 03:30 )             25.3     08-09    140  |  106  |  61<H>  ----------------------------<  110<H>  4.1   |  21<L>  |  1.46<H>    Ca    7.5<L>      09 Aug 2018 03:30    TPro  5.3<L>  /  Alb  2.3<L>  /  TBili  3.1<H>  /  DBili  x   /  AST  105<H>  /  ALT  32  /  AlkPhos  420<H>  08-09                Vancomycin Level, Trough: 17.6 ug/mL (08-07 @ 17:15)  Vancomycin Level, Trough: 26.7 ug/mL (08-07 @ 02:20)  Vancomycin Level, Trough: 18.0 ug/mL (08-05 @ 17:09)  Vancomycin Level, Trough: 24.6 ug/mL (08-05 @ 03:25)              MICROBIOLOGY:    Culture - Blood (08.06.18 @ 20:38)    Culture - Blood:   NO ORGANISMS ISOLATED  NO ORGANISMS ISOLATED AT 72 HRS.    Specimen Source: BLOOD-CATHETER    Culture - Acid Fast Smear Concentrated (08.06.18 @ 15:41)    Specimen Source: BRONCHIAL LAVAGE    Culture - Acid Fast Smear Concentrated:   AFB SMEAR= NO ACID FAST BACILLI SEEN    Culture - Yeast and Fungus (08.06.18 @ 15:41)    Culture - Yeast and Fungus:   CULTURE NEGATIVE FOR YEASTS AND MOLDS AFTER 2 DAYS    Specimen Source: BRONCHIAL LAVAGE    Culture - Respiratory with Gram Stain (08.06.18 @ 15:41)    Culture - Respiratory:   NO ORGANISMS ISOLATED AT 24 HOURS  NO ORGANISMS ISOLATED AT 48 HRS.    Gram Stain Sputum:   NOS^No Organisms Seen  WBC^White Blood Cells  QNTY CELLS IN GRAM STAIN: RARE (1+)    Specimen Source: BRONCHIAL LAVAGE            RADIOLOGY & ADDITIONAL STUDIES:  < from: Xray Chest 1 View- PORTABLE-Routine (08.09.18 @ 07:34) >  IMPRESSION:  Follow-up post right thoracotomy with chest tube and   decreasing postop effusion and atelectasis.    < end of copied text >      < from: CT Abdomen and Pelvis w/ Oral Cont (08.08.18 @ 15:56) >  IMPRESSION:     Three chest tubes in a loculated right pleural effusion with a partially   imaged right pneumothorax.   Increased diffuse groundglass and patchy opacities in the right middle   and lower lobes.  Small volume abdominal and pelvic ascites is unchanged.    < end of copied text >
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
## 2018-08-10 LAB
BACTERIA BLD CULT: SIGNIFICANT CHANGE UP
BACTERIA SPT RESP CULT: SIGNIFICANT CHANGE UP
BUN SERPL-MCNC: 66 MG/DL — HIGH (ref 7–23)
CA-I BLD-SCNC: 1.03 MMOL/L — SIGNIFICANT CHANGE UP (ref 1.03–1.23)
CALCIUM SERPL-MCNC: 7.7 MG/DL — LOW (ref 8.4–10.5)
CHLORIDE SERPL-SCNC: 106 MMOL/L — SIGNIFICANT CHANGE UP (ref 98–107)
CO2 SERPL-SCNC: 19 MMOL/L — LOW (ref 22–31)
CREAT SERPL-MCNC: 1.46 MG/DL — HIGH (ref 0.5–1.3)
GLUCOSE SERPL-MCNC: 101 MG/DL — HIGH (ref 70–99)
HCT VFR BLD CALC: 25.6 % — LOW (ref 39–50)
HGB BLD-MCNC: 8.3 G/DL — LOW (ref 13–17)
MAGNESIUM SERPL-MCNC: 3 MG/DL — HIGH (ref 1.6–2.6)
MCHC RBC-ENTMCNC: 30.3 PG — SIGNIFICANT CHANGE UP (ref 27–34)
MCHC RBC-ENTMCNC: 32.4 % — SIGNIFICANT CHANGE UP (ref 32–36)
MCV RBC AUTO: 93.4 FL — SIGNIFICANT CHANGE UP (ref 80–100)
NRBC # FLD: 0.2 — SIGNIFICANT CHANGE UP
NRBC FLD-RTO: 1.1 — SIGNIFICANT CHANGE UP
PHOSPHATE SERPL-MCNC: 6 MG/DL — HIGH (ref 2.5–4.5)
PLATELET # BLD AUTO: 325 K/UL — SIGNIFICANT CHANGE UP (ref 150–400)
PMV BLD: 10.9 FL — SIGNIFICANT CHANGE UP (ref 7–13)
POTASSIUM SERPL-MCNC: 4.7 MMOL/L — SIGNIFICANT CHANGE UP (ref 3.5–5.3)
POTASSIUM SERPL-SCNC: 4.7 MMOL/L — SIGNIFICANT CHANGE UP (ref 3.5–5.3)
RBC # BLD: 2.74 M/UL — LOW (ref 4.2–5.8)
RBC # FLD: 17.7 % — HIGH (ref 10.3–14.5)
SODIUM SERPL-SCNC: 141 MMOL/L — SIGNIFICANT CHANGE UP (ref 135–145)
SPECIMEN SOURCE: SIGNIFICANT CHANGE UP
WBC # BLD: 17.52 K/UL — HIGH (ref 3.8–10.5)
WBC # FLD AUTO: 17.52 K/UL — HIGH (ref 3.8–10.5)

## 2018-08-10 PROCEDURE — 99291 CRITICAL CARE FIRST HOUR: CPT

## 2018-08-10 PROCEDURE — 71045 X-RAY EXAM CHEST 1 VIEW: CPT | Mod: 26

## 2018-08-10 PROCEDURE — 99292 CRITICAL CARE ADDL 30 MIN: CPT

## 2018-08-10 RX ORDER — ACETAMINOPHEN 500 MG
1000 TABLET ORAL ONCE
Qty: 0 | Refills: 0 | Status: COMPLETED | OUTPATIENT
Start: 2018-08-10 | End: 2018-08-10

## 2018-08-10 RX ORDER — METRONIDAZOLE 500 MG
500 TABLET ORAL EVERY 8 HOURS
Qty: 0 | Refills: 0 | Status: DISCONTINUED | OUTPATIENT
Start: 2018-08-10 | End: 2018-09-12

## 2018-08-10 RX ORDER — CALCIUM GLUCONATE 100 MG/ML
1 VIAL (ML) INTRAVENOUS ONCE
Qty: 0 | Refills: 0 | Status: COMPLETED | OUTPATIENT
Start: 2018-08-10 | End: 2018-08-10

## 2018-08-10 RX ORDER — DEXMEDETOMIDINE HYDROCHLORIDE IN 0.9% SODIUM CHLORIDE 4 UG/ML
0.04 INJECTION INTRAVENOUS
Qty: 200 | Refills: 0 | Status: DISCONTINUED | OUTPATIENT
Start: 2018-08-10 | End: 2018-08-17

## 2018-08-10 RX ORDER — FUROSEMIDE 40 MG
10 TABLET ORAL
Qty: 0 | Refills: 0 | Status: DISCONTINUED | OUTPATIENT
Start: 2018-08-10 | End: 2018-08-11

## 2018-08-10 RX ADMIN — TIGECYCLINE 105 MILLIGRAM(S): 50 INJECTION, POWDER, LYOPHILIZED, FOR SOLUTION INTRAVENOUS at 18:27

## 2018-08-10 RX ADMIN — Medication 2 PUFF(S): at 10:40

## 2018-08-10 RX ADMIN — CHLORHEXIDINE GLUCONATE 15 MILLILITER(S): 213 SOLUTION TOPICAL at 07:09

## 2018-08-10 RX ADMIN — SODIUM CHLORIDE 3 MILLILITER(S): 9 INJECTION INTRAMUSCULAR; INTRAVENOUS; SUBCUTANEOUS at 07:09

## 2018-08-10 RX ADMIN — PHENYLEPHRINE HYDROCHLORIDE 22.5 MICROGRAM(S)/KG/MIN: 10 INJECTION INTRAVENOUS at 10:30

## 2018-08-10 RX ADMIN — PROPOFOL 2.25 MICROGRAM(S)/KG/MIN: 10 INJECTION, EMULSION INTRAVENOUS at 10:30

## 2018-08-10 RX ADMIN — DEXMEDETOMIDINE HYDROCHLORIDE IN 0.9% SODIUM CHLORIDE 0.75 MICROGRAM(S)/KG/HR: 4 INJECTION INTRAVENOUS at 11:58

## 2018-08-10 RX ADMIN — Medication 1000 MILLIGRAM(S): at 04:00

## 2018-08-10 RX ADMIN — Medication 2 PUFF(S): at 02:47

## 2018-08-10 RX ADMIN — FENTANYL CITRATE 7.5 MICROGRAM(S)/KG/HR: 50 INJECTION INTRAVENOUS at 10:30

## 2018-08-10 RX ADMIN — PHENYLEPHRINE HYDROCHLORIDE 22.5 MICROGRAM(S)/KG/MIN: 10 INJECTION INTRAVENOUS at 23:18

## 2018-08-10 RX ADMIN — Medication 1 APPLICATION(S): at 21:46

## 2018-08-10 RX ADMIN — Medication 100 MILLIGRAM(S): at 15:00

## 2018-08-10 RX ADMIN — NYSTATIN CREAM 1 APPLICATION(S): 100000 CREAM TOPICAL at 07:12

## 2018-08-10 RX ADMIN — NYSTATIN CREAM 1 APPLICATION(S): 100000 CREAM TOPICAL at 23:17

## 2018-08-10 RX ADMIN — HEPARIN SODIUM 5000 UNIT(S): 5000 INJECTION INTRAVENOUS; SUBCUTANEOUS at 18:28

## 2018-08-10 RX ADMIN — Medication 50 MILLIGRAM(S): at 21:46

## 2018-08-10 RX ADMIN — Medication 1 APPLICATION(S): at 07:13

## 2018-08-10 RX ADMIN — FENTANYL CITRATE 7.5 MICROGRAM(S)/KG/HR: 50 INJECTION INTRAVENOUS at 23:18

## 2018-08-10 RX ADMIN — Medication 1 APPLICATION(S): at 18:08

## 2018-08-10 RX ADMIN — CHLORHEXIDINE GLUCONATE 1 APPLICATION(S): 213 SOLUTION TOPICAL at 07:09

## 2018-08-10 RX ADMIN — Medication 200 GRAM(S): at 13:00

## 2018-08-10 RX ADMIN — Medication 50 MILLIGRAM(S): at 07:33

## 2018-08-10 RX ADMIN — Medication 2 PUFF(S): at 21:30

## 2018-08-10 RX ADMIN — Medication 50 MILLIGRAM(S): at 14:00

## 2018-08-10 RX ADMIN — Medication 10 MILLIGRAM(S): at 18:08

## 2018-08-10 RX ADMIN — SODIUM CHLORIDE 3 MILLILITER(S): 9 INJECTION INTRAMUSCULAR; INTRAVENOUS; SUBCUTANEOUS at 14:00

## 2018-08-10 RX ADMIN — Medication 400 MILLIGRAM(S): at 13:00

## 2018-08-10 RX ADMIN — HEPARIN SODIUM 5000 UNIT(S): 5000 INJECTION INTRAVENOUS; SUBCUTANEOUS at 07:12

## 2018-08-10 RX ADMIN — PROPOFOL 2.25 MICROGRAM(S)/KG/MIN: 10 INJECTION, EMULSION INTRAVENOUS at 23:18

## 2018-08-10 RX ADMIN — Medication 400 MILLIGRAM(S): at 03:30

## 2018-08-10 RX ADMIN — TIGECYCLINE 105 MILLIGRAM(S): 50 INJECTION, POWDER, LYOPHILIZED, FOR SOLUTION INTRAVENOUS at 07:22

## 2018-08-10 RX ADMIN — PANTOPRAZOLE SODIUM 40 MILLIGRAM(S): 20 TABLET, DELAYED RELEASE ORAL at 14:22

## 2018-08-10 RX ADMIN — SODIUM CHLORIDE 3 MILLILITER(S): 9 INJECTION INTRAMUSCULAR; INTRAVENOUS; SUBCUTANEOUS at 21:24

## 2018-08-10 RX ADMIN — Medication 2 PUFF(S): at 16:58

## 2018-08-10 RX ADMIN — Medication 100 MILLIGRAM(S): at 23:17

## 2018-08-10 RX ADMIN — DEXMEDETOMIDINE HYDROCHLORIDE IN 0.9% SODIUM CHLORIDE 0.75 MICROGRAM(S)/KG/HR: 4 INJECTION INTRAVENOUS at 23:49

## 2018-08-10 RX ADMIN — CHLORHEXIDINE GLUCONATE 15 MILLILITER(S): 213 SOLUTION TOPICAL at 18:08

## 2018-08-10 RX ADMIN — FENTANYL CITRATE 1 MICROGRAM(S)/KG/HR: 50 INJECTION INTRAVENOUS at 23:50
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
## 2018-08-10 NOTE — PROGRESS NOTE ADULT - SUBJECTIVE AND OBJECTIVE BOX
42y Male s/p   under   on     T(C): 37.2 (08-10-18 @ 08:00), Max: 38.3 (08-09-18 @ 20:00)  HR: 86 (08-10-18 @ 08:00) (85 - 109)  BP: 106/64 (08-10-18 @ 04:00) (105/64 - 128/58)  RR: 19 (08-10-18 @ 08:00) (3 - 32)  SpO2: 97% (08-10-18 @ 08:00) (95% - 100%)  Wt(kg): --    Pt seen, doing well, no anesthesia complications or complaints noted or reported.   No Nausea  Pain well controlled

## 2018-08-10 NOTE — PROGRESS NOTE ADULT - SUBJECTIVE AND OBJECTIVE BOX
Infectious Diseases progress note:    Subjective: Fever curve somewhat better in last 24 hrs.  WBC elevated at 17.  Repeat blood cultures sent on 8/9    ROS:  nonverbal    Allergies    No Known Allergies    Intolerances        ANTIBIOTICS/RELEVANT:  antimicrobials  aztreonam  IVPB      aztreonam  IVPB 1000 milliGRAM(s) IV Intermittent every 8 hours  metroNIDAZOLE  IVPB 500 milliGRAM(s) IV Intermittent every 8 hours  tigecycline IVPB 50 milliGRAM(s) IV Intermittent every 12 hours  tigecycline IVPB        immunologic:    OTHER:  acetaminophen    Suspension 650 milliGRAM(s) Oral every 6 hours PRN  ALBUTerol    90 MICROgram(s) HFA Inhaler 2 Puff(s) Inhalation every 6 hours  chlorhexidine 0.12% Liquid 15 milliLiter(s) Swish and Spit two times a day  chlorhexidine 4% Liquid 1 Application(s) Topical <User Schedule>  dexmedetomidine Infusion 0.04 MICROgram(s)/kG/Hr IV Continuous <Continuous>  fentaNYL   Infusion 1 MICROgram(s)/kG/Hr IV Continuous <Continuous>  furosemide   Injectable 10 milliGRAM(s) IV Push two times a day  heparin  Injectable 5000 Unit(s) SubCutaneous every 12 hours  insulin lispro (HumaLOG) corrective regimen sliding scale   SubCutaneous every 6 hours  ipratropium 17 MICROgram(s) HFA Inhaler 2 Puff(s) Inhalation every 6 hours  nystatin Powder 1 Application(s) Topical two times a day  ondansetron Injectable 4 milliGRAM(s) IV Push every 6 hours PRN  pantoprazole  Injectable 40 milliGRAM(s) IV Push daily  petrolatum Ophthalmic Ointment 1 Application(s) Both EYES three times a day  phenylephrine    Infusion 1.6 MICROgram(s)/kG/Min IV Continuous <Continuous>  propofol Infusion 5 MICROgram(s)/kG/Min IV Continuous <Continuous>  sodium chloride 0.9% lock flush 3 milliLiter(s) IV Push every 8 hours      Objective:  Vital Signs Last 24 Hrs  T(C): 37.3 (10 Aug 2018 16:00), Max: 38.3 (09 Aug 2018 20:00)  T(F): 99.1 (10 Aug 2018 16:00), Max: 101 (09 Aug 2018 20:00)  HR: 88 (10 Aug 2018 17:00) (85 - 102)  BP: 112/72 (10 Aug 2018 17:00) (106/64 - 128/58)  BP(mean): 81 (10 Aug 2018 17:00) (74 - 85)  RR: 19 (10 Aug 2018 17:00) (17 - 35)  SpO2: 96% (10 Aug 2018 17:00) (95% - 100%)    PHYSICAL EXAM:  Constitutional: trached/peg  Eyes:MEMO, EOMI  Ear/Nose/Throat: no thrush, mucositis.  Moist mucous membranes	  Neck:no JVD, no lymphadenopathy, supple, tracheostomy c/d/i  Respiratory: chest tubes x 3  Cardiovascular: S1S2 RRR, no murmurs  Gastrointestinal:soft, nontender,  nondistended (+) BS, rectal tube  Extremities:no e/e/c  Skin:  no rashes, open wounds or ulcerations        LABS:                        8.3    17.52 )-----------( 325      ( 10 Aug 2018 02:35 )             25.6     08-10    141  |  106  |  66<H>  ----------------------------<  101<H>  4.7   |  19<L>  |  1.46<H>    Ca    7.7<L>      10 Aug 2018 02:35  Phos  6.0     08-10  Mg     3.0     08-10    TPro  5.3<L>  /  Alb  2.3<L>  /  TBili  3.1<H>  /  DBili  x   /  AST  105<H>  /  ALT  32  /  AlkPhos  420<H>  08-09                Vancomycin Level, Trough: 17.6 ug/mL (08-07 @ 17:15)  Vancomycin Level, Trough: 26.7 ug/mL (08-07 @ 02:20)              MICROBIOLOGY:    Culture - Blood (08.06.18 @ 20:38)    Culture - Blood:   NO ORGANISMS ISOLATED  NO ORGANISMS ISOLATED AT 72 HRS.    Specimen Source: BLOOD-CATHETER    Culture - Acid Fast Smear Concentrated (08.06.18 @ 15:41)    Specimen Source: BRONCHIAL LAVAGE    Culture - Acid Fast Smear Concentrated:   AFB SMEAR= NO ACID FAST BACILLI SEEN    Culture - Yeast and Fungus (08.06.18 @ 15:41)    Culture - Yeast and Fungus:   CULTURE NEGATIVE FOR YEASTS AND MOLDS AFTER 2 DAYS    Specimen Source: BRONCHIAL LAVAGE          RADIOLOGY & ADDITIONAL STUDIES:    < from: Xray Chest 1 View- PORTABLE-Routine (08.10.18 @ 07:20) >  INTERPRETATION:     August 9 at 4:32 PM:  Since the last exam, the endotracheal tube and enteric tubes have been   removed. A tracheostomy tube is now seen in place. Right-sided chest tube  and drain as well as skin staples are unaltered. Hazy right hemithorax   consistent with layering effusion. Right IJ line in place. No extra   alveolar air post tracheostomy placement.    August 10 at 6:23 AM:  No significant interval change. Tracheostomy tube in place. Layering   right pleural effusion with underlying atelectasis again seen. No extra   alveolar air.      COMPARISON:  August 9 at 6:32 AM      IMPRESSION:  Status post extubation and tracheostomy tube placement.    < end of copied text >

## 2018-08-10 NOTE — PROGRESS NOTE ADULT - SUBJECTIVE AND OBJECTIVE BOX
Surgery Progress Note    S: Patient seen and examined, now s/p trach and PEG. Febrile ON.     O:  Vital Signs Last 24 Hrs  T(C): 37.3 (10 Aug 2018 16:00), Max: 38.3 (09 Aug 2018 20:00)  T(F): 99.1 (10 Aug 2018 16:00), Max: 101 (09 Aug 2018 20:00)  HR: 88 (10 Aug 2018 17:00) (85 - 102)  BP: 112/72 (10 Aug 2018 17:00) (106/64 - 128/58)  BP(mean): 81 (10 Aug 2018 17:00) (74 - 85)  RR: 19 (10 Aug 2018 17:00) (17 - 35)  SpO2: 96% (10 Aug 2018 17:00) (95% - 100%)    Physical Exam:  Gen: Laying in bed, NAD on fent/prop  Resp: Unlabored breathing, intubated  Abd: soft, NTND  Extremities: feet cool L > R, 2-3 second capillary refill bilaterally  Vascular: R leg with femoral, popliteal, PT, and DP; L leg femoral, doppler popliteal, doppler AT & DP    Lab:  CBC (08-10 @ 02:35)                          8.3<L>                   17.52<H>  )--------------(  325        --    % Neuts, --    % Lymphs, ANC: --                              25.6<L>    BMP (08-10 @ 02:35)       141     |  106     |  66<H> 			Ca++ 1.03    Ca 7.7<L>       ---------------------------------( 101<H>		Mg 3.0<H>       4.7     |  19<L>   |  1.46<H>			Ph 6.0<H>    -> BLOOD-CATHETER Culture (08-06 @ 20:38)     NG    NG  NG    -> BRONCHIAL LAVAGE Culture (08-06 @ 15:41)       NOS^No Organisms Seen  WBC^White Blood Cells  QNTY CELLS IN GRAM STAIN: RARE (1+)      NO ORGANISMS ISOLATED AT 24 HOURS  NO ORGANISMS ISOLATED AT 48 HRS.  NO ORGANISMS ISOLATED AT 72 HRS.  NG    -> BLOOD PERIPHERAL Culture (08-05 @ 20:06)     NG    NG  NG    -> BRONCHIAL LAVAGE Culture (08-02 @ 12:43)       NOS^No Organisms Seen  WBC^White Blood Cells  QNTY CELLS IN GRAM STAIN: RARE (1+)      NO ORGANISMS ISOLATED AT 24 HOURS  NO ORGANISMS ISOLATED AT 48 HRS.  NO ORGANISMS ISOLATED AT 72 HRS.  NG
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
## 2018-08-11 LAB
BACTERIA BLD CULT: SIGNIFICANT CHANGE UP
BASE EXCESS BLDA CALC-SCNC: -2.6 MMOL/L — SIGNIFICANT CHANGE UP
BUN SERPL-MCNC: 68 MG/DL — HIGH (ref 7–23)
CA-I BLD-SCNC: 1.08 MMOL/L — SIGNIFICANT CHANGE UP (ref 1.03–1.23)
CA-I BLDA-SCNC: 1.11 MMOL/L — LOW (ref 1.15–1.29)
CALCIUM SERPL-MCNC: 7.6 MG/DL — LOW (ref 8.4–10.5)
CHLORIDE SERPL-SCNC: 110 MMOL/L — HIGH (ref 98–107)
CO2 SERPL-SCNC: 19 MMOL/L — LOW (ref 22–31)
CREAT SERPL-MCNC: 1.29 MG/DL — SIGNIFICANT CHANGE UP (ref 0.5–1.3)
GLUCOSE BLDA-MCNC: 124 MG/DL — HIGH (ref 70–99)
GLUCOSE SERPL-MCNC: 99 MG/DL — SIGNIFICANT CHANGE UP (ref 70–99)
HCO3 BLDA-SCNC: 22 MMOL/L — SIGNIFICANT CHANGE UP (ref 22–26)
HCT VFR BLD CALC: 26 % — LOW (ref 39–50)
HCT VFR BLDA CALC: 26.6 % — LOW (ref 39–51)
HGB BLD-MCNC: 8.6 G/DL — LOW (ref 13–17)
HGB BLDA-MCNC: 8.6 G/DL — LOW (ref 13–17)
LACTATE BLDA-SCNC: 1 MMOL/L — SIGNIFICANT CHANGE UP (ref 0.5–2)
MAGNESIUM SERPL-MCNC: 3 MG/DL — HIGH (ref 1.6–2.6)
MCHC RBC-ENTMCNC: 31.7 PG — SIGNIFICANT CHANGE UP (ref 27–34)
MCHC RBC-ENTMCNC: 33.1 % — SIGNIFICANT CHANGE UP (ref 32–36)
MCV RBC AUTO: 95.9 FL — SIGNIFICANT CHANGE UP (ref 80–100)
NRBC # FLD: 0.11 — SIGNIFICANT CHANGE UP
PCO2 BLDA: 37 MMHG — SIGNIFICANT CHANGE UP (ref 35–48)
PH BLDA: 7.39 PH — SIGNIFICANT CHANGE UP (ref 7.35–7.45)
PHOSPHATE SERPL-MCNC: 5.5 MG/DL — HIGH (ref 2.5–4.5)
PLATELET # BLD AUTO: 361 K/UL — SIGNIFICANT CHANGE UP (ref 150–400)
PMV BLD: 11.1 FL — SIGNIFICANT CHANGE UP (ref 7–13)
PO2 BLDA: 90 MMHG — SIGNIFICANT CHANGE UP (ref 83–108)
POTASSIUM BLDA-SCNC: 3.8 MMOL/L — SIGNIFICANT CHANGE UP (ref 3.4–4.5)
POTASSIUM SERPL-MCNC: 4.2 MMOL/L — SIGNIFICANT CHANGE UP (ref 3.5–5.3)
POTASSIUM SERPL-SCNC: 4.2 MMOL/L — SIGNIFICANT CHANGE UP (ref 3.5–5.3)
RBC # BLD: 2.71 M/UL — LOW (ref 4.2–5.8)
RBC # FLD: 17.7 % — HIGH (ref 10.3–14.5)
SAO2 % BLDA: 97.3 % — SIGNIFICANT CHANGE UP (ref 95–99)
SODIUM BLDA-SCNC: 140 MMOL/L — SIGNIFICANT CHANGE UP (ref 136–146)
SODIUM SERPL-SCNC: 142 MMOL/L — SIGNIFICANT CHANGE UP (ref 135–145)
SPECIMEN SOURCE: SIGNIFICANT CHANGE UP
WBC # BLD: 17.01 K/UL — HIGH (ref 3.8–10.5)
WBC # FLD AUTO: 17.01 K/UL — HIGH (ref 3.8–10.5)

## 2018-08-11 PROCEDURE — 71045 X-RAY EXAM CHEST 1 VIEW: CPT | Mod: 26,76

## 2018-08-11 PROCEDURE — 99291 CRITICAL CARE FIRST HOUR: CPT

## 2018-08-11 PROCEDURE — 99292 CRITICAL CARE ADDL 30 MIN: CPT

## 2018-08-11 RX ORDER — VANCOMYCIN HCL 1 G
1000 VIAL (EA) INTRAVENOUS DAILY
Qty: 0 | Refills: 0 | Status: DISCONTINUED | OUTPATIENT
Start: 2018-08-11 | End: 2018-08-21

## 2018-08-11 RX ORDER — ALBUMIN HUMAN 25 %
50 VIAL (ML) INTRAVENOUS EVERY 8 HOURS
Qty: 0 | Refills: 0 | Status: COMPLETED | OUTPATIENT
Start: 2018-08-11 | End: 2018-08-12

## 2018-08-11 RX ORDER — FUROSEMIDE 40 MG
10 TABLET ORAL EVERY 8 HOURS
Qty: 0 | Refills: 0 | Status: COMPLETED | OUTPATIENT
Start: 2018-08-11 | End: 2018-08-12

## 2018-08-11 RX ORDER — CALCIUM ACETATE 667 MG
667 TABLET ORAL EVERY 8 HOURS
Qty: 0 | Refills: 0 | Status: COMPLETED | OUTPATIENT
Start: 2018-08-11 | End: 2018-08-12

## 2018-08-11 RX ORDER — ACETAMINOPHEN 500 MG
1000 TABLET ORAL ONCE
Qty: 0 | Refills: 0 | Status: COMPLETED | OUTPATIENT
Start: 2018-08-11 | End: 2018-08-16

## 2018-08-11 RX ADMIN — Medication 1 MILLIGRAM(S): at 08:15

## 2018-08-11 RX ADMIN — Medication 50 MILLILITER(S): at 22:20

## 2018-08-11 RX ADMIN — Medication 50 MILLILITER(S): at 10:00

## 2018-08-11 RX ADMIN — SODIUM CHLORIDE 3 MILLILITER(S): 9 INJECTION INTRAMUSCULAR; INTRAVENOUS; SUBCUTANEOUS at 05:12

## 2018-08-11 RX ADMIN — Medication 667 MILLIGRAM(S): at 22:21

## 2018-08-11 RX ADMIN — PANTOPRAZOLE SODIUM 40 MILLIGRAM(S): 20 TABLET, DELAYED RELEASE ORAL at 12:00

## 2018-08-11 RX ADMIN — HEPARIN SODIUM 5000 UNIT(S): 5000 INJECTION INTRAVENOUS; SUBCUTANEOUS at 18:50

## 2018-08-11 RX ADMIN — Medication 50 MILLIGRAM(S): at 14:28

## 2018-08-11 RX ADMIN — Medication 100 MILLIGRAM(S): at 22:20

## 2018-08-11 RX ADMIN — Medication 10 MILLIGRAM(S): at 09:34

## 2018-08-11 RX ADMIN — Medication 2 PUFF(S): at 03:19

## 2018-08-11 RX ADMIN — CHLORHEXIDINE GLUCONATE 15 MILLILITER(S): 213 SOLUTION TOPICAL at 05:09

## 2018-08-11 RX ADMIN — Medication 650 MILLIGRAM(S): at 07:30

## 2018-08-11 RX ADMIN — Medication 2 PUFF(S): at 21:36

## 2018-08-11 RX ADMIN — NYSTATIN CREAM 1 APPLICATION(S): 100000 CREAM TOPICAL at 18:50

## 2018-08-11 RX ADMIN — TIGECYCLINE 105 MILLIGRAM(S): 50 INJECTION, POWDER, LYOPHILIZED, FOR SOLUTION INTRAVENOUS at 07:18

## 2018-08-11 RX ADMIN — Medication 667 MILLIGRAM(S): at 14:28

## 2018-08-11 RX ADMIN — CHLORHEXIDINE GLUCONATE 15 MILLILITER(S): 213 SOLUTION TOPICAL at 18:50

## 2018-08-11 RX ADMIN — FENTANYL CITRATE 7.5 MICROGRAM(S)/KG/HR: 50 INJECTION INTRAVENOUS at 08:00

## 2018-08-11 RX ADMIN — PROPOFOL 2.25 MICROGRAM(S)/KG/MIN: 10 INJECTION, EMULSION INTRAVENOUS at 08:00

## 2018-08-11 RX ADMIN — Medication 10 MILLIGRAM(S): at 05:09

## 2018-08-11 RX ADMIN — HEPARIN SODIUM 5000 UNIT(S): 5000 INJECTION INTRAVENOUS; SUBCUTANEOUS at 05:09

## 2018-08-11 RX ADMIN — Medication 50 MILLILITER(S): at 15:50

## 2018-08-11 RX ADMIN — Medication 50 MILLIGRAM(S): at 05:08

## 2018-08-11 RX ADMIN — SODIUM CHLORIDE 3 MILLILITER(S): 9 INJECTION INTRAMUSCULAR; INTRAVENOUS; SUBCUTANEOUS at 21:43

## 2018-08-11 RX ADMIN — Medication 10 MILLIGRAM(S): at 23:21

## 2018-08-11 RX ADMIN — HYDROMORPHONE HYDROCHLORIDE 1 MILLIGRAM(S): 2 INJECTION INTRAMUSCULAR; INTRAVENOUS; SUBCUTANEOUS at 22:30

## 2018-08-11 RX ADMIN — Medication 250 MILLIGRAM(S): at 13:00

## 2018-08-11 RX ADMIN — TIGECYCLINE 105 MILLIGRAM(S): 50 INJECTION, POWDER, LYOPHILIZED, FOR SOLUTION INTRAVENOUS at 18:50

## 2018-08-11 RX ADMIN — CHLORHEXIDINE GLUCONATE 1 APPLICATION(S): 213 SOLUTION TOPICAL at 05:08

## 2018-08-11 RX ADMIN — Medication 100 MILLIGRAM(S): at 05:16

## 2018-08-11 RX ADMIN — NYSTATIN CREAM 1 APPLICATION(S): 100000 CREAM TOPICAL at 05:09

## 2018-08-11 RX ADMIN — Medication 50 MILLIGRAM(S): at 22:20

## 2018-08-11 RX ADMIN — Medication 10 MILLIGRAM(S): at 14:28

## 2018-08-11 RX ADMIN — Medication 1 APPLICATION(S): at 14:28

## 2018-08-11 RX ADMIN — Medication 2 PUFF(S): at 16:26

## 2018-08-11 RX ADMIN — Medication 667 MILLIGRAM(S): at 09:34

## 2018-08-11 RX ADMIN — DEXMEDETOMIDINE HYDROCHLORIDE IN 0.9% SODIUM CHLORIDE 0.75 MICROGRAM(S)/KG/HR: 4 INJECTION INTRAVENOUS at 08:00

## 2018-08-11 RX ADMIN — SODIUM CHLORIDE 3 MILLILITER(S): 9 INJECTION INTRAMUSCULAR; INTRAVENOUS; SUBCUTANEOUS at 14:19

## 2018-08-11 RX ADMIN — Medication 1 APPLICATION(S): at 05:09

## 2018-08-11 RX ADMIN — Medication 1 APPLICATION(S): at 22:33

## 2018-08-11 RX ADMIN — Medication 100 MILLIGRAM(S): at 13:50

## 2018-08-11 RX ADMIN — Medication 2 PUFF(S): at 10:08
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
## 2018-08-11 NOTE — AIRWAY PLACEMENT NOTE ADULT - POST AIRWAY PLACEMENT ASSESSMENT:
chest excursion noted/breath sounds bilateral/breath sounds equal/positive end tidal CO2 noted
breath sounds bilateral/positive end tidal CO2 noted/CXR pending/breath sounds equal/chest excursion noted/skin color improved

## 2018-08-11 NOTE — PHYSICAL THERAPY INITIAL EVALUATION ADULT - PASSIVE RANGE OF MOTION EXAMINATION, REHAB EVAL
bilateral shoulder PROM to 90 degrees/bilateral lower extremity Passive ROM was WFL (within functional limits)

## 2018-08-11 NOTE — PHYSICAL THERAPY INITIAL EVALUATION ADULT - PATIENT PROFILE REVIEW, REHAB EVAL
yes/PT orders received-->no formal activity order. Consult with ALKA KIM-->pt OK to participate in PT evaluation and participate in ROM activities only.

## 2018-08-11 NOTE — PROGRESS NOTE ADULT - SUBJECTIVE AND OBJECTIVE BOX
Patient is a 42y old  Male who presents with a chief complaint of loculated pleural effusion (25 Jul 2018 00:35)      INTERVAL HPI/OVERNIGHT EVENTS:    MEDICATIONS  (STANDING):  albumin human 25% IVPB 50 milliLiter(s) IV Intermittent every 8 hours  ALBUTerol    90 MICROgram(s) HFA Inhaler 2 Puff(s) Inhalation every 6 hours  aztreonam  IVPB      aztreonam  IVPB 1000 milliGRAM(s) IV Intermittent every 8 hours  calcium acetate 667 milliGRAM(s) Oral every 8 hours  chlorhexidine 0.12% Liquid 15 milliLiter(s) Swish and Spit two times a day  chlorhexidine 4% Liquid 1 Application(s) Topical <User Schedule>  dexmedetomidine Infusion 0.04 MICROgram(s)/kG/Hr (0.75 mL/Hr) IV Continuous <Continuous>  fentaNYL   Infusion 1 MICROgram(s)/kG/Hr (7.5 mL/Hr) IV Continuous <Continuous>  furosemide   Injectable 10 milliGRAM(s) IV Push every 8 hours  heparin  Injectable 5000 Unit(s) SubCutaneous every 12 hours  insulin lispro (HumaLOG) corrective regimen sliding scale   SubCutaneous every 6 hours  ipratropium 17 MICROgram(s) HFA Inhaler 2 Puff(s) Inhalation every 6 hours  metroNIDAZOLE  IVPB 500 milliGRAM(s) IV Intermittent every 8 hours  nystatin Powder 1 Application(s) Topical two times a day  pantoprazole  Injectable 40 milliGRAM(s) IV Push daily  petrolatum Ophthalmic Ointment 1 Application(s) Both EYES three times a day  phenylephrine    Infusion 1.6 MICROgram(s)/kG/Min (22.5 mL/Hr) IV Continuous <Continuous>  propofol Infusion 5 MICROgram(s)/kG/Min (2.25 mL/Hr) IV Continuous <Continuous>  sodium chloride 0.9% lock flush 3 milliLiter(s) IV Push every 8 hours  tigecycline IVPB 50 milliGRAM(s) IV Intermittent every 12 hours  tigecycline IVPB      vancomycin  IVPB 1000 milliGRAM(s) IV Intermittent daily    MEDICATIONS  (PRN):  acetaminophen    Suspension 650 milliGRAM(s) Oral every 6 hours PRN For Temp greater than 38 C (100.4 F)  acetaminophen  IVPB. 1000 milliGRAM(s) IV Intermittent once PRN Moderate Pain (4 - 6)  ondansetron Injectable 4 milliGRAM(s) IV Push every 6 hours PRN Nausea and/or Vomiting      Allergies    No Known Allergies    Intolerances        REVIEW OF SYSTEMS:  CONSTITUTIONAL: No fever, weight loss, or fatigue  EYES: No eye pain, visual disturbances, or discharge  ENMT:  No difficulty hearing, tinnitus, vertigo; No sinus or throat pain  NECK: No pain or stiffness  BREASTS: No pain, masses, or nipple discharge  RESPIRATORY: No cough, wheezing, chills or hemoptysis; No shortness of breath  CARDIOVASCULAR: No chest pain, palpitations, dizziness, or leg swelling  GASTROINTESTINAL: No abdominal or epigastric pain. No nausea, vomiting, or hematemesis; No diarrhea or constipation. No melena or hematochezia.  GENITOURINARY: No dysuria, frequency, hematuria, or incontinence  NEUROLOGICAL: No headaches, memory loss, loss of strength, numbness, or tremors  SKIN: No itching, burning, rashes, or lesions   LYMPH NODES: No enlarged glands  ENDOCRINE: No heat or cold intolerance; No hair loss  MUSCULOSKELETAL: No joint pain or swelling; No muscle, back, or extremity pain  PSYCHIATRIC: No depression, anxiety, mood swings, or difficulty sleeping  HEME/LYMPH: No easy bruising, or bleeding gums  ALLERGY AND IMMUNOLOGIC: No hives or eczema    Vital Signs Last 24 Hrs  T(C): 37.3 (11 Aug 2018 14:00), Max: 38.8 (11 Aug 2018 07:30)  T(F): 99.1 (11 Aug 2018 14:00), Max: 101.8 (11 Aug 2018 07:30)  HR: 84 (11 Aug 2018 19:50) (84 - 129)  BP: 97/60 (11 Aug 2018 13:00) (55/25 - 144/79)  BP(mean): 68 (11 Aug 2018 13:00) (28 - 93)  RR: 39 (11 Aug 2018 16:00) (20 - 39)  SpO2: 95% (11 Aug 2018 19:50) (89% - 98%)    PHYSICAL EXAM:  GENERAL: NAD, well-groomed, well-developed  HEAD:  Atraumatic, Normocephalic  EYES: EOMI, PERRLA, conjunctiva and sclera clear  ENMT: No tonsillar erythema, exudates, or enlargement; Moist mucous membranes, Good dentition, No lesions  NECK: Supple, No JVD, Normal thyroid  NERVOUS SYSTEM:  Alert & Oriented X3, Good concentration; Motor Strength 5/5 B/L upper and lower extremities; DTRs 2+ intact and symmetric  CHEST/LUNG: Clear to percussion bilaterally; No rales, rhonchi, wheezing, or rubs  HEART: Regular rate and rhythm; No murmurs, rubs, or gallops  ABDOMEN: Soft, Nontender, Nondistended; Bowel sounds present  EXTREMITIES:  2+ Peripheral Pulses, No clubbing, cyanosis, or edema  LYMPH: No lymphadenopathy noted  SKIN: No rashes or lesions    LABS:                        8.6    17.01 )-----------( 361      ( 11 Aug 2018 03:50 )             26.0     08-11    142  |  110<H>  |  68<H>  ----------------------------<  99  4.2   |  19<L>  |  1.29    Ca    7.6<L>      11 Aug 2018 03:50  Phos  5.5     08-11  Mg     3.0     08-11          CAPILLARY BLOOD GLUCOSE      POCT Blood Glucose.: 113 mg/dL (11 Aug 2018 18:40)  POCT Blood Glucose.: 116 mg/dL (11 Aug 2018 13:34)  POCT Blood Glucose.: 119 mg/dL (11 Aug 2018 06:22)  POCT Blood Glucose.: 110 mg/dL (11 Aug 2018 00:59)      RADIOLOGY & ADDITIONAL TESTS:    Imaging Personally Reviewed:  [ ] YES  [ ] NO    Consultant(s) Notes Reviewed:  [ ] YES  [ ] NO    Care Discussed with Consultants/Other Providers [ ] YES  [ ] NO Patient is seen and examined at the bed side, is afebrile.      REVIEW OF SYSTEMS: Unable to obtain due to mental status      Vital Signs Last 24 Hrs  T(C): 37.3 (11 Aug 2018 14:00), Max: 38.8 (11 Aug 2018 07:30)  T(F): 99.1 (11 Aug 2018 14:00), Max: 101.8 (11 Aug 2018 07:30)  HR: 84 (11 Aug 2018 19:50) (84 - 129)  BP: 97/60 (11 Aug 2018 13:00) (55/25 - 144/79)  BP(mean): 68 (11 Aug 2018 13:00) (28 - 93)  RR: 39 (11 Aug 2018 16:00) (20 - 39)  SpO2: 95% (11 Aug 2018 19:50) (89% - 98%)        PHYSICAL EXAM:  GENERAL: Not in distress  CVS: s1 and s2 present, right chest tubes X 2 in placed  RESP: Air  entry B/L  GI: Abdomen mildly distended  EXT: No pedal edema        MEDICATIONS  (STANDING):  albumin human 25% IVPB 50 milliLiter(s) IV Intermittent every 8 hours  ALBUTerol    90 MICROgram(s) HFA Inhaler 2 Puff(s) Inhalation every 6 hours  aztreonam  IVPB      aztreonam  IVPB 1000 milliGRAM(s) IV Intermittent every 8 hours  calcium acetate 667 milliGRAM(s) Oral every 8 hours  chlorhexidine 0.12% Liquid 15 milliLiter(s) Swish and Spit two times a day  chlorhexidine 4% Liquid 1 Application(s) Topical <User Schedule>  dexmedetomidine Infusion 0.04 MICROgram(s)/kG/Hr (0.75 mL/Hr) IV Continuous <Continuous>  fentaNYL   Infusion 1 MICROgram(s)/kG/Hr (7.5 mL/Hr) IV Continuous <Continuous>  furosemide   Injectable 10 milliGRAM(s) IV Push every 8 hours  heparin  Injectable 5000 Unit(s) SubCutaneous every 12 hours  insulin lispro (HumaLOG) corrective regimen sliding scale   SubCutaneous every 6 hours  ipratropium 17 MICROgram(s) HFA Inhaler 2 Puff(s) Inhalation every 6 hours  metroNIDAZOLE  IVPB 500 milliGRAM(s) IV Intermittent every 8 hours  nystatin Powder 1 Application(s) Topical two times a day  pantoprazole  Injectable 40 milliGRAM(s) IV Push daily  petrolatum Ophthalmic Ointment 1 Application(s) Both EYES three times a day  phenylephrine    Infusion 1.6 MICROgram(s)/kG/Min (22.5 mL/Hr) IV Continuous <Continuous>  propofol Infusion 5 MICROgram(s)/kG/Min (2.25 mL/Hr) IV Continuous <Continuous>  sodium chloride 0.9% lock flush 3 milliLiter(s) IV Push every 8 hours  tigecycline IVPB 50 milliGRAM(s) IV Intermittent every 12 hours  tigecycline IVPB      vancomycin  IVPB 1000 milliGRAM(s) IV Intermittent daily    MEDICATIONS  (PRN):  acetaminophen    Suspension 650 milliGRAM(s) Oral every 6 hours PRN For Temp greater than 38 C (100.4 F)  acetaminophen  IVPB. 1000 milliGRAM(s) IV Intermittent once PRN Moderate Pain (4 - 6)  ondansetron Injectable 4 milliGRAM(s) IV Push every 6 hours PRN Nausea and/or Vomiting      Allergies    No Known Allergies          LABS:                        8.6    17.01 )-----------( 361      ( 11 Aug 2018 03:50 )             26.0         08-11    142  |  110<H>  |  68<H>  ----------------------------<  99  4.2   |  19<L>  |  1.29    Ca    7.6<L>      11 Aug 2018 03:50  Phos  5.5     08-11  Mg     3.0     08-11          CAPILLARY BLOOD GLUCOSE      POCT Blood Glucose.: 113 mg/dL (11 Aug 2018 18:40)  POCT Blood Glucose.: 116 mg/dL (11 Aug 2018 13:34)  POCT Blood Glucose.: 119 mg/dL (11 Aug 2018 06:22)  POCT Blood Glucose.: 110 mg/dL (11 Aug 2018 00:59)      RADIOLOGY & ADDITIONAL TESTS:    < from: Xray Chest 1 View- PORTABLE-Urgent (08.11.18 @ 13:39) >  Portable AP chest from 8/11/2018 at 0629 and 1323. Compared to prior   study from 8/10/2018.    IMPRESSION:  Tracheostomy tube, right IJ central line, and 2 right pleural chest tubes   are unchanged.  No pneumothorax.    Persistent generalized thickened right hemithorax pleural surface. Sharp   left CP angle.    Generalized slightly increased hazy bilateral lung markings suggesting   component of congestion with edema.    < end of copied text >
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
## 2018-08-11 NOTE — PHYSICAL THERAPY INITIAL EVALUATION ADULT - DID THE PATIENT HAVE SURGERY?
Flexible bronchoscopy, Right VATS converted to throacotomy, drainage of empyema, right lung decortication, drainage of right upper lobe abscess/yes

## 2018-08-12 ENCOUNTER — TRANSCRIPTION ENCOUNTER (OUTPATIENT)
Age: 43
End: 2018-08-12

## 2018-08-12 LAB
BASE EXCESS BLDA CALC-SCNC: -4 MMOL/L — SIGNIFICANT CHANGE UP
BUN SERPL-MCNC: 64 MG/DL — HIGH (ref 7–23)
CA-I BLD-SCNC: 1.07 MMOL/L — SIGNIFICANT CHANGE UP (ref 1.03–1.23)
CA-I BLDA-SCNC: 1.15 MMOL/L — SIGNIFICANT CHANGE UP (ref 1.15–1.29)
CALCIUM SERPL-MCNC: 7.9 MG/DL — LOW (ref 8.4–10.5)
CHLORIDE SERPL-SCNC: 108 MMOL/L — HIGH (ref 98–107)
CO2 SERPL-SCNC: 19 MMOL/L — LOW (ref 22–31)
CREAT SERPL-MCNC: 1.16 MG/DL — SIGNIFICANT CHANGE UP (ref 0.5–1.3)
GLUCOSE BLDA-MCNC: 111 MG/DL — HIGH (ref 70–99)
GLUCOSE SERPL-MCNC: 111 MG/DL — HIGH (ref 70–99)
HCO3 BLDA-SCNC: 21 MMOL/L — LOW (ref 22–26)
HCT VFR BLD CALC: 26.9 % — LOW (ref 39–50)
HCT VFR BLDA CALC: 27.7 % — LOW (ref 39–51)
HGB BLD-MCNC: 8.8 G/DL — LOW (ref 13–17)
HGB BLDA-MCNC: 8.9 G/DL — LOW (ref 13–17)
LACTATE BLDA-SCNC: 1.4 MMOL/L — SIGNIFICANT CHANGE UP (ref 0.5–2)
MAGNESIUM SERPL-MCNC: 2.6 MG/DL — SIGNIFICANT CHANGE UP (ref 1.6–2.6)
MCHC RBC-ENTMCNC: 31.4 PG — SIGNIFICANT CHANGE UP (ref 27–34)
MCHC RBC-ENTMCNC: 32.7 % — SIGNIFICANT CHANGE UP (ref 32–36)
MCV RBC AUTO: 96.1 FL — SIGNIFICANT CHANGE UP (ref 80–100)
NRBC # FLD: 0.08 — SIGNIFICANT CHANGE UP
PCO2 BLDA: 37 MMHG — SIGNIFICANT CHANGE UP (ref 35–48)
PH BLDA: 7.36 PH — SIGNIFICANT CHANGE UP (ref 7.35–7.45)
PHOSPHATE SERPL-MCNC: 4.9 MG/DL — HIGH (ref 2.5–4.5)
PLATELET # BLD AUTO: 409 K/UL — HIGH (ref 150–400)
PMV BLD: 11.2 FL — SIGNIFICANT CHANGE UP (ref 7–13)
PO2 BLDA: 100 MMHG — SIGNIFICANT CHANGE UP (ref 83–108)
POTASSIUM BLDA-SCNC: 3.4 MMOL/L — SIGNIFICANT CHANGE UP (ref 3.4–4.5)
POTASSIUM SERPL-MCNC: 3.7 MMOL/L — SIGNIFICANT CHANGE UP (ref 3.5–5.3)
POTASSIUM SERPL-SCNC: 3.7 MMOL/L — SIGNIFICANT CHANGE UP (ref 3.5–5.3)
RBC # BLD: 2.8 M/UL — LOW (ref 4.2–5.8)
RBC # FLD: 17.9 % — HIGH (ref 10.3–14.5)
SAO2 % BLDA: 97.5 % — SIGNIFICANT CHANGE UP (ref 95–99)
SODIUM BLDA-SCNC: 142 MMOL/L — SIGNIFICANT CHANGE UP (ref 136–146)
SODIUM SERPL-SCNC: 141 MMOL/L — SIGNIFICANT CHANGE UP (ref 135–145)
VANCOMYCIN FLD-MCNC: 10.5 UG/ML — SIGNIFICANT CHANGE UP
WBC # BLD: 18.28 K/UL — HIGH (ref 3.8–10.5)
WBC # FLD AUTO: 18.28 K/UL — HIGH (ref 3.8–10.5)

## 2018-08-12 PROCEDURE — 71045 X-RAY EXAM CHEST 1 VIEW: CPT | Mod: 26

## 2018-08-12 PROCEDURE — 99291 CRITICAL CARE FIRST HOUR: CPT

## 2018-08-12 PROCEDURE — 99292 CRITICAL CARE ADDL 30 MIN: CPT

## 2018-08-12 RX ORDER — POTASSIUM CHLORIDE 20 MEQ
40 PACKET (EA) ORAL
Qty: 0 | Refills: 0 | Status: COMPLETED | OUTPATIENT
Start: 2018-08-12 | End: 2018-08-12

## 2018-08-12 RX ORDER — MIDAZOLAM HYDROCHLORIDE 1 MG/ML
0.04 INJECTION, SOLUTION INTRAMUSCULAR; INTRAVENOUS
Qty: 100 | Refills: 0 | Status: DISCONTINUED | OUTPATIENT
Start: 2018-08-12 | End: 2018-08-17

## 2018-08-12 RX ADMIN — PROPOFOL 2.25 MICROGRAM(S)/KG/MIN: 10 INJECTION, EMULSION INTRAVENOUS at 21:51

## 2018-08-12 RX ADMIN — SODIUM CHLORIDE 3 MILLILITER(S): 9 INJECTION INTRAMUSCULAR; INTRAVENOUS; SUBCUTANEOUS at 05:09

## 2018-08-12 RX ADMIN — Medication 50 MILLIGRAM(S): at 13:48

## 2018-08-12 RX ADMIN — Medication 2 PUFF(S): at 15:52

## 2018-08-12 RX ADMIN — PROPOFOL 2.25 MICROGRAM(S)/KG/MIN: 10 INJECTION, EMULSION INTRAVENOUS at 08:56

## 2018-08-12 RX ADMIN — Medication 1 APPLICATION(S): at 05:09

## 2018-08-12 RX ADMIN — Medication 50 MILLILITER(S): at 15:00

## 2018-08-12 RX ADMIN — CHLORHEXIDINE GLUCONATE 15 MILLILITER(S): 213 SOLUTION TOPICAL at 18:37

## 2018-08-12 RX ADMIN — Medication 667 MILLIGRAM(S): at 05:46

## 2018-08-12 RX ADMIN — TIGECYCLINE 105 MILLIGRAM(S): 50 INJECTION, POWDER, LYOPHILIZED, FOR SOLUTION INTRAVENOUS at 05:08

## 2018-08-12 RX ADMIN — SODIUM CHLORIDE 3 MILLILITER(S): 9 INJECTION INTRAMUSCULAR; INTRAVENOUS; SUBCUTANEOUS at 21:32

## 2018-08-12 RX ADMIN — Medication 100 MILLIGRAM(S): at 05:08

## 2018-08-12 RX ADMIN — FENTANYL CITRATE 7.5 MICROGRAM(S)/KG/HR: 50 INJECTION INTRAVENOUS at 08:56

## 2018-08-12 RX ADMIN — Medication 50 MILLILITER(S): at 05:08

## 2018-08-12 RX ADMIN — Medication 50 MILLIGRAM(S): at 05:08

## 2018-08-12 RX ADMIN — SODIUM CHLORIDE 3 MILLILITER(S): 9 INJECTION INTRAMUSCULAR; INTRAVENOUS; SUBCUTANEOUS at 13:48

## 2018-08-12 RX ADMIN — Medication 10 MILLIGRAM(S): at 15:00

## 2018-08-12 RX ADMIN — TIGECYCLINE 105 MILLIGRAM(S): 50 INJECTION, POWDER, LYOPHILIZED, FOR SOLUTION INTRAVENOUS at 18:38

## 2018-08-12 RX ADMIN — HEPARIN SODIUM 5000 UNIT(S): 5000 INJECTION INTRAVENOUS; SUBCUTANEOUS at 05:09

## 2018-08-12 RX ADMIN — Medication 40 MILLIEQUIVALENT(S): at 10:24

## 2018-08-12 RX ADMIN — HEPARIN SODIUM 5000 UNIT(S): 5000 INJECTION INTRAVENOUS; SUBCUTANEOUS at 18:38

## 2018-08-12 RX ADMIN — Medication 2 PUFF(S): at 11:08

## 2018-08-12 RX ADMIN — Medication 667 MILLIGRAM(S): at 22:12

## 2018-08-12 RX ADMIN — PROPOFOL 2.25 MICROGRAM(S)/KG/MIN: 10 INJECTION, EMULSION INTRAVENOUS at 12:00

## 2018-08-12 RX ADMIN — Medication 40 MILLIEQUIVALENT(S): at 08:56

## 2018-08-12 RX ADMIN — NYSTATIN CREAM 1 APPLICATION(S): 100000 CREAM TOPICAL at 18:38

## 2018-08-12 RX ADMIN — NYSTATIN CREAM 1 APPLICATION(S): 100000 CREAM TOPICAL at 05:09

## 2018-08-12 RX ADMIN — Medication 2 PUFF(S): at 03:35

## 2018-08-12 RX ADMIN — Medication 50 MILLIGRAM(S): at 23:05

## 2018-08-12 RX ADMIN — CHLORHEXIDINE GLUCONATE 1 APPLICATION(S): 213 SOLUTION TOPICAL at 05:09

## 2018-08-12 RX ADMIN — Medication 10 MILLIGRAM(S): at 23:05

## 2018-08-12 RX ADMIN — Medication 1 APPLICATION(S): at 22:02

## 2018-08-12 RX ADMIN — Medication 1 APPLICATION(S): at 13:48

## 2018-08-12 RX ADMIN — CHLORHEXIDINE GLUCONATE 15 MILLILITER(S): 213 SOLUTION TOPICAL at 05:09

## 2018-08-12 RX ADMIN — Medication 100 MILLIGRAM(S): at 13:47

## 2018-08-12 RX ADMIN — MIDAZOLAM HYDROCHLORIDE 3 MG/KG/HR: 1 INJECTION, SOLUTION INTRAMUSCULAR; INTRAVENOUS at 22:18

## 2018-08-12 RX ADMIN — Medication 250 MILLIGRAM(S): at 11:11

## 2018-08-12 RX ADMIN — Medication 667 MILLIGRAM(S): at 13:48

## 2018-08-12 RX ADMIN — Medication 10 MILLIGRAM(S): at 06:19

## 2018-08-12 RX ADMIN — Medication 100 MILLIGRAM(S): at 22:02

## 2018-08-12 RX ADMIN — DEXMEDETOMIDINE HYDROCHLORIDE IN 0.9% SODIUM CHLORIDE 0.75 MICROGRAM(S)/KG/HR: 4 INJECTION INTRAVENOUS at 08:57

## 2018-08-12 RX ADMIN — FENTANYL CITRATE 7.5 MICROGRAM(S)/KG/HR: 50 INJECTION INTRAVENOUS at 21:51

## 2018-08-12 RX ADMIN — Medication 2 PUFF(S): at 21:21

## 2018-08-12 RX ADMIN — PANTOPRAZOLE SODIUM 40 MILLIGRAM(S): 20 TABLET, DELAYED RELEASE ORAL at 11:11

## 2018-08-12 RX ADMIN — Medication 50 MILLILITER(S): at 21:50
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
## 2018-08-12 NOTE — PROGRESS NOTE ADULT - SUBJECTIVE AND OBJECTIVE BOX
Patient is seen and examined at the bed side, is afebrile.      REVIEW OF SYSTEMS: Unable to obtain due to mental status      ICU Vital Signs Last 24 Hrs  T(C): 36.8 (12 Aug 2018 20:00), Max: 37.5 (12 Aug 2018 12:00)  T(F): 98.2 (12 Aug 2018 20:00), Max: 99.5 (12 Aug 2018 12:00)  HR: 104 (12 Aug 2018 21:00) (81 - 105)  BP: 106/62 (12 Aug 2018 16:00) (106/62 - 124/75)  BP(mean): 72 (12 Aug 2018 16:00) (72 - 86)  ABP: 107/70 (12 Aug 2018 21:00) (101/71 - 123/72)  ABP(mean): 84 (12 Aug 2018 21:00) (73 - 91)  RR: 23 (12 Aug 2018 21:00) (11 - 30)  SpO2: 97% (12 Aug 2018 21:00) (96% - 100%)      PHYSICAL EXAM:  GENERAL: Not in distress  CVS: s1 and s2 present, right chest tubes X 2 in placed  RESP: Air  entry B/L  GI: Abdomen mildly distended  EXT: No pedal edema        MEDICATIONS  (STANDING):  albumin human 25% IVPB 50 milliLiter(s) IV Intermittent every 8 hours  ALBUTerol    90 MICROgram(s) HFA Inhaler 2 Puff(s) Inhalation every 6 hours  aztreonam  IVPB      aztreonam  IVPB 1000 milliGRAM(s) IV Intermittent every 8 hours  calcium acetate 667 milliGRAM(s) Oral every 8 hours  chlorhexidine 0.12% Liquid 15 milliLiter(s) Swish and Spit two times a day  chlorhexidine 4% Liquid 1 Application(s) Topical <User Schedule>  dexmedetomidine Infusion 0.04 MICROgram(s)/kG/Hr (0.75 mL/Hr) IV Continuous <Continuous>  fentaNYL   Infusion 1 MICROgram(s)/kG/Hr (7.5 mL/Hr) IV Continuous <Continuous>  furosemide   Injectable 10 milliGRAM(s) IV Push every 8 hours  heparin  Injectable 5000 Unit(s) SubCutaneous every 12 hours  insulin lispro (HumaLOG) corrective regimen sliding scale   SubCutaneous every 6 hours  ipratropium 17 MICROgram(s) HFA Inhaler 2 Puff(s) Inhalation every 6 hours  metroNIDAZOLE  IVPB 500 milliGRAM(s) IV Intermittent every 8 hours  midazolam Infusion 0.04 mG/kG/Hr (3 mL/Hr) IV Continuous <Continuous>  nystatin Powder 1 Application(s) Topical two times a day  pantoprazole  Injectable 40 milliGRAM(s) IV Push daily  petrolatum Ophthalmic Ointment 1 Application(s) Both EYES three times a day  phenylephrine    Infusion 1.6 MICROgram(s)/kG/Min (22.5 mL/Hr) IV Continuous <Continuous>  propofol Infusion 5 MICROgram(s)/kG/Min (2.25 mL/Hr) IV Continuous <Continuous>  sodium chloride 0.9% lock flush 3 milliLiter(s) IV Push every 8 hours  tigecycline IVPB 50 milliGRAM(s) IV Intermittent every 12 hours  tigecycline IVPB      vancomycin  IVPB 1000 milliGRAM(s) IV Intermittent daily    MEDICATIONS  (PRN):  acetaminophen    Suspension 650 milliGRAM(s) Oral every 6 hours PRN For Temp greater than 38 C (100.4 F)  acetaminophen  IVPB. 1000 milliGRAM(s) IV Intermittent once PRN Moderate Pain (4 - 6)  ondansetron Injectable 4 milliGRAM(s) IV Push every 6 hours PRN Nausea and/or Vomiting        Allergies    No Known Allergies          LABS:                        8.8    18.28 )-----------( 409      ( 12 Aug 2018 03:14 )             26.9                           8.6    17.01 )-----------( 361      ( 11 Aug 2018 03:50 )             26.0         08-12    141  |  108<H>  |  64<H>  ----------------------------<  111<H>  3.7   |  19<L>  |  1.16    Ca    7.9<L>      12 Aug 2018 03:14  Phos  4.9     08-12  Mg     2.6     08-12      08-11    142  |  110<H>  |  68<H>  ----------------------------<  99  4.2   |  19<L>  |  1.29    Ca    7.6<L>      11 Aug 2018 03:50  Phos  5.5     08-11  Mg     3.0     08-11          CAPILLARY BLOOD GLUCOSE      POCT Blood Glucose.: 113 mg/dL (11 Aug 2018 18:40)  POCT Blood Glucose.: 116 mg/dL (11 Aug 2018 13:34)  POCT Blood Glucose.: 119 mg/dL (11 Aug 2018 06:22)  POCT Blood Glucose.: 110 mg/dL (11 Aug 2018 00:59)      RADIOLOGY & ADDITIONAL TESTS:    < from: Xray Chest 1 View- PORTABLE-Urgent (08.11.18 @ 13:39) >  Portable AP chest from 8/11/2018 at 0629 and 1323. Compared to prior   study from 8/10/2018.    IMPRESSION:  Tracheostomy tube, right IJ central line, and 2 right pleural chest tubes   are unchanged.  No pneumothorax.    Persistent generalized thickened right hemithorax pleural surface. Sharp   left CP angle.    Generalized slightly increased hazy bilateral lung markings suggesting   component of congestion with edema.    < end of copied text > Patient is seen and examined at the bed side, remains  afebrile and intubated/vented. The WBC count stay elevated, is scheduled for OR in AM.      REVIEW OF SYSTEMS: Unable to obtain due to mental status      ICU Vital Signs Last 24 Hrs  T(C): 36.8 (12 Aug 2018 20:00), Max: 37.5 (12 Aug 2018 12:00)  T(F): 98.2 (12 Aug 2018 20:00), Max: 99.5 (12 Aug 2018 12:00)  HR: 104 (12 Aug 2018 21:00) (81 - 105)  BP: 106/62 (12 Aug 2018 16:00) (106/62 - 124/75)  BP(mean): 72 (12 Aug 2018 16:00) (72 - 86)  ABP: 107/70 (12 Aug 2018 21:00) (101/71 - 123/72)  ABP(mean): 84 (12 Aug 2018 21:00) (73 - 91)  RR: 23 (12 Aug 2018 21:00) (11 - 30)  SpO2: 97% (12 Aug 2018 21:00) (96% - 100%)        PHYSICAL EXAM:  GENERAL: Intubated/sedated  CVS: s1 and s2 present, right chest tubes X 2 in placed  RESP: Air  entry B/L  GI: Abdomen mildly distended  EXT: No pedal edema  CNS: intubated        MEDICATIONS  (STANDING):  albumin human 25% IVPB 50 milliLiter(s) IV Intermittent every 8 hours  ALBUTerol    90 MICROgram(s) HFA Inhaler 2 Puff(s) Inhalation every 6 hours  aztreonam  IVPB      aztreonam  IVPB 1000 milliGRAM(s) IV Intermittent every 8 hours  calcium acetate 667 milliGRAM(s) Oral every 8 hours  chlorhexidine 0.12% Liquid 15 milliLiter(s) Swish and Spit two times a day  chlorhexidine 4% Liquid 1 Application(s) Topical <User Schedule>  dexmedetomidine Infusion 0.04 MICROgram(s)/kG/Hr (0.75 mL/Hr) IV Continuous <Continuous>  fentaNYL   Infusion 1 MICROgram(s)/kG/Hr (7.5 mL/Hr) IV Continuous <Continuous>  furosemide   Injectable 10 milliGRAM(s) IV Push every 8 hours  heparin  Injectable 5000 Unit(s) SubCutaneous every 12 hours  insulin lispro (HumaLOG) corrective regimen sliding scale   SubCutaneous every 6 hours  ipratropium 17 MICROgram(s) HFA Inhaler 2 Puff(s) Inhalation every 6 hours  metroNIDAZOLE  IVPB 500 milliGRAM(s) IV Intermittent every 8 hours  midazolam Infusion 0.04 mG/kG/Hr (3 mL/Hr) IV Continuous <Continuous>  nystatin Powder 1 Application(s) Topical two times a day  pantoprazole  Injectable 40 milliGRAM(s) IV Push daily  petrolatum Ophthalmic Ointment 1 Application(s) Both EYES three times a day  phenylephrine    Infusion 1.6 MICROgram(s)/kG/Min (22.5 mL/Hr) IV Continuous <Continuous>  propofol Infusion 5 MICROgram(s)/kG/Min (2.25 mL/Hr) IV Continuous <Continuous>  sodium chloride 0.9% lock flush 3 milliLiter(s) IV Push every 8 hours  tigecycline IVPB 50 milliGRAM(s) IV Intermittent every 12 hours  tigecycline IVPB      vancomycin  IVPB 1000 milliGRAM(s) IV Intermittent daily    MEDICATIONS  (PRN):  acetaminophen    Suspension 650 milliGRAM(s) Oral every 6 hours PRN For Temp greater than 38 C (100.4 F)  acetaminophen  IVPB. 1000 milliGRAM(s) IV Intermittent once PRN Moderate Pain (4 - 6)  ondansetron Injectable 4 milliGRAM(s) IV Push every 6 hours PRN Nausea and/or Vomiting        Allergies    No Known Allergies          LABS:                        8.8    18.28 )-----------( 409      ( 12 Aug 2018 03:14 )             26.9                           8.6    17.01 )-----------( 361      ( 11 Aug 2018 03:50 )             26.0         08-12    141  |  108<H>  |  64<H>  ----------------------------<  111<H>  3.7   |  19<L>  |  1.16    Ca    7.9<L>      12 Aug 2018 03:14  Phos  4.9     08-12  Mg     2.6     08-12 08-11    142  |  110<H>  |  68<H>  ----------------------------<  99  4.2   |  19<L>  |  1.29    Ca    7.6<L>      11 Aug 2018 03:50  Phos  5.5     08-11  Mg     3.0     08-11          CAPILLARY BLOOD GLUCOSE      POCT Blood Glucose.: 113 mg/dL (11 Aug 2018 18:40)  POCT Blood Glucose.: 116 mg/dL (11 Aug 2018 13:34)  POCT Blood Glucose.: 119 mg/dL (11 Aug 2018 06:22)  POCT Blood Glucose.: 110 mg/dL (11 Aug 2018 00:59)      RADIOLOGY & ADDITIONAL TESTS:    < from: Xray Chest 1 View- PORTABLE-Routine (08.12.18 @ 06:43) >    IMPRESSION: Lines and tubes are unchanged. The patient is status post right hemithorax surgery with postoperative changes including skin staples. There is no pneumothorax. There is a small right pleural   effusion with right lung areas of airspace disease predominantly unchanged. There is left lung areas of airspace disease disease most confluent within the left midlung which are either new or demonstrating   interval progression since the prior study and may be due to pulmonary edema. Follow-up is recommended.        < end of copied text >      < from: Xray Chest 1 View- PORTABLE-Urgent (08.11.18 @ 13:39) >  Portable AP chest from 8/11/2018 at 0629 and 1323. Compared to prior   study from 8/10/2018.    IMPRESSION:  Tracheostomy tube, right IJ central line, and 2 right pleural chest tubes   are unchanged.  No pneumothorax.    Persistent generalized thickened right hemithorax pleural surface. Sharp   left CP angle.    Generalized slightly increased hazy bilateral lung markings suggesting   component of congestion with edema.    < end of copied text >

## 2018-08-13 ENCOUNTER — APPOINTMENT (OUTPATIENT)
Dept: THORACIC SURGERY | Facility: HOSPITAL | Age: 43
End: 2018-08-13

## 2018-08-13 ENCOUNTER — APPOINTMENT (OUTPATIENT)
Dept: THORACIC SURGERY | Facility: HOSPITAL | Age: 43
End: 2018-08-13
Payer: MEDICAID

## 2018-08-13 PROBLEM — Z00.00 ENCOUNTER FOR PREVENTIVE HEALTH EXAMINATION: Status: ACTIVE | Noted: 2018-08-13

## 2018-08-13 LAB
BASE EXCESS BLDA CALC-SCNC: -5 MMOL/L — SIGNIFICANT CHANGE UP
BUN SERPL-MCNC: 64 MG/DL — HIGH (ref 7–23)
CALCIUM SERPL-MCNC: 8 MG/DL — LOW (ref 8.4–10.5)
CHLORIDE SERPL-SCNC: 113 MMOL/L — HIGH (ref 98–107)
CO2 SERPL-SCNC: 20 MMOL/L — LOW (ref 22–31)
CREAT SERPL-MCNC: 1.18 MG/DL — SIGNIFICANT CHANGE UP (ref 0.5–1.3)
GLUCOSE SERPL-MCNC: 98 MG/DL — SIGNIFICANT CHANGE UP (ref 70–99)
HCO3 BLDA-SCNC: 20 MMOL/L — LOW (ref 22–26)
HCT VFR BLD CALC: 24.7 % — LOW (ref 39–50)
HGB BLD-MCNC: 7.6 G/DL — LOW (ref 13–17)
MCHC RBC-ENTMCNC: 30 PG — SIGNIFICANT CHANGE UP (ref 27–34)
MCHC RBC-ENTMCNC: 30.8 % — LOW (ref 32–36)
MCV RBC AUTO: 97.6 FL — SIGNIFICANT CHANGE UP (ref 80–100)
NRBC # FLD: 0.03 — SIGNIFICANT CHANGE UP
PCO2 BLDA: 41 MMHG — SIGNIFICANT CHANGE UP (ref 35–48)
PH BLDA: 7.32 PH — LOW (ref 7.35–7.45)
PLATELET # BLD AUTO: 396 K/UL — SIGNIFICANT CHANGE UP (ref 150–400)
PMV BLD: 11.3 FL — SIGNIFICANT CHANGE UP (ref 7–13)
PO2 BLDA: 173 MMHG — HIGH (ref 83–108)
POTASSIUM SERPL-MCNC: 4.5 MMOL/L — SIGNIFICANT CHANGE UP (ref 3.5–5.3)
POTASSIUM SERPL-SCNC: 4.5 MMOL/L — SIGNIFICANT CHANGE UP (ref 3.5–5.3)
RBC # BLD: 2.53 M/UL — LOW (ref 4.2–5.8)
RBC # FLD: 18.3 % — HIGH (ref 10.3–14.5)
SAO2 % BLDA: 99.2 % — HIGH (ref 95–99)
SODIUM SERPL-SCNC: 144 MMOL/L — SIGNIFICANT CHANGE UP (ref 135–145)
SPECIMEN SOURCE: SIGNIFICANT CHANGE UP
VANCOMYCIN TROUGH SERPL-MCNC: 10.3 UG/ML — SIGNIFICANT CHANGE UP (ref 10–20)
WBC # BLD: 17.4 K/UL — HIGH (ref 3.8–10.5)
WBC # FLD AUTO: 17.4 K/UL — HIGH (ref 3.8–10.5)

## 2018-08-13 PROCEDURE — 71045 X-RAY EXAM CHEST 1 VIEW: CPT | Mod: 26

## 2018-08-13 PROCEDURE — 99292 CRITICAL CARE ADDL 30 MIN: CPT

## 2018-08-13 PROCEDURE — 31624 DX BRONCHOSCOPE/LAVAGE: CPT

## 2018-08-13 PROCEDURE — 43246 EGD PLACE GASTROSTOMY TUBE: CPT | Mod: 78

## 2018-08-13 PROCEDURE — 99291 CRITICAL CARE FIRST HOUR: CPT

## 2018-08-13 PROCEDURE — 31600 PLANNED TRACHEOSTOMY: CPT

## 2018-08-13 RX ORDER — ALBUMIN HUMAN 25 %
250 VIAL (ML) INTRAVENOUS ONCE
Qty: 0 | Refills: 0 | Status: COMPLETED | OUTPATIENT
Start: 2018-08-13 | End: 2018-08-13

## 2018-08-13 RX ORDER — FUROSEMIDE 40 MG
20 TABLET ORAL ONCE
Qty: 0 | Refills: 0 | Status: COMPLETED | OUTPATIENT
Start: 2018-08-13 | End: 2018-08-13

## 2018-08-13 RX ADMIN — Medication 1 APPLICATION(S): at 05:05

## 2018-08-13 RX ADMIN — Medication 500 MILLILITER(S): at 21:19

## 2018-08-13 RX ADMIN — PROPOFOL 2.25 MICROGRAM(S)/KG/MIN: 10 INJECTION, EMULSION INTRAVENOUS at 07:30

## 2018-08-13 RX ADMIN — Medication 1 APPLICATION(S): at 22:55

## 2018-08-13 RX ADMIN — MIDAZOLAM HYDROCHLORIDE 3 MG/KG/HR: 1 INJECTION, SOLUTION INTRAMUSCULAR; INTRAVENOUS at 07:30

## 2018-08-13 RX ADMIN — FENTANYL CITRATE 7.5 MICROGRAM(S)/KG/HR: 50 INJECTION INTRAVENOUS at 19:56

## 2018-08-13 RX ADMIN — Medication 2 PUFF(S): at 09:48

## 2018-08-13 RX ADMIN — PROPOFOL 2.25 MICROGRAM(S)/KG/MIN: 10 INJECTION, EMULSION INTRAVENOUS at 19:56

## 2018-08-13 RX ADMIN — Medication 100 MILLIGRAM(S): at 15:25

## 2018-08-13 RX ADMIN — MIDAZOLAM HYDROCHLORIDE 3 MG/KG/HR: 1 INJECTION, SOLUTION INTRAMUSCULAR; INTRAVENOUS at 19:56

## 2018-08-13 RX ADMIN — Medication 50 MILLIGRAM(S): at 06:16

## 2018-08-13 RX ADMIN — Medication 250 MILLIGRAM(S): at 13:40

## 2018-08-13 RX ADMIN — TIGECYCLINE 105 MILLIGRAM(S): 50 INJECTION, POWDER, LYOPHILIZED, FOR SOLUTION INTRAVENOUS at 05:04

## 2018-08-13 RX ADMIN — PANTOPRAZOLE SODIUM 40 MILLIGRAM(S): 20 TABLET, DELAYED RELEASE ORAL at 12:15

## 2018-08-13 RX ADMIN — SODIUM CHLORIDE 3 MILLILITER(S): 9 INJECTION INTRAMUSCULAR; INTRAVENOUS; SUBCUTANEOUS at 22:15

## 2018-08-13 RX ADMIN — TIGECYCLINE 105 MILLIGRAM(S): 50 INJECTION, POWDER, LYOPHILIZED, FOR SOLUTION INTRAVENOUS at 18:39

## 2018-08-13 RX ADMIN — SODIUM CHLORIDE 3 MILLILITER(S): 9 INJECTION INTRAMUSCULAR; INTRAVENOUS; SUBCUTANEOUS at 05:05

## 2018-08-13 RX ADMIN — Medication 100 MILLIGRAM(S): at 22:54

## 2018-08-13 RX ADMIN — SODIUM CHLORIDE 3 MILLILITER(S): 9 INJECTION INTRAMUSCULAR; INTRAVENOUS; SUBCUTANEOUS at 15:18

## 2018-08-13 RX ADMIN — FENTANYL CITRATE 7.5 MICROGRAM(S)/KG/HR: 50 INJECTION INTRAVENOUS at 07:30

## 2018-08-13 RX ADMIN — Medication 50 MILLIGRAM(S): at 23:13

## 2018-08-13 RX ADMIN — HEPARIN SODIUM 5000 UNIT(S): 5000 INJECTION INTRAVENOUS; SUBCUTANEOUS at 17:10

## 2018-08-13 RX ADMIN — CHLORHEXIDINE GLUCONATE 15 MILLILITER(S): 213 SOLUTION TOPICAL at 05:05

## 2018-08-13 RX ADMIN — Medication 50 MILLIGRAM(S): at 15:26

## 2018-08-13 RX ADMIN — CHLORHEXIDINE GLUCONATE 1 APPLICATION(S): 213 SOLUTION TOPICAL at 05:05

## 2018-08-13 RX ADMIN — Medication 20 MILLIGRAM(S): at 22:15

## 2018-08-13 RX ADMIN — FENTANYL CITRATE 1 MICROGRAM(S)/KG/HR: 50 INJECTION INTRAVENOUS at 07:45

## 2018-08-13 RX ADMIN — CHLORHEXIDINE GLUCONATE 15 MILLILITER(S): 213 SOLUTION TOPICAL at 17:10

## 2018-08-13 RX ADMIN — NYSTATIN CREAM 1 APPLICATION(S): 100000 CREAM TOPICAL at 17:10

## 2018-08-13 RX ADMIN — NYSTATIN CREAM 1 APPLICATION(S): 100000 CREAM TOPICAL at 05:05

## 2018-08-13 RX ADMIN — Medication 2 PUFF(S): at 21:26

## 2018-08-13 RX ADMIN — Medication 2 PUFF(S): at 03:50

## 2018-08-13 RX ADMIN — Medication 1 APPLICATION(S): at 15:26

## 2018-08-13 RX ADMIN — Medication 100 MILLIGRAM(S): at 05:16

## 2018-08-13 RX ADMIN — HEPARIN SODIUM 5000 UNIT(S): 5000 INJECTION INTRAVENOUS; SUBCUTANEOUS at 05:30

## 2018-08-13 NOTE — BRIEF OPERATIVE NOTE - POST-OP DX
Empyema  07/27/2018    Active  Jose, Maik  Hemothorax on right  08/06/2018    Active  Jose, Maik  Lung abscess  07/27/2018    Active  Jose, Maik  Respiratory failure  08/09/2018    Active  Maik Garcia
Empyema  07/27/2018    Maik Paul  Hemothorax on right  08/06/2018    Maik Paul  Lung abscess  07/27/2018    Maik Paul
Empyema  07/27/2018    Active  Jose, Maik  Hemothorax on right  08/06/2018    Active  Jose, Maik  Lung abscess  07/27/2018    Active  Jose, Maik  Respiratory failure  08/09/2018    Active  Maik Garcia
Empyema  07/27/2018    Active  Maik Garcia  Lung abscess  07/27/2018    Active  Maik Garcia
Empyema  07/27/2018    Active  Maik Garcia  Lung abscess  07/27/2018    Active  Maik Garcia

## 2018-08-13 NOTE — PROGRESS NOTE ADULT - SUBJECTIVE AND OBJECTIVE BOX
Infectious Diseases progress note:    Subjective: Coverage appreciated.  Pt for OR for tracheostomy replacement.  Fevers improved.  WBC remains elevated    ROS:  intubated/sedated    Allergies    No Known Allergies    Intolerances        ANTIBIOTICS/RELEVANT:  antimicrobials  aztreonam  IVPB      aztreonam  IVPB 1000 milliGRAM(s) IV Intermittent every 8 hours  metroNIDAZOLE  IVPB 500 milliGRAM(s) IV Intermittent every 8 hours  tigecycline IVPB 50 milliGRAM(s) IV Intermittent every 12 hours  tigecycline IVPB      vancomycin  IVPB 1000 milliGRAM(s) IV Intermittent daily    immunologic:    OTHER:  acetaminophen    Suspension 650 milliGRAM(s) Oral every 6 hours PRN  acetaminophen  IVPB. 1000 milliGRAM(s) IV Intermittent once PRN  ALBUTerol    90 MICROgram(s) HFA Inhaler 2 Puff(s) Inhalation every 6 hours  chlorhexidine 0.12% Liquid 15 milliLiter(s) Swish and Spit two times a day  chlorhexidine 4% Liquid 1 Application(s) Topical <User Schedule>  dexmedetomidine Infusion 0.04 MICROgram(s)/kG/Hr IV Continuous <Continuous>  fentaNYL   Infusion 1 MICROgram(s)/kG/Hr IV Continuous <Continuous>  heparin  Injectable 5000 Unit(s) SubCutaneous every 12 hours  insulin lispro (HumaLOG) corrective regimen sliding scale   SubCutaneous every 6 hours  ipratropium 17 MICROgram(s) HFA Inhaler 2 Puff(s) Inhalation every 6 hours  midazolam Infusion 0.04 mG/kG/Hr IV Continuous <Continuous>  nystatin Powder 1 Application(s) Topical two times a day  ondansetron Injectable 4 milliGRAM(s) IV Push every 6 hours PRN  pantoprazole  Injectable 40 milliGRAM(s) IV Push daily  petrolatum Ophthalmic Ointment 1 Application(s) Both EYES three times a day  phenylephrine    Infusion 1.6 MICROgram(s)/kG/Min IV Continuous <Continuous>  propofol Infusion 5 MICROgram(s)/kG/Min IV Continuous <Continuous>  sodium chloride 0.9% lock flush 3 milliLiter(s) IV Push every 8 hours      Objective:  Vital Signs Last 24 Hrs  T(C): 36.8 (13 Aug 2018 17:00), Max: 37.1 (13 Aug 2018 12:00)  T(F): 98.2 (13 Aug 2018 17:00), Max: 98.8 (13 Aug 2018 12:00)  HR: 84 (13 Aug 2018 19:19) (84 - 105)  BP: 92/55 (13 Aug 2018 17:00) (92/55 - 104/68)  BP(mean): 63 (13 Aug 2018 17:00) (62 - 67)  RR: 21 (13 Aug 2018 18:00) (13 - 25)  SpO2: 98% (13 Aug 2018 19:19) (95% - 100%)    PHYSICAL EXAM:  Constitutional: intubated  Eyes:MEMO, EOMI  Ear/Nose/Throat: no thrush, mucositis.  Moist mucous membranes	  Neck:no JVD, no lymphadenopathy, supple  Respiratory: CTA emi  Cardiovascular: S1S2 RRR, no murmurs  Gastrointestinal:soft, nontender,  nondistended (+) BS, rectal tube  Extremities:no e/e/c  Skin:  no rashes, open wounds or ulcerations        LABS:                        7.6    17.40 )-----------( 396      ( 13 Aug 2018 03:15 )             24.7     08-13    144  |  113<H>  |  64<H>  ----------------------------<  98  4.5   |  20<L>  |  1.18    Ca    8.0<L>      13 Aug 2018 03:15  Phos  4.9     08-12  Mg     2.6     08-12              Vancomycin Level, Random:  ug/mL (08-12 @ 03:14)      Vancomycin Level, Trough: 10.3 ug/mL (08-13 @ 11:58)              MICROBIOLOGY:    Culture - Blood (08.10.18 @ 00:30)    Culture - Blood:   NO ORGANISMS ISOLATED  NO ORGANISMS ISOLATED AT 72 HRS.    Specimen Source: BLOOD      Culture - Blood (08.09.18 @ 23:47)    Culture - Blood:   NO ORGANISMS ISOLATED  NO ORGANISMS ISOLATED AT 72 HRS.    Specimen Source: BLOOD    Culture - Blood (08.06.18 @ 20:38)    Culture - Blood:   NO ORGANISMS ISOLATED    Specimen Source: BLOOD-CATHETER            RADIOLOGY & ADDITIONAL STUDIES:    < from: Xray Chest 1 View- PORTABLE-Routine (08.13.18 @ 05:45) >  IMPRESSION:  Lines and tubes as above.    No significant change in small loculated right pleural effusion with   likely associated passive atelectasis.    Continued bilateral interstitial and left perihilar airspace opacity   which could be due to pulmonary edema and/or infection.    < end of copied text >      < from: Xray Chest 1 View- PORTABLE-Routine (08.12.18 @ 06:43) >  IMPRESSION: Lines and tubes are unchanged. The patient is status post   right hemithorax surgery with postoperative changes including skin   staples. There is no pneumothorax. There is a small right pleural   effusion with right lung areas of airspace disease predominantly   unchanged. There is left lung areas of airspace disease disease most   confluent within the left midlung which are either new or demonstrating   interval progression since the prior study and may be due to pulmonary   edema. Follow-up is recommended.    < end of copied text >
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
## 2018-08-13 NOTE — CHART NOTE - NSCHARTNOTEFT_GEN_A_CORE
Source:  nursing, EMR     Diet : NPO after midnight    Patient intubated sedated, EN on hold for OR per nursing . Noted 3+ generalized edema  and 4+ scrotal edema , DTI on sacrum  .      Current Weight: - 96.1 kg on 8/12/18; Admit wt. - 75 kg ; 92.2 kg on 8/6/18;     Pertinent Medications: MEDICATIONS  (STANDING):  ALBUTerol    90 MICROgram(s) HFA Inhaler 2 Puff(s) Inhalation every 6 hours  aztreonam  IVPB      aztreonam  IVPB 1000 milliGRAM(s) IV Intermittent every 8 hours  dexmedetomidine Infusion 0.04 MICROgram(s)/kG/Hr (0.75 mL/Hr) IV Continuous <Continuous>  fentaNYL   Infusion 1 MICROgram(s)/kG/Hr (7.5 mL/Hr) IV Continuous <Continuous>  heparin  Injectable 5000 Unit(s) SubCutaneous every 12 hours  insulin lispro (HumaLOG) corrective regimen sliding scale   SubCutaneous every 6 hours  ipratropium 17 MICROgram(s) HFA Inhaler 2 Puff(s) Inhalation every 6 hours  metroNIDAZOLE  IVPB 500 milliGRAM(s) IV Intermittent every 8 hours  midazolam Infusion 0.04 mG/kG/Hr (3 mL/Hr) IV Continuous <Continuous>  nystatin Powder 1 Application(s) Topical two times a day  pantoprazole  Injectable 40 milliGRAM(s) IV Push daily  petrolatum Ophthalmic Ointment 1 Application(s) Both EYES three times a day  phenylephrine    Infusion 1.6 MICROgram(s)/kG/Min (22.5 mL/Hr) IV Continuous <Continuous>  propofol Infusion 5 MICROgram(s)/kG/Min (2.25 mL/Hr) IV Continuous <Continuous>  sodium chloride 0.9% lock flush 3 milliLiter(s) IV Push every 8 hours  tigecycline IVPB 50 milliGRAM(s) IV Intermittent every 12 hours  tigecycline IVPB      vancomycin  IVPB 1000 milliGRAM(s) IV Intermittent daily    MEDICATIONS  (PRN):  acetaminophen    Suspension 650 milliGRAM(s) Oral every 6 hours PRN For Temp greater than 38 C (100.4 F)  acetaminophen  IVPB. 1000 milliGRAM(s) IV Intermittent once PRN Moderate Pain (4 - 6)  ondansetron Injectable 4 milliGRAM(s) IV Push every 6 hours PRN Nausea and/or Vomiting    Pertinent Labs:  08-13 Na144 mmol/L Glu 98 mg/dL K+ 4.5 mmol/L Cr  1.18 mg/dL BUN 64 mg/dL<H> 08-12 Phos 4.9 mg/dL<H> 08-09 Alb 2.3 g/dL<L> 08-06 PAB 7 mg/dL<L> 07-29 [id number] 5.7 %<H>      Estimated Needs:  1500 - 1875 kcal/d ( 20 - 25 kcal/kg dry wt. ) protein 75 - 90 gm/d ( 1.0 - 1.2 gm /kg dry wt. )   Pt. was on EN support - Nepro carb steady @ 35 ml/hr 24hrs receiving 1512 kcal & 68 gm protein/d . Pt. will benefit from additional protein to promote wound healing .      Recommend to resume EN when medically able and add no carb Pro source 1 pack daily to increase protein to 83 gm/d     Monitoring and Evaluation: Nutrition intake ,  Tolerance to diet prescription ,  weights and follow up per protocol

## 2018-08-13 NOTE — BRIEF OPERATIVE NOTE - PRE-OP DX
Bronchial obstruction  08/02/2018    Active  Jack Iqbal  Empyema  07/27/2018    Active  Maik Garcia  Hemothorax on right  08/06/2018    Active  Maik Garcia
Bronchial obstruction  08/02/2018    Active  Jack Iqbal  Empyema  07/27/2018    Active  Maik Garcia  Hemothorax on right  08/06/2018    Active  Maik Garcia  Respiratory failure  08/09/2018    Active  Maik Garcia
Bronchial obstruction  08/02/2018    Active  Jack Iqbal  Empyema  07/27/2018    Active  Maik Garcia
Bronchial obstruction  08/02/2018    Active  Jack Iqbal  Empyema  07/27/2018    Active  Maik Garcia  Hemothorax on right  08/06/2018    Active  Maik Garcia  Respiratory failure  08/09/2018    Active  Maik Garcia
Empyema  07/27/2018    Active  Maik Garcia

## 2018-08-13 NOTE — BRIEF OPERATIVE NOTE - TYPE OF ANESTHESIA
Monitored Anesthesia Care with sedation
General
General
Monitored Anesthesia Care with sedation
General

## 2018-08-13 NOTE — BRIEF OPERATIVE NOTE - PROCEDURE
<<-----Click on this checkbox to enter Procedure Tracheostomy  08/09/2018    Active  Maik Garcia  Flexible bronchoscopy  07/27/2018    Active  Maik Garcia

## 2018-08-13 NOTE — BRIEF OPERATIVE NOTE - ASSISTANT(S)
CYRIL Lopez, Tevin, PGY 3
Maik Garcia PGY-4
KRYSTLE Iqbal
Maik Garcia PGY-4
Hiro Mccord MD, Maik Garcia PGY-4, Stewart Segal PGY-2

## 2018-08-13 NOTE — BRIEF OPERATIVE NOTE - SPECIMENS
Right upper, middle and lower lobe peels, right upper lobe abscess
None
Right lower lobe peel, BAL
BAL for culture
none

## 2018-08-13 NOTE — BRIEF OPERATIVE NOTE - OPERATION/FINDINGS
Done at bedside in CTICU, No mucus plug found, BAL sent for culture
Flexible bronchoscopy, tracheostomy with #8 Shiley cuffed, PEG placement
Flexible bronchoscopy with BAL, Right VATS converted to throacotomy, drainage of empyema, right lung decortication, drainage of right upper lobe abscess
Flexible bronchoscopy and insertion of tracheostomy through existing stoma
Right thoracotomy, drainage of 1.5L right hemothorax.  Diffuse oozing noted.  Hemostasis achieved.  Bronchoscopy with BAL

## 2018-08-14 LAB
ALBUMIN SERPL ELPH-MCNC: 2.2 G/DL — LOW (ref 3.3–5)
ALP SERPL-CCNC: 887 U/L — HIGH (ref 40–120)
ALT FLD-CCNC: 41 U/L — SIGNIFICANT CHANGE UP (ref 4–41)
AST SERPL-CCNC: 81 U/L — HIGH (ref 4–40)
BACTERIA BLD CULT: SIGNIFICANT CHANGE UP
BASE EXCESS BLDA CALC-SCNC: -3.6 MMOL/L — SIGNIFICANT CHANGE UP
BASE EXCESS BLDA CALC-SCNC: -3.9 MMOL/L — SIGNIFICANT CHANGE UP
BILIRUB SERPL-MCNC: 6.2 MG/DL — HIGH (ref 0.2–1.2)
BUN SERPL-MCNC: 64 MG/DL — HIGH (ref 7–23)
CALCIUM SERPL-MCNC: 8.1 MG/DL — LOW (ref 8.4–10.5)
CHLORIDE BLDA-SCNC: 118 MMOL/L — HIGH (ref 96–108)
CHLORIDE SERPL-SCNC: 108 MMOL/L — HIGH (ref 98–107)
CO2 SERPL-SCNC: 19 MMOL/L — LOW (ref 22–31)
CREAT SERPL-MCNC: 1.16 MG/DL — SIGNIFICANT CHANGE UP (ref 0.5–1.3)
GLUCOSE BLDA-MCNC: 102 MG/DL — HIGH (ref 70–99)
GLUCOSE SERPL-MCNC: 105 MG/DL — HIGH (ref 70–99)
HCO3 BLDA-SCNC: 21 MMOL/L — LOW (ref 22–26)
HCO3 BLDA-SCNC: 21 MMOL/L — LOW (ref 22–26)
HCT VFR BLD CALC: 26.9 % — LOW (ref 39–50)
HCT VFR BLDA CALC: 27.1 % — LOW (ref 39–51)
HGB BLD-MCNC: 8.3 G/DL — LOW (ref 13–17)
HGB BLDA-MCNC: 8.7 G/DL — LOW (ref 13–17)
LACTATE BLDA-SCNC: 0.8 MMOL/L — SIGNIFICANT CHANGE UP (ref 0.5–2)
MCHC RBC-ENTMCNC: 30.3 PG — SIGNIFICANT CHANGE UP (ref 27–34)
MCHC RBC-ENTMCNC: 30.9 % — LOW (ref 32–36)
MCV RBC AUTO: 98.2 FL — SIGNIFICANT CHANGE UP (ref 80–100)
NRBC # FLD: 0.05 — SIGNIFICANT CHANGE UP
PCO2 BLDA: 38 MMHG — SIGNIFICANT CHANGE UP (ref 35–48)
PCO2 BLDA: 38 MMHG — SIGNIFICANT CHANGE UP (ref 35–48)
PH BLDA: 7.36 PH — SIGNIFICANT CHANGE UP (ref 7.35–7.45)
PH BLDA: 7.36 PH — SIGNIFICANT CHANGE UP (ref 7.35–7.45)
PLATELET # BLD AUTO: 414 K/UL — HIGH (ref 150–400)
PMV BLD: 11.4 FL — SIGNIFICANT CHANGE UP (ref 7–13)
PO2 BLDA: 111 MMHG — HIGH (ref 83–108)
PO2 BLDA: 98 MMHG — SIGNIFICANT CHANGE UP (ref 83–108)
POTASSIUM BLDA-SCNC: 4.3 MMOL/L — SIGNIFICANT CHANGE UP (ref 3.4–4.5)
POTASSIUM SERPL-MCNC: 4.5 MMOL/L — SIGNIFICANT CHANGE UP (ref 3.5–5.3)
POTASSIUM SERPL-SCNC: 4.5 MMOL/L — SIGNIFICANT CHANGE UP (ref 3.5–5.3)
PROT SERPL-MCNC: 5.2 G/DL — LOW (ref 6–8.3)
RBC # BLD: 2.74 M/UL — LOW (ref 4.2–5.8)
RBC # FLD: 18.4 % — HIGH (ref 10.3–14.5)
SAO2 % BLDA: 97.1 % — SIGNIFICANT CHANGE UP (ref 95–99)
SAO2 % BLDA: 98.2 % — SIGNIFICANT CHANGE UP (ref 95–99)
SODIUM BLDA-SCNC: 142 MMOL/L — SIGNIFICANT CHANGE UP (ref 136–146)
SODIUM SERPL-SCNC: 140 MMOL/L — SIGNIFICANT CHANGE UP (ref 135–145)
WBC # BLD: 19.64 K/UL — HIGH (ref 3.8–10.5)
WBC # FLD AUTO: 19.64 K/UL — HIGH (ref 3.8–10.5)

## 2018-08-14 PROCEDURE — 99292 CRITICAL CARE ADDL 30 MIN: CPT

## 2018-08-14 PROCEDURE — 71045 X-RAY EXAM CHEST 1 VIEW: CPT | Mod: 26

## 2018-08-14 PROCEDURE — 99291 CRITICAL CARE FIRST HOUR: CPT

## 2018-08-14 RX ADMIN — Medication 250 MILLIGRAM(S): at 11:03

## 2018-08-14 RX ADMIN — FENTANYL CITRATE 7.5 MICROGRAM(S)/KG/HR: 50 INJECTION INTRAVENOUS at 07:34

## 2018-08-14 RX ADMIN — PROPOFOL 2.25 MICROGRAM(S)/KG/MIN: 10 INJECTION, EMULSION INTRAVENOUS at 20:11

## 2018-08-14 RX ADMIN — Medication 1 APPLICATION(S): at 06:07

## 2018-08-14 RX ADMIN — Medication 100 MILLIGRAM(S): at 13:01

## 2018-08-14 RX ADMIN — HEPARIN SODIUM 5000 UNIT(S): 5000 INJECTION INTRAVENOUS; SUBCUTANEOUS at 18:18

## 2018-08-14 RX ADMIN — FENTANYL CITRATE 7.5 MICROGRAM(S)/KG/HR: 50 INJECTION INTRAVENOUS at 03:00

## 2018-08-14 RX ADMIN — Medication 2 PUFF(S): at 11:27

## 2018-08-14 RX ADMIN — NYSTATIN CREAM 1 APPLICATION(S): 100000 CREAM TOPICAL at 18:18

## 2018-08-14 RX ADMIN — SODIUM CHLORIDE 3 MILLILITER(S): 9 INJECTION INTRAMUSCULAR; INTRAVENOUS; SUBCUTANEOUS at 13:01

## 2018-08-14 RX ADMIN — Medication 2 PUFF(S): at 21:30

## 2018-08-14 RX ADMIN — Medication 100 MILLIGRAM(S): at 22:53

## 2018-08-14 RX ADMIN — HEPARIN SODIUM 5000 UNIT(S): 5000 INJECTION INTRAVENOUS; SUBCUTANEOUS at 06:07

## 2018-08-14 RX ADMIN — SODIUM CHLORIDE 3 MILLILITER(S): 9 INJECTION INTRAMUSCULAR; INTRAVENOUS; SUBCUTANEOUS at 05:34

## 2018-08-14 RX ADMIN — PROPOFOL 2.25 MICROGRAM(S)/KG/MIN: 10 INJECTION, EMULSION INTRAVENOUS at 07:36

## 2018-08-14 RX ADMIN — FENTANYL CITRATE 7.5 MICROGRAM(S)/KG/HR: 50 INJECTION INTRAVENOUS at 20:11

## 2018-08-14 RX ADMIN — PANTOPRAZOLE SODIUM 40 MILLIGRAM(S): 20 TABLET, DELAYED RELEASE ORAL at 11:02

## 2018-08-14 RX ADMIN — Medication 50 MILLIGRAM(S): at 06:07

## 2018-08-14 RX ADMIN — CHLORHEXIDINE GLUCONATE 1 APPLICATION(S): 213 SOLUTION TOPICAL at 06:08

## 2018-08-14 RX ADMIN — SODIUM CHLORIDE 3 MILLILITER(S): 9 INJECTION INTRAMUSCULAR; INTRAVENOUS; SUBCUTANEOUS at 22:46

## 2018-08-14 RX ADMIN — Medication 1 APPLICATION(S): at 13:01

## 2018-08-14 RX ADMIN — TIGECYCLINE 105 MILLIGRAM(S): 50 INJECTION, POWDER, LYOPHILIZED, FOR SOLUTION INTRAVENOUS at 06:07

## 2018-08-14 RX ADMIN — Medication 100 MILLIGRAM(S): at 06:07

## 2018-08-14 RX ADMIN — MIDAZOLAM HYDROCHLORIDE 3 MG/KG/HR: 1 INJECTION, SOLUTION INTRAMUSCULAR; INTRAVENOUS at 07:34

## 2018-08-14 RX ADMIN — Medication 2 PUFF(S): at 03:27

## 2018-08-14 RX ADMIN — NYSTATIN CREAM 1 APPLICATION(S): 100000 CREAM TOPICAL at 06:07

## 2018-08-14 RX ADMIN — CHLORHEXIDINE GLUCONATE 15 MILLILITER(S): 213 SOLUTION TOPICAL at 18:18

## 2018-08-14 RX ADMIN — Medication 2 PUFF(S): at 17:24

## 2018-08-14 RX ADMIN — Medication 1 APPLICATION(S): at 22:53

## 2018-08-14 RX ADMIN — MIDAZOLAM HYDROCHLORIDE 3 MG/KG/HR: 1 INJECTION, SOLUTION INTRAMUSCULAR; INTRAVENOUS at 20:00

## 2018-08-14 RX ADMIN — Medication 50 MILLIGRAM(S): at 13:01

## 2018-08-14 RX ADMIN — CHLORHEXIDINE GLUCONATE 15 MILLILITER(S): 213 SOLUTION TOPICAL at 06:07

## 2018-08-14 NOTE — PROGRESS NOTE ADULT - SUBJECTIVE AND OBJECTIVE BOX
Infectious Diseases progress note:    Subjective: Fever curve improved, WBC remains elevated.  ALP and TB elevated.      ROS:  nonverbal    Allergies    No Known Allergies    Intolerances        ANTIBIOTICS/RELEVANT:  antimicrobials  metroNIDAZOLE  IVPB 500 milliGRAM(s) IV Intermittent every 8 hours  vancomycin  IVPB 1000 milliGRAM(s) IV Intermittent daily    immunologic:    OTHER:  acetaminophen    Suspension 650 milliGRAM(s) Oral every 6 hours PRN  acetaminophen  IVPB. 1000 milliGRAM(s) IV Intermittent once PRN  ALBUTerol    90 MICROgram(s) HFA Inhaler 2 Puff(s) Inhalation every 6 hours  chlorhexidine 0.12% Liquid 15 milliLiter(s) Swish and Spit two times a day  chlorhexidine 4% Liquid 1 Application(s) Topical <User Schedule>  dexmedetomidine Infusion 0.04 MICROgram(s)/kG/Hr IV Continuous <Continuous>  fentaNYL   Infusion 1 MICROgram(s)/kG/Hr IV Continuous <Continuous>  heparin  Injectable 5000 Unit(s) SubCutaneous every 12 hours  insulin lispro (HumaLOG) corrective regimen sliding scale   SubCutaneous every 6 hours  ipratropium 17 MICROgram(s) HFA Inhaler 2 Puff(s) Inhalation every 6 hours  midazolam Infusion 0.04 mG/kG/Hr IV Continuous <Continuous>  nystatin Powder 1 Application(s) Topical two times a day  ondansetron Injectable 4 milliGRAM(s) IV Push every 6 hours PRN  pantoprazole  Injectable 40 milliGRAM(s) IV Push daily  petrolatum Ophthalmic Ointment 1 Application(s) Both EYES three times a day  phenylephrine    Infusion 1.6 MICROgram(s)/kG/Min IV Continuous <Continuous>  propofol Infusion 5 MICROgram(s)/kG/Min IV Continuous <Continuous>  sodium chloride 0.9% lock flush 3 milliLiter(s) IV Push every 8 hours      Objective:  Vital Signs Last 24 Hrs  T(C): 37.5 (14 Aug 2018 16:00), Max: 37.5 (14 Aug 2018 16:00)  T(F): 99.5 (14 Aug 2018 16:00), Max: 99.5 (14 Aug 2018 16:00)  HR: 99 (14 Aug 2018 18:00) (83 - 105)  BP: 104/59 (14 Aug 2018 04:00) (94/60 - 104/59)  BP(mean): 70 (14 Aug 2018 04:00) (68 - 71)  RR: 27 (14 Aug 2018 18:00) (17 - 39)  SpO2: 99% (14 Aug 2018 18:00) (96% - 100%)    PHYSICAL EXAM:  Constitutional:  trached  Eyes:MEMO, EOMI  Ear/Nose/Throat: no thrush, mucositis.  Moist mucous membranes	  Neck:no JVD, no lymphadenopathy, supple  Respiratory: CTA emi  Cardiovascular: S1S2 RRR, no murmurs  Gastrointestinal:soft, nontender,  nondistended (+) BS, peg in place  Extremities:no e/e/c  Skin:  no rashes, open wounds or ulcerations        LABS:                        8.3    19.64 )-----------( 414      ( 14 Aug 2018 02:32 )             26.9     08-14    140  |  108<H>  |  64<H>  ----------------------------<  105<H>  4.5   |  19<L>  |  1.16    Ca    8.1<L>      14 Aug 2018 02:32    TPro  5.2<L>  /  Alb  2.2<L>  /  TBili  6.2<H>  /  DBili  x   /  AST  81<H>  /  ALT  41  /  AlkPhos  887<H>  08-14            Vancomycin Level, Random:  ug/mL (08-12 @ 03:14)      Vancomycin Level, Trough: 10.3 ug/mL (08-13 @ 11:58)              MICROBIOLOGY:      Culture - Blood (08.10.18 @ 00:30)    Culture - Blood:   NO ORGANISMS ISOLATED  NO ORGANISMS ISOLATED AT 96 HOURS    Specimen Source: BLOOD    Culture - Blood (08.09.18 @ 23:47)    Culture - Blood:   NO ORGANISMS ISOLATED  NO ORGANISMS ISOLATED AT 96 HOURS    Specimen Source: BLOOD    Culture - Blood (08.06.18 @ 20:38)    Culture - Blood:   NO ORGANISMS ISOLATED    Specimen Source: BLOOD-CATHETER        RADIOLOGY & ADDITIONAL STUDIES:    < from: Xray Chest 1 View- PORTABLE-Routine (08.14.18 @ 07:21) >  IMPRESSION:  New tracheostomy tube in place. Other lines and tubes as   above.    Unchanged small loculated right pleural effusion with likely associated   passive atelectasis.    Continued bilateral interstitial and left perihilar airspace opacity   which could be due to pulmonary edema and/or infection.    < end of copied text >
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
## 2018-08-14 NOTE — PROGRESS NOTE ADULT - SUBJECTIVE AND OBJECTIVE BOX
Surgery Progress Note    S: Patient seen and examined, now s/p trach replacement in OR.     O:  Vital Signs Last 24 Hrs  T(C): 37 (14 Aug 2018 00:00), Max: 37.1 (13 Aug 2018 12:00)  T(F): 98.6 (14 Aug 2018 00:00), Max: 98.8 (13 Aug 2018 12:00)  HR: 90 (14 Aug 2018 01:00) (83 - 93)  BP: 97/64 (13 Aug 2018 23:00) (92/55 - 104/68)  BP(mean): 71 (13 Aug 2018 23:00) (62 - 71)  RR: 20 (14 Aug 2018 01:00) (16 - 39)  SpO2: 97% (14 Aug 2018 01:00) (95% - 100%)    Physical Exam:  Gen: Laying in bed, NAD on fent/prop  Resp: Unlabored breathing, intubated  Abd: soft, NTND  Extremities: feet cool L > R, 2-3 second capillary refill bilaterally  Vascular: R leg with femoral, popliteal, PT, and DP; L leg femoral, doppler popliteal, doppler AT & DP    Lab:  CBC (08-13 @ 03:15)                          7.6<L>                   17.40<H>  )--------------(  396        --    % Neuts, --    % Lymphs, ANC: --                              24.7<L>    BMP (08-13 @ 03:15)       144     |  113<H>  |  64<H> 			Ca++ --      Ca 8.0<L>       ---------------------------------( 98    		Mg --           4.5     |  20<L>   |  1.18  			Ph --              ABG (08-13 @ 03:15)     7.32<L> / 41 / 173<H> / 20<L> / -5.0 / 99.2<H>%     Lactate:          -> BLOOD Culture (08-10 @ 00:30)     NG    NG  NG    -> BLOOD Culture (08-09 @ 23:47)     NG    NG  NG    -> BLOOD-CATHETER Culture (08-06 @ 20:38)     NG    NG  NG    -> BRONCHIAL LAVAGE Culture (08-06 @ 15:41)       NOS^No Organisms Seen  WBC^White Blood Cells  QNTY CELLS IN GRAM STAIN: RARE (1+)      NO ORGANISMS ISOLATED AT 24 HOURS  NO ORGANISMS ISOLATED AT 48 HRS.  NO ORGANISMS ISOLATED AT 72 HRS.  NG    -> BLOOD PERIPHERAL Culture (08-05 @ 20:06)     NG    NG  NG

## 2018-08-14 NOTE — PROGRESS NOTE ADULT - SUBJECTIVE AND OBJECTIVE BOX
POST ANESTHESIA EVALUATION    42y Male POSTOP DAY 1 S/P     MENTAL STATUS: Patient participation [  ] Awake     [x  ] Arousable     [  ] Sedated    AIRWAY PATENCY: [  ] Satisfactory  [ x ] Other: trached    Vital Signs Last 24 Hrs  T(C): 36.6 (14 Aug 2018 08:00), Max: 37.1 (13 Aug 2018 12:00)  T(F): 97.9 (14 Aug 2018 08:00), Max: 98.8 (13 Aug 2018 12:00)  HR: 92 (14 Aug 2018 09:00) (83 - 99)  BP: 104/59 (14 Aug 2018 04:00) (92/55 - 104/68)  BP(mean): 70 (14 Aug 2018 04:00) (62 - 71)  RR: 26 (14 Aug 2018 09:00) (17 - 39)  SpO2: 100% (14 Aug 2018 09:00) (95% - 100%)  I&O's Summary    13 Aug 2018 07:01  -  14 Aug 2018 07:00  --------------------------------------------------------  IN: 2097.6 mL / OUT: 1869 mL / NET: 228.6 mL    14 Aug 2018 07:01  -  14 Aug 2018 09:11  --------------------------------------------------------  IN: 139 mL / OUT: 85 mL / NET: 54 mL          NAUSEA/ VOMITTING:  [x  ] NONE  [  ] CONTROLLED [  ] OTHER     PAIN: [ x ] CONTROLLED WITH CURRENT REGIMEN  [  ] OTHER    [  x] NO APPARENT ANESTHESIA COMPLICATIONS      Comments:

## 2018-08-15 LAB
ALBUMIN SERPL ELPH-MCNC: 1.9 G/DL — LOW (ref 3.3–5)
ALP SERPL-CCNC: 965 U/L — HIGH (ref 40–120)
ALT FLD-CCNC: 45 U/L — HIGH (ref 4–41)
AST SERPL-CCNC: 87 U/L — HIGH (ref 4–40)
BACTERIA BLD CULT: SIGNIFICANT CHANGE UP
BASE EXCESS BLDA CALC-SCNC: -3.1 MMOL/L — SIGNIFICANT CHANGE UP
BILIRUB DIRECT SERPL-MCNC: 5 MG/DL — HIGH (ref 0.1–0.2)
BILIRUB SERPL-MCNC: 5.6 MG/DL — HIGH (ref 0.2–1.2)
BILIRUB SERPL-MCNC: 6 MG/DL — HIGH (ref 0.2–1.2)
BUN SERPL-MCNC: 55 MG/DL — HIGH (ref 7–23)
CALCIUM SERPL-MCNC: 8 MG/DL — LOW (ref 8.4–10.5)
CHLORIDE SERPL-SCNC: 113 MMOL/L — HIGH (ref 98–107)
CO2 SERPL-SCNC: 19 MMOL/L — LOW (ref 22–31)
CREAT SERPL-MCNC: 1.07 MG/DL — SIGNIFICANT CHANGE UP (ref 0.5–1.3)
GLUCOSE SERPL-MCNC: 104 MG/DL — HIGH (ref 70–99)
HCO3 BLDA-SCNC: 22 MMOL/L — SIGNIFICANT CHANGE UP (ref 22–26)
HCT VFR BLD CALC: 26.3 % — LOW (ref 39–50)
HGB BLD-MCNC: 8 G/DL — LOW (ref 13–17)
MAGNESIUM SERPL-MCNC: 2.5 MG/DL — SIGNIFICANT CHANGE UP (ref 1.6–2.6)
MCHC RBC-ENTMCNC: 30.1 PG — SIGNIFICANT CHANGE UP (ref 27–34)
MCHC RBC-ENTMCNC: 30.4 % — LOW (ref 32–36)
MCV RBC AUTO: 98.9 FL — SIGNIFICANT CHANGE UP (ref 80–100)
NRBC # FLD: 0.04 — SIGNIFICANT CHANGE UP
PCO2 BLDA: 36 MMHG — SIGNIFICANT CHANGE UP (ref 35–48)
PH BLDA: 7.39 PH — SIGNIFICANT CHANGE UP (ref 7.35–7.45)
PHOSPHATE SERPL-MCNC: 3.7 MG/DL — SIGNIFICANT CHANGE UP (ref 2.5–4.5)
PLATELET # BLD AUTO: 435 K/UL — HIGH (ref 150–400)
PMV BLD: 11.4 FL — SIGNIFICANT CHANGE UP (ref 7–13)
PO2 BLDA: 133 MMHG — HIGH (ref 83–108)
POTASSIUM SERPL-MCNC: 4.1 MMOL/L — SIGNIFICANT CHANGE UP (ref 3.5–5.3)
POTASSIUM SERPL-SCNC: 4.1 MMOL/L — SIGNIFICANT CHANGE UP (ref 3.5–5.3)
PROT SERPL-MCNC: 5 G/DL — LOW (ref 6–8.3)
RBC # BLD: 2.66 M/UL — LOW (ref 4.2–5.8)
RBC # FLD: 18.2 % — HIGH (ref 10.3–14.5)
SAO2 % BLDA: 99 % — SIGNIFICANT CHANGE UP (ref 95–99)
SODIUM SERPL-SCNC: 143 MMOL/L — SIGNIFICANT CHANGE UP (ref 135–145)
WBC # BLD: 18.77 K/UL — HIGH (ref 3.8–10.5)
WBC # FLD AUTO: 18.77 K/UL — HIGH (ref 3.8–10.5)

## 2018-08-15 PROCEDURE — 71045 X-RAY EXAM CHEST 1 VIEW: CPT | Mod: 26

## 2018-08-15 PROCEDURE — 99291 CRITICAL CARE FIRST HOUR: CPT

## 2018-08-15 PROCEDURE — 99292 CRITICAL CARE ADDL 30 MIN: CPT

## 2018-08-15 RX ORDER — FENTANYL CITRATE 50 UG/ML
1 INJECTION INTRAVENOUS
Qty: 2500 | Refills: 0 | Status: DISCONTINUED | OUTPATIENT
Start: 2018-08-15 | End: 2018-08-19

## 2018-08-15 RX ORDER — AZTREONAM 2 G
VIAL (EA) INJECTION
Qty: 0 | Refills: 0 | Status: DISCONTINUED | OUTPATIENT
Start: 2018-08-15 | End: 2018-08-18

## 2018-08-15 RX ORDER — FUROSEMIDE 40 MG
20 TABLET ORAL ONCE
Qty: 0 | Refills: 0 | Status: COMPLETED | OUTPATIENT
Start: 2018-08-15 | End: 2018-08-15

## 2018-08-15 RX ORDER — AZTREONAM 2 G
1000 VIAL (EA) INJECTION EVERY 8 HOURS
Qty: 0 | Refills: 0 | Status: DISCONTINUED | OUTPATIENT
Start: 2018-08-15 | End: 2018-08-18

## 2018-08-15 RX ORDER — AZTREONAM 2 G
1000 VIAL (EA) INJECTION ONCE
Qty: 0 | Refills: 0 | Status: COMPLETED | OUTPATIENT
Start: 2018-08-15 | End: 2018-08-15

## 2018-08-15 RX ADMIN — Medication 650 MILLIGRAM(S): at 08:20

## 2018-08-15 RX ADMIN — NYSTATIN CREAM 1 APPLICATION(S): 100000 CREAM TOPICAL at 05:02

## 2018-08-15 RX ADMIN — Medication 2 PUFF(S): at 10:44

## 2018-08-15 RX ADMIN — MIDAZOLAM HYDROCHLORIDE 3 MG/KG/HR: 1 INJECTION, SOLUTION INTRAMUSCULAR; INTRAVENOUS at 09:49

## 2018-08-15 RX ADMIN — HEPARIN SODIUM 5000 UNIT(S): 5000 INJECTION INTRAVENOUS; SUBCUTANEOUS at 05:02

## 2018-08-15 RX ADMIN — NYSTATIN CREAM 1 APPLICATION(S): 100000 CREAM TOPICAL at 18:56

## 2018-08-15 RX ADMIN — Medication 2 PUFF(S): at 03:31

## 2018-08-15 RX ADMIN — CHLORHEXIDINE GLUCONATE 15 MILLILITER(S): 213 SOLUTION TOPICAL at 18:56

## 2018-08-15 RX ADMIN — SODIUM CHLORIDE 3 MILLILITER(S): 9 INJECTION INTRAMUSCULAR; INTRAVENOUS; SUBCUTANEOUS at 13:24

## 2018-08-15 RX ADMIN — MIDAZOLAM HYDROCHLORIDE 3 MG/KG/HR: 1 INJECTION, SOLUTION INTRAMUSCULAR; INTRAVENOUS at 20:39

## 2018-08-15 RX ADMIN — Medication 100 MILLIGRAM(S): at 23:37

## 2018-08-15 RX ADMIN — Medication 1 APPLICATION(S): at 05:02

## 2018-08-15 RX ADMIN — Medication 2 PUFF(S): at 16:21

## 2018-08-15 RX ADMIN — Medication 1 APPLICATION(S): at 13:24

## 2018-08-15 RX ADMIN — FENTANYL CITRATE 7.5 MICROGRAM(S)/KG/HR: 50 INJECTION INTRAVENOUS at 09:49

## 2018-08-15 RX ADMIN — HEPARIN SODIUM 5000 UNIT(S): 5000 INJECTION INTRAVENOUS; SUBCUTANEOUS at 18:56

## 2018-08-15 RX ADMIN — Medication 50 MILLIGRAM(S): at 17:51

## 2018-08-15 RX ADMIN — Medication 100 MILLIGRAM(S): at 13:25

## 2018-08-15 RX ADMIN — Medication 20 MILLIGRAM(S): at 06:12

## 2018-08-15 RX ADMIN — Medication 1 APPLICATION(S): at 21:59

## 2018-08-15 RX ADMIN — SODIUM CHLORIDE 3 MILLILITER(S): 9 INJECTION INTRAMUSCULAR; INTRAVENOUS; SUBCUTANEOUS at 05:03

## 2018-08-15 RX ADMIN — PROPOFOL 2.25 MICROGRAM(S)/KG/MIN: 10 INJECTION, EMULSION INTRAVENOUS at 09:49

## 2018-08-15 RX ADMIN — Medication 2 PUFF(S): at 20:21

## 2018-08-15 RX ADMIN — CHLORHEXIDINE GLUCONATE 1 APPLICATION(S): 213 SOLUTION TOPICAL at 05:03

## 2018-08-15 RX ADMIN — PANTOPRAZOLE SODIUM 40 MILLIGRAM(S): 20 TABLET, DELAYED RELEASE ORAL at 11:21

## 2018-08-15 RX ADMIN — CHLORHEXIDINE GLUCONATE 15 MILLILITER(S): 213 SOLUTION TOPICAL at 05:04

## 2018-08-15 RX ADMIN — Medication 50 MILLIGRAM(S): at 22:02

## 2018-08-15 RX ADMIN — Medication 250 MILLIGRAM(S): at 11:21

## 2018-08-15 RX ADMIN — SODIUM CHLORIDE 3 MILLILITER(S): 9 INJECTION INTRAMUSCULAR; INTRAVENOUS; SUBCUTANEOUS at 21:58

## 2018-08-15 RX ADMIN — Medication 100 MILLIGRAM(S): at 05:02

## 2018-08-15 RX ADMIN — FENTANYL CITRATE 7.5 MICROGRAM(S)/KG/HR: 50 INJECTION INTRAVENOUS at 20:39
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
## 2018-08-15 NOTE — PROGRESS NOTE ADULT - SUBJECTIVE AND OBJECTIVE BOX
Infectious Diseases progress note:    Subjective:  Leukocytosis improved.  Pt with febrile episode.  LFts/bilirubin elevated today.  Pending US.    ROS:  nonverbal    Allergies    No Known Allergies    Intolerances        ANTIBIOTICS/RELEVANT:  antimicrobials  aztreonam  IVPB      aztreonam  IVPB 1000 milliGRAM(s) IV Intermittent every 8 hours  metroNIDAZOLE  IVPB 500 milliGRAM(s) IV Intermittent every 8 hours  vancomycin  IVPB 1000 milliGRAM(s) IV Intermittent daily    immunologic:    OTHER:  acetaminophen    Suspension 650 milliGRAM(s) Oral every 6 hours PRN  acetaminophen  IVPB. 1000 milliGRAM(s) IV Intermittent once PRN  ALBUTerol    90 MICROgram(s) HFA Inhaler 2 Puff(s) Inhalation every 6 hours  chlorhexidine 0.12% Liquid 15 milliLiter(s) Swish and Spit two times a day  chlorhexidine 4% Liquid 1 Application(s) Topical <User Schedule>  dexmedetomidine Infusion 0.04 MICROgram(s)/kG/Hr IV Continuous <Continuous>  fentaNYL   Infusion 1 MICROgram(s)/kG/Hr IV Continuous <Continuous>  heparin  Injectable 5000 Unit(s) SubCutaneous every 12 hours  insulin lispro (HumaLOG) corrective regimen sliding scale   SubCutaneous every 6 hours  ipratropium 17 MICROgram(s) HFA Inhaler 2 Puff(s) Inhalation every 6 hours  midazolam Infusion 0.04 mG/kG/Hr IV Continuous <Continuous>  nystatin Powder 1 Application(s) Topical two times a day  ondansetron Injectable 4 milliGRAM(s) IV Push every 6 hours PRN  pantoprazole  Injectable 40 milliGRAM(s) IV Push daily  petrolatum Ophthalmic Ointment 1 Application(s) Both EYES three times a day  phenylephrine    Infusion 1.6 MICROgram(s)/kG/Min IV Continuous <Continuous>  propofol Infusion 5 MICROgram(s)/kG/Min IV Continuous <Continuous>  sodium chloride 0.9% lock flush 3 milliLiter(s) IV Push every 8 hours      Objective:  Vital Signs Last 24 Hrs  T(C): 37.8 (15 Aug 2018 16:00), Max: 38.1 (15 Aug 2018 08:00)  T(F): 100 (15 Aug 2018 16:00), Max: 100.5 (15 Aug 2018 08:00)  HR: 97 (15 Aug 2018 19:00) (88 - 117)  BP: 109/66 (15 Aug 2018 04:00) (104/61 - 123/75)  BP(mean): 75 (15 Aug 2018 04:00) (70 - 85)  RR: 24 (15 Aug 2018 19:00) (21 - 45)  SpO2: 96% (15 Aug 2018 19:00) (95% - 100%)    PHYSICAL EXAM:  Constitutional: trached, sedated  Eyes:MEMO, EOMI  Ear/Nose/Throat: no thrush, mucositis.  Moist mucous membranes	  Neck:no JVD, no lymphadenopathy, supple  Respiratory: Chest tubes in place  Cardiovascular: S1S2 RRR, no murmurs  Gastrointestinal:soft, nontender,  nondistended (+) BS  Extremities:no e/e/c  Skin:  no rashes, open wounds or ulcerations  :  scrotal edema        LABS:                        8.0    18.77 )-----------( 435      ( 15 Aug 2018 03:20 )             26.3     08-15    143  |  113<H>  |  55<H>  ----------------------------<  104<H>  4.1   |  19<L>  |  1.07    Ca    8.0<L>      15 Aug 2018 03:20  Phos  3.7     08-15  Mg     2.5     08-15    TPro  x   /  Alb  x   /  TBili  5.6<H>  /  DBili  5.0<H>  /  AST  x   /  ALT  x   /  AlkPhos  x   08-15            Vancomycin Level, Random:  ug/mL (08-12 @ 03:14)      Vancomycin Level, Trough: 10.3 ug/mL (08-13 @ 11:58)              MICROBIOLOGY:    Culture - Blood (08.10.18 @ 00:30)    Culture - Blood:   NO ORGANISMS ISOLATED    Specimen Source: BLOOD    Culture - Blood (08.09.18 @ 23:47)    Culture - Blood:   NO ORGANISMS ISOLATED    Specimen Source: BLOOD          RADIOLOGY & ADDITIONAL STUDIES:    < from: Xray Chest 1 View- PORTABLE-Routine (08.15.18 @ 06:47) >  IMPRESSION:  Tubes unchanged.    Unchanged small loculated right pleural effusion with likely associated   passive atelectasis.    Unchanged interstitial opacities.    Slight improvement in previous left perihilar and lower lung airspace   opacity which could be due to infection and/or pulmonary edema.    < end of copied text >

## 2018-08-16 LAB
ALBUMIN SERPL ELPH-MCNC: 2.1 G/DL — LOW (ref 3.3–5)
ALP SERPL-CCNC: 997 U/L — HIGH (ref 40–120)
ALT FLD-CCNC: 47 U/L — HIGH (ref 4–41)
APPEARANCE UR: SIGNIFICANT CHANGE UP
AST SERPL-CCNC: 87 U/L — HIGH (ref 4–40)
BASE EXCESS BLDA CALC-SCNC: -2.4 MMOL/L — SIGNIFICANT CHANGE UP
BILIRUB SERPL-MCNC: 5.9 MG/DL — HIGH (ref 0.2–1.2)
BILIRUB UR-MCNC: SIGNIFICANT CHANGE UP
BLOOD UR QL VISUAL: SIGNIFICANT CHANGE UP
BUN SERPL-MCNC: 43 MG/DL — HIGH (ref 7–23)
CA-I BLDA-SCNC: 1.21 MMOL/L — SIGNIFICANT CHANGE UP (ref 1.15–1.29)
CALCIUM SERPL-MCNC: 8.1 MG/DL — LOW (ref 8.4–10.5)
CHLORIDE SERPL-SCNC: 115 MMOL/L — HIGH (ref 98–107)
CO2 SERPL-SCNC: 21 MMOL/L — LOW (ref 22–31)
COLOR SPEC: YELLOW — SIGNIFICANT CHANGE UP
CREAT SERPL-MCNC: 0.91 MG/DL — SIGNIFICANT CHANGE UP (ref 0.5–1.3)
GLUCOSE BLDA-MCNC: 121 MG/DL — HIGH (ref 70–99)
GLUCOSE SERPL-MCNC: 121 MG/DL — HIGH (ref 70–99)
GLUCOSE UR-MCNC: NEGATIVE — SIGNIFICANT CHANGE UP
HCO3 BLDA-SCNC: 23 MMOL/L — SIGNIFICANT CHANGE UP (ref 22–26)
HCT VFR BLD CALC: 26.2 % — LOW (ref 39–50)
HCT VFR BLDA CALC: 24.7 % — LOW (ref 39–51)
HGB BLD-MCNC: 7.9 G/DL — LOW (ref 13–17)
HGB BLDA-MCNC: 7.9 G/DL — LOW (ref 13–17)
KETONES UR-MCNC: NEGATIVE — SIGNIFICANT CHANGE UP
LACTATE BLDA-SCNC: 1.4 MMOL/L — SIGNIFICANT CHANGE UP (ref 0.5–2)
LEUKOCYTE ESTERASE UR-ACNC: HIGH
MAGNESIUM SERPL-MCNC: 2.4 MG/DL — SIGNIFICANT CHANGE UP (ref 1.6–2.6)
MCHC RBC-ENTMCNC: 30.2 % — LOW (ref 32–36)
MCHC RBC-ENTMCNC: 30.5 PG — SIGNIFICANT CHANGE UP (ref 27–34)
MCV RBC AUTO: 101.2 FL — HIGH (ref 80–100)
METHOD TYPE: SIGNIFICANT CHANGE UP
MUCOUS THREADS # UR AUTO: SIGNIFICANT CHANGE UP
NITRITE UR-MCNC: NEGATIVE — SIGNIFICANT CHANGE UP
NRBC # FLD: 0.06 — SIGNIFICANT CHANGE UP
ORGANISM # SPEC MICROSCOPIC CNT: SIGNIFICANT CHANGE UP
ORGANISM # SPEC MICROSCOPIC CNT: SIGNIFICANT CHANGE UP
PCO2 BLDA: 34 MMHG — LOW (ref 35–48)
PH BLDA: 7.42 PH — SIGNIFICANT CHANGE UP (ref 7.35–7.45)
PH UR: 6.5 — SIGNIFICANT CHANGE UP (ref 5–8)
PHOSPHATE SERPL-MCNC: 3.3 MG/DL — SIGNIFICANT CHANGE UP (ref 2.5–4.5)
PLATELET # BLD AUTO: 416 K/UL — HIGH (ref 150–400)
PMV BLD: 11.7 FL — SIGNIFICANT CHANGE UP (ref 7–13)
PO2 BLDA: 169 MMHG — HIGH (ref 83–108)
POTASSIUM BLDA-SCNC: 3.6 MMOL/L — SIGNIFICANT CHANGE UP (ref 3.4–4.5)
POTASSIUM SERPL-MCNC: 3.9 MMOL/L — SIGNIFICANT CHANGE UP (ref 3.5–5.3)
POTASSIUM SERPL-SCNC: 3.9 MMOL/L — SIGNIFICANT CHANGE UP (ref 3.5–5.3)
PROT SERPL-MCNC: 5.3 G/DL — LOW (ref 6–8.3)
PROT UR-MCNC: 30 — SIGNIFICANT CHANGE UP
RBC # BLD: 2.59 M/UL — LOW (ref 4.2–5.8)
RBC # FLD: 17.6 % — HIGH (ref 10.3–14.5)
RBC CASTS # UR COMP ASSIST: SIGNIFICANT CHANGE UP HPF
SAO2 % BLDA: 99.3 % — HIGH (ref 95–99)
SODIUM BLDA-SCNC: 146 MMOL/L — SIGNIFICANT CHANGE UP (ref 136–146)
SODIUM SERPL-SCNC: 148 MMOL/L — HIGH (ref 135–145)
SP GR SPEC: 1.02 — SIGNIFICANT CHANGE UP (ref 1–1.04)
SPECIMEN SOURCE: SIGNIFICANT CHANGE UP
SPECIMEN SOURCE: SIGNIFICANT CHANGE UP
UROBILINOGEN FLD QL: NORMAL — SIGNIFICANT CHANGE UP
VANCOMYCIN TROUGH SERPL-MCNC: 11.2 UG/ML — SIGNIFICANT CHANGE UP (ref 10–20)
WBC # BLD: 21.04 K/UL — HIGH (ref 3.8–10.5)
WBC # FLD AUTO: 21.04 K/UL — HIGH (ref 3.8–10.5)
WBC UR QL: >50 HPF — HIGH
YEAST BUDDING # UR COMP ASSIST: SIGNIFICANT CHANGE UP

## 2018-08-16 PROCEDURE — 99292 CRITICAL CARE ADDL 30 MIN: CPT

## 2018-08-16 PROCEDURE — 99291 CRITICAL CARE FIRST HOUR: CPT

## 2018-08-16 PROCEDURE — 71045 X-RAY EXAM CHEST 1 VIEW: CPT | Mod: 26

## 2018-08-16 PROCEDURE — 71045 X-RAY EXAM CHEST 1 VIEW: CPT | Mod: 26,77

## 2018-08-16 PROCEDURE — 76705 ECHO EXAM OF ABDOMEN: CPT | Mod: 26

## 2018-08-16 RX ORDER — ACETAMINOPHEN 500 MG
1000 TABLET ORAL ONCE
Qty: 0 | Refills: 0 | Status: COMPLETED | OUTPATIENT
Start: 2018-08-16 | End: 2018-08-17

## 2018-08-16 RX ADMIN — Medication 250 MILLIGRAM(S): at 11:25

## 2018-08-16 RX ADMIN — CHLORHEXIDINE GLUCONATE 1 APPLICATION(S): 213 SOLUTION TOPICAL at 05:24

## 2018-08-16 RX ADMIN — Medication 1000 MILLIGRAM(S): at 15:00

## 2018-08-16 RX ADMIN — Medication 100 MILLIGRAM(S): at 13:37

## 2018-08-16 RX ADMIN — FENTANYL CITRATE 7.5 MICROGRAM(S)/KG/HR: 50 INJECTION INTRAVENOUS at 08:00

## 2018-08-16 RX ADMIN — Medication 100 MILLIGRAM(S): at 05:05

## 2018-08-16 RX ADMIN — Medication 50 MILLIGRAM(S): at 05:24

## 2018-08-16 RX ADMIN — HEPARIN SODIUM 5000 UNIT(S): 5000 INJECTION INTRAVENOUS; SUBCUTANEOUS at 06:12

## 2018-08-16 RX ADMIN — SODIUM CHLORIDE 3 MILLILITER(S): 9 INJECTION INTRAMUSCULAR; INTRAVENOUS; SUBCUTANEOUS at 21:01

## 2018-08-16 RX ADMIN — Medication 50 MILLIGRAM(S): at 21:00

## 2018-08-16 RX ADMIN — Medication 1 APPLICATION(S): at 05:05

## 2018-08-16 RX ADMIN — SODIUM CHLORIDE 3 MILLILITER(S): 9 INJECTION INTRAMUSCULAR; INTRAVENOUS; SUBCUTANEOUS at 13:37

## 2018-08-16 RX ADMIN — Medication 650 MILLIGRAM(S): at 19:49

## 2018-08-16 RX ADMIN — NYSTATIN CREAM 1 APPLICATION(S): 100000 CREAM TOPICAL at 05:05

## 2018-08-16 RX ADMIN — Medication 400 MILLIGRAM(S): at 14:30

## 2018-08-16 RX ADMIN — Medication 2 PUFF(S): at 22:11

## 2018-08-16 RX ADMIN — CHLORHEXIDINE GLUCONATE 15 MILLILITER(S): 213 SOLUTION TOPICAL at 18:16

## 2018-08-16 RX ADMIN — SODIUM CHLORIDE 3 MILLILITER(S): 9 INJECTION INTRAMUSCULAR; INTRAVENOUS; SUBCUTANEOUS at 05:05

## 2018-08-16 RX ADMIN — Medication 1 APPLICATION(S): at 13:37

## 2018-08-16 RX ADMIN — NYSTATIN CREAM 1 APPLICATION(S): 100000 CREAM TOPICAL at 18:15

## 2018-08-16 RX ADMIN — Medication 100 MILLIGRAM(S): at 22:09

## 2018-08-16 RX ADMIN — Medication 1 APPLICATION(S): at 21:01

## 2018-08-16 RX ADMIN — Medication 2 PUFF(S): at 15:52

## 2018-08-16 RX ADMIN — PANTOPRAZOLE SODIUM 40 MILLIGRAM(S): 20 TABLET, DELAYED RELEASE ORAL at 11:25

## 2018-08-16 RX ADMIN — Medication 50 MILLIGRAM(S): at 13:37

## 2018-08-16 RX ADMIN — Medication 2 PUFF(S): at 09:57

## 2018-08-16 RX ADMIN — CHLORHEXIDINE GLUCONATE 15 MILLILITER(S): 213 SOLUTION TOPICAL at 06:11

## 2018-08-16 RX ADMIN — HEPARIN SODIUM 5000 UNIT(S): 5000 INJECTION INTRAVENOUS; SUBCUTANEOUS at 18:16

## 2018-08-16 RX ADMIN — Medication 2 PUFF(S): at 05:08
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
## 2018-08-16 NOTE — PROGRESS NOTE ADULT - SUBJECTIVE AND OBJECTIVE BOX
Infectious Diseases progress note:    Subjective: (+) blood cultures growing staph aureus.  s/p central line change.  Abd US with thickened/edematous GB wall, no gallstones.   TB and LFTs elevated.      ROS:  nonverbal    Allergies    No Known Allergies    Intolerances        ANTIBIOTICS/RELEVANT:  antimicrobials  aztreonam  IVPB      aztreonam  IVPB 1000 milliGRAM(s) IV Intermittent every 8 hours  metroNIDAZOLE  IVPB 500 milliGRAM(s) IV Intermittent every 8 hours  vancomycin  IVPB 1000 milliGRAM(s) IV Intermittent daily    immunologic:    OTHER:  acetaminophen    Suspension 650 milliGRAM(s) Oral every 6 hours PRN  acetaminophen  IVPB. 1000 milliGRAM(s) IV Intermittent once PRN  ALBUTerol    90 MICROgram(s) HFA Inhaler 2 Puff(s) Inhalation every 6 hours  chlorhexidine 0.12% Liquid 15 milliLiter(s) Swish and Spit two times a day  chlorhexidine 4% Liquid 1 Application(s) Topical <User Schedule>  dexmedetomidine Infusion 0.04 MICROgram(s)/kG/Hr IV Continuous <Continuous>  fentaNYL   Infusion 1 MICROgram(s)/kG/Hr IV Continuous <Continuous>  heparin  Injectable 5000 Unit(s) SubCutaneous every 12 hours  insulin lispro (HumaLOG) corrective regimen sliding scale   SubCutaneous every 6 hours  ipratropium 17 MICROgram(s) HFA Inhaler 2 Puff(s) Inhalation every 6 hours  midazolam Infusion 0.04 mG/kG/Hr IV Continuous <Continuous>  nystatin Powder 1 Application(s) Topical two times a day  ondansetron Injectable 4 milliGRAM(s) IV Push every 6 hours PRN  pantoprazole  Injectable 40 milliGRAM(s) IV Push daily  petrolatum Ophthalmic Ointment 1 Application(s) Both EYES three times a day  phenylephrine    Infusion 1.6 MICROgram(s)/kG/Min IV Continuous <Continuous>  propofol Infusion 5 MICROgram(s)/kG/Min IV Continuous <Continuous>  sodium chloride 0.9% lock flush 3 milliLiter(s) IV Push every 8 hours      Objective:  Vital Signs Last 24 Hrs  T(C): 38.2 (16 Aug 2018 16:00), Max: 38.2 (16 Aug 2018 16:00)  T(F): 100.8 (16 Aug 2018 16:00), Max: 100.8 (16 Aug 2018 16:00)  HR: 127 (16 Aug 2018 19:15) (87 - 127)  BP: 136/82 (16 Aug 2018 08:00) (109/62 - 136/82)  BP(mean): 94 (16 Aug 2018 08:00) (72 - 94)  RR: 29 (16 Aug 2018 19:15) (18 - 42)  SpO2: 95% (16 Aug 2018 19:15) (94% - 99%)    PHYSICAL EXAM:  Constitutional:NAD  Eyes:MEMO, EOMI  Ear/Nose/Throat: no thrush, mucositis.  Moist mucous membranes	  Neck:no JVD, no lymphadenopathy, supple  Respiratory: CTA emi, rt sided chest tubes in place  Cardiovascular: S1S2 RRR, no murmurs  Gastrointestinal:soft, nontender,  nondistended (+) BS  Extremities:no e/e/c  Skin:  no rashes, open wounds or ulcerations        LABS:                        7.9    21.04 )-----------( 416      ( 16 Aug 2018 03:40 )             26.2         148<H>  |  115<H>  |  43<H>  ----------------------------<  121<H>  3.9   |  21<L>  |  0.91    Ca    8.1<L>      16 Aug 2018 03:40  Phos  3.3       Mg     2.4         TPro  5.3<L>  /  Alb  2.1<L>  /  TBili  5.9<H>  /  DBili  x   /  AST  87<H>  /  ALT  47<H>  /  AlkPhos  997<H>        Urinalysis Basic - ( 16 Aug 2018 15:45 )    Color: YELLOW / Appearance: HAZY / S.023 / pH: 6.5  Gluc: NEGATIVE / Ketone: NEGATIVE  / Bili: SMALL / Urobili: NORMAL   Blood: MODERATE / Protein: 30 / Nitrite: NEGATIVE   Leuk Esterase: LARGE / RBC: 25-50 HPF / WBC >50 HPF   Sq Epi: x / Non Sq Epi: x / Bacteria: x          Vancomycin Level, Random:  ug/mL ( @ 03:14)      Vancomycin Level, Trough: 11.2 ug/mL ( @ 03:40)  Vancomycin Level, Trough: 10.3 ug/mL ( @ 11:58)              MICROBIOLOGY:    Culture - Blood (08.15.18 @ 17:10)    Culture - Blood:   ***Blood Panel PCR results on this specimen are available  approximately 3 hours after the Gram stain result***  Gram stain, PCR, and/or culture results may not always  correspond due to difference in methodologies  ------------------------------------------------------------  This PCR assay was performed using [email or web address].  The  following targets are tested for:  Enterococcus, vancomycin  resistant enterococci, Listeria monocytogenes,  coagulase  negative staphylococci, S. aureus, methicillin resistant S.  aureus, Streptococcus agalactiae (Group B), S. pneumoniae,  S. pyogenes (Group A), Acinetobacter baumannii, Enterobacter  cloacae, E. coli, Klebsiella oxytoca, K. pneumoniae, Proteus  sp., Serratia marcescens, Haemophilus influenzae, Neisseria  meningitidis, Pseudomonas aeruginosa, Candida albicans, C.  glabrata, C. krusei, C. parapsilosis, C. tropicalis and the  KPC resistance gene.  **NOTE: Due to technical problems, Proteus sp. will NOT be  reported as part of the BCID paneluntil further notice.    -  Staphylococcus aureus: + DETECT ALVIN Any isolate of Staphylococcus aureus from a blood culture is  NOT considered a contaminant.    Specimen Source: BLOOD    Organism: BLOOD CULTURE PCR    Gram Stain Blood:   ***** CRITICAL RESULT *****  PERSON CALLED / READ-BACK: /Y  DATE / TIME CALLED: 18 [id number]  CALLED BY: MARKUS BARNES  GPCCL^Gram Pos Cocci In Clusters  AFTER: 16 HOURS INCUBATION  BOTTLE: AEROBIC BOTTLE    Organism Identification: BLOOD CULTURE PCR    Method Type: PCR    Culture - Blood (08.15.18 @ 17:10)    Culture - Blood:   NO ORGANISMS ISOLATED  NO ORGANISMS ISOLATED AT 24 HOURS    Specimen Source: BLOOD      Culture - Blood (18 @ 20:38)    Culture - Blood:   NO ORGANISMS ISOLATED    Specimen Source: BLOOD-CATHETER        RADIOLOGY & ADDITIONAL STUDIES:    < from: US Abdomen Limited (18 @ 12:19) >  FINDINGS:    Liver: Within normal limits.    Bile ducts: Normal caliber. Common hepatic duct measures the mm.     Gallbladder: Thickened and edematous gallbladder wall not significantly   changed since 2018. No sonographic Ayala's sign or gallstones.        Pancreas: Not well visualized.    Right kidney: 13.3 cm. No hydronephrosis.    Ascites: Small abdominal ascites.    IVC: Visualized portions are within normal limits.    Small right pleural effusion.    IMPRESSION:     Thickened and edematous gallbladder wall. Nondilated biliary tree.    Small right pleural effusion and small abdominal ascites.    < end of copied text >

## 2018-08-16 NOTE — PROGRESS NOTE ADULT - SUBJECTIVE AND OBJECTIVE BOX
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 support via trach, sedated due to periodic restlessnes . Febrille , on multiple ABX - culture results pending      PAST MEDICAL & SURGICAL HISTORY:  No pertinent past medical history  No significant past surgical history    Allergies  No Known Allergies      ***VITAL SIGNS:  Vital Signs Last 24 Hrs  T(C): 38.4 (16 Aug 2018 20:00), Max: 38.4 (16 Aug 2018 20:00)  T(F): 101.2 (16 Aug 2018 20:00), Max: 101.2 (16 Aug 2018 20:00)  HR: 96 (16 Aug 2018 23:10) (87 - 133)  BP: 136/82 (16 Aug 2018 08:00) (109/62 - 136/82)  BP(mean): 94 (16 Aug 2018 08:00) (72 - 94)  RR: 21 (16 Aug 2018 23:00) (18 - 42)  SpO2: 96% (16 Aug 2018 23:10) (94% - 99%)    I/Os:   I&O's Detail    15 Aug 2018 07:01  -  16 Aug 2018 07:00  --------------------------------------------------------  IN:    Enteral Tube Flush: 120 mL    fentaNYL  Infusion: 86.8 mL    IV PiggyBack: 550 mL    midazolam Infusion: 63 mL    Nepro with Carb Steady: 960 mL  Total IN: 1779.8 mL    OUT:    Chest Tube: 20 mL    Chest Tube: 110 mL    Indwelling Catheter - Urethral: 2455 mL    Rectal Tube: 200 mL  Total OUT: 2785 mL    Total NET: -1005.2 mL      16 Aug 2018 07:01  -  16 Aug 2018 23:55  --------------------------------------------------------  IN:    Enteral Tube Flush: 80 mL    fentaNYL  Infusion: 105.9 mL    IV PiggyBack: 550 mL    Nepro with Carb Steady: 560 mL    propofol Infusion: 7 mL  Total IN: 1302.9 mL    OUT:    Chest Tube: 50 mL    Chest Tube: 20 mL    Indwelling Catheter - Urethral: 1030 mL  Total OUT: 1100 mL    Total NET: 202.9 mL      CAPILLARY BLOOD GLUCOSE  POCT Blood Glucose.: 137 mg/dL (16 Aug 2018 18:14)  POCT Blood Glucose.: 132 mg/dL (16 Aug 2018 11:26)  POCT Blood Glucose.: 127 mg/dL (16 Aug 2018 06:17)    =======================  MEDICATIONS  ===================  MEDICATIONS  (STANDING):  acetaminophen  IVPB. 1000 milliGRAM(s) IV Intermittent once  ALBUTerol    90 MICROgram(s) HFA Inhaler 2 Puff(s) Inhalation every 6 hours  aztreonam  IVPB      aztreonam  IVPB 1000 milliGRAM(s) IV Intermittent every 8 hours  chlorhexidine 0.12% Liquid 15 milliLiter(s) Swish and Spit two times a day  chlorhexidine 4% Liquid 1 Application(s) Topical <User Schedule>  fentaNYL   Infusion 1 MICROgram(s)/kG/Hr (7.5 mL/Hr) IV Continuous <Continuous>  heparin  Injectable 5000 Unit(s) SubCutaneous every 12 hours  insulin lispro (HumaLOG) corrective regimen sliding scale   SubCutaneous every 6 hours  ipratropium 17 MICROgram(s) HFA Inhaler 2 Puff(s) Inhalation every 6 hours  metroNIDAZOLE  IVPB 500 milliGRAM(s) IV Intermittent every 8 hours  nystatin Powder 1 Application(s) Topical two times a day  pantoprazole  Injectable 40 milliGRAM(s) IV Push daily  petrolatum Ophthalmic Ointment 1 Application(s) Both EYES three times a day  phenylephrine    Infusion 1.6 MICROgram(s)/kG/Min (22.5 mL/Hr) IV Continuous <Continuous>  propofol Infusion 5 MICROgram(s)/kG/Min (2.25 mL/Hr) IV Continuous <Continuous>  sodium chloride 0.9% lock flush 3 milliLiter(s) IV Push every 8 hours  vancomycin  IVPB 1000 milliGRAM(s) IV Intermittent daily    MEDICATIONS  (PRN):  acetaminophen    Suspension 650 milliGRAM(s) Oral every 6 hours PRN For Temp greater than 38 C (100.4 F)  ondansetron Injectable 4 milliGRAM(s) IV Push every 6 hours PRN Nausea and/or Vomiting    ======================VENTILATOR SETTINGS  ==============  Mode: AC/ CMV (Assist Control/ Continuous Mandatory Ventilation)  RR (machine): 12  TV (machine): 450  FiO2: 40  PEEP: 5  MAP: 11  PIP: 27    =================== PATIENT CARE ACCESS DEVICES ==========  Peripheral IV (+)  Central Venous Line	R/ SC  18  Arterial Line	L/ RAd  (+) 		  Urinary Catheter, (+)  Necessity of urinary, arterial, and venous catheters discussed    ======================= PHYSICAL EXAM===================  General:             sedated, on vent support, anasarcic  Neuro:               sedated	  HEENT:               MEMO/  trach  Respiratory:	Lungs sound coarse on auscultation bilaterally .                           No rales, (+) rhonchi, no wheezing. Effort even and unlabored.  CV:		Regular rate and rhythm. Normal S1/S2.  Abdomen:	 Soft,  nontender, not-distended. Bowel sounds present - hypoactive                          scrotal edema (+)  Skin:		No rash.  Extremities:	Warm, (+++) edema.  (+) pulses    ============================ LABS =======================                        7.9    21.04 )-----------( 416      ( 16 Aug 2018 03:40 )             26.2         148<H>  |  115<H>  |  43<H>  ----------------------------------------<  121<H>  3.9          |  21<L>   |  0.91    Ca    8.1<L>      16 Aug 2018 03:40  Phos  3.3       Mg     2.4         TPro  5.3<L>  /  Alb  2.1<L>  /  TBili  5.9<H>  /  DBili  x   /  AST  87<H>  /  ALT  47<H>  /  AlkPhos  997<H>              ABG - ( 16 Aug 2018 03:40 )  pH, Arterial: 7.42  pH, Blood: x     /  pCO2: 34    /  pO2: 169   / HCO3: 23    / Base Excess: -2.4  /  SaO2: 99.3        Urinalysis Basic - ( 16 Aug 2018 15:45 )    Color: YELLOW / Appearance: HAZY / S.023 / pH: 6.5  Gluc: NEGATIVE / Ketone: NEGATIVE  / Bili: SMALL / Urobili: NORMAL   Blood: MODERATE / Protein: 30 / Nitrite: NEGATIVE   Leuk Esterase: LARGE / RBC: 25-50 HPF / WBC >50 HPF   Sq Epi: x / Non Sq Epi: x / Bacteria: x      BLOOD  08-15-18 --  --  BLOOD CULTURE PCR    BLOOD  08-10-18 --  --  --    BLOOD  18 --  --  --    BLOOD-CATHETER  18 --  --  --    BRONCHIAL LAVAGE  18 --  --  --    BLOOD PERIPHERAL  18 --  --  --    BRONCHIAL LAVAGE  18 --  --  --    BLOOD  18 --  --  --    LUNG - LOWER LOBE RIGHT  18 --  --  --    ABSCESS  18 --  --  --    BLOOD ARTERIAL  18 --  --  --    PLEURAL FLUID  18 --  --  --    PLEURAL FLUID  18 --  --    NOS^No Organisms Seen  WBC^White Blood Cells  QNTY CELLS IN GRAM STAIN: MANY (4+)    BLOOD PERIPHERAL  18 --  --  --    ===================== IMAGING STUDIES ===================  Radiology personally reviewed.    < from: Xray Chest 1 View- PORTABLE-Routine (18 @ 08:01) >  INTERPRETATION:     Heart size and the mediastinum cannot be accurately evaluated on this   projection.  Tracheostomy tube is in place. Right chest tubes unchanged in position.   Right-sided skin staples again noted. Minimal right subcutaneous   emphysema.  Small loculated right pleural effusion with likely associated passive   atelectasis is unchanged. Other underlying pathology is not excluded.   Right-sided interstitial opacities are not significantly changed.  There is increased patchy opacities in the left mid and lower lung. No   left pleural effusion.  No pneumothorax seen.        IMPRESSION:  Tubes unchanged in position.    Small loculated rightpleural effusion with likely associated passive   atelectasis is unchanged. Other underlying pathology is not excluded.    Unchanged right interstitial opacities.    Increased patchy left mid and lower lung opacities which could be due to   atelectasis, pneumonia, and/or asymmetric pulmonary edema.    < end of copied text >        < from: US Abdomen Limited (18 @ 12:19) >  INTERPRETATION:  CLINICAL INFORMATION: Elevated bilirubin.    COMPARISON: CT abdomen and pelvis 2018  FINDINGS:    Liver: Within normal limits.  Bile ducts: Normal caliber. Common hepatic duct measures the mm.   Gallbladder: Thickened and edematous gallbladder wall not significantly   changed since 2018. No sonographic Ayala's sign or gallstones.      Pancreas: Not well visualized.  Right kidney: 13.3 cm. No hydronephrosis.  Ascites: Small abdominal ascites.  IVC: Visualized portions are within normal limits.  Small right pleural effusion.    IMPRESSION:   Thickened and edematous gallbladder wall. Nondilated biliary tree.  Small right pleural effusion and small abdominal ascites.      < end of copied text >    ====================ASSESSMENT AND PLAN ================    42yMale s/p Drainage of right lung abscess  2018, postop course complicated by sepsis, hypotension, respiratory failure, hemothorax     Procedure:  Drainage of lung abscess  2018  right upper lobe    Decortication of right lung  2018      Right thoracotomy  2018      VATS (video-assisted thoracoscopic surgery)  2018  right   Flexible bronchoscopy  2018   Flexible bronchoscopy 2018  Evacuation of hemothorax  2018   Trach & PEG  2018  Trach exchange 18    Issues:             Acute respiratory failure on vent suppport            Hypotension - on/off Elijah            Right Hemothorax- Evacuated            Empyema            chest tube in place            postop pain            left leg hypoperfusion( no ischemia as per vasc surg )             anasarca , severe malnutrition            stable multifactorial anemia - no evidence of active bleeding            elevated Bili/ AST/ ALT - ? drug induced ( ABX adjusted by ID)    ====================== NEUROLOGY=======================  Pain control Fentanyl/ Tylenol  Pt is sedated with Propofol / Fentanyl    ==================== RESPIRATORY========================  Monitor chest tube output  Chest tube to suction / water seal	    Mechanical Ventilation:  Mode: AC/ CMV (Assist Control/ Continuous Mandatory Ventilation)  RR (machine): 12  TV (machine): 450  FiO2: 40  PEEP: 5  MAP: 11  PIP: 27    Mechanical ventilator status assessed & settings reviewed  Continue bronchodilators, pulmonary toilet  Head of bed elevation to 30-40 degrees  Daily vent weaning  trials with CPAP as tolerated    ====================CARDIOVASCULAR=====================  Continue hemodynamic monitoring/ telemetry  On/ Off Elijah    ===================== RENAL ============================  Continue LR 30CC/hr  KVO  Monitor I/Os, BUN/ Cr  and electrolytes   Keep Pierre for UO monitoring  No nephrotoxic meds    ==================== GASTROINTESTINAL===================  On PEG feeds with Nepro + Prosource  Continue GI prophylaxis with  Protonix  Continue Zofran / Reglan for nausea - PRN	  check Prealbumin and LFT  in AM    =======================    ENDOCRIN  =====================  Glycemic monitoring  F/S with coverage  ===================HEMATOLOGIC/ONCOLOGIC =============  Monitor chest tube output. No signs of active bleeding.   Follow CBC, coags  in AM  DVT prophylaxis with SCD, sc Heparin    ========================INFECTIOUS DISEASE===============   Monitor for fever / trend  leukocytosis.  F/up blood cultures  Empyema : C/w ABX Aztreonam, Vanco, Flagyl  ID dr Simons on board          Pertinent clinical, laboratory, radiographic, hemodynamic, echocardiographic, respiratory data, microbiologic data and chart were reviewed and analyzed frequently throughout the course of the day and night. GI and DVT prophylaxis, glycemic control, head of bed elevation and skin care issues were addressed.  Patient seen, examined and plan discussed with CT Surgery / CTICU team during rounds.  Pt remains critically ill in imminent risk of  deterioration and requires very careful cardio- pulmonary monitoring and support.    I have spent 40  minutes of critical care time with this pt between   7  pm    and    11:55  pm         minutes spent on total encounter; more than 50% of the visit was spent counseling and/or coordinating care by the attending physician.        PRATIK Montes MD
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
## 2018-08-17 LAB
ALBUMIN SERPL ELPH-MCNC: 2 G/DL — LOW (ref 3.3–5)
ALBUMIN SERPL ELPH-MCNC: 2.1 G/DL — LOW (ref 3.3–5)
ALBUMIN SERPL ELPH-MCNC: 2.5 G/DL — LOW (ref 3.3–5)
ALP SERPL-CCNC: 1182 U/L — HIGH (ref 40–120)
ALP SERPL-CCNC: 985 U/L — HIGH (ref 40–120)
ALP SERPL-CCNC: 999 U/L — HIGH (ref 40–120)
ALT FLD-CCNC: 43 U/L — HIGH (ref 4–41)
ALT FLD-CCNC: 44 U/L — HIGH (ref 4–41)
ALT FLD-CCNC: 44 U/L — HIGH (ref 4–41)
APTT BLD: 34.4 SEC — SIGNIFICANT CHANGE UP (ref 27.5–37.4)
AST SERPL-CCNC: 70 U/L — HIGH (ref 4–40)
AST SERPL-CCNC: 72 U/L — HIGH (ref 4–40)
AST SERPL-CCNC: 73 U/L — HIGH (ref 4–40)
BASE EXCESS BLDA CALC-SCNC: -1 MMOL/L — SIGNIFICANT CHANGE UP
BASE EXCESS BLDA CALC-SCNC: -1.5 MMOL/L — SIGNIFICANT CHANGE UP
BASE EXCESS BLDA CALC-SCNC: -1.8 MMOL/L — SIGNIFICANT CHANGE UP
BILIRUB SERPL-MCNC: 3.7 MG/DL — HIGH (ref 0.2–1.2)
BILIRUB SERPL-MCNC: 3.9 MG/DL — HIGH (ref 0.2–1.2)
BILIRUB SERPL-MCNC: 4 MG/DL — HIGH (ref 0.2–1.2)
BUN SERPL-MCNC: 34 MG/DL — HIGH (ref 7–23)
BUN SERPL-MCNC: 37 MG/DL — HIGH (ref 7–23)
BUN SERPL-MCNC: 39 MG/DL — HIGH (ref 7–23)
CA-I BLDA-SCNC: 1.14 MMOL/L — LOW (ref 1.15–1.29)
CA-I BLDA-SCNC: 1.15 MMOL/L — SIGNIFICANT CHANGE UP (ref 1.15–1.29)
CA-I BLDA-SCNC: 1.21 MMOL/L — SIGNIFICANT CHANGE UP (ref 1.15–1.29)
CALCIUM SERPL-MCNC: 7.9 MG/DL — LOW (ref 8.4–10.5)
CALCIUM SERPL-MCNC: 7.9 MG/DL — LOW (ref 8.4–10.5)
CALCIUM SERPL-MCNC: 8.1 MG/DL — LOW (ref 8.4–10.5)
CHLORIDE SERPL-SCNC: 114 MMOL/L — HIGH (ref 98–107)
CHLORIDE SERPL-SCNC: 117 MMOL/L — HIGH (ref 98–107)
CHLORIDE SERPL-SCNC: 118 MMOL/L — HIGH (ref 98–107)
CO2 SERPL-SCNC: 20 MMOL/L — LOW (ref 22–31)
CREAT SERPL-MCNC: 0.81 MG/DL — SIGNIFICANT CHANGE UP (ref 0.5–1.3)
CREAT SERPL-MCNC: 0.84 MG/DL — SIGNIFICANT CHANGE UP (ref 0.5–1.3)
CREAT SERPL-MCNC: 0.86 MG/DL — SIGNIFICANT CHANGE UP (ref 0.5–1.3)
GLUCOSE BLDA-MCNC: 122 MG/DL — HIGH (ref 70–99)
GLUCOSE BLDA-MCNC: 133 MG/DL — HIGH (ref 70–99)
GLUCOSE BLDA-MCNC: 140 MG/DL — HIGH (ref 70–99)
GLUCOSE SERPL-MCNC: 124 MG/DL — HIGH (ref 70–99)
GLUCOSE SERPL-MCNC: 129 MG/DL — HIGH (ref 70–99)
GLUCOSE SERPL-MCNC: 147 MG/DL — HIGH (ref 70–99)
HCO3 BLDA-SCNC: 23 MMOL/L — SIGNIFICANT CHANGE UP (ref 22–26)
HCO3 BLDA-SCNC: 23 MMOL/L — SIGNIFICANT CHANGE UP (ref 22–26)
HCO3 BLDA-SCNC: 24 MMOL/L — SIGNIFICANT CHANGE UP (ref 22–26)
HCT VFR BLD CALC: 25.4 % — LOW (ref 39–50)
HCT VFR BLD CALC: 26.7 % — LOW (ref 39–50)
HCT VFR BLDA CALC: 25.3 % — LOW (ref 39–51)
HCT VFR BLDA CALC: 28.9 % — LOW (ref 39–51)
HCT VFR BLDA CALC: 29.7 % — LOW (ref 39–51)
HGB BLD-MCNC: 7.5 G/DL — LOW (ref 13–17)
HGB BLD-MCNC: 8 G/DL — LOW (ref 13–17)
HGB BLDA-MCNC: 8.1 G/DL — LOW (ref 13–17)
HGB BLDA-MCNC: 9.3 G/DL — LOW (ref 13–17)
HGB BLDA-MCNC: 9.6 G/DL — LOW (ref 13–17)
INR BLD: 1.33 — HIGH (ref 0.88–1.17)
LACTATE BLDA-SCNC: 1 MMOL/L — SIGNIFICANT CHANGE UP (ref 0.5–2)
LACTATE BLDA-SCNC: 1.2 MMOL/L — SIGNIFICANT CHANGE UP (ref 0.5–2)
LACTATE BLDA-SCNC: 1.3 MMOL/L — SIGNIFICANT CHANGE UP (ref 0.5–2)
MAGNESIUM SERPL-MCNC: 1.9 MG/DL — SIGNIFICANT CHANGE UP (ref 1.6–2.6)
MAGNESIUM SERPL-MCNC: 2.2 MG/DL — SIGNIFICANT CHANGE UP (ref 1.6–2.6)
MAGNESIUM SERPL-MCNC: 2.2 MG/DL — SIGNIFICANT CHANGE UP (ref 1.6–2.6)
MCHC RBC-ENTMCNC: 29.5 % — LOW (ref 32–36)
MCHC RBC-ENTMCNC: 30 % — LOW (ref 32–36)
MCHC RBC-ENTMCNC: 30.6 PG — SIGNIFICANT CHANGE UP (ref 27–34)
MCHC RBC-ENTMCNC: 31 PG — SIGNIFICANT CHANGE UP (ref 27–34)
MCV RBC AUTO: 103.5 FL — HIGH (ref 80–100)
MCV RBC AUTO: 103.7 FL — HIGH (ref 80–100)
NRBC # FLD: 0.08 — SIGNIFICANT CHANGE UP
NRBC # FLD: 0.15 — SIGNIFICANT CHANGE UP
ORGANISM # SPEC MICROSCOPIC CNT: SIGNIFICANT CHANGE UP
ORGANISM # SPEC MICROSCOPIC CNT: SIGNIFICANT CHANGE UP
PCO2 BLDA: 35 MMHG — SIGNIFICANT CHANGE UP (ref 35–48)
PCO2 BLDA: 36 MMHG — SIGNIFICANT CHANGE UP (ref 35–48)
PCO2 BLDA: 38 MMHG — SIGNIFICANT CHANGE UP (ref 35–48)
PH BLDA: 7.4 PH — SIGNIFICANT CHANGE UP (ref 7.35–7.45)
PH BLDA: 7.41 PH — SIGNIFICANT CHANGE UP (ref 7.35–7.45)
PH BLDA: 7.42 PH — SIGNIFICANT CHANGE UP (ref 7.35–7.45)
PHOSPHATE SERPL-MCNC: 3 MG/DL — SIGNIFICANT CHANGE UP (ref 2.5–4.5)
PHOSPHATE SERPL-MCNC: 3.2 MG/DL — SIGNIFICANT CHANGE UP (ref 2.5–4.5)
PHOSPHATE SERPL-MCNC: 3.3 MG/DL — SIGNIFICANT CHANGE UP (ref 2.5–4.5)
PLATELET # BLD AUTO: 350 K/UL — SIGNIFICANT CHANGE UP (ref 150–400)
PLATELET # BLD AUTO: 383 K/UL — SIGNIFICANT CHANGE UP (ref 150–400)
PMV BLD: 11.5 FL — SIGNIFICANT CHANGE UP (ref 7–13)
PMV BLD: 11.6 FL — SIGNIFICANT CHANGE UP (ref 7–13)
PO2 BLDA: 68 MMHG — LOW (ref 83–108)
PO2 BLDA: 71 MMHG — LOW (ref 83–108)
PO2 BLDA: 74 MMHG — LOW (ref 83–108)
POTASSIUM BLDA-SCNC: 3.3 MMOL/L — LOW (ref 3.4–4.5)
POTASSIUM BLDA-SCNC: 3.6 MMOL/L — SIGNIFICANT CHANGE UP (ref 3.4–4.5)
POTASSIUM BLDA-SCNC: 3.7 MMOL/L — SIGNIFICANT CHANGE UP (ref 3.4–4.5)
POTASSIUM SERPL-MCNC: 3.4 MMOL/L — LOW (ref 3.5–5.3)
POTASSIUM SERPL-MCNC: 3.8 MMOL/L — SIGNIFICANT CHANGE UP (ref 3.5–5.3)
POTASSIUM SERPL-MCNC: 3.8 MMOL/L — SIGNIFICANT CHANGE UP (ref 3.5–5.3)
POTASSIUM SERPL-SCNC: 3.4 MMOL/L — LOW (ref 3.5–5.3)
POTASSIUM SERPL-SCNC: 3.8 MMOL/L — SIGNIFICANT CHANGE UP (ref 3.5–5.3)
POTASSIUM SERPL-SCNC: 3.8 MMOL/L — SIGNIFICANT CHANGE UP (ref 3.5–5.3)
PREALB SERPL-MCNC: 8 MG/DL — LOW (ref 20–40)
PROT SERPL-MCNC: 5.4 G/DL — LOW (ref 6–8.3)
PROT SERPL-MCNC: 5.7 G/DL — LOW (ref 6–8.3)
PROT SERPL-MCNC: 6.2 G/DL — SIGNIFICANT CHANGE UP (ref 6–8.3)
PROTHROM AB SERPL-ACNC: 14.8 SEC — HIGH (ref 9.8–13.1)
RBC # BLD: 2.45 M/UL — LOW (ref 4.2–5.8)
RBC # BLD: 2.58 M/UL — LOW (ref 4.2–5.8)
RBC # FLD: 17.6 % — HIGH (ref 10.3–14.5)
RBC # FLD: 17.9 % — HIGH (ref 10.3–14.5)
SAO2 % BLDA: 94.4 % — LOW (ref 95–99)
SAO2 % BLDA: 95 % — SIGNIFICANT CHANGE UP (ref 95–99)
SAO2 % BLDA: 95.4 % — SIGNIFICANT CHANGE UP (ref 95–99)
SODIUM BLDA-SCNC: 145 MMOL/L — SIGNIFICANT CHANGE UP (ref 136–146)
SODIUM BLDA-SCNC: 146 MMOL/L — SIGNIFICANT CHANGE UP (ref 136–146)
SODIUM BLDA-SCNC: 148 MMOL/L — HIGH (ref 136–146)
SODIUM SERPL-SCNC: 146 MMOL/L — HIGH (ref 135–145)
SODIUM SERPL-SCNC: 147 MMOL/L — HIGH (ref 135–145)
SODIUM SERPL-SCNC: 149 MMOL/L — HIGH (ref 135–145)
SPECIMEN SOURCE: SIGNIFICANT CHANGE UP
SPECIMEN SOURCE: SIGNIFICANT CHANGE UP
WBC # BLD: 20.84 K/UL — HIGH (ref 3.8–10.5)
WBC # BLD: 25.68 K/UL — HIGH (ref 3.8–10.5)
WBC # FLD AUTO: 20.84 K/UL — HIGH (ref 3.8–10.5)
WBC # FLD AUTO: 25.68 K/UL — HIGH (ref 3.8–10.5)

## 2018-08-17 PROCEDURE — 71250 CT THORAX DX C-: CPT | Mod: 26

## 2018-08-17 PROCEDURE — 74176 CT ABD & PELVIS W/O CONTRAST: CPT | Mod: 26

## 2018-08-17 PROCEDURE — 99292 CRITICAL CARE ADDL 30 MIN: CPT

## 2018-08-17 PROCEDURE — 71045 X-RAY EXAM CHEST 1 VIEW: CPT | Mod: 26

## 2018-08-17 PROCEDURE — 99291 CRITICAL CARE FIRST HOUR: CPT

## 2018-08-17 RX ORDER — FUROSEMIDE 40 MG
20 TABLET ORAL ONCE
Qty: 0 | Refills: 0 | Status: COMPLETED | OUTPATIENT
Start: 2018-08-17 | End: 2018-08-17

## 2018-08-17 RX ORDER — HYDROMORPHONE HYDROCHLORIDE 2 MG/ML
1 INJECTION INTRAMUSCULAR; INTRAVENOUS; SUBCUTANEOUS ONCE
Qty: 0 | Refills: 0 | Status: DISCONTINUED | OUTPATIENT
Start: 2018-08-17 | End: 2018-08-17

## 2018-08-17 RX ORDER — ALBUTEROL 90 UG/1
2 AEROSOL, METERED ORAL EVERY 6 HOURS
Qty: 0 | Refills: 0 | Status: DISCONTINUED | OUTPATIENT
Start: 2018-08-17 | End: 2018-09-10

## 2018-08-17 RX ORDER — METOPROLOL TARTRATE 50 MG
5 TABLET ORAL ONCE
Qty: 0 | Refills: 0 | Status: COMPLETED | OUTPATIENT
Start: 2018-08-17 | End: 2018-08-17

## 2018-08-17 RX ORDER — FUROSEMIDE 40 MG
5 TABLET ORAL
Qty: 500 | Refills: 0 | Status: DISCONTINUED | OUTPATIENT
Start: 2018-08-17 | End: 2018-08-24

## 2018-08-17 RX ORDER — HEPARIN SODIUM 5000 [USP'U]/ML
5000 INJECTION INTRAVENOUS; SUBCUTANEOUS EVERY 8 HOURS
Qty: 0 | Refills: 0 | Status: DISCONTINUED | OUTPATIENT
Start: 2018-08-17 | End: 2018-09-12

## 2018-08-17 RX ORDER — ALBUMIN HUMAN 25 %
50 VIAL (ML) INTRAVENOUS EVERY 6 HOURS
Qty: 0 | Refills: 0 | Status: COMPLETED | OUTPATIENT
Start: 2018-08-17 | End: 2018-08-18

## 2018-08-17 RX ORDER — FOLIC ACID 0.8 MG
1 TABLET ORAL DAILY
Qty: 0 | Refills: 0 | Status: DISCONTINUED | OUTPATIENT
Start: 2018-08-17 | End: 2018-09-06

## 2018-08-17 RX ORDER — THIAMINE MONONITRATE (VIT B1) 100 MG
100 TABLET ORAL DAILY
Qty: 0 | Refills: 0 | Status: DISCONTINUED | OUTPATIENT
Start: 2018-08-17 | End: 2018-09-06

## 2018-08-17 RX ORDER — PREGABALIN 225 MG/1
1000 CAPSULE ORAL DAILY
Qty: 0 | Refills: 0 | Status: DISCONTINUED | OUTPATIENT
Start: 2018-08-17 | End: 2018-09-06

## 2018-08-17 RX ADMIN — NYSTATIN CREAM 1 APPLICATION(S): 100000 CREAM TOPICAL at 05:06

## 2018-08-17 RX ADMIN — Medication 50 MILLIGRAM(S): at 05:06

## 2018-08-17 RX ADMIN — PROPOFOL 2.25 MICROGRAM(S)/KG/MIN: 10 INJECTION, EMULSION INTRAVENOUS at 22:00

## 2018-08-17 RX ADMIN — Medication 20 MILLIGRAM(S): at 01:03

## 2018-08-17 RX ADMIN — Medication 650 MILLIGRAM(S): at 21:00

## 2018-08-17 RX ADMIN — HEPARIN SODIUM 5000 UNIT(S): 5000 INJECTION INTRAVENOUS; SUBCUTANEOUS at 05:06

## 2018-08-17 RX ADMIN — Medication 100 MILLIGRAM(S): at 06:18

## 2018-08-17 RX ADMIN — Medication 50 MILLIGRAM(S): at 21:55

## 2018-08-17 RX ADMIN — Medication 400 MILLIGRAM(S): at 08:00

## 2018-08-17 RX ADMIN — Medication 2 PUFF(S): at 15:55

## 2018-08-17 RX ADMIN — Medication 1 APPLICATION(S): at 05:07

## 2018-08-17 RX ADMIN — CHLORHEXIDINE GLUCONATE 1 APPLICATION(S): 213 SOLUTION TOPICAL at 04:00

## 2018-08-17 RX ADMIN — Medication 100 MILLIGRAM(S): at 21:54

## 2018-08-17 RX ADMIN — PREGABALIN 1000 MICROGRAM(S): 225 CAPSULE ORAL at 11:36

## 2018-08-17 RX ADMIN — Medication 250 MILLIGRAM(S): at 11:36

## 2018-08-17 RX ADMIN — Medication 50 MILLIGRAM(S): at 15:00

## 2018-08-17 RX ADMIN — Medication 5 MG/HR: at 19:00

## 2018-08-17 RX ADMIN — PANTOPRAZOLE SODIUM 40 MILLIGRAM(S): 20 TABLET, DELAYED RELEASE ORAL at 11:36

## 2018-08-17 RX ADMIN — CHLORHEXIDINE GLUCONATE 15 MILLILITER(S): 213 SOLUTION TOPICAL at 05:08

## 2018-08-17 RX ADMIN — Medication 2 PUFF(S): at 11:13

## 2018-08-17 RX ADMIN — FENTANYL CITRATE 7.5 MICROGRAM(S)/KG/HR: 50 INJECTION INTRAVENOUS at 22:00

## 2018-08-17 RX ADMIN — NYSTATIN CREAM 1 APPLICATION(S): 100000 CREAM TOPICAL at 18:00

## 2018-08-17 RX ADMIN — HYDROMORPHONE HYDROCHLORIDE 1 MILLIGRAM(S): 2 INJECTION INTRAMUSCULAR; INTRAVENOUS; SUBCUTANEOUS at 23:18

## 2018-08-17 RX ADMIN — Medication 1 APPLICATION(S): at 21:54

## 2018-08-17 RX ADMIN — Medication 2 PUFF(S): at 22:28

## 2018-08-17 RX ADMIN — Medication 2 PUFF(S): at 04:07

## 2018-08-17 RX ADMIN — PROPOFOL 2.25 MICROGRAM(S)/KG/MIN: 10 INJECTION, EMULSION INTRAVENOUS at 05:08

## 2018-08-17 RX ADMIN — Medication 1 APPLICATION(S): at 16:00

## 2018-08-17 RX ADMIN — SODIUM CHLORIDE 3 MILLILITER(S): 9 INJECTION INTRAMUSCULAR; INTRAVENOUS; SUBCUTANEOUS at 05:06

## 2018-08-17 RX ADMIN — Medication 100 MILLIGRAM(S): at 11:36

## 2018-08-17 RX ADMIN — CHLORHEXIDINE GLUCONATE 15 MILLILITER(S): 213 SOLUTION TOPICAL at 20:08

## 2018-08-17 RX ADMIN — Medication 1 MILLIGRAM(S): at 11:36

## 2018-08-17 RX ADMIN — SODIUM CHLORIDE 3 MILLILITER(S): 9 INJECTION INTRAMUSCULAR; INTRAVENOUS; SUBCUTANEOUS at 22:12

## 2018-08-17 RX ADMIN — Medication 5 MILLIGRAM(S): at 17:00

## 2018-08-17 RX ADMIN — HEPARIN SODIUM 5000 UNIT(S): 5000 INJECTION INTRAVENOUS; SUBCUTANEOUS at 16:00

## 2018-08-17 RX ADMIN — HYDROMORPHONE HYDROCHLORIDE 1 MILLIGRAM(S): 2 INJECTION INTRAMUSCULAR; INTRAVENOUS; SUBCUTANEOUS at 23:01

## 2018-08-17 RX ADMIN — Medication 100 MILLIGRAM(S): at 16:00

## 2018-08-17 RX ADMIN — SODIUM CHLORIDE 3 MILLILITER(S): 9 INJECTION INTRAMUSCULAR; INTRAVENOUS; SUBCUTANEOUS at 16:00

## 2018-08-17 RX ADMIN — Medication 1000 MILLIGRAM(S): at 08:15

## 2018-08-17 RX ADMIN — Medication 50 MILLILITER(S): at 20:52

## 2018-08-17 RX ADMIN — HEPARIN SODIUM 5000 UNIT(S): 5000 INJECTION INTRAVENOUS; SUBCUTANEOUS at 21:55

## 2018-08-17 RX ADMIN — FENTANYL CITRATE 7.5 MICROGRAM(S)/KG/HR: 50 INJECTION INTRAVENOUS at 05:08

## 2018-08-17 RX ADMIN — FENTANYL CITRATE 1 MICROGRAM(S)/KG/HR: 50 INJECTION INTRAVENOUS at 22:15

## 2018-08-17 NOTE — CHART NOTE - NSCHARTNOTEFT_GEN_A_CORE
Source:  nursing and EMR    Diet : NPO with tube feed - Nepro carb steady @ 40 ml/hr 24hrs and no carb Pro source 1 pack daily     Patient  extubated , on EN , tolerating goal rate , receiving adequate nutrition . Pt. unable to speak 2/2 tracheostomy , noted 3+ generalized edema , per flow sheet pt. w/ sacral DTI but per nursing it is more to stage 2 .    Enteral : EN provides 1728 kcal & 93 gm protein/d .       Current Weight: 96.1 kg on 8/12/18; Admit wt. - 75 kg ; no recent wt. available     Pertinent Medications: MEDICATIONS  (STANDING):  aztreonam  IVPB      aztreonam  IVPB 1000 milliGRAM(s) IV Intermittent every 8 hours  cyanocobalamin 1000 MICROGram(s) Oral daily  fentaNYL   Infusion 1 MICROgram(s)/kG/Hr (7.5 mL/Hr) IV Continuous <Continuous>  folic acid 1 milliGRAM(s) Oral daily  heparin  Injectable 5000 Unit(s) SubCutaneous every 8 hours  insulin lispro (HumaLOG) corrective regimen sliding scale   SubCutaneous every 6 hours  ipratropium 17 MICROgram(s) HFA Inhaler 2 Puff(s) Inhalation every 6 hours  metroNIDAZOLE  IVPB 500 milliGRAM(s) IV Intermittent every 8 hours  nystatin Powder 1 Application(s) Topical two times a day  pantoprazole  Injectable 40 milliGRAM(s) IV Push daily  petrolatum Ophthalmic Ointment 1 Application(s) Both EYES three times a day  propofol Infusion 5 MICROgram(s)/kG/Min (2.25 mL/Hr) IV Continuous <Continuous>  sodium chloride 0.9% lock flush 3 milliLiter(s) IV Push every 8 hours  thiamine 100 milliGRAM(s) Oral daily  vancomycin  IVPB 1000 milliGRAM(s) IV Intermittent daily    MEDICATIONS  (PRN):  acetaminophen    Suspension 650 milliGRAM(s) Oral every 6 hours PRN For Temp greater than 38 C (100.4 F)  ALBUTerol    90 MICROgram(s) HFA Inhaler 2 Puff(s) Inhalation every 6 hours PRN Shortness of Breath and/or Wheezing  ondansetron Injectable 4 milliGRAM(s) IV Push every 6 hours PRN Nausea and/or Vomiting    Pertinent Labs:  08-17 Na149 mmol/L<H> Glu 129 mg/dL<H> K+ 3.8 mmol/L Cr  0.84 mg/dL BUN 39 mg/dL<H> 08-17 Phos 3.2 mg/dL 08-17 Alb 2.1 g/dL<L> 08-17 PAB 8 mg/dL<L> 07-29 IsislalgymQ3Q 5.7 %<H>      Estimated Needs:  no change since previous assessment: - 1500 - 1875 kcal/d ( 20 - 25 kcal/kg dry wt. ) protein 75 - 90 gm/d ( 1.0 - 1.2 gm/kg dry wt. )     Recommend  - to obtain wt. ; continue EN .    Monitoring and Evaluation:  Tolerance to diet prescription, weights and follow up per protocol
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
## 2018-08-17 NOTE — PROGRESS NOTE ADULT - SUBJECTIVE AND OBJECTIVE BOX
Infectious Diseases progress note:    Subjective:  Pt more awake/alert today.  Pt with possible infected right thoracic wound, s/p removal of chest tubes and wound debridement.  s/p central line changed yesterday.   WBC remains elevated.        ROS:  on ventilator    Allergies    No Known Allergies    Intolerances        ANTIBIOTICS/RELEVANT:  antimicrobials  aztreonam  IVPB      aztreonam  IVPB 1000 milliGRAM(s) IV Intermittent every 8 hours  metroNIDAZOLE  IVPB 500 milliGRAM(s) IV Intermittent every 8 hours  vancomycin  IVPB 1000 milliGRAM(s) IV Intermittent daily    immunologic:    OTHER:  acetaminophen    Suspension 650 milliGRAM(s) Oral every 6 hours PRN  albumin human 25% IVPB 50 milliLiter(s) IV Intermittent every 6 hours  ALBUTerol    90 MICROgram(s) HFA Inhaler 2 Puff(s) Inhalation every 6 hours PRN  chlorhexidine 0.12% Liquid 15 milliLiter(s) Swish and Spit two times a day  chlorhexidine 4% Liquid 1 Application(s) Topical <User Schedule>  cyanocobalamin 1000 MICROGram(s) Oral daily  fentaNYL   Infusion 1 MICROgram(s)/kG/Hr IV Continuous <Continuous>  folic acid 1 milliGRAM(s) Oral daily  furosemide Infusion 10 mG/Hr IV Continuous <Continuous>  heparin  Injectable 5000 Unit(s) SubCutaneous every 8 hours  insulin lispro (HumaLOG) corrective regimen sliding scale   SubCutaneous every 6 hours  ipratropium 17 MICROgram(s) HFA Inhaler 2 Puff(s) Inhalation every 6 hours  metoprolol tartrate Injectable 5 milliGRAM(s) IV Push once  nystatin Powder 1 Application(s) Topical two times a day  ondansetron Injectable 4 milliGRAM(s) IV Push every 6 hours PRN  pantoprazole  Injectable 40 milliGRAM(s) IV Push daily  petrolatum Ophthalmic Ointment 1 Application(s) Both EYES three times a day  propofol Infusion 5 MICROgram(s)/kG/Min IV Continuous <Continuous>  sodium chloride 0.9% lock flush 3 milliLiter(s) IV Push every 8 hours  thiamine 100 milliGRAM(s) Oral daily      Objective:  Vital Signs Last 24 Hrs  T(C): 38.1 (17 Aug 2018 08:00), Max: 38.4 (16 Aug 2018 20:00)  T(F): 100.5 (17 Aug 2018 08:00), Max: 101.2 (16 Aug 2018 20:00)  HR: 105 (17 Aug 2018 15:56) (88 - 133)  BP: 135/80 (17 Aug 2018 08:00) (135/80 - 135/80)  BP(mean): 94 (17 Aug 2018 08:00) (94 - 94)  RR: 20 (17 Aug 2018 11:00) (20 - 229)  SpO2: 97% (17 Aug 2018 15:56) (94% - 98%)    PHYSICAL EXAM:  Constitutional: awake.  Trached  Eyes:MEMO, EOMI  Ear/Nose/Throat: no thrush, mucositis.  Moist mucous membranes	  Neck:no JVD, no lymphadenopathy, supple  Respiratory: CTA emi  Cardiovascular: S1S2 RRR, no murmurs  Gastrointestinal:soft, nontender,  nondistended (+) BS  Extremities:no e/e/c  Skin:  no rashes, open wounds or ulcerations.  Rt IJ central line        LABS:                        7.5    20.84 )-----------( 350      ( 17 Aug 2018 03:20 )             25.4     08-17    149<H>  |  117<H>  |  39<H>  ----------------------------<  129<H>  3.8   |  20<L>  |  0.84    Ca    7.9<L>      17 Aug 2018 06:55  Phos  3.2       Mg     2.2     17    TPro  5.7<L>  /  Alb  2.1<L>  /  TBili  3.7<H>  /  DBili  x   /  AST  72<H>  /  ALT  44<H>  /  AlkPhos  999<H>  0817    PT/INR - ( 17 Aug 2018 03:20 )   PT: 14.8 SEC;   INR: 1.33          PTT - ( 17 Aug 2018 03:20 )  PTT:34.4 SEC  Urinalysis Basic - ( 16 Aug 2018 15:45 )    Color: YELLOW / Appearance: HAZY / S.023 / pH: 6.5  Gluc: NEGATIVE / Ketone: NEGATIVE  / Bili: SMALL / Urobili: NORMAL   Blood: MODERATE / Protein: 30 / Nitrite: NEGATIVE   Leuk Esterase: LARGE / RBC: 25-50 HPF / WBC >50 HPF   Sq Epi: x / Non Sq Epi: x / Bacteria: x              Vancomycin Level, Trough: 11.2 ug/mL ( @ 03:40)  Vancomycin Level, Trough: 10.3 ug/mL ( @ 11:58)              MICROBIOLOGY:    Culture - Blood (18 @ 15:01)    Culture - Blood:   NO ORGANISMS ISOLATED  NO ORGANISMS ISOLATED AT 24 HOURS    Specimen Source: BLOOD    Culture - Blood (08.15.18 @ 17:10)    -  Staphylococcus aureus: + DETECT ALVIN Any isolate of Staphylococcus aureus from a blood culture is  NOT considered a contaminant.    Culture - Blood:   ***Blood Panel PCR results on this specimen are available  approximately 3 hours after the Gram stain result***  Gram stain, PCR, and/or culture results may not always  correspond due to difference in methodologies  ------------------------------------------------------------  This PCR assay was performed using [employer].  The  following targets are tested for:  Enterococcus, vancomycin  resistant enterococci, Listeria monocytogenes,  coagulase  negative staphylococci, S. aureus, methicillin resistant S.  aureus, Streptococcus agalactiae (Group B), S. pneumoniae,  S. pyogenes (Group A), Acinetobacter baumannii, Enterobacter  cloacae, E. coli, Klebsiella oxytoca, K. pneumoniae, Proteus  sp., Serratia marcescens, Haemophilus influenzae, Neisseria  meningitidis, Pseudomonas aeruginosa, Candida albicans, C.  glabrata, C. krusei, C. parapsilosis, C. tropicalis and the  KPC resistance gene.  **NOTE: Due to technical problems, Proteus sp. will NOT be  reported as part of the BCID paneluntil further notice.    Specimen Source: BLOOD    Organism: BLOOD CULTURE PCR    Organism: Staphylococcus aureus    Gram Stain Blood:   ***** CRITICAL RESULT *****  PERSON CALLED / READ-BACK: /Y  DATE / TIME CALLED: 18 1140  CALLED BY: MARKUS BARNES  GPCCL^Gram Pos Cocci In Clusters  AFTER: 25 HOURS INCUBATION  BOTTLE: AEROBIC   ANAEROBIC BOTTLES    Organism Identification: BLOOD CULTURE PCR  Staphylococcus aureus    Method Type: PCR    Culture - Blood (08.15.18 @ 17:10)    Culture - Blood:   NO ORGANISMS ISOLATED  NO ORGANISMS ISOLATED AT 48 HRS.    Specimen Source: BLOOD          RADIOLOGY & ADDITIONAL STUDIES:    < from: CT Abdomen and Pelvis w/ Oral Cont (18 @ 14:17) >    EXAM:  CT ABDOMEN AND PELVIS OC      EXAM:  CT CHEST        PROCEDURE DATE:  Aug 17 2018         INTERPRETATION:  CLINICAL INFORMATION: Respiratory failure, fever,   leukocytosis, evaluate for infectious source.    COMPARISON: CT chest abdomen pelvis 2018.    PROCEDURE:   CT of the Chest, Abdomen and Pelvis was performed without intravenous   contrast.   Intravenous contrast: None.  Oral contrast: positive contrast was administered.  Sagittal and coronal reformats were performed.    FINDINGS:    CHEST:     LUNGS AND LARGE AIRWAYS: Tracheostomy. Patchy bilateral opacities,   predominantly in the left upper lobe. Intralobular septal thickening.  PLEURA: Small right and moderate sized left pleural effusions. Small   right pneumothorax.  VESSELS: Right subclavian central venous catheter with tip in the   superior vena cava.  HEART: Heart size is normal. No pericardial effusion.  MEDIASTINUM AND SHAILA: A few borderline enlarged mediastinal lymph nodes.   A right paratracheal lymph node measures 11 mm in short axis (2:30).  CHEST WALL AND LOWER NECK: Large open wound right posterior chest wall.    ABDOMEN AND PELVIS:    LIVER: Within normal limits.  BILE DUCTS: Normal caliber.  GALLBLADDER: Nonspecific gallbladder wall edema. The gallbladder is not   distended.  SPLEEN: Within normal limits.  PANCREAS: Within normal limits.  ADRENALS: Within normal limits.  KIDNEYS/URETERS: Within normal limits.    BLADDER: Pierre catheter.  REPRODUCTIVE ORGANS: The prostate is within normal limits.    BOWEL: No bowel obstruction. Appendix normal.  PERITONEUM: Small ascites, increased from May 2, 2018.  VESSELS:  Atherosclerotic changes.  RETROPERITONEUM: No lymphadenopathy.    ABDOMINAL WALL: Anasarca.  BONES: Within normal limits.    IMPRESSION:     Pulmonary edema. Patchy bilateral opacities with left upper lobe   predominance may be superimposed pneumonia.  Large open right posterior chest wound.  Increased ascites.    < end of copied text >
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
## 2018-08-18 LAB
-  CEFAZOLIN: SIGNIFICANT CHANGE UP
-  CIPROFLOXACIN: SIGNIFICANT CHANGE UP
-  CLINDAMYCIN: SIGNIFICANT CHANGE UP
-  ERYTHROMYCIN: SIGNIFICANT CHANGE UP
-  GENTAMICIN: SIGNIFICANT CHANGE UP
-  LEVOFLOXACIN: SIGNIFICANT CHANGE UP
-  MOXIFLOXACIN(AEROBIC): SIGNIFICANT CHANGE UP
-  OXACILLIN: SIGNIFICANT CHANGE UP
-  PENICILLIN: SIGNIFICANT CHANGE UP
-  RIFAMPIN.: SIGNIFICANT CHANGE UP
-  TETRACYCLINE: SIGNIFICANT CHANGE UP
-  TRIMETHOPRIM/SULFAMETHOXAZOLE: SIGNIFICANT CHANGE UP
-  VANCOMYCIN: SIGNIFICANT CHANGE UP
ALBUMIN SERPL ELPH-MCNC: 2.3 G/DL — LOW (ref 3.3–5)
ALP SERPL-CCNC: 1082 U/L — HIGH (ref 40–120)
ALT FLD-CCNC: 42 U/L — HIGH (ref 4–41)
APTT BLD: 34.6 SEC — SIGNIFICANT CHANGE UP (ref 27.5–37.4)
AST SERPL-CCNC: 65 U/L — HIGH (ref 4–40)
BACTERIA BLD CULT: SIGNIFICANT CHANGE UP
BASE EXCESS BLDA CALC-SCNC: -0.1 MMOL/L — SIGNIFICANT CHANGE UP
BASE EXCESS BLDA CALC-SCNC: -0.7 MMOL/L — SIGNIFICANT CHANGE UP
BASE EXCESS BLDA CALC-SCNC: -1 MMOL/L — SIGNIFICANT CHANGE UP
BILIRUB SERPL-MCNC: 3.4 MG/DL — HIGH (ref 0.2–1.2)
BUN SERPL-MCNC: 31 MG/DL — HIGH (ref 7–23)
BUN SERPL-MCNC: 32 MG/DL — HIGH (ref 7–23)
CA-I BLDA-SCNC: 1.14 MMOL/L — LOW (ref 1.15–1.29)
CALCIUM SERPL-MCNC: 7.9 MG/DL — LOW (ref 8.4–10.5)
CALCIUM SERPL-MCNC: 7.9 MG/DL — LOW (ref 8.4–10.5)
CHLORIDE SERPL-SCNC: 112 MMOL/L — HIGH (ref 98–107)
CHLORIDE SERPL-SCNC: 114 MMOL/L — HIGH (ref 98–107)
CO2 SERPL-SCNC: 21 MMOL/L — LOW (ref 22–31)
CO2 SERPL-SCNC: 24 MMOL/L — SIGNIFICANT CHANGE UP (ref 22–31)
CREAT SERPL-MCNC: 0.83 MG/DL — SIGNIFICANT CHANGE UP (ref 0.5–1.3)
CREAT SERPL-MCNC: 0.83 MG/DL — SIGNIFICANT CHANGE UP (ref 0.5–1.3)
GLUCOSE BLDA-MCNC: 120 MG/DL — HIGH (ref 70–99)
GLUCOSE SERPL-MCNC: 120 MG/DL — HIGH (ref 70–99)
GLUCOSE SERPL-MCNC: 94 MG/DL — SIGNIFICANT CHANGE UP (ref 70–99)
HCO3 BLDA-SCNC: 23 MMOL/L — SIGNIFICANT CHANGE UP (ref 22–26)
HCO3 BLDA-SCNC: 24 MMOL/L — SIGNIFICANT CHANGE UP (ref 22–26)
HCO3 BLDA-SCNC: 24 MMOL/L — SIGNIFICANT CHANGE UP (ref 22–26)
HCT VFR BLD CALC: 25.7 % — LOW (ref 39–50)
HCT VFR BLDA CALC: 22.7 % — LOW (ref 39–51)
HGB BLD-MCNC: 7.7 G/DL — LOW (ref 13–17)
HGB BLDA-MCNC: 7.3 G/DL — LOW (ref 13–17)
INR BLD: 1.29 — HIGH (ref 0.88–1.17)
LACTATE BLDA-SCNC: 1.1 MMOL/L — SIGNIFICANT CHANGE UP (ref 0.5–2)
MAGNESIUM SERPL-MCNC: 1.9 MG/DL — SIGNIFICANT CHANGE UP (ref 1.6–2.6)
MAGNESIUM SERPL-MCNC: 1.9 MG/DL — SIGNIFICANT CHANGE UP (ref 1.6–2.6)
MCHC RBC-ENTMCNC: 30 % — LOW (ref 32–36)
MCHC RBC-ENTMCNC: 32.1 PG — SIGNIFICANT CHANGE UP (ref 27–34)
MCV RBC AUTO: 107.1 FL — HIGH (ref 80–100)
METHOD TYPE: SIGNIFICANT CHANGE UP
MSSA DNA SPEC QL NAA+PROBE: SIGNIFICANT CHANGE UP
NRBC # FLD: 0.13 — SIGNIFICANT CHANGE UP
PCO2 BLDA: 39 MMHG — SIGNIFICANT CHANGE UP (ref 35–48)
PCO2 BLDA: 51 MMHG — HIGH (ref 35–48)
PCO2 BLDA: 53 MMHG — HIGH (ref 35–48)
PH BLDA: 7.3 PH — LOW (ref 7.35–7.45)
PH BLDA: 7.32 PH — LOW (ref 7.35–7.45)
PH BLDA: 7.39 PH — SIGNIFICANT CHANGE UP (ref 7.35–7.45)
PHOSPHATE SERPL-MCNC: 3 MG/DL — SIGNIFICANT CHANGE UP (ref 2.5–4.5)
PHOSPHATE SERPL-MCNC: 3.5 MG/DL — SIGNIFICANT CHANGE UP (ref 2.5–4.5)
PLATELET # BLD AUTO: 375 K/UL — SIGNIFICANT CHANGE UP (ref 150–400)
PMV BLD: 11.8 FL — SIGNIFICANT CHANGE UP (ref 7–13)
PO2 BLDA: 59 MMHG — LOW (ref 83–108)
PO2 BLDA: 97 MMHG — SIGNIFICANT CHANGE UP (ref 83–108)
PO2 BLDA: 99 MMHG — SIGNIFICANT CHANGE UP (ref 83–108)
POTASSIUM BLDA-SCNC: 3.4 MMOL/L — SIGNIFICANT CHANGE UP (ref 3.4–4.5)
POTASSIUM SERPL-MCNC: 3.1 MMOL/L — LOW (ref 3.5–5.3)
POTASSIUM SERPL-MCNC: 3.5 MMOL/L — SIGNIFICANT CHANGE UP (ref 3.5–5.3)
POTASSIUM SERPL-SCNC: 3.1 MMOL/L — LOW (ref 3.5–5.3)
POTASSIUM SERPL-SCNC: 3.5 MMOL/L — SIGNIFICANT CHANGE UP (ref 3.5–5.3)
PROT SERPL-MCNC: 5.9 G/DL — LOW (ref 6–8.3)
PROTHROM AB SERPL-ACNC: 14.9 SEC — HIGH (ref 9.8–13.1)
RBC # BLD: 2.4 M/UL — LOW (ref 4.2–5.8)
RBC # FLD: 17.6 % — HIGH (ref 10.3–14.5)
SAO2 % BLDA: 90.1 % — LOW (ref 95–99)
SAO2 % BLDA: 97.8 % — SIGNIFICANT CHANGE UP (ref 95–99)
SAO2 % BLDA: 98.2 % — SIGNIFICANT CHANGE UP (ref 95–99)
SODIUM BLDA-SCNC: 147 MMOL/L — HIGH (ref 136–146)
SODIUM SERPL-SCNC: 147 MMOL/L — HIGH (ref 135–145)
SODIUM SERPL-SCNC: 148 MMOL/L — HIGH (ref 135–145)
SPECIMEN SOURCE: SIGNIFICANT CHANGE UP
WBC # BLD: 21.72 K/UL — HIGH (ref 3.8–10.5)
WBC # FLD AUTO: 21.72 K/UL — HIGH (ref 3.8–10.5)

## 2018-08-18 PROCEDURE — 71045 X-RAY EXAM CHEST 1 VIEW: CPT | Mod: 26,76

## 2018-08-18 PROCEDURE — 99292 CRITICAL CARE ADDL 30 MIN: CPT

## 2018-08-18 PROCEDURE — 99291 CRITICAL CARE FIRST HOUR: CPT

## 2018-08-18 RX ORDER — POTASSIUM CHLORIDE 20 MEQ
20 PACKET (EA) ORAL
Qty: 0 | Refills: 0 | Status: COMPLETED | OUTPATIENT
Start: 2018-08-18 | End: 2018-08-18

## 2018-08-18 RX ORDER — CALCIUM GLUCONATE 100 MG/ML
1 VIAL (ML) INTRAVENOUS ONCE
Qty: 0 | Refills: 0 | Status: COMPLETED | OUTPATIENT
Start: 2018-08-18 | End: 2018-08-19

## 2018-08-18 RX ORDER — FENTANYL CITRATE 50 UG/ML
50 INJECTION INTRAVENOUS ONCE
Qty: 0 | Refills: 0 | Status: DISCONTINUED | OUTPATIENT
Start: 2018-08-18 | End: 2018-08-18

## 2018-08-18 RX ORDER — ALBUMIN HUMAN 25 %
50 VIAL (ML) INTRAVENOUS EVERY 8 HOURS
Qty: 0 | Refills: 0 | Status: DISCONTINUED | OUTPATIENT
Start: 2018-08-18 | End: 2018-08-21

## 2018-08-18 RX ORDER — HYDROMORPHONE HYDROCHLORIDE 2 MG/ML
0.5 INJECTION INTRAMUSCULAR; INTRAVENOUS; SUBCUTANEOUS
Qty: 0 | Refills: 0 | Status: DISCONTINUED | OUTPATIENT
Start: 2018-08-18 | End: 2018-08-18

## 2018-08-18 RX ORDER — HYDROMORPHONE HYDROCHLORIDE 2 MG/ML
1 INJECTION INTRAMUSCULAR; INTRAVENOUS; SUBCUTANEOUS
Qty: 0 | Refills: 0 | Status: DISCONTINUED | OUTPATIENT
Start: 2018-08-18 | End: 2018-08-21

## 2018-08-18 RX ORDER — ACETAMINOPHEN 500 MG
1000 TABLET ORAL ONCE
Qty: 0 | Refills: 0 | Status: COMPLETED | OUTPATIENT
Start: 2018-08-18 | End: 2018-08-19

## 2018-08-18 RX ORDER — POTASSIUM CHLORIDE 20 MEQ
40 PACKET (EA) ORAL ONCE
Qty: 0 | Refills: 0 | Status: COMPLETED | OUTPATIENT
Start: 2018-08-18 | End: 2018-08-18

## 2018-08-18 RX ORDER — MAGNESIUM SULFATE 500 MG/ML
2 VIAL (ML) INJECTION ONCE
Qty: 0 | Refills: 0 | Status: COMPLETED | OUTPATIENT
Start: 2018-08-18 | End: 2018-08-18

## 2018-08-18 RX ORDER — POTASSIUM CHLORIDE 20 MEQ
10 PACKET (EA) ORAL
Qty: 0 | Refills: 0 | Status: COMPLETED | OUTPATIENT
Start: 2018-08-18 | End: 2018-08-19

## 2018-08-18 RX ORDER — HYDROMORPHONE HYDROCHLORIDE 2 MG/ML
1 INJECTION INTRAMUSCULAR; INTRAVENOUS; SUBCUTANEOUS ONCE
Qty: 0 | Refills: 0 | Status: DISCONTINUED | OUTPATIENT
Start: 2018-08-18 | End: 2018-08-18

## 2018-08-18 RX ADMIN — HEPARIN SODIUM 5000 UNIT(S): 5000 INJECTION INTRAVENOUS; SUBCUTANEOUS at 13:20

## 2018-08-18 RX ADMIN — HYDROMORPHONE HYDROCHLORIDE 0.5 MILLIGRAM(S): 2 INJECTION INTRAMUSCULAR; INTRAVENOUS; SUBCUTANEOUS at 12:25

## 2018-08-18 RX ADMIN — SODIUM CHLORIDE 3 MILLILITER(S): 9 INJECTION INTRAMUSCULAR; INTRAVENOUS; SUBCUTANEOUS at 05:45

## 2018-08-18 RX ADMIN — Medication 50 MILLIGRAM(S): at 05:43

## 2018-08-18 RX ADMIN — Medication 100 MILLIGRAM(S): at 05:43

## 2018-08-18 RX ADMIN — Medication 2 PUFF(S): at 22:20

## 2018-08-18 RX ADMIN — FENTANYL CITRATE 1 MICROGRAM(S)/KG/HR: 50 INJECTION INTRAVENOUS at 12:59

## 2018-08-18 RX ADMIN — Medication 50 MILLILITER(S): at 02:52

## 2018-08-18 RX ADMIN — PROPOFOL 2.25 MICROGRAM(S)/KG/MIN: 10 INJECTION, EMULSION INTRAVENOUS at 12:57

## 2018-08-18 RX ADMIN — FENTANYL CITRATE 7.5 MICROGRAM(S)/KG/HR: 50 INJECTION INTRAVENOUS at 12:57

## 2018-08-18 RX ADMIN — HYDROMORPHONE HYDROCHLORIDE 1 MILLIGRAM(S): 2 INJECTION INTRAMUSCULAR; INTRAVENOUS; SUBCUTANEOUS at 08:00

## 2018-08-18 RX ADMIN — CHLORHEXIDINE GLUCONATE 1 APPLICATION(S): 213 SOLUTION TOPICAL at 05:44

## 2018-08-18 RX ADMIN — Medication 5 MG/HR: at 12:58

## 2018-08-18 RX ADMIN — Medication 100 MILLIGRAM(S): at 14:00

## 2018-08-18 RX ADMIN — PREGABALIN 1000 MICROGRAM(S): 225 CAPSULE ORAL at 12:53

## 2018-08-18 RX ADMIN — Medication 50 MILLILITER(S): at 22:28

## 2018-08-18 RX ADMIN — PANTOPRAZOLE SODIUM 40 MILLIGRAM(S): 20 TABLET, DELAYED RELEASE ORAL at 12:53

## 2018-08-18 RX ADMIN — PROPOFOL 2.25 MICROGRAM(S)/KG/MIN: 10 INJECTION, EMULSION INTRAVENOUS at 20:05

## 2018-08-18 RX ADMIN — HYDROMORPHONE HYDROCHLORIDE 1 MILLIGRAM(S): 2 INJECTION INTRAMUSCULAR; INTRAVENOUS; SUBCUTANEOUS at 22:52

## 2018-08-18 RX ADMIN — Medication 2.5 MG/HR: at 20:05

## 2018-08-18 RX ADMIN — Medication 40 MILLIEQUIVALENT(S): at 23:57

## 2018-08-18 RX ADMIN — Medication 1 APPLICATION(S): at 13:20

## 2018-08-18 RX ADMIN — Medication 50 MILLILITER(S): at 14:00

## 2018-08-18 RX ADMIN — Medication 1 MILLIGRAM(S): at 12:53

## 2018-08-18 RX ADMIN — FENTANYL CITRATE 50 MICROGRAM(S): 50 INJECTION INTRAVENOUS at 14:00

## 2018-08-18 RX ADMIN — Medication 100 MILLIEQUIVALENT(S): at 06:33

## 2018-08-18 RX ADMIN — Medication 100 MILLIGRAM(S): at 22:29

## 2018-08-18 RX ADMIN — FENTANYL CITRATE 1 MICROGRAM(S)/KG/HR: 50 INJECTION INTRAVENOUS at 20:06

## 2018-08-18 RX ADMIN — FENTANYL CITRATE 7.5 MICROGRAM(S)/KG/HR: 50 INJECTION INTRAVENOUS at 20:06

## 2018-08-18 RX ADMIN — Medication 100 MILLIEQUIVALENT(S): at 23:56

## 2018-08-18 RX ADMIN — Medication 40 MILLIEQUIVALENT(S): at 01:00

## 2018-08-18 RX ADMIN — PROPOFOL 2.25 MICROGRAM(S)/KG/MIN: 10 INJECTION, EMULSION INTRAVENOUS at 02:52

## 2018-08-18 RX ADMIN — Medication 2 PUFF(S): at 04:28

## 2018-08-18 RX ADMIN — CHLORHEXIDINE GLUCONATE 15 MILLILITER(S): 213 SOLUTION TOPICAL at 05:43

## 2018-08-18 RX ADMIN — Medication 100 MILLIGRAM(S): at 12:54

## 2018-08-18 RX ADMIN — FENTANYL CITRATE 50 MICROGRAM(S): 50 INJECTION INTRAVENOUS at 13:22

## 2018-08-18 RX ADMIN — Medication 1 APPLICATION(S): at 05:44

## 2018-08-18 RX ADMIN — Medication 2.5 MG/HR: at 18:44

## 2018-08-18 RX ADMIN — NYSTATIN CREAM 1 APPLICATION(S): 100000 CREAM TOPICAL at 18:44

## 2018-08-18 RX ADMIN — Medication 2 PUFF(S): at 16:03

## 2018-08-18 RX ADMIN — HYDROMORPHONE HYDROCHLORIDE 1 MILLIGRAM(S): 2 INJECTION INTRAMUSCULAR; INTRAVENOUS; SUBCUTANEOUS at 08:45

## 2018-08-18 RX ADMIN — SODIUM CHLORIDE 3 MILLILITER(S): 9 INJECTION INTRAMUSCULAR; INTRAVENOUS; SUBCUTANEOUS at 13:22

## 2018-08-18 RX ADMIN — Medication 50 MILLIGRAM(S): at 14:00

## 2018-08-18 RX ADMIN — Medication 100 MILLIEQUIVALENT(S): at 05:38

## 2018-08-18 RX ADMIN — Medication 250 MILLIGRAM(S): at 12:54

## 2018-08-18 RX ADMIN — HEPARIN SODIUM 5000 UNIT(S): 5000 INJECTION INTRAVENOUS; SUBCUTANEOUS at 22:28

## 2018-08-18 RX ADMIN — HEPARIN SODIUM 5000 UNIT(S): 5000 INJECTION INTRAVENOUS; SUBCUTANEOUS at 05:43

## 2018-08-18 RX ADMIN — NYSTATIN CREAM 1 APPLICATION(S): 100000 CREAM TOPICAL at 05:43

## 2018-08-18 RX ADMIN — Medication 1 APPLICATION(S): at 22:28

## 2018-08-18 RX ADMIN — Medication 50 GRAM(S): at 05:43

## 2018-08-18 RX ADMIN — Medication 2.5 MG/HR: at 22:30

## 2018-08-18 RX ADMIN — HYDROMORPHONE HYDROCHLORIDE 0.5 MILLIGRAM(S): 2 INJECTION INTRAMUSCULAR; INTRAVENOUS; SUBCUTANEOUS at 12:57

## 2018-08-18 RX ADMIN — Medication 50 GRAM(S): at 23:56

## 2018-08-18 RX ADMIN — CHLORHEXIDINE GLUCONATE 15 MILLILITER(S): 213 SOLUTION TOPICAL at 18:44

## 2018-08-18 NOTE — PROCEDURE NOTE - NSPOSTCAREGUIDE_GEN_A_CORE
Care for catheter as per unit/ICU protocols/Verbal/written post procedure instructions were given to patient/caregiver
Care for catheter as per unit/ICU protocols
Verbal/written post procedure instructions were given to patient/caregiver
Verbal/written post procedure instructions were given to patient/caregiver
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
## 2018-08-18 NOTE — PROCEDURE NOTE - NSINFORMCONSENT_GEN_A_CORE
Benefits, risks, and possible complications of procedure explained to patient/caregiver who verbalized understanding and gave written consent.
Benefits, risks, and possible complications of procedure explained to patient/caregiver who verbalized understanding and gave written consent.
This was an emergent procedure.
This was an emergent procedure.

## 2018-08-18 NOTE — PROCEDURE NOTE - NSINDICATIONS_GEN_A_CORE
pleural effusion
antibiotic therapy/venous access
critical illness/venous access/hemodynamic monitoring/volume resuscitation
monitoring purposes/blood sampling

## 2018-08-18 NOTE — PROGRESS NOTE ADULT - SUBJECTIVE AND OBJECTIVE BOX
Infectious Diseases progress note:    Subjective:  Pt awake, L chest tube placed.  Noted recurrent rash today.  Low grade fevers.      ROS:  CONSTITUTIONAL:  No fever, chills, rigors  CARDIOVASCULAR:  No chest pain or palpitations  RESPIRATORY:   No SOB, cough, dyspnea on exertion.  No wheezing  GASTROINTESTINAL:  No abd pain, N/V, diarrhea/constipation  EXTREMITIES:  No swelling or joint pain  GENITOURINARY:  No burning on urination, increased frequency or urgency.  No flank pain  NEUROLOGIC:  No HA, visual disturbances  SKIN: No rashes    Allergies    No Known Allergies    Intolerances        ANTIBIOTICS/RELEVANT:  antimicrobials  metroNIDAZOLE  IVPB 500 milliGRAM(s) IV Intermittent every 8 hours  vancomycin  IVPB 1000 milliGRAM(s) IV Intermittent daily    immunologic:    OTHER:  acetaminophen    Suspension 650 milliGRAM(s) Oral every 6 hours PRN  ALBUTerol    90 MICROgram(s) HFA Inhaler 2 Puff(s) Inhalation every 6 hours PRN  chlorhexidine 0.12% Liquid 15 milliLiter(s) Swish and Spit two times a day  chlorhexidine 4% Liquid 1 Application(s) Topical <User Schedule>  cyanocobalamin 1000 MICROGram(s) Oral daily  fentaNYL   Infusion 1 MICROgram(s)/kG/Hr IV Continuous <Continuous>  folic acid 1 milliGRAM(s) Oral daily  furosemide Infusion 10 mG/Hr IV Continuous <Continuous>  heparin  Injectable 5000 Unit(s) SubCutaneous every 8 hours  HYDROmorphone  Injectable 0.5 milliGRAM(s) IV Push every 10 minutes PRN  insulin lispro (HumaLOG) corrective regimen sliding scale   SubCutaneous every 6 hours  ipratropium 17 MICROgram(s) HFA Inhaler 2 Puff(s) Inhalation every 6 hours  nystatin Powder 1 Application(s) Topical two times a day  ondansetron Injectable 4 milliGRAM(s) IV Push every 6 hours PRN  pantoprazole  Injectable 40 milliGRAM(s) IV Push daily  petrolatum Ophthalmic Ointment 1 Application(s) Both EYES three times a day  propofol Infusion 5 MICROgram(s)/kG/Min IV Continuous <Continuous>  sodium chloride 0.9% lock flush 3 milliLiter(s) IV Push every 8 hours  thiamine 100 milliGRAM(s) Oral daily      Objective:  Vital Signs Last 24 Hrs  T(C): 36.7 (18 Aug 2018 16:00), Max: 38.3 (17 Aug 2018 20:00)  T(F): 98.1 (18 Aug 2018 16:00), Max: 101 (17 Aug 2018 20:00)  HR: 104 (18 Aug 2018 16:08) (93 - 130)  BP: --  BP(mean): --  RR: 17 (18 Aug 2018 16:00) (16 - 35)  SpO2: 96% (18 Aug 2018 16:00) (96% - 100%)    PHYSICAL EXAM:  Constitutional:NAD  Eyes:MEMO, EOMI  Ear/Nose/Throat: no thrush, mucositis.  Moist mucous membranes	  Neck:no JVD, no lymphadenopathy, supple  Respiratory: CTA emi, L chest tube  Cardiovascular: S1S2 RRR, no murmurs  Gastrointestinal:soft, nontender,  nondistended (+) BS  Extremities:no e/e/c  Skin:  diffuse erythematous rash        LABS:                        7.7    21.72 )-----------( 375      ( 18 Aug 2018 02:45 )             25.7     08-18    147<H>  |  114<H>  |  32<H>  ----------------------------<  120<H>  3.5   |  21<L>  |  0.83    Ca    7.9<L>      18 Aug 2018 02:45  Phos  3.0     08-18  Mg     1.9     08-18    TPro  5.9<L>  /  Alb  2.3<L>  /  TBili  3.4<H>  /  DBili  x   /  AST  65<H>  /  ALT  42<H>  /  AlkPhos  1082<H>  08-18    PT/INR - ( 18 Aug 2018 02:45 )   PT: 14.9 SEC;   INR: 1.29          PTT - ( 18 Aug 2018 02:45 )  PTT:34.6 SEC            Vancomycin Level, Trough: 11.2 ug/mL (08-16 @ 03:40)              MICROBIOLOGY:    Culture - Blood (08.16.18 @ 19:02)    Culture - Blood:   NO ORGANISMS ISOLATED  NO ORGANISMS ISOLATED AT 24 HOURS    Specimen Source: BLOOD    Culture - Blood (08.16.18 @ 15:01)    Culture - Blood:   NO ORGANISMS ISOLATED  NO ORGANISMS ISOLATED AT 48 HRS.    Specimen Source: BLOOD    Culture - Blood (08.15.18 @ 17:10)    -  Staphylococcus aureus: + DETECT ALVIN Any isolate of Staphylococcus aureus from a blood culture is  NOT considered a contaminant.    Culture - Blood:   ***Blood Panel PCR results on this specimen are available  approximately 3 hours after the Gram stain result***  Gram stain, PCR, and/or culture results may not always  correspond due to difference in methodologies  ------------------------------------------------------------  This PCR assay was performed using [employer].  The  following targets are tested for:  Enterococcus, vancomycin  resistant enterococci, Listeria monocytogenes,  coagulase  negative staphylococci, S. aureus, methicillin resistant S.  aureus, Streptococcus agalactiae (Group B), S. pneumoniae,  S. pyogenes (Group A), Acinetobacter baumannii, Enterobacter  cloacae, E. coli, Klebsiella oxytoca, K. pneumoniae, Proteus  sp., Serratia marcescens, Haemophilus influenzae, Neisseria  meningitidis, Pseudomonas aeruginosa, Candida albicans, C.  glabrata, C. krusei, C. parapsilosis, C. tropicalis and the  KPC resistance gene.  **NOTE: Due to technical problems, Proteus sp. will NOT be  reported as part of the BCID paneluntil further notice.    -  Gentamicin: S <=1 ALVIN    -  Levofloxacin: S <=0.5 ALVIN    -  Moxifloxacin(Aerobic): S <=0.5 ALVIN    -  Oxacillin: S <=0.25 ALVIN    -  Penicillin: R >8 ALVIN    -  Rifampin: S <=1 ALVIN    -  Tetra/Doxy: S <=1 ALVIN    -  Trimethoprim/Sulfamethoxazole: S <=0.5/9.5 ALVIN    -  Vancomycin: S 2 ALVIN    -  Cefazolin: S <=4 ALVIN    -  Ciprofloxacin: S <=1 ALVIN    -  Clindamycin: S <0.5 ALVIN    -  Erythromycin: S <0.5 ALVIN    Specimen Source: BLOOD    Organism: BLOOD CULTURE PCR    Organism: Staphylococcus aureus    Gram Stain Blood:   ***** CRITICAL RESULT *****  PERSON CALLED / READ-BACK: /JULIA  DATE / TIME CALLED: 08/16/18 1140  CALLED BY: MARKUS BARNES  GPCCL^Gram Pos Cocci In Clusters  AFTER: 25 HOURS INCUBATION  BOTTLE: AEROBIC   ANAEROBIC BOTTLES    Organism Identification: BLOOD CULTURE PCR  Staphylococcus aureus    Method Type: PCR    Method Type: POSITIVE ALVIN 29          RADIOLOGY & ADDITIONAL STUDIES:    < from: Xray Chest 1 View- PORTABLE-Urgent (08.18.18 @ 12:51) >  FINDINGS:     Cardiac silhouette not well evaluated. Right-sided central venous   catheter with tip overlying SVC. Tracheostomy tube. Left-sided pigtail   catheter. Small right pleural effusion. Patchy bilateral lung opacity. No   pneumothorax.    IMPRESSION:   Chest tube placement. No pneumothorax.    < end of copied text >

## 2018-08-18 NOTE — PROCEDURE NOTE - NSPROCDETAILS_GEN_ALL_CORE
sterile technique, catheter placed/location identified, draped/prepped, sterile technique used/sterile dressing applied/supine position/ultrasound guidance
Seldinger technique/sterile dressing applied/supine position/dressing applied/secured in place/percutaneous/thoracostomy tube placed percutaneously
guidewire recovered/lumen(s) aspirated and flushed/sterile dressing applied/ultrasound guidance/sterile technique, catheter placed
location identified, draped/prepped, sterile technique used, needle inserted/introduced/connected to a pressurized flush line/all materials/supplies accounted for at end of procedure/Seldinger technique/positive blood return obtained via catheter/sutured in place/hemostasis with direct pressure, dressing applied/ultrasound guidance

## 2018-08-19 LAB
ALBUMIN SERPL ELPH-MCNC: 2.6 G/DL — LOW (ref 3.3–5)
ALBUMIN SERPL ELPH-MCNC: 2.7 G/DL — LOW (ref 3.3–5)
ALBUMIN SERPL ELPH-MCNC: 2.7 G/DL — LOW (ref 3.3–5)
ALP SERPL-CCNC: 874 U/L — HIGH (ref 40–120)
ALP SERPL-CCNC: 938 U/L — HIGH (ref 40–120)
ALP SERPL-CCNC: 983 U/L — HIGH (ref 40–120)
ALT FLD-CCNC: 34 U/L — SIGNIFICANT CHANGE UP (ref 4–41)
ALT FLD-CCNC: 37 U/L — SIGNIFICANT CHANGE UP (ref 4–41)
ALT FLD-CCNC: 39 U/L — SIGNIFICANT CHANGE UP (ref 4–41)
ANTIBODY ID 1_1: SIGNIFICANT CHANGE UP
ANTIBODY ID 1_2: SIGNIFICANT CHANGE UP
ANTIBODY ID 1_3: SIGNIFICANT CHANGE UP
APTT BLD: 29.9 SEC — SIGNIFICANT CHANGE UP (ref 27.5–37.4)
APTT BLD: 32 SEC — SIGNIFICANT CHANGE UP (ref 27.5–37.4)
AST SERPL-CCNC: 50 U/L — HIGH (ref 4–40)
AST SERPL-CCNC: 59 U/L — HIGH (ref 4–40)
AST SERPL-CCNC: 62 U/L — HIGH (ref 4–40)
BASE EXCESS BLDA CALC-SCNC: -0.3 MMOL/L — SIGNIFICANT CHANGE UP
BASE EXCESS BLDA CALC-SCNC: -1.1 MMOL/L — SIGNIFICANT CHANGE UP
BASE EXCESS BLDA CALC-SCNC: 2 MMOL/L — SIGNIFICANT CHANGE UP
BILIRUB SERPL-MCNC: 2.7 MG/DL — HIGH (ref 0.2–1.2)
BILIRUB SERPL-MCNC: 3 MG/DL — HIGH (ref 0.2–1.2)
BILIRUB SERPL-MCNC: 3 MG/DL — HIGH (ref 0.2–1.2)
BLD GP AB SCN SERPL QL: POSITIVE — SIGNIFICANT CHANGE UP
BUN SERPL-MCNC: 26 MG/DL — HIGH (ref 7–23)
BUN SERPL-MCNC: 27 MG/DL — HIGH (ref 7–23)
BUN SERPL-MCNC: 28 MG/DL — HIGH (ref 7–23)
CA-I BLD-SCNC: 1.18 MMOL/L — SIGNIFICANT CHANGE UP (ref 1.03–1.23)
CA-I BLDA-SCNC: 1.2 MMOL/L — SIGNIFICANT CHANGE UP (ref 1.15–1.29)
CA-I BLDA-SCNC: 1.25 MMOL/L — SIGNIFICANT CHANGE UP (ref 1.15–1.29)
CALCIUM SERPL-MCNC: 8.1 MG/DL — LOW (ref 8.4–10.5)
CALCIUM SERPL-MCNC: 8.4 MG/DL — SIGNIFICANT CHANGE UP (ref 8.4–10.5)
CALCIUM SERPL-MCNC: 8.4 MG/DL — SIGNIFICANT CHANGE UP (ref 8.4–10.5)
CHLORIDE SERPL-SCNC: 109 MMOL/L — HIGH (ref 98–107)
CHLORIDE SERPL-SCNC: 109 MMOL/L — HIGH (ref 98–107)
CHLORIDE SERPL-SCNC: 112 MMOL/L — HIGH (ref 98–107)
CO2 SERPL-SCNC: 22 MMOL/L — SIGNIFICANT CHANGE UP (ref 22–31)
CO2 SERPL-SCNC: 25 MMOL/L — SIGNIFICANT CHANGE UP (ref 22–31)
CO2 SERPL-SCNC: 26 MMOL/L — SIGNIFICANT CHANGE UP (ref 22–31)
CREAT SERPL-MCNC: 0.77 MG/DL — SIGNIFICANT CHANGE UP (ref 0.5–1.3)
CREAT SERPL-MCNC: 0.77 MG/DL — SIGNIFICANT CHANGE UP (ref 0.5–1.3)
CREAT SERPL-MCNC: 0.8 MG/DL — SIGNIFICANT CHANGE UP (ref 0.5–1.3)
DAT C3-SP REAG RBC QL: NEGATIVE — SIGNIFICANT CHANGE UP
DAT POLY-SP REAG RBC QL: POSITIVE — SIGNIFICANT CHANGE UP
DIRECT COOMBS IGG: POSITIVE — SIGNIFICANT CHANGE UP
ELUATE ANTIBODY 1: SIGNIFICANT CHANGE UP
GGT SERPL-CCNC: 1391 U/L — HIGH (ref 8–61)
GLUCOSE BLDA-MCNC: 100 MG/DL — HIGH (ref 70–99)
GLUCOSE BLDA-MCNC: 99 MG/DL — SIGNIFICANT CHANGE UP (ref 70–99)
GLUCOSE SERPL-MCNC: 110 MG/DL — HIGH (ref 70–99)
GLUCOSE SERPL-MCNC: 94 MG/DL — SIGNIFICANT CHANGE UP (ref 70–99)
GLUCOSE SERPL-MCNC: 98 MG/DL — SIGNIFICANT CHANGE UP (ref 70–99)
HCO3 BLDA-SCNC: 23 MMOL/L — SIGNIFICANT CHANGE UP (ref 22–26)
HCO3 BLDA-SCNC: 24 MMOL/L — SIGNIFICANT CHANGE UP (ref 22–26)
HCO3 BLDA-SCNC: 26 MMOL/L — SIGNIFICANT CHANGE UP (ref 22–26)
HCT VFR BLD CALC: 24.7 % — LOW (ref 39–50)
HCT VFR BLD CALC: 25.3 % — LOW (ref 39–50)
HCT VFR BLD CALC: 26.8 % — LOW (ref 39–50)
HCT VFR BLDA CALC: 22 % — LOW (ref 39–51)
HCT VFR BLDA CALC: 22.3 % — LOW (ref 39–51)
HGB BLD-MCNC: 7.1 G/DL — LOW (ref 13–17)
HGB BLD-MCNC: 7.6 G/DL — LOW (ref 13–17)
HGB BLD-MCNC: 8 G/DL — LOW (ref 13–17)
HGB BLDA-MCNC: 7 G/DL — CRITICAL LOW (ref 13–17)
HGB BLDA-MCNC: 7.1 G/DL — LOW (ref 13–17)
INR BLD: 1.11 — SIGNIFICANT CHANGE UP (ref 0.88–1.17)
INR BLD: 1.19 — HIGH (ref 0.88–1.17)
LACTATE BLDA-SCNC: 1 MMOL/L — SIGNIFICANT CHANGE UP (ref 0.5–2)
LACTATE BLDA-SCNC: 1.3 MMOL/L — SIGNIFICANT CHANGE UP (ref 0.5–2)
MAGNESIUM SERPL-MCNC: 1.9 MG/DL — SIGNIFICANT CHANGE UP (ref 1.6–2.6)
MAGNESIUM SERPL-MCNC: 2.1 MG/DL — SIGNIFICANT CHANGE UP (ref 1.6–2.6)
MAGNESIUM SERPL-MCNC: 2.3 MG/DL — SIGNIFICANT CHANGE UP (ref 1.6–2.6)
MCHC RBC-ENTMCNC: 28.7 % — LOW (ref 32–36)
MCHC RBC-ENTMCNC: 29.9 % — LOW (ref 32–36)
MCHC RBC-ENTMCNC: 30 % — LOW (ref 32–36)
MCHC RBC-ENTMCNC: 31 PG — SIGNIFICANT CHANGE UP (ref 27–34)
MCHC RBC-ENTMCNC: 31.7 PG — SIGNIFICANT CHANGE UP (ref 27–34)
MCHC RBC-ENTMCNC: 32.6 PG — SIGNIFICANT CHANGE UP (ref 27–34)
MCV RBC AUTO: 103.9 FL — HIGH (ref 80–100)
MCV RBC AUTO: 108.6 FL — HIGH (ref 80–100)
MCV RBC AUTO: 110.3 FL — HIGH (ref 80–100)
NRBC # FLD: 0.31 — SIGNIFICANT CHANGE UP
NRBC # FLD: 0.32 — SIGNIFICANT CHANGE UP
NRBC # FLD: 0.33 — SIGNIFICANT CHANGE UP
NRBC FLD-RTO: 1.4 — SIGNIFICANT CHANGE UP
NRBC FLD-RTO: 1.6 — SIGNIFICANT CHANGE UP
NRBC FLD-RTO: 1.6 — SIGNIFICANT CHANGE UP
PCO2 BLDA: 44 MMHG — SIGNIFICANT CHANGE UP (ref 35–48)
PCO2 BLDA: 48 MMHG — SIGNIFICANT CHANGE UP (ref 35–48)
PCO2 BLDA: 50 MMHG — HIGH (ref 35–48)
PH BLDA: 7.32 PH — LOW (ref 7.35–7.45)
PH BLDA: 7.32 PH — LOW (ref 7.35–7.45)
PH BLDA: 7.39 PH — SIGNIFICANT CHANGE UP (ref 7.35–7.45)
PHOSPHATE SERPL-MCNC: 3 MG/DL — SIGNIFICANT CHANGE UP (ref 2.5–4.5)
PHOSPHATE SERPL-MCNC: 3.2 MG/DL — SIGNIFICANT CHANGE UP (ref 2.5–4.5)
PHOSPHATE SERPL-MCNC: 3.4 MG/DL — SIGNIFICANT CHANGE UP (ref 2.5–4.5)
PLATELET # BLD AUTO: 324 K/UL — SIGNIFICANT CHANGE UP (ref 150–400)
PLATELET # BLD AUTO: 325 K/UL — SIGNIFICANT CHANGE UP (ref 150–400)
PLATELET # BLD AUTO: 336 K/UL — SIGNIFICANT CHANGE UP (ref 150–400)
PMV BLD: 11.4 FL — SIGNIFICANT CHANGE UP (ref 7–13)
PMV BLD: 11.6 FL — SIGNIFICANT CHANGE UP (ref 7–13)
PMV BLD: 11.9 FL — SIGNIFICANT CHANGE UP (ref 7–13)
PO2 BLDA: 101 MMHG — SIGNIFICANT CHANGE UP (ref 83–108)
PO2 BLDA: 83 MMHG — SIGNIFICANT CHANGE UP (ref 83–108)
PO2 BLDA: 90 MMHG — SIGNIFICANT CHANGE UP (ref 83–108)
POTASSIUM BLDA-SCNC: 3.3 MMOL/L — LOW (ref 3.4–4.5)
POTASSIUM BLDA-SCNC: 3.9 MMOL/L — SIGNIFICANT CHANGE UP (ref 3.4–4.5)
POTASSIUM SERPL-MCNC: 3.3 MMOL/L — LOW (ref 3.5–5.3)
POTASSIUM SERPL-MCNC: 3.7 MMOL/L — SIGNIFICANT CHANGE UP (ref 3.5–5.3)
POTASSIUM SERPL-MCNC: 4 MMOL/L — SIGNIFICANT CHANGE UP (ref 3.5–5.3)
POTASSIUM SERPL-SCNC: 3.3 MMOL/L — LOW (ref 3.5–5.3)
POTASSIUM SERPL-SCNC: 3.7 MMOL/L — SIGNIFICANT CHANGE UP (ref 3.5–5.3)
POTASSIUM SERPL-SCNC: 4 MMOL/L — SIGNIFICANT CHANGE UP (ref 3.5–5.3)
PREALB SERPL-MCNC: 14 MG/DL — LOW (ref 20–40)
PROT SERPL-MCNC: 5.9 G/DL — LOW (ref 6–8.3)
PROT SERPL-MCNC: 6.1 G/DL — SIGNIFICANT CHANGE UP (ref 6–8.3)
PROT SERPL-MCNC: 6.3 G/DL — SIGNIFICANT CHANGE UP (ref 6–8.3)
PROTHROM AB SERPL-ACNC: 12.8 SEC — SIGNIFICANT CHANGE UP (ref 9.8–13.1)
PROTHROM AB SERPL-ACNC: 13.3 SEC — HIGH (ref 9.8–13.1)
RBC # BLD: 2.24 M/UL — LOW (ref 4.2–5.8)
RBC # BLD: 2.33 M/UL — LOW (ref 4.2–5.8)
RBC # BLD: 2.58 M/UL — LOW (ref 4.2–5.8)
RBC # FLD: 17.6 % — HIGH (ref 10.3–14.5)
RBC # FLD: 17.7 % — HIGH (ref 10.3–14.5)
RBC # FLD: 18.5 % — HIGH (ref 10.3–14.5)
RH IG SCN BLD-IMP: NEGATIVE — SIGNIFICANT CHANGE UP
SAO2 % BLDA: 97 % — SIGNIFICANT CHANGE UP (ref 95–99)
SAO2 % BLDA: 98.2 % — SIGNIFICANT CHANGE UP (ref 95–99)
SAO2 % BLDA: 98.2 % — SIGNIFICANT CHANGE UP (ref 95–99)
SODIUM BLDA-SCNC: 147 MMOL/L — HIGH (ref 136–146)
SODIUM BLDA-SCNC: 148 MMOL/L — HIGH (ref 136–146)
SODIUM SERPL-SCNC: 144 MMOL/L — SIGNIFICANT CHANGE UP (ref 135–145)
SODIUM SERPL-SCNC: 147 MMOL/L — HIGH (ref 135–145)
SODIUM SERPL-SCNC: 149 MMOL/L — HIGH (ref 135–145)
WBC # BLD: 20.33 K/UL — HIGH (ref 3.8–10.5)
WBC # BLD: 20.45 K/UL — HIGH (ref 3.8–10.5)
WBC # BLD: 21.85 K/UL — HIGH (ref 3.8–10.5)
WBC # FLD AUTO: 20.33 K/UL — HIGH (ref 3.8–10.5)
WBC # FLD AUTO: 20.45 K/UL — HIGH (ref 3.8–10.5)
WBC # FLD AUTO: 21.85 K/UL — HIGH (ref 3.8–10.5)

## 2018-08-19 PROCEDURE — 99233 SBSQ HOSP IP/OBS HIGH 50: CPT

## 2018-08-19 PROCEDURE — 71045 X-RAY EXAM CHEST 1 VIEW: CPT | Mod: 26,76

## 2018-08-19 PROCEDURE — 86077 PHYS BLOOD BANK SERV XMATCH: CPT

## 2018-08-19 RX ORDER — MIDAZOLAM HYDROCHLORIDE 1 MG/ML
2 INJECTION, SOLUTION INTRAMUSCULAR; INTRAVENOUS ONCE
Qty: 0 | Refills: 0 | Status: DISCONTINUED | OUTPATIENT
Start: 2018-08-19 | End: 2018-08-19

## 2018-08-19 RX ORDER — DEXMEDETOMIDINE HYDROCHLORIDE IN 0.9% SODIUM CHLORIDE 4 UG/ML
0.5 INJECTION INTRAVENOUS
Qty: 200 | Refills: 0 | Status: DISCONTINUED | OUTPATIENT
Start: 2018-08-19 | End: 2018-08-27

## 2018-08-19 RX ORDER — PHENYLEPHRINE HYDROCHLORIDE 10 MG/ML
0.4 INJECTION INTRAVENOUS
Qty: 40 | Refills: 0 | Status: DISCONTINUED | OUTPATIENT
Start: 2018-08-19 | End: 2018-08-19

## 2018-08-19 RX ORDER — POTASSIUM CHLORIDE 20 MEQ
40 PACKET (EA) ORAL ONCE
Qty: 0 | Refills: 0 | Status: COMPLETED | OUTPATIENT
Start: 2018-08-19 | End: 2018-08-19

## 2018-08-19 RX ORDER — MIDAZOLAM HYDROCHLORIDE 1 MG/ML
0.04 INJECTION, SOLUTION INTRAMUSCULAR; INTRAVENOUS
Qty: 100 | Refills: 0 | Status: DISCONTINUED | OUTPATIENT
Start: 2018-08-19 | End: 2018-08-19

## 2018-08-19 RX ORDER — POTASSIUM CHLORIDE 20 MEQ
20 PACKET (EA) ORAL
Qty: 0 | Refills: 0 | Status: COMPLETED | OUTPATIENT
Start: 2018-08-19 | End: 2018-08-19

## 2018-08-19 RX ADMIN — Medication 100 MILLIEQUIVALENT(S): at 01:33

## 2018-08-19 RX ADMIN — DEXMEDETOMIDINE HYDROCHLORIDE IN 0.9% SODIUM CHLORIDE 9.38 MICROGRAM(S)/KG/HR: 4 INJECTION INTRAVENOUS at 20:21

## 2018-08-19 RX ADMIN — Medication 40 MILLIEQUIVALENT(S): at 23:46

## 2018-08-19 RX ADMIN — MIDAZOLAM HYDROCHLORIDE 2 MILLIGRAM(S): 1 INJECTION, SOLUTION INTRAMUSCULAR; INTRAVENOUS at 01:11

## 2018-08-19 RX ADMIN — Medication 250 MILLIGRAM(S): at 13:28

## 2018-08-19 RX ADMIN — MIDAZOLAM HYDROCHLORIDE 3 MG/KG/HR: 1 INJECTION, SOLUTION INTRAMUSCULAR; INTRAVENOUS at 02:25

## 2018-08-19 RX ADMIN — HEPARIN SODIUM 5000 UNIT(S): 5000 INJECTION INTRAVENOUS; SUBCUTANEOUS at 21:04

## 2018-08-19 RX ADMIN — Medication 2 PUFF(S): at 11:11

## 2018-08-19 RX ADMIN — Medication 1 APPLICATION(S): at 05:43

## 2018-08-19 RX ADMIN — HYDROMORPHONE HYDROCHLORIDE 1 MILLIGRAM(S): 2 INJECTION INTRAMUSCULAR; INTRAVENOUS; SUBCUTANEOUS at 13:45

## 2018-08-19 RX ADMIN — Medication 100 MILLIEQUIVALENT(S): at 12:30

## 2018-08-19 RX ADMIN — PROPOFOL 2.25 MICROGRAM(S)/KG/MIN: 10 INJECTION, EMULSION INTRAVENOUS at 07:00

## 2018-08-19 RX ADMIN — HEPARIN SODIUM 5000 UNIT(S): 5000 INJECTION INTRAVENOUS; SUBCUTANEOUS at 05:42

## 2018-08-19 RX ADMIN — Medication 100 MILLIGRAM(S): at 12:11

## 2018-08-19 RX ADMIN — SODIUM CHLORIDE 3 MILLILITER(S): 9 INJECTION INTRAMUSCULAR; INTRAVENOUS; SUBCUTANEOUS at 00:21

## 2018-08-19 RX ADMIN — NYSTATIN CREAM 1 APPLICATION(S): 100000 CREAM TOPICAL at 05:43

## 2018-08-19 RX ADMIN — SODIUM CHLORIDE 3 MILLILITER(S): 9 INJECTION INTRAMUSCULAR; INTRAVENOUS; SUBCUTANEOUS at 05:43

## 2018-08-19 RX ADMIN — Medication 100 MILLIEQUIVALENT(S): at 00:26

## 2018-08-19 RX ADMIN — Medication 2 PUFF(S): at 16:29

## 2018-08-19 RX ADMIN — CHLORHEXIDINE GLUCONATE 15 MILLILITER(S): 213 SOLUTION TOPICAL at 07:21

## 2018-08-19 RX ADMIN — Medication 2.5 MG/HR: at 20:22

## 2018-08-19 RX ADMIN — NYSTATIN CREAM 1 APPLICATION(S): 100000 CREAM TOPICAL at 16:18

## 2018-08-19 RX ADMIN — Medication 100 MILLIGRAM(S): at 15:00

## 2018-08-19 RX ADMIN — Medication 1000 MILLIGRAM(S): at 12:30

## 2018-08-19 RX ADMIN — SODIUM CHLORIDE 3 MILLILITER(S): 9 INJECTION INTRAMUSCULAR; INTRAVENOUS; SUBCUTANEOUS at 21:00

## 2018-08-19 RX ADMIN — PHENYLEPHRINE HYDROCHLORIDE 11.25 MICROGRAM(S)/KG/MIN: 10 INJECTION INTRAVENOUS at 01:30

## 2018-08-19 RX ADMIN — PREGABALIN 1000 MICROGRAM(S): 225 CAPSULE ORAL at 12:11

## 2018-08-19 RX ADMIN — Medication 1 MILLIGRAM(S): at 12:11

## 2018-08-19 RX ADMIN — Medication 200 GRAM(S): at 00:58

## 2018-08-19 RX ADMIN — PANTOPRAZOLE SODIUM 40 MILLIGRAM(S): 20 TABLET, DELAYED RELEASE ORAL at 12:11

## 2018-08-19 RX ADMIN — Medication 400 MILLIGRAM(S): at 12:00

## 2018-08-19 RX ADMIN — Medication 2 PUFF(S): at 04:55

## 2018-08-19 RX ADMIN — Medication 2.5 MG/HR: at 07:00

## 2018-08-19 RX ADMIN — Medication 100 MILLIEQUIVALENT(S): at 11:13

## 2018-08-19 RX ADMIN — SODIUM CHLORIDE 3 MILLILITER(S): 9 INJECTION INTRAMUSCULAR; INTRAVENOUS; SUBCUTANEOUS at 11:23

## 2018-08-19 RX ADMIN — PROPOFOL 2.25 MICROGRAM(S)/KG/MIN: 10 INJECTION, EMULSION INTRAVENOUS at 20:21

## 2018-08-19 RX ADMIN — CHLORHEXIDINE GLUCONATE 1 APPLICATION(S): 213 SOLUTION TOPICAL at 07:21

## 2018-08-19 RX ADMIN — Medication 100 MILLIGRAM(S): at 05:43

## 2018-08-19 RX ADMIN — Medication 2 PUFF(S): at 22:09

## 2018-08-19 RX ADMIN — Medication 100 MILLIGRAM(S): at 21:04

## 2018-08-19 RX ADMIN — HYDROMORPHONE HYDROCHLORIDE 1 MILLIGRAM(S): 2 INJECTION INTRAMUSCULAR; INTRAVENOUS; SUBCUTANEOUS at 14:00

## 2018-08-19 RX ADMIN — PHENYLEPHRINE HYDROCHLORIDE 11.25 MICROGRAM(S)/KG/MIN: 10 INJECTION INTRAVENOUS at 07:00

## 2018-08-19 RX ADMIN — PHENYLEPHRINE HYDROCHLORIDE 11.25 MICROGRAM(S)/KG/MIN: 10 INJECTION INTRAVENOUS at 01:23

## 2018-08-19 RX ADMIN — HEPARIN SODIUM 5000 UNIT(S): 5000 INJECTION INTRAVENOUS; SUBCUTANEOUS at 13:09

## 2018-08-19 RX ADMIN — CHLORHEXIDINE GLUCONATE 15 MILLILITER(S): 213 SOLUTION TOPICAL at 16:18
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
## 2018-08-20 LAB
ALBUMIN SERPL ELPH-MCNC: 2.8 G/DL — LOW (ref 3.3–5)
ALP SERPL-CCNC: 975 U/L — HIGH (ref 40–120)
ALT FLD-CCNC: 39 U/L — SIGNIFICANT CHANGE UP (ref 4–41)
AST SERPL-CCNC: 57 U/L — HIGH (ref 4–40)
BACTERIA BLD CULT: SIGNIFICANT CHANGE UP
BILIRUB DIRECT SERPL-MCNC: 1.5 MG/DL — HIGH (ref 0.1–0.2)
BILIRUB SERPL-MCNC: 2.3 MG/DL — HIGH (ref 0.2–1.2)
BILIRUB SERPL-MCNC: 2.7 MG/DL — HIGH (ref 0.2–1.2)
BUN SERPL-MCNC: 24 MG/DL — HIGH (ref 7–23)
C DIFF TOX GENS STL QL NAA+PROBE: SIGNIFICANT CHANGE UP
CA-I BLD-SCNC: 1.08 MMOL/L — SIGNIFICANT CHANGE UP (ref 1.03–1.23)
CALCIUM SERPL-MCNC: 8.4 MG/DL — SIGNIFICANT CHANGE UP (ref 8.4–10.5)
CHLORIDE SERPL-SCNC: 108 MMOL/L — HIGH (ref 98–107)
CO2 SERPL-SCNC: 23 MMOL/L — SIGNIFICANT CHANGE UP (ref 22–31)
CREAT SERPL-MCNC: 0.7 MG/DL — SIGNIFICANT CHANGE UP (ref 0.5–1.3)
GLUCOSE SERPL-MCNC: 127 MG/DL — HIGH (ref 70–99)
HAPTOGLOB SERPL-MCNC: 152 MG/DL — SIGNIFICANT CHANGE UP (ref 34–200)
HCT VFR BLD CALC: 26.8 % — LOW (ref 39–50)
HGB BLD-MCNC: 8.2 G/DL — LOW (ref 13–17)
LDH SERPL L TO P-CCNC: 232 U/L — HIGH (ref 135–225)
MAGNESIUM SERPL-MCNC: 1.7 MG/DL — SIGNIFICANT CHANGE UP (ref 1.6–2.6)
MCHC RBC-ENTMCNC: 30.6 % — LOW (ref 32–36)
MCHC RBC-ENTMCNC: 31.2 PG — SIGNIFICANT CHANGE UP (ref 27–34)
MCV RBC AUTO: 101.9 FL — HIGH (ref 80–100)
NRBC # FLD: 0.37 — SIGNIFICANT CHANGE UP
NRBC FLD-RTO: 1.9 — SIGNIFICANT CHANGE UP
PHOSPHATE SERPL-MCNC: 3.2 MG/DL — SIGNIFICANT CHANGE UP (ref 2.5–4.5)
PLATELET # BLD AUTO: 337 K/UL — SIGNIFICANT CHANGE UP (ref 150–400)
PMV BLD: 12.2 FL — SIGNIFICANT CHANGE UP (ref 7–13)
POTASSIUM SERPL-MCNC: 3.4 MMOL/L — LOW (ref 3.5–5.3)
POTASSIUM SERPL-SCNC: 3.4 MMOL/L — LOW (ref 3.5–5.3)
PROT SERPL-MCNC: 6.4 G/DL — SIGNIFICANT CHANGE UP (ref 6–8.3)
RBC # BLD: 2.63 M/UL — LOW (ref 4.2–5.8)
RBC # FLD: 19 % — HIGH (ref 10.3–14.5)
SODIUM SERPL-SCNC: 147 MMOL/L — HIGH (ref 135–145)
TRANSFUSION REACTION INTERP 1: SIGNIFICANT CHANGE UP
VANCOMYCIN TROUGH SERPL-MCNC: 7.5 UG/ML — LOW (ref 10–20)
WBC # BLD: 19.65 K/UL — HIGH (ref 3.8–10.5)
WBC # FLD AUTO: 19.65 K/UL — HIGH (ref 3.8–10.5)

## 2018-08-20 PROCEDURE — 86078 PHYS BLOOD BANK SERV REACTJ: CPT

## 2018-08-20 PROCEDURE — 99291 CRITICAL CARE FIRST HOUR: CPT

## 2018-08-20 PROCEDURE — 71045 X-RAY EXAM CHEST 1 VIEW: CPT | Mod: 26

## 2018-08-20 PROCEDURE — 99292 CRITICAL CARE ADDL 30 MIN: CPT

## 2018-08-20 RX ORDER — METOCLOPRAMIDE HCL 10 MG
10 TABLET ORAL ONCE
Qty: 0 | Refills: 0 | Status: COMPLETED | OUTPATIENT
Start: 2018-08-20 | End: 2018-08-20

## 2018-08-20 RX ORDER — POTASSIUM CHLORIDE 20 MEQ
20 PACKET (EA) ORAL
Qty: 0 | Refills: 0 | Status: COMPLETED | OUTPATIENT
Start: 2018-08-20 | End: 2018-08-20

## 2018-08-20 RX ORDER — MAGNESIUM SULFATE 500 MG/ML
2 VIAL (ML) INJECTION
Qty: 0 | Refills: 0 | Status: COMPLETED | OUTPATIENT
Start: 2018-08-20 | End: 2018-08-20

## 2018-08-20 RX ORDER — ACETAMINOPHEN 500 MG
1000 TABLET ORAL ONCE
Qty: 0 | Refills: 0 | Status: COMPLETED | OUTPATIENT
Start: 2018-08-20 | End: 2018-08-20

## 2018-08-20 RX ADMIN — NYSTATIN CREAM 1 APPLICATION(S): 100000 CREAM TOPICAL at 05:14

## 2018-08-20 RX ADMIN — Medication 2 PUFF(S): at 16:21

## 2018-08-20 RX ADMIN — Medication 100 MILLIGRAM(S): at 21:04

## 2018-08-20 RX ADMIN — CHLORHEXIDINE GLUCONATE 1 APPLICATION(S): 213 SOLUTION TOPICAL at 05:14

## 2018-08-20 RX ADMIN — PREGABALIN 1000 MICROGRAM(S): 225 CAPSULE ORAL at 13:08

## 2018-08-20 RX ADMIN — Medication 100 MILLIEQUIVALENT(S): at 06:31

## 2018-08-20 RX ADMIN — PROPOFOL 2.25 MICROGRAM(S)/KG/MIN: 10 INJECTION, EMULSION INTRAVENOUS at 10:41

## 2018-08-20 RX ADMIN — Medication 100 MILLIGRAM(S): at 13:59

## 2018-08-20 RX ADMIN — PANTOPRAZOLE SODIUM 40 MILLIGRAM(S): 20 TABLET, DELAYED RELEASE ORAL at 13:09

## 2018-08-20 RX ADMIN — Medication 2 PUFF(S): at 21:22

## 2018-08-20 RX ADMIN — Medication 2.5 MG/HR: at 19:00

## 2018-08-20 RX ADMIN — CHLORHEXIDINE GLUCONATE 15 MILLILITER(S): 213 SOLUTION TOPICAL at 05:14

## 2018-08-20 RX ADMIN — Medication 50 GRAM(S): at 06:31

## 2018-08-20 RX ADMIN — Medication 400 MILLIGRAM(S): at 05:30

## 2018-08-20 RX ADMIN — Medication 50 GRAM(S): at 08:00

## 2018-08-20 RX ADMIN — SODIUM CHLORIDE 3 MILLILITER(S): 9 INJECTION INTRAMUSCULAR; INTRAVENOUS; SUBCUTANEOUS at 21:04

## 2018-08-20 RX ADMIN — Medication 250 MILLIGRAM(S): at 13:09

## 2018-08-20 RX ADMIN — HEPARIN SODIUM 5000 UNIT(S): 5000 INJECTION INTRAVENOUS; SUBCUTANEOUS at 05:14

## 2018-08-20 RX ADMIN — Medication 2 PUFF(S): at 09:09

## 2018-08-20 RX ADMIN — SODIUM CHLORIDE 3 MILLILITER(S): 9 INJECTION INTRAMUSCULAR; INTRAVENOUS; SUBCUTANEOUS at 05:00

## 2018-08-20 RX ADMIN — HEPARIN SODIUM 5000 UNIT(S): 5000 INJECTION INTRAVENOUS; SUBCUTANEOUS at 13:08

## 2018-08-20 RX ADMIN — DEXMEDETOMIDINE HYDROCHLORIDE IN 0.9% SODIUM CHLORIDE 9.38 MICROGRAM(S)/KG/HR: 4 INJECTION INTRAVENOUS at 19:00

## 2018-08-20 RX ADMIN — CHLORHEXIDINE GLUCONATE 15 MILLILITER(S): 213 SOLUTION TOPICAL at 17:31

## 2018-08-20 RX ADMIN — Medication 650 MILLIGRAM(S): at 02:38

## 2018-08-20 RX ADMIN — PROPOFOL 2.25 MICROGRAM(S)/KG/MIN: 10 INJECTION, EMULSION INTRAVENOUS at 19:00

## 2018-08-20 RX ADMIN — NYSTATIN CREAM 1 APPLICATION(S): 100000 CREAM TOPICAL at 17:31

## 2018-08-20 RX ADMIN — SODIUM CHLORIDE 3 MILLILITER(S): 9 INJECTION INTRAMUSCULAR; INTRAVENOUS; SUBCUTANEOUS at 13:09

## 2018-08-20 RX ADMIN — Medication 100 MILLIGRAM(S): at 05:14

## 2018-08-20 RX ADMIN — Medication 100 MILLIGRAM(S): at 13:09

## 2018-08-20 RX ADMIN — Medication 2 PUFF(S): at 04:04

## 2018-08-20 RX ADMIN — Medication 1 MILLIGRAM(S): at 13:08

## 2018-08-20 RX ADMIN — HEPARIN SODIUM 5000 UNIT(S): 5000 INJECTION INTRAVENOUS; SUBCUTANEOUS at 21:03

## 2018-08-20 RX ADMIN — DEXMEDETOMIDINE HYDROCHLORIDE IN 0.9% SODIUM CHLORIDE 9.38 MICROGRAM(S)/KG/HR: 4 INJECTION INTRAVENOUS at 10:41

## 2018-08-20 RX ADMIN — Medication 10 MILLIGRAM(S): at 06:43

## 2018-08-20 RX ADMIN — Medication 100 MILLIEQUIVALENT(S): at 07:02

## 2018-08-20 NOTE — PROGRESS NOTE ADULT - SUBJECTIVE AND OBJECTIVE BOX
Infectious Diseases progress note:    Subjective:  Pt more awake, alert.  Still having fevers.  WBC slowly downward trending.  Rash resolved    ROS:  CONSTITUTIONAL:  No fever, chills, rigors  CARDIOVASCULAR:  No chest pain or palpitations  RESPIRATORY:   No SOB, cough, dyspnea on exertion.  No wheezing  GASTROINTESTINAL:  No abd pain, N/V, diarrhea/constipation  EXTREMITIES:  No swelling or joint pain  GENITOURINARY:  No burning on urination, increased frequency or urgency.  No flank pain  NEUROLOGIC:  No HA, visual disturbances  SKIN: No rashes    Allergies    No Known Allergies    Intolerances        ANTIBIOTICS/RELEVANT:  antimicrobials  metroNIDAZOLE  IVPB 500 milliGRAM(s) IV Intermittent every 8 hours  vancomycin  IVPB 1000 milliGRAM(s) IV Intermittent daily    immunologic:    OTHER:  acetaminophen    Suspension 650 milliGRAM(s) Oral every 6 hours PRN  albumin human 25% IVPB 50 milliLiter(s) IV Intermittent every 8 hours  ALBUTerol    90 MICROgram(s) HFA Inhaler 2 Puff(s) Inhalation every 6 hours PRN  chlorhexidine 0.12% Liquid 15 milliLiter(s) Swish and Spit two times a day  chlorhexidine 4% Liquid 1 Application(s) Topical <User Schedule>  cyanocobalamin 1000 MICROGram(s) Oral daily  dexmedetomidine Infusion 0.5 MICROgram(s)/kG/Hr IV Continuous <Continuous>  folic acid 1 milliGRAM(s) Oral daily  furosemide Infusion 5 mG/Hr IV Continuous <Continuous>  heparin  Injectable 5000 Unit(s) SubCutaneous every 8 hours  HYDROmorphone  Injectable 1 milliGRAM(s) IV Push every 3 hours PRN  insulin lispro (HumaLOG) corrective regimen sliding scale   SubCutaneous every 6 hours  ipratropium 17 MICROgram(s) HFA Inhaler 2 Puff(s) Inhalation every 6 hours  nystatin Powder 1 Application(s) Topical two times a day  ondansetron Injectable 4 milliGRAM(s) IV Push every 6 hours PRN  pantoprazole  Injectable 40 milliGRAM(s) IV Push daily  propofol Infusion 5 MICROgram(s)/kG/Min IV Continuous <Continuous>  sodium chloride 0.9% lock flush 3 milliLiter(s) IV Push every 8 hours  thiamine 100 milliGRAM(s) Oral daily      Objective:  Vital Signs Last 24 Hrs  T(C): 37.7 (20 Aug 2018 12:00), Max: 38.4 (20 Aug 2018 05:30)  T(F): 99.8 (20 Aug 2018 12:00), Max: 101.1 (20 Aug 2018 05:30)  HR: 74 (20 Aug 2018 16:22) (66 - 101)  BP: 113/75 (20 Aug 2018 16:00) (100/80 - 156/99)  BP(mean): 83 (20 Aug 2018 16:00) (70 - 113)  RR: 23 (20 Aug 2018 16:00) (18 - 36)  SpO2: 100% (20 Aug 2018 16:22) (99% - 100%)    PHYSICAL EXAM:  Constitutional:NAD  Eyes:MEMO, EOMI  Ear/Nose/Throat: no thrush, mucositis.  Moist mucous membranes	  Neck:no JVD, no lymphadenopathy, supple  Respiratory: CTA emi  Cardiovascular: S1S2 RRR, no murmurs  Gastrointestinal:soft, nontender,  nondistended (+) BS  Extremities:no e/e/c  Skin:  no rashes, open wounds or ulcerations        LABS:                        8.2    19.65 )-----------( 337      ( 20 Aug 2018 03:30 )             26.8     08-20    147<H>  |  108<H>  |  24<H>  ----------------------------<  127<H>  3.4<L>   |  23  |  0.70    Ca    8.4      20 Aug 2018 03:30  Phos  3.2     08-20  Mg     1.7     08-20    TPro  x   /  Alb  x   /  TBili  2.3<H>  /  DBili  1.5<H>  /  AST  x   /  ALT  x   /  AlkPhos  x   08-20    PT/INR - ( 19 Aug 2018 16:00 )   PT: 12.8 SEC;   INR: 1.11          PTT - ( 19 Aug 2018 16:00 )  PTT:29.9 SEC            Vancomycin Level, Trough: 7.5 ug/mL (08-20 @ 09:53)  Vancomycin Level, Trough: 11.2 ug/mL (08-16 @ 03:40)              MICROBIOLOGY:    Culture - Blood (08.17.18 @ 23:16)    Culture - Blood:   NO ORGANISMS ISOLATED  NO ORGANISMS ISOLATED AT 48 HRS.    Specimen Source: BLOOD PERIPHERAL    Culture - Blood (08.16.18 @ 19:02)    Culture - Blood:   NO ORGANISMS ISOLATED  NO ORGANISMS ISOLATED AT 72 HRS.    Specimen Source: BLOOD    Culture - Blood (08.16.18 @ 15:01)    Culture - Blood:   NO ORGANISMS ISOLATED  NO ORGANISMS ISOLATED AT 96 HOURS    Specimen Source: BLOOD    Culture - Blood (08.15.18 @ 17:10)    Culture - Blood:   ***Blood Panel PCR results on this specimen are available  approximately 3 hours after the Gram stain result***  Gram stain, PCR, and/or culture results may not always  correspond due to difference in methodologies  ------------------------------------------------------------  This PCR assay was performed using [email or web address].  The  following targets are tested for:  Enterococcus, vancomycin  resistant enterococci, Listeria monocytogenes,  coagulase  negative staphylococci, S. aureus, methicillin resistant S.  aureus, Streptococcus agalactiae (Group B), S. pneumoniae,  S. pyogenes (Group A), Acinetobacter baumannii, Enterobacter  cloacae, E. coli, Klebsiella oxytoca, K. pneumoniae, Proteus  sp., Serratia marcescens, Haemophilus influenzae, Neisseria  meningitidis, Pseudomonas aeruginosa, Candida albicans, C.  glabrata, C. krusei, C. parapsilosis, C. tropicalis and the  KPC resistance gene.  **NOTE: Due to technical problems, Proteus sp. will NOT be  reported as part of the BCID paneluntil further notice.    -  Staphylococcus aureus: + DETECT ALVIN Any isolate of Staphylococcus aureus from a blood culture is  NOT considered a contaminant.    -  Penicillin: R >8 ALVIN    -  Levofloxacin: S <=0.5 ALVIN    -  Cefazolin: S <=4 ALVIN    -  Clindamycin: S <0.5 ALVIN    -  Ciprofloxacin: S <=1 ALVIN    -  Erythromycin: S <0.5 ALVIN    -  Gentamicin: S <=1 ALVIN    -  Moxifloxacin(Aerobic): S <=0.5 ALVIN    -  Oxacillin: S <=0.25 ALVIN    -  Rifampin: S <=1 ALVIN    -  Tetra/Doxy: S <=1 ALVIN    -  Trimethoprim/Sulfamethoxazole: S <=0.5/9.5 ALVIN    -  Vancomycin: S 2 ALVIN    Specimen Source: BLOOD    Organism: BLOOD CULTURE PCR    Organism: Staphylococcus aureus    Gram Stain Blood:   ***** CRITICAL RESULT *****  PERSON CALLED / READ-BACK: /Y  DATE / TIME CALLED: 08/16/18 1140  CALLED BY: MARKUS BARNES  GPCCL^Gram Pos Cocci In Clusters  AFTER: 25 HOURS INCUBATION  BOTTLE: AEROBIC   ANAEROBIC BOTTLES    Organism Identification: BLOOD CULTURE PCR  Staphylococcus aureus    Method Type: POSITIVE ALVIN 29    Method Type: PCR          RADIOLOGY & ADDITIONAL STUDIES:    < from: Xray Chest 1 View- PORTABLE-Routine (08.20.18 @ 07:11) >  IMPRESSION:  Small loculated right pleural effusion with likely   associated passive atelectasis is not significantly changed.    No significant change in bilateral airspace opacity.    Trace left pleural effusion, unchanged.    < end of copied text >        < from: CT Abdomen and Pelvis w/ Oral Cont (08.17.18 @ 14:17) >  IMPRESSION:     Pulmonary edema. Patchy bilateral opacities with left upper lobe   predominance may be superimposed pneumonia.  Large open right posterior chest wound.  Increased ascites.    < end of copied text >

## 2018-08-20 NOTE — PROGRESS NOTE ADULT - SUBJECTIVE AND OBJECTIVE BOX
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 Ventilation)  RR (machine): 12  TV (machine): 500  FiO2: 40  PEEP: 5  MAP: 14  PIP: 40    Mechanical ventilator status assessed & settings reviewed  Continue bronchodilators, pulmonary toilet  Head of bed elevation to 30-40 degrees  Daily vent weaning  trials with CPAP as tolerated    ====================CARDIOVASCULAR=====================  Continue hemodynamic monitoring/ telemetry    ===================== RENAL ============================  Monitor I/Os, BUN/ Cr  and electrolytes  LAsix drip   Keep Pierre for UO monitoring    ==================== GASTROINTESTINAL===================  NPO  PEG feeding held  at 3 am for 3 hours  due to nausea with abd distension  Continue GI prophylaxis with  Protonix  Continue Zofran / Reglan for nausea - PRN	    =======================    ENDOCRIN  =====================  Glycemic monitoring  F/S with coverage  ===================HEMATOLOGIC/ONCOLOGIC =============  Monitor chest tube output. No signs of active bleeding.   Follow CBC, coags    DVT prophylaxis with SCD, sc Heparin    ========================INFECTIOUS DISEASE===============   Monitor for fever / leukocytosis.  F/up Bcx and Cdiff from last night.  Right chest thoracotomy wound dressing to be changed and repacked by thoracic surgery today  Empyema : C/w ABX Aztreonam, Vanco, Flagyl  ID dr Simons on board      Pertinent clinical, laboratory, radiographic, hemodynamic, echocardiographic, respiratory data, microbiologic data and chart were reviewed and analyzed frequently throughout the course of the day and night. GI and DVT prophylaxis, glycemic control, head of bed elevation and skin care issues were addressed.  Patient seen, examined and plan discussed with CT Surgery / CTICU team during rounds.  Pt remains critically ill in imminent risk of  deterioration and requires very careful cardio- pulmonary monitoring and support.    I have spent    90   minutes of critical care time with this pt between 12 am   and  8   am         minutes spent on total encounter; more than 50% of the visit was spent counseling and/or coordinating care by the attending physician.        PRATIK Montes MD
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
## 2018-08-21 LAB
ALBUMIN SERPL ELPH-MCNC: 2.3 G/DL — LOW (ref 3.3–5)
ALBUMIN SERPL ELPH-MCNC: 2.5 G/DL — LOW (ref 3.3–5)
ALP SERPL-CCNC: 795 U/L — HIGH (ref 40–120)
ALP SERPL-CCNC: 830 U/L — HIGH (ref 40–120)
ALT FLD-CCNC: 32 U/L — SIGNIFICANT CHANGE UP (ref 4–41)
ALT FLD-CCNC: 33 U/L — SIGNIFICANT CHANGE UP (ref 4–41)
APTT BLD: 31.4 SEC — SIGNIFICANT CHANGE UP (ref 27.5–37.4)
AST SERPL-CCNC: 46 U/L — HIGH (ref 4–40)
AST SERPL-CCNC: 47 U/L — HIGH (ref 4–40)
BACTERIA BLD CULT: SIGNIFICANT CHANGE UP
BACTERIA BLD CULT: SIGNIFICANT CHANGE UP
BILIRUB DIRECT SERPL-MCNC: 1.4 MG/DL — HIGH (ref 0.1–0.2)
BILIRUB SERPL-MCNC: 2.2 MG/DL — HIGH (ref 0.2–1.2)
BILIRUB SERPL-MCNC: 2.2 MG/DL — HIGH (ref 0.2–1.2)
BUN SERPL-MCNC: 20 MG/DL — SIGNIFICANT CHANGE UP (ref 7–23)
CALCIUM SERPL-MCNC: 7.8 MG/DL — LOW (ref 8.4–10.5)
CALCIUM SERPL-MCNC: 7.9 MG/DL — LOW (ref 8.4–10.5)
CALCIUM SERPL-MCNC: 7.9 MG/DL — LOW (ref 8.4–10.5)
CHLORIDE SERPL-SCNC: 106 MMOL/L — SIGNIFICANT CHANGE UP (ref 98–107)
CHLORIDE SERPL-SCNC: 106 MMOL/L — SIGNIFICANT CHANGE UP (ref 98–107)
CHLORIDE SERPL-SCNC: 107 MMOL/L — SIGNIFICANT CHANGE UP (ref 98–107)
CO2 SERPL-SCNC: 26 MMOL/L — SIGNIFICANT CHANGE UP (ref 22–31)
CO2 SERPL-SCNC: 26 MMOL/L — SIGNIFICANT CHANGE UP (ref 22–31)
CO2 SERPL-SCNC: 27 MMOL/L — SIGNIFICANT CHANGE UP (ref 22–31)
CREAT SERPL-MCNC: 0.59 MG/DL — SIGNIFICANT CHANGE UP (ref 0.5–1.3)
CREAT SERPL-MCNC: 0.63 MG/DL — SIGNIFICANT CHANGE UP (ref 0.5–1.3)
CREAT SERPL-MCNC: 0.64 MG/DL — SIGNIFICANT CHANGE UP (ref 0.5–1.3)
GLUCOSE SERPL-MCNC: 105 MG/DL — HIGH (ref 70–99)
GLUCOSE SERPL-MCNC: 109 MG/DL — HIGH (ref 70–99)
GLUCOSE SERPL-MCNC: 116 MG/DL — HIGH (ref 70–99)
GRAM STN SPT: SIGNIFICANT CHANGE UP
HAPTOGLOB SERPL-MCNC: 143 MG/DL — SIGNIFICANT CHANGE UP (ref 34–200)
HCT VFR BLD CALC: 25.2 % — LOW (ref 39–50)
HGB BLD-MCNC: 7.9 G/DL — LOW (ref 13–17)
INR BLD: 1.11 — SIGNIFICANT CHANGE UP (ref 0.88–1.17)
LDH SERPL L TO P-CCNC: 228 U/L — HIGH (ref 135–225)
LDH SERPL L TO P-CCNC: 254 U/L — HIGH (ref 135–225)
MAGNESIUM SERPL-MCNC: 1.7 MG/DL — SIGNIFICANT CHANGE UP (ref 1.6–2.6)
MAGNESIUM SERPL-MCNC: 1.7 MG/DL — SIGNIFICANT CHANGE UP (ref 1.6–2.6)
MCHC RBC-ENTMCNC: 31.3 % — LOW (ref 32–36)
MCHC RBC-ENTMCNC: 32.4 PG — SIGNIFICANT CHANGE UP (ref 27–34)
MCV RBC AUTO: 103.3 FL — HIGH (ref 80–100)
NRBC # FLD: 0.12 — SIGNIFICANT CHANGE UP
PHOSPHATE SERPL-MCNC: 3.5 MG/DL — SIGNIFICANT CHANGE UP (ref 2.5–4.5)
PHOSPHATE SERPL-MCNC: 3.5 MG/DL — SIGNIFICANT CHANGE UP (ref 2.5–4.5)
PLATELET # BLD AUTO: 322 K/UL — SIGNIFICANT CHANGE UP (ref 150–400)
PMV BLD: 11.6 FL — SIGNIFICANT CHANGE UP (ref 7–13)
POTASSIUM SERPL-MCNC: 2.7 MMOL/L — CRITICAL LOW (ref 3.5–5.3)
POTASSIUM SERPL-MCNC: 2.7 MMOL/L — CRITICAL LOW (ref 3.5–5.3)
POTASSIUM SERPL-MCNC: 3 MMOL/L — LOW (ref 3.5–5.3)
POTASSIUM SERPL-SCNC: 2.7 MMOL/L — CRITICAL LOW (ref 3.5–5.3)
POTASSIUM SERPL-SCNC: 2.7 MMOL/L — CRITICAL LOW (ref 3.5–5.3)
POTASSIUM SERPL-SCNC: 3 MMOL/L — LOW (ref 3.5–5.3)
PROT SERPL-MCNC: 5.9 G/DL — LOW (ref 6–8.3)
PROT SERPL-MCNC: 6.1 G/DL — SIGNIFICANT CHANGE UP (ref 6–8.3)
PROTHROM AB SERPL-ACNC: 12.3 SEC — SIGNIFICANT CHANGE UP (ref 9.8–13.1)
RBC # BLD: 2.44 M/UL — LOW (ref 4.2–5.8)
RBC # FLD: 19.2 % — HIGH (ref 10.3–14.5)
SODIUM SERPL-SCNC: 144 MMOL/L — SIGNIFICANT CHANGE UP (ref 135–145)
SODIUM SERPL-SCNC: 145 MMOL/L — SIGNIFICANT CHANGE UP (ref 135–145)
SODIUM SERPL-SCNC: 146 MMOL/L — HIGH (ref 135–145)
SPECIMEN SOURCE: SIGNIFICANT CHANGE UP
VANCOMYCIN TROUGH SERPL-MCNC: 8.7 UG/ML — LOW (ref 10–20)
WBC # BLD: 17.33 K/UL — HIGH (ref 3.8–10.5)
WBC # FLD AUTO: 17.33 K/UL — HIGH (ref 3.8–10.5)

## 2018-08-21 PROCEDURE — 71045 X-RAY EXAM CHEST 1 VIEW: CPT | Mod: 26

## 2018-08-21 PROCEDURE — 99291 CRITICAL CARE FIRST HOUR: CPT

## 2018-08-21 PROCEDURE — 93306 TTE W/DOPPLER COMPLETE: CPT | Mod: 26

## 2018-08-21 RX ORDER — MAGNESIUM SULFATE 500 MG/ML
2 VIAL (ML) INJECTION ONCE
Qty: 0 | Refills: 0 | Status: COMPLETED | OUTPATIENT
Start: 2018-08-21 | End: 2018-08-21

## 2018-08-21 RX ORDER — POTASSIUM CHLORIDE 20 MEQ
40 PACKET (EA) ORAL ONCE
Qty: 0 | Refills: 0 | Status: COMPLETED | OUTPATIENT
Start: 2018-08-21 | End: 2018-08-21

## 2018-08-21 RX ORDER — POTASSIUM CHLORIDE 20 MEQ
20 PACKET (EA) ORAL
Qty: 0 | Refills: 0 | Status: COMPLETED | OUTPATIENT
Start: 2018-08-21 | End: 2018-08-21

## 2018-08-21 RX ORDER — VANCOMYCIN HCL 1 G
1250 VIAL (EA) INTRAVENOUS EVERY 24 HOURS
Qty: 0 | Refills: 0 | Status: DISCONTINUED | OUTPATIENT
Start: 2018-08-22 | End: 2018-08-25

## 2018-08-21 RX ORDER — SODIUM HYPOCHLORITE 0.125 %
1 SOLUTION, NON-ORAL MISCELLANEOUS DAILY
Qty: 0 | Refills: 0 | Status: DISCONTINUED | OUTPATIENT
Start: 2018-08-21 | End: 2018-08-27

## 2018-08-21 RX ORDER — VANCOMYCIN HCL 1 G
500 VIAL (EA) INTRAVENOUS ONCE
Qty: 0 | Refills: 0 | Status: COMPLETED | OUTPATIENT
Start: 2018-08-21 | End: 2018-08-21

## 2018-08-21 RX ORDER — POTASSIUM CHLORIDE 20 MEQ
20 PACKET (EA) ORAL ONCE
Qty: 0 | Refills: 0 | Status: COMPLETED | OUTPATIENT
Start: 2018-08-21 | End: 2018-08-21

## 2018-08-21 RX ORDER — VANCOMYCIN HCL 1 G
VIAL (EA) INTRAVENOUS
Qty: 0 | Refills: 0 | Status: DISCONTINUED | OUTPATIENT
Start: 2018-08-21 | End: 2018-08-21

## 2018-08-21 RX ADMIN — NYSTATIN CREAM 1 APPLICATION(S): 100000 CREAM TOPICAL at 17:07

## 2018-08-21 RX ADMIN — Medication 100 MILLIGRAM(S): at 11:36

## 2018-08-21 RX ADMIN — Medication 100 MILLIGRAM(S): at 07:24

## 2018-08-21 RX ADMIN — CHLORHEXIDINE GLUCONATE 15 MILLILITER(S): 213 SOLUTION TOPICAL at 17:07

## 2018-08-21 RX ADMIN — Medication 250 MILLIGRAM(S): at 12:32

## 2018-08-21 RX ADMIN — SODIUM CHLORIDE 3 MILLILITER(S): 9 INJECTION INTRAMUSCULAR; INTRAVENOUS; SUBCUTANEOUS at 13:01

## 2018-08-21 RX ADMIN — Medication 2.5 MG/HR: at 20:56

## 2018-08-21 RX ADMIN — SODIUM CHLORIDE 3 MILLILITER(S): 9 INJECTION INTRAMUSCULAR; INTRAVENOUS; SUBCUTANEOUS at 21:36

## 2018-08-21 RX ADMIN — Medication 2.5 MG/HR: at 08:00

## 2018-08-21 RX ADMIN — DEXMEDETOMIDINE HYDROCHLORIDE IN 0.9% SODIUM CHLORIDE 9.38 MICROGRAM(S)/KG/HR: 4 INJECTION INTRAVENOUS at 08:00

## 2018-08-21 RX ADMIN — SODIUM CHLORIDE 3 MILLILITER(S): 9 INJECTION INTRAMUSCULAR; INTRAVENOUS; SUBCUTANEOUS at 05:05

## 2018-08-21 RX ADMIN — DEXMEDETOMIDINE HYDROCHLORIDE IN 0.9% SODIUM CHLORIDE 9.38 MICROGRAM(S)/KG/HR: 4 INJECTION INTRAVENOUS at 20:56

## 2018-08-21 RX ADMIN — HYDROMORPHONE HYDROCHLORIDE 1 MILLIGRAM(S): 2 INJECTION INTRAMUSCULAR; INTRAVENOUS; SUBCUTANEOUS at 12:35

## 2018-08-21 RX ADMIN — NYSTATIN CREAM 1 APPLICATION(S): 100000 CREAM TOPICAL at 05:05

## 2018-08-21 RX ADMIN — Medication 2 PUFF(S): at 21:38

## 2018-08-21 RX ADMIN — Medication 50 GRAM(S): at 07:35

## 2018-08-21 RX ADMIN — Medication 100 MILLIEQUIVALENT(S): at 08:13

## 2018-08-21 RX ADMIN — HEPARIN SODIUM 5000 UNIT(S): 5000 INJECTION INTRAVENOUS; SUBCUTANEOUS at 13:01

## 2018-08-21 RX ADMIN — HYDROMORPHONE HYDROCHLORIDE 1 MILLIGRAM(S): 2 INJECTION INTRAMUSCULAR; INTRAVENOUS; SUBCUTANEOUS at 12:50

## 2018-08-21 RX ADMIN — Medication 100 MILLIGRAM(S): at 13:01

## 2018-08-21 RX ADMIN — Medication 100 MILLIEQUIVALENT(S): at 20:59

## 2018-08-21 RX ADMIN — HEPARIN SODIUM 5000 UNIT(S): 5000 INJECTION INTRAVENOUS; SUBCUTANEOUS at 21:34

## 2018-08-21 RX ADMIN — Medication 100 MILLIGRAM(S): at 22:47

## 2018-08-21 RX ADMIN — Medication 1 MILLIGRAM(S): at 11:36

## 2018-08-21 RX ADMIN — HEPARIN SODIUM 5000 UNIT(S): 5000 INJECTION INTRAVENOUS; SUBCUTANEOUS at 07:24

## 2018-08-21 RX ADMIN — Medication 50 GRAM(S): at 21:34

## 2018-08-21 RX ADMIN — Medication 1 APPLICATION(S): at 12:31

## 2018-08-21 RX ADMIN — CHLORHEXIDINE GLUCONATE 1 APPLICATION(S): 213 SOLUTION TOPICAL at 05:05

## 2018-08-21 RX ADMIN — PANTOPRAZOLE SODIUM 40 MILLIGRAM(S): 20 TABLET, DELAYED RELEASE ORAL at 11:36

## 2018-08-21 RX ADMIN — PROPOFOL 2.25 MICROGRAM(S)/KG/MIN: 10 INJECTION, EMULSION INTRAVENOUS at 08:00

## 2018-08-21 RX ADMIN — CHLORHEXIDINE GLUCONATE 15 MILLILITER(S): 213 SOLUTION TOPICAL at 05:05

## 2018-08-21 RX ADMIN — Medication 40 MILLIEQUIVALENT(S): at 21:43

## 2018-08-21 RX ADMIN — Medication 100 MILLIGRAM(S): at 21:34

## 2018-08-21 RX ADMIN — Medication 2 PUFF(S): at 04:14

## 2018-08-21 RX ADMIN — PREGABALIN 1000 MICROGRAM(S): 225 CAPSULE ORAL at 11:36

## 2018-08-21 RX ADMIN — Medication 100 MILLIEQUIVALENT(S): at 06:10

## 2018-08-21 RX ADMIN — Medication 2 PUFF(S): at 15:28

## 2018-08-21 RX ADMIN — Medication 100 MILLIEQUIVALENT(S): at 04:52

## 2018-08-21 RX ADMIN — Medication 2 PUFF(S): at 10:17

## 2018-08-21 RX ADMIN — Medication 100 MILLIEQUIVALENT(S): at 08:51

## 2018-08-21 NOTE — PROGRESS NOTE ADULT - SUBJECTIVE AND OBJECTIVE BOX
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 and chart were reviewed and analyzed frequently throughout the course of the day and night. GI and DVT prophylaxis, glycemic control, head of bed elevation and skin care issues were addressed.  Patient seen, examined and plan discussed with CT Surgery / CTICU team during rounds.  Pt remains critically ill in imminent risk of  deterioration and requires very careful cardio- pulmonary monitoring and support.    I have spent    60  minutes of critical care time with this pt between  12 am   and   8 am         minutes spent on total encounter; more than 50% of the visit was spent counseling and/or coordinating care by the attending physician.        PRATIK Montes MD

## 2018-08-22 LAB
ALBUMIN SERPL ELPH-MCNC: 2.5 G/DL — LOW (ref 3.3–5)
ALP SERPL-CCNC: 769 U/L — HIGH (ref 40–120)
ALT FLD-CCNC: 32 U/L — SIGNIFICANT CHANGE UP (ref 4–41)
AST SERPL-CCNC: 43 U/L — HIGH (ref 4–40)
BACTERIA BLD CULT: SIGNIFICANT CHANGE UP
BACTERIA SPT RESP CULT: SIGNIFICANT CHANGE UP
BASE EXCESS BLDA CALC-SCNC: 2.9 MMOL/L — SIGNIFICANT CHANGE UP
BASOPHILS # BLD AUTO: 0.07 K/UL — SIGNIFICANT CHANGE UP (ref 0–0.2)
BASOPHILS NFR BLD AUTO: 0.5 % — SIGNIFICANT CHANGE UP (ref 0–2)
BILIRUB SERPL-MCNC: 1.7 MG/DL — HIGH (ref 0.2–1.2)
BUN SERPL-MCNC: 18 MG/DL — SIGNIFICANT CHANGE UP (ref 7–23)
CA-I BLDA-SCNC: 1.14 MMOL/L — LOW (ref 1.15–1.29)
CALCIUM SERPL-MCNC: 7.7 MG/DL — LOW (ref 8.4–10.5)
CHLORIDE SERPL-SCNC: 107 MMOL/L — SIGNIFICANT CHANGE UP (ref 98–107)
CO2 SERPL-SCNC: 27 MMOL/L — SIGNIFICANT CHANGE UP (ref 22–31)
CREAT SERPL-MCNC: 0.59 MG/DL — SIGNIFICANT CHANGE UP (ref 0.5–1.3)
EOSINOPHIL # BLD AUTO: 0.38 K/UL — SIGNIFICANT CHANGE UP (ref 0–0.5)
EOSINOPHIL NFR BLD AUTO: 2.7 % — SIGNIFICANT CHANGE UP (ref 0–6)
GLUCOSE BLDA-MCNC: 145 MG/DL — HIGH (ref 70–99)
GLUCOSE SERPL-MCNC: 129 MG/DL — HIGH (ref 70–99)
HCO3 BLDA-SCNC: 27 MMOL/L — HIGH (ref 22–26)
HCT VFR BLD CALC: 23 % — LOW (ref 39–50)
HCT VFR BLD CALC: 24.9 % — LOW (ref 39–50)
HCT VFR BLDA CALC: 25.8 % — LOW (ref 39–51)
HGB BLD-MCNC: 7.1 G/DL — LOW (ref 13–17)
HGB BLD-MCNC: 7.5 G/DL — LOW (ref 13–17)
HGB BLDA-MCNC: 8.3 G/DL — LOW (ref 13–17)
IMM GRANULOCYTES # BLD AUTO: 0.36 # — SIGNIFICANT CHANGE UP
IMM GRANULOCYTES NFR BLD AUTO: 2.6 % — HIGH (ref 0–1.5)
LACTATE BLDA-SCNC: 1.8 MMOL/L — SIGNIFICANT CHANGE UP (ref 0.5–2)
LYMPHOCYTES # BLD AUTO: 1.6 K/UL — SIGNIFICANT CHANGE UP (ref 1–3.3)
LYMPHOCYTES # BLD AUTO: 11.3 % — LOW (ref 13–44)
MAGNESIUM SERPL-MCNC: 2.1 MG/DL — SIGNIFICANT CHANGE UP (ref 1.6–2.6)
MCHC RBC-ENTMCNC: 30.1 % — LOW (ref 32–36)
MCHC RBC-ENTMCNC: 30.9 % — LOW (ref 32–36)
MCHC RBC-ENTMCNC: 31.3 PG — SIGNIFICANT CHANGE UP (ref 27–34)
MCHC RBC-ENTMCNC: 32.1 PG — SIGNIFICANT CHANGE UP (ref 27–34)
MCV RBC AUTO: 103.8 FL — HIGH (ref 80–100)
MCV RBC AUTO: 104.1 FL — HIGH (ref 80–100)
MONOCYTES # BLD AUTO: 0.7 K/UL — SIGNIFICANT CHANGE UP (ref 0–0.9)
MONOCYTES NFR BLD AUTO: 5 % — SIGNIFICANT CHANGE UP (ref 2–14)
NEUTROPHILS # BLD AUTO: 10.99 K/UL — HIGH (ref 1.8–7.4)
NEUTROPHILS NFR BLD AUTO: 77.9 % — HIGH (ref 43–77)
NRBC # FLD: 0.04 — SIGNIFICANT CHANGE UP
NRBC # FLD: 0.05 — SIGNIFICANT CHANGE UP
PCO2 BLDA: 43 MMHG — SIGNIFICANT CHANGE UP (ref 35–48)
PH BLDA: 7.42 PH — SIGNIFICANT CHANGE UP (ref 7.35–7.45)
PHOSPHATE SERPL-MCNC: 3.1 MG/DL — SIGNIFICANT CHANGE UP (ref 2.5–4.5)
PLATELET # BLD AUTO: 315 K/UL — SIGNIFICANT CHANGE UP (ref 150–400)
PLATELET # BLD AUTO: 321 K/UL — SIGNIFICANT CHANGE UP (ref 150–400)
PMV BLD: 11.6 FL — SIGNIFICANT CHANGE UP (ref 7–13)
PMV BLD: 12 FL — SIGNIFICANT CHANGE UP (ref 7–13)
PO2 BLDA: 108 MMHG — SIGNIFICANT CHANGE UP (ref 83–108)
POTASSIUM BLDA-SCNC: 3.2 MMOL/L — LOW (ref 3.4–4.5)
POTASSIUM SERPL-MCNC: 3.2 MMOL/L — LOW (ref 3.5–5.3)
POTASSIUM SERPL-SCNC: 3.2 MMOL/L — LOW (ref 3.5–5.3)
PREALB SERPL-MCNC: 17 MG/DL — LOW (ref 20–40)
PROT SERPL-MCNC: 6 G/DL — SIGNIFICANT CHANGE UP (ref 6–8.3)
RBC # BLD: 2.21 M/UL — LOW (ref 4.2–5.8)
RBC # BLD: 2.4 M/UL — LOW (ref 4.2–5.8)
RBC # FLD: 19.2 % — HIGH (ref 10.3–14.5)
RBC # FLD: 19.5 % — HIGH (ref 10.3–14.5)
SAO2 % BLDA: 98.1 % — SIGNIFICANT CHANGE UP (ref 95–99)
SODIUM BLDA-SCNC: 144 MMOL/L — SIGNIFICANT CHANGE UP (ref 136–146)
SODIUM SERPL-SCNC: 146 MMOL/L — HIGH (ref 135–145)
WBC # BLD: 14.1 K/UL — HIGH (ref 3.8–10.5)
WBC # BLD: 17.84 K/UL — HIGH (ref 3.8–10.5)
WBC # FLD AUTO: 14.1 K/UL — HIGH (ref 3.8–10.5)
WBC # FLD AUTO: 17.84 K/UL — HIGH (ref 3.8–10.5)

## 2018-08-22 PROCEDURE — 71045 X-RAY EXAM CHEST 1 VIEW: CPT | Mod: 26

## 2018-08-22 PROCEDURE — 74018 RADEX ABDOMEN 1 VIEW: CPT | Mod: 26

## 2018-08-22 PROCEDURE — 99291 CRITICAL CARE FIRST HOUR: CPT

## 2018-08-22 RX ORDER — CALCIUM GLUCONATE 100 MG/ML
1 VIAL (ML) INTRAVENOUS ONCE
Qty: 0 | Refills: 0 | Status: COMPLETED | OUTPATIENT
Start: 2018-08-22 | End: 2018-08-22

## 2018-08-22 RX ORDER — POTASSIUM CHLORIDE 20 MEQ
20 PACKET (EA) ORAL
Qty: 0 | Refills: 0 | Status: COMPLETED | OUTPATIENT
Start: 2018-08-22 | End: 2018-08-22

## 2018-08-22 RX ORDER — METOCLOPRAMIDE HCL 10 MG
10 TABLET ORAL ONCE
Qty: 0 | Refills: 0 | Status: COMPLETED | OUTPATIENT
Start: 2018-08-22 | End: 2018-08-22

## 2018-08-22 RX ORDER — MAGNESIUM SULFATE 500 MG/ML
1 VIAL (ML) INJECTION ONCE
Qty: 0 | Refills: 0 | Status: COMPLETED | OUTPATIENT
Start: 2018-08-22 | End: 2018-08-22

## 2018-08-22 RX ORDER — ACETAMINOPHEN 500 MG
1000 TABLET ORAL ONCE
Qty: 0 | Refills: 0 | Status: COMPLETED | OUTPATIENT
Start: 2018-08-22 | End: 2018-08-22

## 2018-08-22 RX ORDER — POTASSIUM CHLORIDE 20 MEQ
20 PACKET (EA) ORAL
Qty: 0 | Refills: 0 | Status: COMPLETED | OUTPATIENT
Start: 2018-08-22 | End: 2018-08-23

## 2018-08-22 RX ORDER — SODIUM CHLORIDE 9 MG/ML
1000 INJECTION, SOLUTION INTRAVENOUS
Qty: 0 | Refills: 0 | Status: DISCONTINUED | OUTPATIENT
Start: 2018-08-22 | End: 2018-09-11

## 2018-08-22 RX ORDER — POTASSIUM CHLORIDE 20 MEQ
40 PACKET (EA) ORAL ONCE
Qty: 0 | Refills: 0 | Status: COMPLETED | OUTPATIENT
Start: 2018-08-22 | End: 2018-08-22

## 2018-08-22 RX ADMIN — Medication 166.67 MILLIGRAM(S): at 14:11

## 2018-08-22 RX ADMIN — Medication 2 PUFF(S): at 15:19

## 2018-08-22 RX ADMIN — Medication 40 MILLIEQUIVALENT(S): at 08:27

## 2018-08-22 RX ADMIN — Medication 100 GRAM(S): at 21:48

## 2018-08-22 RX ADMIN — CHLORHEXIDINE GLUCONATE 1 APPLICATION(S): 213 SOLUTION TOPICAL at 06:00

## 2018-08-22 RX ADMIN — SODIUM CHLORIDE 3 MILLILITER(S): 9 INJECTION INTRAMUSCULAR; INTRAVENOUS; SUBCUTANEOUS at 15:38

## 2018-08-22 RX ADMIN — Medication 1 MILLIGRAM(S): at 12:36

## 2018-08-22 RX ADMIN — NYSTATIN CREAM 1 APPLICATION(S): 100000 CREAM TOPICAL at 18:43

## 2018-08-22 RX ADMIN — Medication 50 MILLIEQUIVALENT(S): at 14:00

## 2018-08-22 RX ADMIN — NYSTATIN CREAM 1 APPLICATION(S): 100000 CREAM TOPICAL at 06:00

## 2018-08-22 RX ADMIN — Medication 50 MILLIEQUIVALENT(S): at 07:24

## 2018-08-22 RX ADMIN — Medication 100 MILLIGRAM(S): at 14:00

## 2018-08-22 RX ADMIN — Medication 100 MILLIGRAM(S): at 05:59

## 2018-08-22 RX ADMIN — HEPARIN SODIUM 5000 UNIT(S): 5000 INJECTION INTRAVENOUS; SUBCUTANEOUS at 06:00

## 2018-08-22 RX ADMIN — PANTOPRAZOLE SODIUM 40 MILLIGRAM(S): 20 TABLET, DELAYED RELEASE ORAL at 12:35

## 2018-08-22 RX ADMIN — Medication 200 GRAM(S): at 23:19

## 2018-08-22 RX ADMIN — Medication 2.5 MG/HR: at 07:24

## 2018-08-22 RX ADMIN — PREGABALIN 1000 MICROGRAM(S): 225 CAPSULE ORAL at 12:35

## 2018-08-22 RX ADMIN — SODIUM CHLORIDE 3 MILLILITER(S): 9 INJECTION INTRAMUSCULAR; INTRAVENOUS; SUBCUTANEOUS at 06:00

## 2018-08-22 RX ADMIN — SODIUM CHLORIDE 3 MILLILITER(S): 9 INJECTION INTRAMUSCULAR; INTRAVENOUS; SUBCUTANEOUS at 21:05

## 2018-08-22 RX ADMIN — Medication 400 MILLIGRAM(S): at 14:30

## 2018-08-22 RX ADMIN — Medication 50 MILLIEQUIVALENT(S): at 11:12

## 2018-08-22 RX ADMIN — Medication 2 PUFF(S): at 04:10

## 2018-08-22 RX ADMIN — Medication 50 MILLIEQUIVALENT(S): at 23:20

## 2018-08-22 RX ADMIN — CHLORHEXIDINE GLUCONATE 15 MILLILITER(S): 213 SOLUTION TOPICAL at 18:43

## 2018-08-22 RX ADMIN — DEXMEDETOMIDINE HYDROCHLORIDE IN 0.9% SODIUM CHLORIDE 9.38 MICROGRAM(S)/KG/HR: 4 INJECTION INTRAVENOUS at 07:24

## 2018-08-22 RX ADMIN — Medication 100 MILLIGRAM(S): at 21:04

## 2018-08-22 RX ADMIN — Medication 10 MILLIGRAM(S): at 21:04

## 2018-08-22 RX ADMIN — CHLORHEXIDINE GLUCONATE 15 MILLILITER(S): 213 SOLUTION TOPICAL at 05:59

## 2018-08-22 RX ADMIN — Medication 2 PUFF(S): at 10:59

## 2018-08-22 RX ADMIN — Medication 2 PUFF(S): at 23:00

## 2018-08-22 RX ADMIN — HEPARIN SODIUM 5000 UNIT(S): 5000 INJECTION INTRAVENOUS; SUBCUTANEOUS at 21:04

## 2018-08-22 RX ADMIN — Medication 100 MILLIGRAM(S): at 12:36

## 2018-08-22 RX ADMIN — Medication 1000 MILLIGRAM(S): at 14:45

## 2018-08-22 RX ADMIN — HEPARIN SODIUM 5000 UNIT(S): 5000 INJECTION INTRAVENOUS; SUBCUTANEOUS at 15:38

## 2018-08-22 RX ADMIN — Medication 2.5 MG/HR: at 20:14

## 2018-08-22 NOTE — PROGRESS NOTE ADULT - SUBJECTIVE AND OBJECTIVE BOX
Infectious Diseases progress note:    Subjective:  WBC remains elevated.  No new fever.  Abd distended.  Rectal tube with liquid brown stools.      ROS:  CONSTITUTIONAL:  No fever, chills, rigors  CARDIOVASCULAR:  No chest pain or palpitations  RESPIRATORY:   No SOB, cough, dyspnea on exertion.  No wheezing  GASTROINTESTINAL:  No abd pain, N/V, diarrhea/constipation  EXTREMITIES:  No swelling or joint pain  GENITOURINARY:  No burning on urination, increased frequency or urgency.  No flank pain  NEUROLOGIC:  No HA, visual disturbances  SKIN: No rashes    Allergies    No Known Allergies    Intolerances        ANTIBIOTICS/RELEVANT:  antimicrobials  metroNIDAZOLE  IVPB 500 milliGRAM(s) IV Intermittent every 8 hours  vancomycin  IVPB 1250 milliGRAM(s) IV Intermittent every 24 hours    immunologic:    OTHER:  acetaminophen    Suspension 650 milliGRAM(s) Oral every 6 hours PRN  ALBUTerol    90 MICROgram(s) HFA Inhaler 2 Puff(s) Inhalation every 6 hours PRN  chlorhexidine 0.12% Liquid 15 milliLiter(s) Swish and Spit two times a day  chlorhexidine 4% Liquid 1 Application(s) Topical <User Schedule>  cyanocobalamin 1000 MICROGram(s) Oral daily  Dakins Solution - 1/4 Strength 1 Application(s) Topical daily  dexmedetomidine Infusion 0.5 MICROgram(s)/kG/Hr IV Continuous <Continuous>  folic acid 1 milliGRAM(s) Oral daily  furosemide Infusion 5 mG/Hr IV Continuous <Continuous>  heparin  Injectable 5000 Unit(s) SubCutaneous every 8 hours  insulin lispro (HumaLOG) corrective regimen sliding scale   SubCutaneous every 6 hours  ipratropium 17 MICROgram(s) HFA Inhaler 2 Puff(s) Inhalation every 6 hours  nystatin Powder 1 Application(s) Topical two times a day  ondansetron Injectable 4 milliGRAM(s) IV Push every 6 hours PRN  pantoprazole  Injectable 40 milliGRAM(s) IV Push daily  propofol Infusion 5 MICROgram(s)/kG/Min IV Continuous <Continuous>  sodium chloride 0.9% lock flush 3 milliLiter(s) IV Push every 8 hours  thiamine 100 milliGRAM(s) Oral daily      Objective:  Vital Signs Last 24 Hrs  T(C): 37.2 (22 Aug 2018 20:00), Max: 37.3 (22 Aug 2018 12:00)  T(F): 99 (22 Aug 2018 20:00), Max: 99.1 (22 Aug 2018 12:00)  HR: 95 (22 Aug 2018 21:00) (55 - 127)  BP: 123/85 (22 Aug 2018 21:00) (102/57 - 149/75)  BP(mean): 93 (22 Aug 2018 21:00) (68 - 104)  RR: 30 (22 Aug 2018 21:00) (17 - 41)  SpO2: 100% (22 Aug 2018 21:00) (89% - 100%)    PHYSICAL EXAM:  Constitutional:NAD  Eyes:MEMO, EOMI  Ear/Nose/Throat: no thrush, mucositis.  Moist mucous membranes	  Neck:no JVD, no lymphadenopathy, supple, tracheostomy  Respiratory: CTA emi  Cardiovascular: S1S2 RRR, no murmurs  Gastrointestinal:soft, nontender,  distended (+) BS, peg, rectal tube with liquid brown stools  Extremities:no e/e/c  Skin:  no rashes, open wounds or ulcerations        LABS:                        7.5    17.84 )-----------( 321      ( 22 Aug 2018 11:45 )             24.9     08-22    146<H>  |  107  |  18  ----------------------------<  129<H>  3.2<L>   |  27  |  0.59    Ca    7.7<L>      22 Aug 2018 03:30  Phos  3.1     08-22  Mg     2.1     08-22    TPro  6.0  /  Alb  2.5<L>  /  TBili  1.7<H>  /  DBili  x   /  AST  43<H>  /  ALT  32  /  AlkPhos  769<H>  08-22    PT/INR - ( 21 Aug 2018 03:45 )   PT: 12.3 SEC;   INR: 1.11          PTT - ( 21 Aug 2018 03:45 )  PTT:31.4 SEC            Vancomycin Level, Trough: 8.7 ug/mL (08-21 @ 05:20)  Vancomycin Level, Trough: 7.5 ug/mL (08-20 @ 09:53)              MICROBIOLOGY:    Culture - Respiratory with Gram Stain (08.20.18 @ 14:07)    Culture - Respiratory:   NO GROWTH - PRELIMINARY RESULTS  FEW  NRF^Normal Respiratory Margot  QUANTITY OF GROWTH: FEW    Gram Stain Sputum:   Test not performed    Specimen Source: ENDOTRACHEAL SPECIMEN    Culture - Blood (08.20.18 @ 05:05)    Culture - Blood:   NO ORGANISMS ISOLATED  NO ORGANISMS ISOLATED AT 48 HRS.    Specimen Source: BLOOD PERIPHERAL    Culture - Blood (08.20.18 @ 04:53)    Culture - Blood:   NO ORGANISMS ISOLATED  NO ORGANISMS ISOLATED AT 48 HRS.    Specimen Source: BLOOD PERIPHERAL            RADIOLOGY & ADDITIONAL STUDIES:    < from: Xray Chest 1 View- PORTABLE-Routine (08.22.18 @ 06:49) >  IMPRESSION:  Line and tubes as above.    Unchanged small loculated right pleuraleffusion with likely associated   passive atelectasis.    Increased right lung reticular markings appear more pronounced.    Unchanged patchy left lung opacities.    < end of copied text >
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
## 2018-08-22 NOTE — PROGRESS NOTE ADULT - SUBJECTIVE AND OBJECTIVE BOX
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    =======================    ENDOCRIN  =====================  Glycemic monitoring  F/S with coverage  ===================HEMATOLOGIC/ONCOLOGIC =============  Monitor chest tube output. No signs of active bleeding.   Follow CBC, coags    DVT prophylaxis with SCD, sc Heparin      ========================INFECTIOUS DISEASE===============   Monitor for fever / leukocytosis.  F/up Bcx  from previous  night.  Right chest thoracotomy wound dressing to be changed and repacked by thoracic surgery today  Empyema : C/w ABX Aztreonam, Vanco, Flagyl  ID dr Simons on board  ECHO to r/o endocarditis- results negative for visible vegetation      Pertinent clinical, laboratory, radiographic, hemodynamic, echocardiographic, respiratory data, microbiologic data and chart were reviewed and analyzed frequently throughout the course of the day and night. GI and DVT prophylaxis, glycemic control, head of bed elevation and skin care issues were addressed.  Patient seen, examined and plan discussed with CT Surgery / CTICU team during rounds.  Pt remains critically ill in imminent risk of  deterioration and requires very careful cardio- pulmonary monitoring and support.    I have spent   75  minutes of critical care time with this pt between 12 am and   8 am         minutes spent on total encounter; more than 50% of the visit was spent counseling and/or coordinating care by the attending physician.        PRATIK Montes MD

## 2018-08-22 NOTE — CHART NOTE - NSCHARTNOTEFT_GEN_A_CORE
Source:  nursing and EMR    Diet : NPO with tube feed - Nepro carb steady 40 ml/hr 24hrs daily and no carb Pro source 1 /d     Patient with tracheostomy , unable to speak, alert, per nursing tolerating EN , current rate provides adequate nutrition. Sacral pressure ulcer continues .      Enteral : EN provides 1728 kcal & 93 gm protein/d.        Current Weight: - 8/21/18 - 83.3 kg Admit wt. - 75 kg     Pertinent Medications: MEDICATIONS  (STANDING):  acetaminophen  IVPB. 1000 milliGRAM(s) IV Intermittent once  cyanocobalamin 1000 MICROGram(s) Oral daily  Dakins Solution - 1/4 Strength 1 Application(s) Topical daily  dexmedetomidine Infusion 0.5 MICROgram(s)/kG/Hr (9.375 mL/Hr) IV Continuous <Continuous>  diltiazem Injectable 10 milliGRAM(s) IV Push once  folic acid 1 milliGRAM(s) Oral daily  furosemide Infusion 5 mG/Hr (2.5 mL/Hr) IV Continuous <Continuous>  heparin  Injectable 5000 Unit(s) SubCutaneous every 8 hours  insulin lispro (HumaLOG) corrective regimen sliding scale   SubCutaneous every 6 hours  ipratropium 17 MICROgram(s) HFA Inhaler 2 Puff(s) Inhalation every 6 hours  metroNIDAZOLE  IVPB 500 milliGRAM(s) IV Intermittent every 8 hours  nystatin Powder 1 Application(s) Topical two times a day  pantoprazole  Injectable 40 milliGRAM(s) IV Push daily  potassium chloride  20 mEq/100 mL IVPB 20 milliEquivalent(s) IV Intermittent every 2 hours  propofol Infusion 5 MICROgram(s)/kG/Min (2.25 mL/Hr) IV Continuous <Continuous>  sodium chloride 0.9% lock flush 3 milliLiter(s) IV Push every 8 hours  thiamine 100 milliGRAM(s) Oral daily  vancomycin  IVPB 1250 milliGRAM(s) IV Intermittent every 24 hours    MEDICATIONS  (PRN):  acetaminophen    Suspension 650 milliGRAM(s) Oral every 6 hours PRN For Temp greater than 38 C (100.4 F)  ALBUTerol    90 MICROgram(s) HFA Inhaler 2 Puff(s) Inhalation every 6 hours PRN Shortness of Breath and/or Wheezing  ondansetron Injectable 4 milliGRAM(s) IV Push every 6 hours PRN Nausea and/or Vomiting    Pertinent Labs:  08-22 Na146 mmol/L<H> Glu 129 mg/dL<H> K+ 3.2 mmol/L<L> Cr  0.59 mg/dL BUN 18 mg/dL 08-22 Phos 3.1 mg/dL 08-22 Alb 2.5 g/dL<L> 08-22 PAB 17 mg/dL<L> 07-29 [id number] 5.7 %<H>      Estimated Needs:   no change since previous assessment: - 1500 - 1875 kcal /d ( 20 - 25 kcal/kg dry wt. ) protein 75 - 90 gm/d ( 1.0 - 1.2 gm/kg dry wt. )     Recommend to continue current EN     Monitoring and Evaluation:  Tolerance to diet prescription , weights and follow up per protocol
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
## 2018-08-23 LAB
ALBUMIN SERPL ELPH-MCNC: 2.9 G/DL — LOW (ref 3.3–5)
ALP SERPL-CCNC: 718 U/L — HIGH (ref 40–120)
ALT FLD-CCNC: 35 U/L — SIGNIFICANT CHANGE UP (ref 4–41)
AST SERPL-CCNC: 46 U/L — HIGH (ref 4–40)
BILIRUB SERPL-MCNC: 2 MG/DL — HIGH (ref 0.2–1.2)
BUN SERPL-MCNC: 15 MG/DL — SIGNIFICANT CHANGE UP (ref 7–23)
CALCIUM SERPL-MCNC: 8.1 MG/DL — LOW (ref 8.4–10.5)
CHLORIDE SERPL-SCNC: 105 MMOL/L — SIGNIFICANT CHANGE UP (ref 98–107)
CO2 SERPL-SCNC: 29 MMOL/L — SIGNIFICANT CHANGE UP (ref 22–31)
CREAT SERPL-MCNC: 0.56 MG/DL — SIGNIFICANT CHANGE UP (ref 0.5–1.3)
GLUCOSE SERPL-MCNC: 106 MG/DL — HIGH (ref 70–99)
HCT VFR BLD CALC: 22.9 % — LOW (ref 39–50)
HGB BLD-MCNC: 6.9 G/DL — CRITICAL LOW (ref 13–17)
MAGNESIUM SERPL-MCNC: 1.8 MG/DL — SIGNIFICANT CHANGE UP (ref 1.6–2.6)
MCHC RBC-ENTMCNC: 30.1 % — LOW (ref 32–36)
MCHC RBC-ENTMCNC: 31.2 PG — SIGNIFICANT CHANGE UP (ref 27–34)
MCV RBC AUTO: 103.6 FL — HIGH (ref 80–100)
NRBC # FLD: 0.02 — SIGNIFICANT CHANGE UP
PHOSPHATE SERPL-MCNC: 2.9 MG/DL — SIGNIFICANT CHANGE UP (ref 2.5–4.5)
PLATELET # BLD AUTO: 285 K/UL — SIGNIFICANT CHANGE UP (ref 150–400)
PMV BLD: 11.3 FL — SIGNIFICANT CHANGE UP (ref 7–13)
POTASSIUM SERPL-MCNC: 3.9 MMOL/L — SIGNIFICANT CHANGE UP (ref 3.5–5.3)
POTASSIUM SERPL-SCNC: 3.9 MMOL/L — SIGNIFICANT CHANGE UP (ref 3.5–5.3)
PROT SERPL-MCNC: 6.1 G/DL — SIGNIFICANT CHANGE UP (ref 6–8.3)
RBC # BLD: 2.21 M/UL — LOW (ref 4.2–5.8)
RBC # FLD: 20 % — HIGH (ref 10.3–14.5)
SODIUM SERPL-SCNC: 142 MMOL/L — SIGNIFICANT CHANGE UP (ref 135–145)
VANCOMYCIN TROUGH SERPL-MCNC: 7.2 UG/ML — LOW (ref 10–20)
WBC # BLD: 17.87 K/UL — HIGH (ref 3.8–10.5)
WBC # FLD AUTO: 17.87 K/UL — HIGH (ref 3.8–10.5)

## 2018-08-23 PROCEDURE — 99291 CRITICAL CARE FIRST HOUR: CPT

## 2018-08-23 PROCEDURE — 71045 X-RAY EXAM CHEST 1 VIEW: CPT | Mod: 26

## 2018-08-23 RX ORDER — MAGNESIUM SULFATE 500 MG/ML
2 VIAL (ML) INJECTION ONCE
Qty: 0 | Refills: 0 | Status: COMPLETED | OUTPATIENT
Start: 2018-08-23 | End: 2018-08-23

## 2018-08-23 RX ORDER — POTASSIUM CHLORIDE 20 MEQ
10 PACKET (EA) ORAL ONCE
Qty: 0 | Refills: 0 | Status: COMPLETED | OUTPATIENT
Start: 2018-08-23 | End: 2018-08-23

## 2018-08-23 RX ORDER — FENTANYL CITRATE 50 UG/ML
50 INJECTION INTRAVENOUS ONCE
Qty: 0 | Refills: 0 | Status: DISCONTINUED | OUTPATIENT
Start: 2018-08-23 | End: 2018-08-23

## 2018-08-23 RX ORDER — POTASSIUM CHLORIDE 20 MEQ
40 PACKET (EA) ORAL ONCE
Qty: 0 | Refills: 0 | Status: COMPLETED | OUTPATIENT
Start: 2018-08-23 | End: 2018-08-23

## 2018-08-23 RX ADMIN — Medication 50 MILLIEQUIVALENT(S): at 01:07

## 2018-08-23 RX ADMIN — Medication 40 MILLIEQUIVALENT(S): at 12:00

## 2018-08-23 RX ADMIN — Medication 1 APPLICATION(S): at 12:11

## 2018-08-23 RX ADMIN — Medication 100 MILLIGRAM(S): at 13:56

## 2018-08-23 RX ADMIN — PANTOPRAZOLE SODIUM 40 MILLIGRAM(S): 20 TABLET, DELAYED RELEASE ORAL at 13:56

## 2018-08-23 RX ADMIN — HEPARIN SODIUM 5000 UNIT(S): 5000 INJECTION INTRAVENOUS; SUBCUTANEOUS at 05:29

## 2018-08-23 RX ADMIN — Medication 100 MILLIGRAM(S): at 05:29

## 2018-08-23 RX ADMIN — Medication 50 MILLIEQUIVALENT(S): at 02:11

## 2018-08-23 RX ADMIN — NYSTATIN CREAM 1 APPLICATION(S): 100000 CREAM TOPICAL at 05:29

## 2018-08-23 RX ADMIN — Medication 2 PUFF(S): at 22:45

## 2018-08-23 RX ADMIN — Medication 2 PUFF(S): at 09:52

## 2018-08-23 RX ADMIN — CHLORHEXIDINE GLUCONATE 15 MILLILITER(S): 213 SOLUTION TOPICAL at 19:02

## 2018-08-23 RX ADMIN — SODIUM CHLORIDE 3 MILLILITER(S): 9 INJECTION INTRAMUSCULAR; INTRAVENOUS; SUBCUTANEOUS at 05:28

## 2018-08-23 RX ADMIN — SODIUM CHLORIDE 10 MILLILITER(S): 9 INJECTION, SOLUTION INTRAVENOUS at 19:57

## 2018-08-23 RX ADMIN — DEXMEDETOMIDINE HYDROCHLORIDE IN 0.9% SODIUM CHLORIDE 9.38 MICROGRAM(S)/KG/HR: 4 INJECTION INTRAVENOUS at 19:57

## 2018-08-23 RX ADMIN — PREGABALIN 1000 MICROGRAM(S): 225 CAPSULE ORAL at 13:56

## 2018-08-23 RX ADMIN — CHLORHEXIDINE GLUCONATE 1 APPLICATION(S): 213 SOLUTION TOPICAL at 05:29

## 2018-08-23 RX ADMIN — Medication 100 MILLIGRAM(S): at 13:30

## 2018-08-23 RX ADMIN — Medication 100 MILLIEQUIVALENT(S): at 09:48

## 2018-08-23 RX ADMIN — HEPARIN SODIUM 5000 UNIT(S): 5000 INJECTION INTRAVENOUS; SUBCUTANEOUS at 13:56

## 2018-08-23 RX ADMIN — Medication 166.67 MILLIGRAM(S): at 13:50

## 2018-08-23 RX ADMIN — Medication 2.5 MG/HR: at 19:57

## 2018-08-23 RX ADMIN — SODIUM CHLORIDE 3 MILLILITER(S): 9 INJECTION INTRAMUSCULAR; INTRAVENOUS; SUBCUTANEOUS at 22:37

## 2018-08-23 RX ADMIN — Medication 100 MILLIGRAM(S): at 22:31

## 2018-08-23 RX ADMIN — SODIUM CHLORIDE 3 MILLILITER(S): 9 INJECTION INTRAMUSCULAR; INTRAVENOUS; SUBCUTANEOUS at 12:12

## 2018-08-23 RX ADMIN — HEPARIN SODIUM 5000 UNIT(S): 5000 INJECTION INTRAVENOUS; SUBCUTANEOUS at 22:30

## 2018-08-23 RX ADMIN — Medication 2 PUFF(S): at 03:51

## 2018-08-23 RX ADMIN — FENTANYL CITRATE 50 MICROGRAM(S): 50 INJECTION INTRAVENOUS at 12:11

## 2018-08-23 RX ADMIN — Medication 2 PUFF(S): at 15:50

## 2018-08-23 RX ADMIN — NYSTATIN CREAM 1 APPLICATION(S): 100000 CREAM TOPICAL at 19:02

## 2018-08-23 RX ADMIN — Medication 50 GRAM(S): at 08:20

## 2018-08-23 RX ADMIN — FENTANYL CITRATE 50 MICROGRAM(S): 50 INJECTION INTRAVENOUS at 12:00

## 2018-08-23 RX ADMIN — SODIUM CHLORIDE 10 MILLILITER(S): 9 INJECTION, SOLUTION INTRAVENOUS at 01:07

## 2018-08-23 RX ADMIN — Medication 1 MILLIGRAM(S): at 13:55

## 2018-08-23 NOTE — OCCUPATIONAL THERAPY INITIAL EVALUATION ADULT - DIAGNOSIS, OT EVAL
s/p Drainage of lung abscess right upper lobe, Decortication of right lung, & Right thoracotomy; s/p Bronchoscopy with bronchoalveolar lavage and Drainage of hemothorax using video-assisted thoracoscopic surgery (VATS); s/p PEG and Tracheostomy; Decreased standing balance; Decreased functional mobility; Decreased ADL management

## 2018-08-23 NOTE — PHYSICAL THERAPY INITIAL EVALUATION ADULT - DIAGNOSIS, PT EVAL
Deconditioning, decreased strength, decreased balance, decreased endurance, decreased postural control all limiting pts. ability to perform functional mobility
pleural effusion, weakness

## 2018-08-23 NOTE — PHYSICAL THERAPY INITIAL EVALUATION ADULT - IMPAIRMENTS FOUND, PT EVAL
gait, locomotion, and balance/ROM/muscle strength/posture
gait, locomotion, and balance/aerobic capacity/endurance/muscle strength

## 2018-08-23 NOTE — PHYSICAL THERAPY INITIAL EVALUATION ADULT - PLANNED THERAPY INTERVENTIONS, PT EVAL
when medically appropriate/bed mobility training/gait training/strengthening/transfer training
strengthening/transfer training/balance training/bed mobility training/gait training

## 2018-08-23 NOTE — OCCUPATIONAL THERAPY INITIAL EVALUATION ADULT - LIVES WITH, PROFILE
Pt. reports he lives with his wife in an apartment building with steps to enter. Once inside, pt. reports there is an elevator available to reach apartment. Per pt., he has a bathtub in his bathroom.

## 2018-08-23 NOTE — OCCUPATIONAL THERAPY INITIAL EVALUATION ADULT - PERTINENT HX OF CURRENT PROBLEM, REHAB EVAL
Pt is a 42 year old male with no past medical history who presented to  [facility] complaining of right upper quadrant pain radiating to back. CT chest showing multiple pleural loculations some with gas concerning for empyema. Pt transferred to [facility]. (See continued below)

## 2018-08-23 NOTE — PHYSICAL THERAPY INITIAL EVALUATION ADULT - ADDITIONAL COMMENTS
(+) steps at home to enter apartment however, once inside (+) elevator access. Pt. returned seated in chair with all tubes/lines intact, call bell in reach and in NAD. RN bedside

## 2018-08-23 NOTE — PHYSICAL THERAPY INITIAL EVALUATION ADULT - PATIENT PROFILE REVIEW, REHAB EVAL
yes/Pt. profile reviewed, consulted with RN Judi STOKES prior to initial PT evaluation and tx, as per RN, Pt. is OK to participate in skilled therapy session.

## 2018-08-23 NOTE — OCCUPATIONAL THERAPY INITIAL EVALUATION ADULT - GENERAL OBSERVATIONS, REHAB EVAL
Pt. received semisupine in bed. No acute distress. Patient agreed to evaluation from Occupational Therapist. +O2 via Trach, +PEG, +Catheter, +Rectal Tube, +Chest Tube, +IV, +Tele. PT present. RN present. Pt's primary language is Polish; however, pt understands and speaks some English. Second nurse present bedside to translate per pt.'s preference as needed.

## 2018-08-23 NOTE — PHYSICAL THERAPY INITIAL EVALUATION ADULT - DISCHARGE DISPOSITION, PT EVAL
anticipate d/c to sub-acute rehab, however formal recommendation to be determined after gait assessment-->please follow PT notes
rehabilitation facility

## 2018-08-23 NOTE — OCCUPATIONAL THERAPY INITIAL EVALUATION ADULT - PATIENT/FAMILY/SIGNIFICANT OTHER GOALS STATEMENT, OT EVAL
Pt with Trach; however, pt is able to communicate by gestures and "mouthing words." Pt reports he wants to get better.
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
## 2018-08-23 NOTE — PHYSICAL THERAPY INITIAL EVALUATION ADULT - PERTINENT HX OF CURRENT PROBLEM, REHAB EVAL
Pt. is a 42 year old male admitted to [facility] secondary to pleural effusion, S/P tracheostomy, flexible bronchoscopy.
Patient is a 42 year old male admitted to [facility] on 7/25 with right upper quadrant pain radiating to back. Pt with no significant PMH. Per chart, post-op course complicated by sepsis, hypotension, respiratory failure, hemothorax.

## 2018-08-23 NOTE — OCCUPATIONAL THERAPY INITIAL EVALUATION ADULT - ADDITIONAL COMMENTS
(See continued from above). Pt is s/p Drainage of lung abscess right upper lobe, Decortication of right lung, & Right thoracotomy on 7/27/18. On 8/6/18, pt is s/p Bronchoscopy with bronchoalveolar lavage and Drainage of hemothorax using video-assisted thoracoscopic surgery (VATS). Pt is s/p PEG and Tracheostomy on 8/13/18.

## 2018-08-23 NOTE — PHYSICAL THERAPY INITIAL EVALUATION ADULT - GENERAL OBSERVATIONS, REHAB EVAL
Pt. received laying in bed
Patient was received semi-supine in bed in NAD, +Chest tube x3, +Tele, +a-line, +IV. Pt on ventilator. Patient's primary language is Polish, however able to answer "yes/no" to certain questions in English.

## 2018-08-23 NOTE — PHYSICAL THERAPY INITIAL EVALUATION ADULT - MANUAL MUSCLE TESTING RESULTS, REHAB EVAL
Bilateral UE muscle strength grossly 3/5 Throughout; Bilateral LE muscle strength grossly 3/5 Throughout.
bilateral UEs grossly 3-/5 throughout, bilateral ankle DF 3/5/grossly assessed due to

## 2018-08-23 NOTE — OCCUPATIONAL THERAPY INITIAL EVALUATION ADULT - MD ORDER
Occupational Therapy (OT) to evaluate and treat. Per ALKA STOKES, pt is okay to participate in OT evaluation and perform activity as tolerated.

## 2018-08-24 LAB
ANTIBODY ID 1_1: SIGNIFICANT CHANGE UP
ANTIBODY ID 1_2: SIGNIFICANT CHANGE UP
BLD GP AB SCN SERPL QL: POSITIVE — SIGNIFICANT CHANGE UP
BUN SERPL-MCNC: 15 MG/DL — SIGNIFICANT CHANGE UP (ref 7–23)
CALCIUM SERPL-MCNC: 8 MG/DL — LOW (ref 8.4–10.5)
CHLORIDE SERPL-SCNC: 103 MMOL/L — SIGNIFICANT CHANGE UP (ref 98–107)
CO2 SERPL-SCNC: 29 MMOL/L — SIGNIFICANT CHANGE UP (ref 22–31)
CREAT SERPL-MCNC: 0.59 MG/DL — SIGNIFICANT CHANGE UP (ref 0.5–1.3)
DAT C3-SP REAG RBC QL: POSITIVE — SIGNIFICANT CHANGE UP
DAT POLY-SP REAG RBC QL: POSITIVE — SIGNIFICANT CHANGE UP
DIRECT COOMBS IGG: NEGATIVE — SIGNIFICANT CHANGE UP
GLUCOSE SERPL-MCNC: 100 MG/DL — HIGH (ref 70–99)
HCT VFR BLD CALC: 22.5 % — LOW (ref 39–50)
HGB BLD-MCNC: 6.8 G/DL — CRITICAL LOW (ref 13–17)
MAGNESIUM SERPL-MCNC: 1.8 MG/DL — SIGNIFICANT CHANGE UP (ref 1.6–2.6)
MCHC RBC-ENTMCNC: 30.2 % — LOW (ref 32–36)
MCHC RBC-ENTMCNC: 31.9 PG — SIGNIFICANT CHANGE UP (ref 27–34)
MCV RBC AUTO: 105.6 FL — HIGH (ref 80–100)
NRBC # FLD: 0.04 — SIGNIFICANT CHANGE UP
PHOSPHATE SERPL-MCNC: 3.7 MG/DL — SIGNIFICANT CHANGE UP (ref 2.5–4.5)
PLATELET # BLD AUTO: 309 K/UL — SIGNIFICANT CHANGE UP (ref 150–400)
PMV BLD: 11.4 FL — SIGNIFICANT CHANGE UP (ref 7–13)
POTASSIUM SERPL-MCNC: 3.3 MMOL/L — LOW (ref 3.5–5.3)
POTASSIUM SERPL-SCNC: 3.3 MMOL/L — LOW (ref 3.5–5.3)
RBC # BLD: 2.13 M/UL — LOW (ref 4.2–5.8)
RBC # FLD: 20.6 % — HIGH (ref 10.3–14.5)
RH IG SCN BLD-IMP: NEGATIVE — SIGNIFICANT CHANGE UP
SODIUM SERPL-SCNC: 141 MMOL/L — SIGNIFICANT CHANGE UP (ref 135–145)
WBC # BLD: 14.88 K/UL — HIGH (ref 3.8–10.5)
WBC # FLD AUTO: 14.88 K/UL — HIGH (ref 3.8–10.5)

## 2018-08-24 PROCEDURE — 99291 CRITICAL CARE FIRST HOUR: CPT

## 2018-08-24 PROCEDURE — 71045 X-RAY EXAM CHEST 1 VIEW: CPT | Mod: 26

## 2018-08-24 PROCEDURE — 86077 PHYS BLOOD BANK SERV XMATCH: CPT

## 2018-08-24 RX ORDER — ACETAMINOPHEN 500 MG
1000 TABLET ORAL ONCE
Qty: 0 | Refills: 0 | Status: COMPLETED | OUTPATIENT
Start: 2018-08-24 | End: 2018-08-24

## 2018-08-24 RX ORDER — POTASSIUM CHLORIDE 20 MEQ
10 PACKET (EA) ORAL ONCE
Qty: 0 | Refills: 0 | Status: COMPLETED | OUTPATIENT
Start: 2018-08-24 | End: 2018-08-24

## 2018-08-24 RX ORDER — POTASSIUM CHLORIDE 20 MEQ
40 PACKET (EA) ORAL ONCE
Qty: 0 | Refills: 0 | Status: COMPLETED | OUTPATIENT
Start: 2018-08-24 | End: 2018-08-24

## 2018-08-24 RX ORDER — MAGNESIUM SULFATE 500 MG/ML
2 VIAL (ML) INJECTION ONCE
Qty: 0 | Refills: 0 | Status: COMPLETED | OUTPATIENT
Start: 2018-08-24 | End: 2018-08-24

## 2018-08-24 RX ORDER — POTASSIUM CHLORIDE 20 MEQ
20 PACKET (EA) ORAL DAILY
Qty: 0 | Refills: 0 | Status: COMPLETED | OUTPATIENT
Start: 2018-08-24 | End: 2018-08-26

## 2018-08-24 RX ORDER — FUROSEMIDE 40 MG
20 TABLET ORAL
Qty: 0 | Refills: 0 | Status: COMPLETED | OUTPATIENT
Start: 2018-08-24 | End: 2018-08-26

## 2018-08-24 RX ORDER — FENTANYL CITRATE 50 UG/ML
50 INJECTION INTRAVENOUS ONCE
Qty: 0 | Refills: 0 | Status: DISCONTINUED | OUTPATIENT
Start: 2018-08-24 | End: 2018-08-24

## 2018-08-24 RX ADMIN — DEXMEDETOMIDINE HYDROCHLORIDE IN 0.9% SODIUM CHLORIDE 9.38 MICROGRAM(S)/KG/HR: 4 INJECTION INTRAVENOUS at 12:00

## 2018-08-24 RX ADMIN — Medication 2 PUFF(S): at 03:16

## 2018-08-24 RX ADMIN — Medication 2 PUFF(S): at 09:45

## 2018-08-24 RX ADMIN — Medication 2 PUFF(S): at 22:09

## 2018-08-24 RX ADMIN — Medication 20 MILLIEQUIVALENT(S): at 12:02

## 2018-08-24 RX ADMIN — Medication 100 MILLIGRAM(S): at 06:24

## 2018-08-24 RX ADMIN — SODIUM CHLORIDE 3 MILLILITER(S): 9 INJECTION INTRAMUSCULAR; INTRAVENOUS; SUBCUTANEOUS at 13:00

## 2018-08-24 RX ADMIN — CHLORHEXIDINE GLUCONATE 15 MILLILITER(S): 213 SOLUTION TOPICAL at 18:23

## 2018-08-24 RX ADMIN — Medication 40 MILLIEQUIVALENT(S): at 06:39

## 2018-08-24 RX ADMIN — CHLORHEXIDINE GLUCONATE 15 MILLILITER(S): 213 SOLUTION TOPICAL at 06:23

## 2018-08-24 RX ADMIN — Medication 20 MILLIGRAM(S): at 06:39

## 2018-08-24 RX ADMIN — ONDANSETRON 4 MILLIGRAM(S): 8 TABLET, FILM COATED ORAL at 13:49

## 2018-08-24 RX ADMIN — NYSTATIN CREAM 1 APPLICATION(S): 100000 CREAM TOPICAL at 06:24

## 2018-08-24 RX ADMIN — SODIUM CHLORIDE 3 MILLILITER(S): 9 INJECTION INTRAMUSCULAR; INTRAVENOUS; SUBCUTANEOUS at 22:52

## 2018-08-24 RX ADMIN — Medication 1 APPLICATION(S): at 16:40

## 2018-08-24 RX ADMIN — Medication 100 MILLIGRAM(S): at 12:03

## 2018-08-24 RX ADMIN — PANTOPRAZOLE SODIUM 40 MILLIGRAM(S): 20 TABLET, DELAYED RELEASE ORAL at 12:01

## 2018-08-24 RX ADMIN — Medication 100 MILLIGRAM(S): at 22:52

## 2018-08-24 RX ADMIN — PREGABALIN 1000 MICROGRAM(S): 225 CAPSULE ORAL at 12:02

## 2018-08-24 RX ADMIN — CHLORHEXIDINE GLUCONATE 1 APPLICATION(S): 213 SOLUTION TOPICAL at 00:00

## 2018-08-24 RX ADMIN — Medication 100 MILLIEQUIVALENT(S): at 12:01

## 2018-08-24 RX ADMIN — Medication 20 MILLIGRAM(S): at 18:23

## 2018-08-24 RX ADMIN — Medication 1000 MILLIGRAM(S): at 21:30

## 2018-08-24 RX ADMIN — Medication 1 MILLIGRAM(S): at 12:03

## 2018-08-24 RX ADMIN — Medication 50 GRAM(S): at 06:39

## 2018-08-24 RX ADMIN — HEPARIN SODIUM 5000 UNIT(S): 5000 INJECTION INTRAVENOUS; SUBCUTANEOUS at 13:49

## 2018-08-24 RX ADMIN — HEPARIN SODIUM 5000 UNIT(S): 5000 INJECTION INTRAVENOUS; SUBCUTANEOUS at 22:52

## 2018-08-24 RX ADMIN — FENTANYL CITRATE 50 MICROGRAM(S): 50 INJECTION INTRAVENOUS at 16:30

## 2018-08-24 RX ADMIN — SODIUM CHLORIDE 10 MILLILITER(S): 9 INJECTION, SOLUTION INTRAVENOUS at 20:00

## 2018-08-24 RX ADMIN — NYSTATIN CREAM 1 APPLICATION(S): 100000 CREAM TOPICAL at 18:23

## 2018-08-24 RX ADMIN — HEPARIN SODIUM 5000 UNIT(S): 5000 INJECTION INTRAVENOUS; SUBCUTANEOUS at 06:24

## 2018-08-24 RX ADMIN — Medication 100 MILLIGRAM(S): at 13:49

## 2018-08-24 RX ADMIN — Medication 400 MILLIGRAM(S): at 21:00

## 2018-08-24 RX ADMIN — FENTANYL CITRATE 50 MICROGRAM(S): 50 INJECTION INTRAVENOUS at 16:39

## 2018-08-24 RX ADMIN — SODIUM CHLORIDE 3 MILLILITER(S): 9 INJECTION INTRAMUSCULAR; INTRAVENOUS; SUBCUTANEOUS at 06:24

## 2018-08-24 RX ADMIN — Medication 166.67 MILLIGRAM(S): at 11:59

## 2018-08-24 RX ADMIN — Medication 2 PUFF(S): at 15:09

## 2018-08-24 RX ADMIN — SODIUM CHLORIDE 10 MILLILITER(S): 9 INJECTION, SOLUTION INTRAVENOUS at 11:59

## 2018-08-24 NOTE — PROGRESS NOTE ADULT - SUBJECTIVE AND OBJECTIVE BOX
Infectious Diseases progress note:    Subjective: Sitting in chair.  Last fever on 8/20 100.3.  WBC downtrending.  No abd distension or pain today.      ROS:  CONSTITUTIONAL:  No fever, chills, rigors  CARDIOVASCULAR:  No chest pain or palpitations  RESPIRATORY:   No SOB, cough, dyspnea on exertion.  No wheezing  GASTROINTESTINAL:  No abd pain, N/V, diarrhea/constipation  EXTREMITIES:  No swelling or joint pain  GENITOURINARY:  No burning on urination, increased frequency or urgency.  No flank pain  NEUROLOGIC:  No HA, visual disturbances  SKIN: No rashes    Allergies    No Known Allergies    Intolerances        ANTIBIOTICS/RELEVANT:  antimicrobials  metroNIDAZOLE  IVPB 500 milliGRAM(s) IV Intermittent every 8 hours  vancomycin  IVPB 1250 milliGRAM(s) IV Intermittent every 24 hours    immunologic:    OTHER:  acetaminophen    Suspension 650 milliGRAM(s) Oral every 6 hours PRN  ALBUTerol    90 MICROgram(s) HFA Inhaler 2 Puff(s) Inhalation every 6 hours PRN  chlorhexidine 0.12% Liquid 15 milliLiter(s) Swish and Spit two times a day  chlorhexidine 4% Liquid 1 Application(s) Topical <User Schedule>  cyanocobalamin 1000 MICROGram(s) Oral daily  Dakins Solution - 1/4 Strength 1 Application(s) Topical daily  dexmedetomidine Infusion 0.5 MICROgram(s)/kG/Hr IV Continuous <Continuous>  folic acid 1 milliGRAM(s) Oral daily  furosemide   Injectable 20 milliGRAM(s) IV Push two times a day  heparin  Injectable 5000 Unit(s) SubCutaneous every 8 hours  insulin lispro (HumaLOG) corrective regimen sliding scale   SubCutaneous every 6 hours  ipratropium 17 MICROgram(s) HFA Inhaler 2 Puff(s) Inhalation every 6 hours  lactated ringers 1000 milliLiter(s) IV Continuous <Continuous>  nystatin Powder 1 Application(s) Topical two times a day  ondansetron Injectable 4 milliGRAM(s) IV Push every 6 hours PRN  pantoprazole  Injectable 40 milliGRAM(s) IV Push daily  potassium chloride   Powder 20 milliEquivalent(s) Oral daily  propofol Infusion 5 MICROgram(s)/kG/Min IV Continuous <Continuous>  sodium chloride 0.9% lock flush 3 milliLiter(s) IV Push every 8 hours  thiamine 100 milliGRAM(s) Oral daily      Objective:  Vital Signs Last 24 Hrs  T(C): 37.4 (24 Aug 2018 12:00), Max: 37.5 (24 Aug 2018 08:00)  T(F): 99.4 (24 Aug 2018 12:00), Max: 99.5 (24 Aug 2018 08:00)  HR: 85 (24 Aug 2018 15:11) (63 - 105)  BP: 105/70 (24 Aug 2018 14:00) (91/63 - 117/77)  BP(mean): 78 (24 Aug 2018 14:00) (65 - 87)  RR: 26 (24 Aug 2018 14:00) (15 - 28)  SpO2: 99% (24 Aug 2018 15:11) (98% - 100%)    PHYSICAL EXAM:  Constitutional:NAD  Eyes:MEMO, EOMI  Ear/Nose/Throat: no thrush, mucositis.  Moist mucous membranes	  Neck:no JVD, no lymphadenopathy, supple  Respiratory: CTA emi  Cardiovascular: S1S2 RRR, no murmurs  Gastrointestinal:soft, nontender,  nondistended (+) BS, rectal tube with liquid brown stools  Extremities:no e/e/c  Skin:  no rashes, open wounds or ulcerations        LABS:                        6.8    14.88 )-----------( 309      ( 24 Aug 2018 04:15 )             22.5     08-24    141  |  103  |  15  ----------------------------<  100<H>  3.3<L>   |  29  |  0.59    Ca    8.0<L>      24 Aug 2018 04:15  Phos  3.7     08-24  Mg     1.8     08-24    TPro  6.1  /  Alb  2.9<L>  /  TBili  2.0<H>  /  DBili  x   /  AST  46<H>  /  ALT  35  /  AlkPhos  718<H>  08-23                Vancomycin Level, Trough: 7.2 ug/mL (08-23 @ 11:10)  Vancomycin Level, Trough: 8.7 ug/mL (08-21 @ 05:20)  Vancomycin Level, Trough: 7.5 ug/mL (08-20 @ 09:53)              MICROBIOLOGY:    Culture - Respiratory with Gram Stain (08.20.18 @ 14:07)    Culture - Respiratory:   NO GROWTH - PRELIMINARY RESULTS  FEW  NRF^Normal Respiratory Margot  QUANTITY OF GROWTH: FEW    Gram Stain Sputum:   Test not performed    Specimen Source: ENDOTRACHEAL SPECIMEN    Culture - Blood (08.20.18 @ 05:05)    Culture - Blood:   NO ORGANISMS ISOLATED  NO ORGANISMS ISOLATED AT 96 HOURS    Specimen Source: BLOOD PERIPHERAL    Culture - Blood (08.20.18 @ 04:53)    Culture - Blood:   NO ORGANISMS ISOLATED  NO ORGANISMS ISOLATED AT 96 HOURS    Specimen Source: BLOOD PERIPHERAL          RADIOLOGY & ADDITIONAL STUDIES:    < from: Xray Chest 1 View- PORTABLE-Routine (08.24.18 @ 07:05) >  INTERPRETATION:     Tracheostomy tube and right subclavian line are unchanged. Likewise,   pigtail catheter remains at the left lung base and partial resection of   right sixth rib posteriorly is seen. Subclavian line with tip in the SVC   again seen.    Smallloculated right effusion again seen but no pneumothorax. Bilateral   reticular and more confluent opacities likely pulmonary edema may   represent ARDS.      COMPARISON:  August 23      IMPRESSION:  Follow-up showing no significant change.    < end of copied text >
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
## 2018-08-25 LAB
ALBUMIN SERPL ELPH-MCNC: 2.7 G/DL — LOW (ref 3.3–5)
ALP SERPL-CCNC: 577 U/L — HIGH (ref 40–120)
ALT FLD-CCNC: 32 U/L — SIGNIFICANT CHANGE UP (ref 4–41)
AST SERPL-CCNC: 42 U/L — HIGH (ref 4–40)
BACTERIA BLD CULT: SIGNIFICANT CHANGE UP
BACTERIA BLD CULT: SIGNIFICANT CHANGE UP
BILIRUB SERPL-MCNC: 1.4 MG/DL — HIGH (ref 0.2–1.2)
BUN SERPL-MCNC: 18 MG/DL — SIGNIFICANT CHANGE UP (ref 7–23)
CALCIUM SERPL-MCNC: 8 MG/DL — LOW (ref 8.4–10.5)
CHLORIDE SERPL-SCNC: 104 MMOL/L — SIGNIFICANT CHANGE UP (ref 98–107)
CO2 SERPL-SCNC: 28 MMOL/L — SIGNIFICANT CHANGE UP (ref 22–31)
CREAT SERPL-MCNC: 0.62 MG/DL — SIGNIFICANT CHANGE UP (ref 0.5–1.3)
GLUCOSE SERPL-MCNC: 100 MG/DL — HIGH (ref 70–99)
HCT VFR BLD CALC: 24.9 % — LOW (ref 39–50)
HGB BLD-MCNC: 7.6 G/DL — LOW (ref 13–17)
MAGNESIUM SERPL-MCNC: 2.2 MG/DL — SIGNIFICANT CHANGE UP (ref 1.6–2.6)
MCHC RBC-ENTMCNC: 30.5 % — LOW (ref 32–36)
MCHC RBC-ENTMCNC: 31.7 PG — SIGNIFICANT CHANGE UP (ref 27–34)
MCV RBC AUTO: 103.8 FL — HIGH (ref 80–100)
NRBC # FLD: 0.03 — SIGNIFICANT CHANGE UP
PLATELET # BLD AUTO: 265 K/UL — SIGNIFICANT CHANGE UP (ref 150–400)
PMV BLD: 11.9 FL — SIGNIFICANT CHANGE UP (ref 7–13)
POTASSIUM SERPL-MCNC: 3.7 MMOL/L — SIGNIFICANT CHANGE UP (ref 3.5–5.3)
POTASSIUM SERPL-SCNC: 3.7 MMOL/L — SIGNIFICANT CHANGE UP (ref 3.5–5.3)
PROT SERPL-MCNC: 6.1 G/DL — SIGNIFICANT CHANGE UP (ref 6–8.3)
RBC # BLD: 2.4 M/UL — LOW (ref 4.2–5.8)
RBC # FLD: 22.1 % — HIGH (ref 10.3–14.5)
SODIUM SERPL-SCNC: 143 MMOL/L — SIGNIFICANT CHANGE UP (ref 135–145)
VANCOMYCIN TROUGH SERPL-MCNC: 7.9 UG/ML — LOW (ref 10–20)
WBC # BLD: 12.73 K/UL — HIGH (ref 3.8–10.5)
WBC # FLD AUTO: 12.73 K/UL — HIGH (ref 3.8–10.5)

## 2018-08-25 PROCEDURE — 71045 X-RAY EXAM CHEST 1 VIEW: CPT | Mod: 26

## 2018-08-25 PROCEDURE — 99291 CRITICAL CARE FIRST HOUR: CPT

## 2018-08-25 RX ORDER — ALBUMIN HUMAN 25 %
250 VIAL (ML) INTRAVENOUS ONCE
Qty: 0 | Refills: 0 | Status: COMPLETED | OUTPATIENT
Start: 2018-08-25 | End: 2018-08-25

## 2018-08-25 RX ORDER — POTASSIUM CHLORIDE 20 MEQ
10 PACKET (EA) ORAL ONCE
Qty: 0 | Refills: 0 | Status: COMPLETED | OUTPATIENT
Start: 2018-08-25 | End: 2018-08-25

## 2018-08-25 RX ORDER — VANCOMYCIN HCL 1 G
VIAL (EA) INTRAVENOUS
Qty: 0 | Refills: 0 | Status: DISCONTINUED | OUTPATIENT
Start: 2018-08-25 | End: 2018-08-29

## 2018-08-25 RX ORDER — ALPRAZOLAM 0.25 MG
0.25 TABLET ORAL EVERY 8 HOURS
Qty: 0 | Refills: 0 | Status: DISCONTINUED | OUTPATIENT
Start: 2018-08-25 | End: 2018-08-27

## 2018-08-25 RX ORDER — HYDROMORPHONE HYDROCHLORIDE 2 MG/ML
0.5 INJECTION INTRAMUSCULAR; INTRAVENOUS; SUBCUTANEOUS ONCE
Qty: 0 | Refills: 0 | Status: DISCONTINUED | OUTPATIENT
Start: 2018-08-25 | End: 2018-08-25

## 2018-08-25 RX ORDER — VANCOMYCIN HCL 1 G
1000 VIAL (EA) INTRAVENOUS EVERY 12 HOURS
Qty: 0 | Refills: 0 | Status: DISCONTINUED | OUTPATIENT
Start: 2018-08-26 | End: 2018-08-29

## 2018-08-25 RX ORDER — VANCOMYCIN HCL 1 G
1000 VIAL (EA) INTRAVENOUS ONCE
Qty: 0 | Refills: 0 | Status: COMPLETED | OUTPATIENT
Start: 2018-08-25 | End: 2018-08-25

## 2018-08-25 RX ADMIN — PREGABALIN 1000 MICROGRAM(S): 225 CAPSULE ORAL at 15:38

## 2018-08-25 RX ADMIN — CHLORHEXIDINE GLUCONATE 15 MILLILITER(S): 213 SOLUTION TOPICAL at 05:51

## 2018-08-25 RX ADMIN — NYSTATIN CREAM 1 APPLICATION(S): 100000 CREAM TOPICAL at 19:42

## 2018-08-25 RX ADMIN — HEPARIN SODIUM 5000 UNIT(S): 5000 INJECTION INTRAVENOUS; SUBCUTANEOUS at 21:42

## 2018-08-25 RX ADMIN — HEPARIN SODIUM 5000 UNIT(S): 5000 INJECTION INTRAVENOUS; SUBCUTANEOUS at 14:38

## 2018-08-25 RX ADMIN — Medication 100 MILLIGRAM(S): at 15:39

## 2018-08-25 RX ADMIN — Medication 100 MILLIGRAM(S): at 21:42

## 2018-08-25 RX ADMIN — SODIUM CHLORIDE 3 MILLILITER(S): 9 INJECTION INTRAMUSCULAR; INTRAVENOUS; SUBCUTANEOUS at 21:42

## 2018-08-25 RX ADMIN — Medication 20 MILLIEQUIVALENT(S): at 15:39

## 2018-08-25 RX ADMIN — NYSTATIN CREAM 1 APPLICATION(S): 100000 CREAM TOPICAL at 05:51

## 2018-08-25 RX ADMIN — HEPARIN SODIUM 5000 UNIT(S): 5000 INJECTION INTRAVENOUS; SUBCUTANEOUS at 05:53

## 2018-08-25 RX ADMIN — Medication 500 MILLILITER(S): at 02:00

## 2018-08-25 RX ADMIN — HYDROMORPHONE HYDROCHLORIDE 0.5 MILLIGRAM(S): 2 INJECTION INTRAMUSCULAR; INTRAVENOUS; SUBCUTANEOUS at 00:00

## 2018-08-25 RX ADMIN — Medication 0.25 MILLIGRAM(S): at 17:26

## 2018-08-25 RX ADMIN — SODIUM CHLORIDE 3 MILLILITER(S): 9 INJECTION INTRAMUSCULAR; INTRAVENOUS; SUBCUTANEOUS at 05:52

## 2018-08-25 RX ADMIN — SODIUM CHLORIDE 3 MILLILITER(S): 9 INJECTION INTRAMUSCULAR; INTRAVENOUS; SUBCUTANEOUS at 15:39

## 2018-08-25 RX ADMIN — Medication 2 PUFF(S): at 09:44

## 2018-08-25 RX ADMIN — CHLORHEXIDINE GLUCONATE 1 APPLICATION(S): 213 SOLUTION TOPICAL at 05:51

## 2018-08-25 RX ADMIN — Medication 100 MILLIGRAM(S): at 05:51

## 2018-08-25 RX ADMIN — HYDROMORPHONE HYDROCHLORIDE 0.5 MILLIGRAM(S): 2 INJECTION INTRAMUSCULAR; INTRAVENOUS; SUBCUTANEOUS at 00:30

## 2018-08-25 RX ADMIN — SODIUM CHLORIDE 10 MILLILITER(S): 9 INJECTION, SOLUTION INTRAVENOUS at 19:49

## 2018-08-25 RX ADMIN — Medication 20 MILLIGRAM(S): at 19:42

## 2018-08-25 RX ADMIN — Medication 0.25 MILLIGRAM(S): at 21:42

## 2018-08-25 RX ADMIN — Medication 100 MILLIEQUIVALENT(S): at 07:52

## 2018-08-25 RX ADMIN — Medication 2 PUFF(S): at 22:22

## 2018-08-25 RX ADMIN — CHLORHEXIDINE GLUCONATE 15 MILLILITER(S): 213 SOLUTION TOPICAL at 17:26

## 2018-08-25 RX ADMIN — Medication 2 PUFF(S): at 15:16

## 2018-08-25 RX ADMIN — Medication 250 MILLIGRAM(S): at 17:25

## 2018-08-25 RX ADMIN — Medication 1 MILLIGRAM(S): at 15:39

## 2018-08-25 RX ADMIN — Medication 100 MILLIGRAM(S): at 14:39

## 2018-08-25 RX ADMIN — SODIUM CHLORIDE 10 MILLILITER(S): 9 INJECTION, SOLUTION INTRAVENOUS at 12:07

## 2018-08-25 RX ADMIN — PANTOPRAZOLE SODIUM 40 MILLIGRAM(S): 20 TABLET, DELAYED RELEASE ORAL at 13:39

## 2018-08-25 RX ADMIN — Medication 2 PUFF(S): at 03:40

## 2018-08-25 NOTE — PROGRESS NOTE ADULT - SUBJECTIVE AND OBJECTIVE BOX
Infectious Diseases progress note:    Subjective: NAD, afebrile, WBC improving    ROS:  CONSTITUTIONAL:  No fever, chills, rigors  CARDIOVASCULAR:  No chest pain or palpitations  RESPIRATORY:   No SOB, cough, dyspnea on exertion.  No wheezing  GASTROINTESTINAL:  No abd pain, N/V, diarrhea/constipation  EXTREMITIES:  No swelling or joint pain  GENITOURINARY:  No burning on urination, increased frequency or urgency.  No flank pain  NEUROLOGIC:  No HA, visual disturbances  SKIN: No rashes    Allergies    No Known Allergies    Intolerances        ANTIBIOTICS/RELEVANT:  antimicrobials  metroNIDAZOLE  IVPB 500 milliGRAM(s) IV Intermittent every 8 hours  vancomycin  IVPB 1250 milliGRAM(s) IV Intermittent every 24 hours    immunologic:    OTHER:  acetaminophen    Suspension 650 milliGRAM(s) Oral every 6 hours PRN  ALBUTerol    90 MICROgram(s) HFA Inhaler 2 Puff(s) Inhalation every 6 hours PRN  ALPRAZolam 0.25 milliGRAM(s) Oral every 8 hours  chlorhexidine 0.12% Liquid 15 milliLiter(s) Swish and Spit two times a day  chlorhexidine 4% Liquid 1 Application(s) Topical <User Schedule>  cyanocobalamin 1000 MICROGram(s) Oral daily  Dakins Solution - 1/4 Strength 1 Application(s) Topical daily  dexmedetomidine Infusion 0.5 MICROgram(s)/kG/Hr IV Continuous <Continuous>  folic acid 1 milliGRAM(s) Oral daily  furosemide   Injectable 20 milliGRAM(s) IV Push two times a day  heparin  Injectable 5000 Unit(s) SubCutaneous every 8 hours  insulin lispro (HumaLOG) corrective regimen sliding scale   SubCutaneous every 6 hours  ipratropium 17 MICROgram(s) HFA Inhaler 2 Puff(s) Inhalation every 6 hours  lactated ringers 1000 milliLiter(s) IV Continuous <Continuous>  nystatin Powder 1 Application(s) Topical two times a day  ondansetron Injectable 4 milliGRAM(s) IV Push every 6 hours PRN  pantoprazole  Injectable 40 milliGRAM(s) IV Push daily  potassium chloride   Powder 20 milliEquivalent(s) Oral daily  propofol Infusion 5 MICROgram(s)/kG/Min IV Continuous <Continuous>  sodium chloride 0.9% lock flush 3 milliLiter(s) IV Push every 8 hours  thiamine 100 milliGRAM(s) Oral daily      Objective:  Vital Signs Last 24 Hrs  T(C): 37.1 (25 Aug 2018 08:00), Max: 37.2 (24 Aug 2018 16:00)  T(F): 98.7 (25 Aug 2018 08:00), Max: 99 (24 Aug 2018 16:00)  HR: 94 (25 Aug 2018 12:00) (60 - 95)  BP: 108/66 (25 Aug 2018 12:00) (84/23 - 108/66)  BP(mean): 75 (25 Aug 2018 12:00) (39 - 83)  RR: 24 (25 Aug 2018 12:00) (13 - 28)  SpO2: 99% (25 Aug 2018 12:00) (98% - 100%)    PHYSICAL EXAM:  Constitutional:NAD  Eyes:MEMO, EOMI  Ear/Nose/Throat: no thrush, mucositis.  Moist mucous membranes, tracheostomy	  Neck:no JVD, no lymphadenopathy, supple  Respiratory: CTA emi  Cardiovascular: S1S2 RRR, no murmurs  Gastrointestinal:soft, nontender,  nondistended (+) BS, PEG, rectal tube  Extremities:no e/e/c  Skin:  no rashes, open wounds or ulcerations        LABS:                        7.6    12.73 )-----------( 265      ( 25 Aug 2018 02:30 )             24.9     08-25    143  |  104  |  18  ----------------------------<  100<H>  3.7   |  28  |  0.62    Ca    8.0<L>      25 Aug 2018 02:30  Phos  3.7     08-24  Mg     2.2     08-25    TPro  6.1  /  Alb  2.7<L>  /  TBili  1.4<H>  /  DBili  x   /  AST  42<H>  /  ALT  32  /  AlkPhos  577<H>  08-25                Vancomycin Level, Trough: 7.2 ug/mL (08-23 @ 11:10)  Vancomycin Level, Trough: 8.7 ug/mL (08-21 @ 05:20)              MICROBIOLOGY:    Culture - Respiratory with Gram Stain (08.20.18 @ 14:07)    Culture - Respiratory:   NO GROWTH - PRELIMINARY RESULTS  FEW  NRF^Normal Respiratory Margot  QUANTITY OF GROWTH: FEW    Gram Stain Sputum:   Test not performed    Specimen Source: ENDOTRACHEAL SPECIMEN    Culture - Blood (08.20.18 @ 05:05)    Culture - Blood:   NO ORGANISMS ISOLATED    Specimen Source: BLOOD PERIPHERAL    Culture - Blood (08.20.18 @ 04:53)    Culture - Blood:   NO ORGANISMS ISOLATED    Specimen Source: BLOOD PERIPHERAL          RADIOLOGY & ADDITIONAL STUDIES:  < from: Xray Chest 1 View- PORTABLE-Routine (08.24.18 @ 07:05) >  INTERPRETATION:     Tracheostomy tube and right subclavian line are unchanged. Likewise,   pigtail catheter remains at the left lung base and partial resection of   right sixth rib posteriorly is seen. Subclavian line with tip in the SVC   again seen.    Smallloculated right effusion again seen but no pneumothorax. Bilateral   reticular and more confluent opacities likely pulmonary edema may   represent ARDS.      COMPARISON:  August 23      IMPRESSION:  Follow-up showing no significant change.    < end of copied text >
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
## 2018-08-26 LAB
BUN SERPL-MCNC: 14 MG/DL — SIGNIFICANT CHANGE UP (ref 7–23)
CALCIUM SERPL-MCNC: 8.3 MG/DL — LOW (ref 8.4–10.5)
CHLORIDE SERPL-SCNC: 105 MMOL/L — SIGNIFICANT CHANGE UP (ref 98–107)
CO2 SERPL-SCNC: 29 MMOL/L — SIGNIFICANT CHANGE UP (ref 22–31)
CREAT SERPL-MCNC: 0.59 MG/DL — SIGNIFICANT CHANGE UP (ref 0.5–1.3)
GLUCOSE SERPL-MCNC: 103 MG/DL — HIGH (ref 70–99)
HCT VFR BLD CALC: 23.5 % — LOW (ref 39–50)
HGB BLD-MCNC: 7.3 G/DL — LOW (ref 13–17)
MAGNESIUM SERPL-MCNC: 2 MG/DL — SIGNIFICANT CHANGE UP (ref 1.6–2.6)
MCHC RBC-ENTMCNC: 31.1 % — LOW (ref 32–36)
MCHC RBC-ENTMCNC: 31.6 PG — SIGNIFICANT CHANGE UP (ref 27–34)
MCV RBC AUTO: 101.7 FL — HIGH (ref 80–100)
NRBC # FLD: 0 — SIGNIFICANT CHANGE UP
PHOSPHATE SERPL-MCNC: 3.5 MG/DL — SIGNIFICANT CHANGE UP (ref 2.5–4.5)
PLATELET # BLD AUTO: 260 K/UL — SIGNIFICANT CHANGE UP (ref 150–400)
PMV BLD: 11.9 FL — SIGNIFICANT CHANGE UP (ref 7–13)
POTASSIUM SERPL-MCNC: 3.1 MMOL/L — LOW (ref 3.5–5.3)
POTASSIUM SERPL-SCNC: 3.1 MMOL/L — LOW (ref 3.5–5.3)
RBC # BLD: 2.31 M/UL — LOW (ref 4.2–5.8)
RBC # FLD: 23.2 % — HIGH (ref 10.3–14.5)
SODIUM SERPL-SCNC: 144 MMOL/L — SIGNIFICANT CHANGE UP (ref 135–145)
VANCOMYCIN TROUGH SERPL-MCNC: 12.8 UG/ML — SIGNIFICANT CHANGE UP (ref 10–20)
WBC # BLD: 13 K/UL — HIGH (ref 3.8–10.5)
WBC # FLD AUTO: 13 K/UL — HIGH (ref 3.8–10.5)

## 2018-08-26 PROCEDURE — 71045 X-RAY EXAM CHEST 1 VIEW: CPT | Mod: 26

## 2018-08-26 PROCEDURE — 99291 CRITICAL CARE FIRST HOUR: CPT

## 2018-08-26 RX ORDER — POTASSIUM CHLORIDE 20 MEQ
40 PACKET (EA) ORAL ONCE
Qty: 0 | Refills: 0 | Status: DISCONTINUED | OUTPATIENT
Start: 2018-08-26 | End: 2018-08-26

## 2018-08-26 RX ORDER — POTASSIUM CHLORIDE 20 MEQ
20 PACKET (EA) ORAL ONCE
Qty: 0 | Refills: 0 | Status: DISCONTINUED | OUTPATIENT
Start: 2018-08-26 | End: 2018-08-26

## 2018-08-26 RX ORDER — ACETAMINOPHEN 500 MG
1000 TABLET ORAL ONCE
Qty: 0 | Refills: 0 | Status: COMPLETED | OUTPATIENT
Start: 2018-08-26 | End: 2018-08-26

## 2018-08-26 RX ORDER — POTASSIUM CHLORIDE 20 MEQ
20 PACKET (EA) ORAL ONCE
Qty: 0 | Refills: 0 | Status: COMPLETED | OUTPATIENT
Start: 2018-08-26 | End: 2018-08-26

## 2018-08-26 RX ORDER — POTASSIUM CHLORIDE 20 MEQ
40 PACKET (EA) ORAL ONCE
Qty: 0 | Refills: 0 | Status: COMPLETED | OUTPATIENT
Start: 2018-08-26 | End: 2018-08-26

## 2018-08-26 RX ADMIN — Medication 2 PUFF(S): at 23:26

## 2018-08-26 RX ADMIN — Medication 2 PUFF(S): at 16:50

## 2018-08-26 RX ADMIN — PANTOPRAZOLE SODIUM 40 MILLIGRAM(S): 20 TABLET, DELAYED RELEASE ORAL at 12:42

## 2018-08-26 RX ADMIN — Medication 20 MILLIEQUIVALENT(S): at 12:42

## 2018-08-26 RX ADMIN — Medication 1000 MILLIGRAM(S): at 10:15

## 2018-08-26 RX ADMIN — NYSTATIN CREAM 1 APPLICATION(S): 100000 CREAM TOPICAL at 06:47

## 2018-08-26 RX ADMIN — Medication 250 MILLIGRAM(S): at 06:23

## 2018-08-26 RX ADMIN — CHLORHEXIDINE GLUCONATE 15 MILLILITER(S): 213 SOLUTION TOPICAL at 17:16

## 2018-08-26 RX ADMIN — Medication 100 MILLIGRAM(S): at 06:24

## 2018-08-26 RX ADMIN — Medication 40 MILLIEQUIVALENT(S): at 07:51

## 2018-08-26 RX ADMIN — SODIUM CHLORIDE 10 MILLILITER(S): 9 INJECTION, SOLUTION INTRAVENOUS at 20:31

## 2018-08-26 RX ADMIN — Medication 20 MILLIEQUIVALENT(S): at 21:29

## 2018-08-26 RX ADMIN — CHLORHEXIDINE GLUCONATE 1 APPLICATION(S): 213 SOLUTION TOPICAL at 06:45

## 2018-08-26 RX ADMIN — PREGABALIN 1000 MICROGRAM(S): 225 CAPSULE ORAL at 12:42

## 2018-08-26 RX ADMIN — HEPARIN SODIUM 5000 UNIT(S): 5000 INJECTION INTRAVENOUS; SUBCUTANEOUS at 21:29

## 2018-08-26 RX ADMIN — SODIUM CHLORIDE 3 MILLILITER(S): 9 INJECTION INTRAMUSCULAR; INTRAVENOUS; SUBCUTANEOUS at 14:18

## 2018-08-26 RX ADMIN — Medication 20 MILLIGRAM(S): at 18:14

## 2018-08-26 RX ADMIN — Medication 100 MILLIGRAM(S): at 21:29

## 2018-08-26 RX ADMIN — Medication 250 MILLIGRAM(S): at 18:14

## 2018-08-26 RX ADMIN — SODIUM CHLORIDE 3 MILLILITER(S): 9 INJECTION INTRAMUSCULAR; INTRAVENOUS; SUBCUTANEOUS at 06:45

## 2018-08-26 RX ADMIN — SODIUM CHLORIDE 3 MILLILITER(S): 9 INJECTION INTRAMUSCULAR; INTRAVENOUS; SUBCUTANEOUS at 21:29

## 2018-08-26 RX ADMIN — Medication 0.25 MILLIGRAM(S): at 06:23

## 2018-08-26 RX ADMIN — Medication 100 MILLIGRAM(S): at 12:42

## 2018-08-26 RX ADMIN — Medication 400 MILLIGRAM(S): at 10:00

## 2018-08-26 RX ADMIN — NYSTATIN CREAM 1 APPLICATION(S): 100000 CREAM TOPICAL at 17:16

## 2018-08-26 RX ADMIN — Medication 0.25 MILLIGRAM(S): at 14:27

## 2018-08-26 RX ADMIN — Medication 0.25 MILLIGRAM(S): at 21:29

## 2018-08-26 RX ADMIN — Medication 2 PUFF(S): at 03:52

## 2018-08-26 RX ADMIN — HEPARIN SODIUM 5000 UNIT(S): 5000 INJECTION INTRAVENOUS; SUBCUTANEOUS at 14:27

## 2018-08-26 RX ADMIN — CHLORHEXIDINE GLUCONATE 15 MILLILITER(S): 213 SOLUTION TOPICAL at 06:23

## 2018-08-26 RX ADMIN — Medication 20 MILLIEQUIVALENT(S): at 09:07

## 2018-08-26 RX ADMIN — Medication 100 MILLIGRAM(S): at 14:59

## 2018-08-26 RX ADMIN — Medication 2 PUFF(S): at 10:25

## 2018-08-26 RX ADMIN — Medication 1 MILLIGRAM(S): at 12:42

## 2018-08-26 RX ADMIN — Medication 20 MILLIGRAM(S): at 06:23
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
## 2018-08-26 NOTE — PROGRESS NOTE ADULT - SUBJECTIVE AND OBJECTIVE BOX
Infectious Diseases progress note:    Subjective: NAD, sitting in chair.  Awake, alert.  No acute o/n events.  Afebrile.  Central line removed    ROS:  CONSTITUTIONAL:  No fever, chills, rigors  CARDIOVASCULAR:  No chest pain or palpitations  RESPIRATORY:   No SOB, cough, dyspnea on exertion.  No wheezing  GASTROINTESTINAL:  No abd pain, N/V, diarrhea/constipation  EXTREMITIES:  No swelling or joint pain  GENITOURINARY:  No burning on urination, increased frequency or urgency.  No flank pain  NEUROLOGIC:  No HA, visual disturbances  SKIN: No rashes    Allergies    No Known Allergies    Intolerances        ANTIBIOTICS/RELEVANT:  antimicrobials  metroNIDAZOLE  IVPB 500 milliGRAM(s) IV Intermittent every 8 hours  vancomycin  IVPB      vancomycin  IVPB 1000 milliGRAM(s) IV Intermittent every 12 hours    immunologic:    OTHER:  acetaminophen    Suspension 650 milliGRAM(s) Oral every 6 hours PRN  ALBUTerol    90 MICROgram(s) HFA Inhaler 2 Puff(s) Inhalation every 6 hours PRN  ALPRAZolam 0.25 milliGRAM(s) Oral every 8 hours  chlorhexidine 0.12% Liquid 15 milliLiter(s) Swish and Spit two times a day  chlorhexidine 4% Liquid 1 Application(s) Topical <User Schedule>  cyanocobalamin 1000 MICROGram(s) Oral daily  Dakins Solution - 1/4 Strength 1 Application(s) Topical daily  dexmedetomidine Infusion 0.5 MICROgram(s)/kG/Hr IV Continuous <Continuous>  folic acid 1 milliGRAM(s) Oral daily  furosemide   Injectable 20 milliGRAM(s) IV Push two times a day  heparin  Injectable 5000 Unit(s) SubCutaneous every 8 hours  insulin lispro (HumaLOG) corrective regimen sliding scale   SubCutaneous every 6 hours  ipratropium 17 MICROgram(s) HFA Inhaler 2 Puff(s) Inhalation every 6 hours  lactated ringers 1000 milliLiter(s) IV Continuous <Continuous>  nystatin Powder 1 Application(s) Topical two times a day  ondansetron Injectable 4 milliGRAM(s) IV Push every 6 hours PRN  pantoprazole  Injectable 40 milliGRAM(s) IV Push daily  propofol Infusion 5 MICROgram(s)/kG/Min IV Continuous <Continuous>  sodium chloride 0.9% lock flush 3 milliLiter(s) IV Push every 8 hours  thiamine 100 milliGRAM(s) Oral daily      Objective:  Vital Signs Last 24 Hrs  T(C): 36.9 (26 Aug 2018 12:00), Max: 37.2 (26 Aug 2018 08:00)  T(F): 98.4 (26 Aug 2018 12:00), Max: 98.9 (26 Aug 2018 08:00)  HR: 89 (26 Aug 2018 13:00) (9 - 99)  BP: 118/96 (26 Aug 2018 13:00) (98/65 - 120/83)  BP(mean): 99 (26 Aug 2018 13:00) (70 - 99)  RR: 27 (26 Aug 2018 13:00) (17 - 27)  SpO2: 100% (26 Aug 2018 13:00) (99% - 100%)    PHYSICAL EXAM:  Constitutional:NAD  Eyes:MEMO, EOMI  Ear/Nose/Throat: no thrush, mucositis.  Moist mucous membranes	  Neck:no JVD, no lymphadenopathy, supple  Respiratory: CTA emi, tracheostomy site c/d/i  Cardiovascular: S1S2 RRR, no murmurs  Gastrointestinal:soft, nontender,  nondistended (+) BS, peg  Extremities:no e/e/c  Skin:  no rashes, open wounds or ulcerations        LABS:                        7.3    13.00 )-----------( 260      ( 26 Aug 2018 04:45 )             23.5     08-26    144  |  105  |  14  ----------------------------<  103<H>  3.1<L>   |  29  |  0.59    Ca    8.3<L>      26 Aug 2018 04:45  Phos  3.5     08-26  Mg     2.0     08-26    TPro  6.1  /  Alb  2.7<L>  /  TBili  1.4<H>  /  DBili  x   /  AST  42<H>  /  ALT  32  /  AlkPhos  577<H>  08-25                Vancomycin Level, Trough: 7.9 ug/mL (08-25 @ 13:00)  Vancomycin Level, Trough: 7.2 ug/mL (08-23 @ 11:10)              MICROBIOLOGY:    Culture - Respiratory with Gram Stain (08.20.18 @ 14:07)    Culture - Respiratory:   NO GROWTH - PRELIMINARY RESULTS  FEW  NRF^Normal Respiratory Margot  QUANTITY OF GROWTH: FEW    Gram Stain Sputum:   Test not performed    Specimen Source: ENDOTRACHEAL SPECIMEN    Culture - Blood (08.20.18 @ 05:05)    Culture - Blood:   NO ORGANISMS ISOLATED    Specimen Source: BLOOD PERIPHERAL    Culture - Blood (08.20.18 @ 04:53)    Culture - Blood:   NO ORGANISMS ISOLATED    Specimen Source: BLOOD PERIPHERAL          RADIOLOGY & ADDITIONAL STUDIES:    < from: Xray Chest 1 View- PORTABLE-Routine (08.26.18 @ 07:06) >  IMPRESSION: The patient is status post tracheostomy. There is a small   right pleural effusion as on the prior study. There is a pigtail catheter   overlying the left lower hemithorax. There are bilateral increased   markings which may be related to pulmonary edema and a more patchy   opacity within the left upper lung unchanged. No pneumothorax is   identified    < end of copied text >
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
## 2018-08-27 LAB
ALBUMIN FLD-MCNC: 0.9 G/DL — SIGNIFICANT CHANGE UP
ALBUMIN SERPL ELPH-MCNC: 2.8 G/DL — LOW (ref 3.3–5)
ALP SERPL-CCNC: 483 U/L — HIGH (ref 40–120)
ALT FLD-CCNC: 27 U/L — SIGNIFICANT CHANGE UP (ref 4–41)
AMYLASE P1 CFR SERPL: 55 U/L — SIGNIFICANT CHANGE UP (ref 25–125)
AST SERPL-CCNC: 33 U/L — SIGNIFICANT CHANGE UP (ref 4–40)
BILIRUB SERPL-MCNC: 1.4 MG/DL — HIGH (ref 0.2–1.2)
BODY FLUID TYPE: SIGNIFICANT CHANGE UP
BUN SERPL-MCNC: 10 MG/DL — SIGNIFICANT CHANGE UP (ref 7–23)
CALCIUM SERPL-MCNC: 8.7 MG/DL — SIGNIFICANT CHANGE UP (ref 8.4–10.5)
CHLORIDE SERPL-SCNC: 108 MMOL/L — HIGH (ref 98–107)
CLARITY SPEC: SIGNIFICANT CHANGE UP
CO2 SERPL-SCNC: 28 MMOL/L — SIGNIFICANT CHANGE UP (ref 22–31)
COLOR FLD: YELLOW — SIGNIFICANT CHANGE UP
CREAT SERPL-MCNC: 0.61 MG/DL — SIGNIFICANT CHANGE UP (ref 0.5–1.3)
EOSINOPHIL # FLD: 5 % — SIGNIFICANT CHANGE UP
GLUCOSE FLD-MCNC: 113 MG/DL — SIGNIFICANT CHANGE UP
GLUCOSE SERPL-MCNC: 103 MG/DL — HIGH (ref 70–99)
GRAM STN FLD: SIGNIFICANT CHANGE UP
HCT VFR BLD CALC: 24.2 % — LOW (ref 39–50)
HGB BLD-MCNC: 7.4 G/DL — LOW (ref 13–17)
LDH SERPL L TO P-CCNC: 153 U/L — SIGNIFICANT CHANGE UP
LDH SERPL L TO P-CCNC: 197 U/L — SIGNIFICANT CHANGE UP (ref 135–225)
LYMPHOCYTES NFR FLD: 12 % — SIGNIFICANT CHANGE UP
MCHC RBC-ENTMCNC: 30.6 % — LOW (ref 32–36)
MCHC RBC-ENTMCNC: 31.6 PG — SIGNIFICANT CHANGE UP (ref 27–34)
MCV RBC AUTO: 103.4 FL — HIGH (ref 80–100)
MESOTHL CELL # FLD: 4 % — SIGNIFICANT CHANGE UP
MONOCYTES # FLD: 15 % — SIGNIFICANT CHANGE UP
NEUTS SEG NFR FLD MANUAL: 63 % — SIGNIFICANT CHANGE UP
NRBC # FLD: 0 — SIGNIFICANT CHANGE UP
OTHER CELLS FLD MANUAL: 1 % — SIGNIFICANT CHANGE UP
PLATELET # BLD AUTO: 253 K/UL — SIGNIFICANT CHANGE UP (ref 150–400)
PMV BLD: 11.4 FL — SIGNIFICANT CHANGE UP (ref 7–13)
POTASSIUM SERPL-MCNC: 3.9 MMOL/L — SIGNIFICANT CHANGE UP (ref 3.5–5.3)
POTASSIUM SERPL-SCNC: 3.9 MMOL/L — SIGNIFICANT CHANGE UP (ref 3.5–5.3)
PROT FLD-MCNC: 1.8 G/DL — SIGNIFICANT CHANGE UP
PROT SERPL-MCNC: 6.1 G/DL — SIGNIFICANT CHANGE UP (ref 6–8.3)
RBC # BLD: 2.34 M/UL — LOW (ref 4.2–5.8)
RBC # FLD: 23.5 % — HIGH (ref 10.3–14.5)
RCV VOL RI: 2000 CELL/UL — HIGH (ref 0–5)
SODIUM SERPL-SCNC: 138 MMOL/L — SIGNIFICANT CHANGE UP (ref 135–145)
SPECIMEN SOURCE: SIGNIFICANT CHANGE UP
TOTAL CELLS COUNTED, BODY FLUID: 100 CELLS — SIGNIFICANT CHANGE UP
TOTAL NUCLEATED CELL COUNT, BODY FLUID: 288 CELL/UL — HIGH (ref 0–5)
WBC # BLD: 13.84 K/UL — HIGH (ref 3.8–10.5)
WBC # FLD AUTO: 13.84 K/UL — HIGH (ref 3.8–10.5)

## 2018-08-27 PROCEDURE — 71045 X-RAY EXAM CHEST 1 VIEW: CPT | Mod: 26

## 2018-08-27 PROCEDURE — 88108 CYTOPATH CONCENTRATE TECH: CPT | Mod: 26

## 2018-08-27 PROCEDURE — 99291 CRITICAL CARE FIRST HOUR: CPT

## 2018-08-27 RX ORDER — KETOROLAC TROMETHAMINE 30 MG/ML
15 SYRINGE (ML) INJECTION ONCE
Qty: 0 | Refills: 0 | Status: DISCONTINUED | OUTPATIENT
Start: 2018-08-27 | End: 2018-08-27

## 2018-08-27 RX ORDER — MAGNESIUM SULFATE 500 MG/ML
2 VIAL (ML) INJECTION ONCE
Qty: 0 | Refills: 0 | Status: COMPLETED | OUTPATIENT
Start: 2018-08-27 | End: 2018-08-27

## 2018-08-27 RX ORDER — FENTANYL CITRATE 50 UG/ML
50 INJECTION INTRAVENOUS ONCE
Qty: 0 | Refills: 0 | Status: DISCONTINUED | OUTPATIENT
Start: 2018-08-27 | End: 2018-08-27

## 2018-08-27 RX ORDER — ACETAMINOPHEN 500 MG
1000 TABLET ORAL ONCE
Qty: 0 | Refills: 0 | Status: COMPLETED | OUTPATIENT
Start: 2018-08-27 | End: 2018-08-27

## 2018-08-27 RX ORDER — POTASSIUM CHLORIDE 20 MEQ
10 PACKET (EA) ORAL
Qty: 0 | Refills: 0 | Status: COMPLETED | OUTPATIENT
Start: 2018-08-27 | End: 2018-08-27

## 2018-08-27 RX ORDER — ALPRAZOLAM 0.25 MG
0.5 TABLET ORAL EVERY 8 HOURS
Qty: 0 | Refills: 0 | Status: DISCONTINUED | OUTPATIENT
Start: 2018-08-27 | End: 2018-09-03

## 2018-08-27 RX ORDER — POTASSIUM CHLORIDE 20 MEQ
40 PACKET (EA) ORAL ONCE
Qty: 0 | Refills: 0 | Status: COMPLETED | OUTPATIENT
Start: 2018-08-27 | End: 2018-08-27

## 2018-08-27 RX ADMIN — FENTANYL CITRATE 50 MICROGRAM(S): 50 INJECTION INTRAVENOUS at 11:45

## 2018-08-27 RX ADMIN — Medication 2 PUFF(S): at 22:05

## 2018-08-27 RX ADMIN — Medication 0.25 MILLIGRAM(S): at 06:32

## 2018-08-27 RX ADMIN — FENTANYL CITRATE 50 MICROGRAM(S): 50 INJECTION INTRAVENOUS at 11:30

## 2018-08-27 RX ADMIN — Medication 50 GRAM(S): at 11:00

## 2018-08-27 RX ADMIN — Medication 400 MILLIGRAM(S): at 10:55

## 2018-08-27 RX ADMIN — Medication 250 MILLIGRAM(S): at 06:32

## 2018-08-27 RX ADMIN — Medication 100 MILLIGRAM(S): at 05:11

## 2018-08-27 RX ADMIN — SODIUM CHLORIDE 10 MILLILITER(S): 9 INJECTION, SOLUTION INTRAVENOUS at 22:02

## 2018-08-27 RX ADMIN — Medication 0.5 MILLIGRAM(S): at 22:02

## 2018-08-27 RX ADMIN — SODIUM CHLORIDE 3 MILLILITER(S): 9 INJECTION INTRAMUSCULAR; INTRAVENOUS; SUBCUTANEOUS at 06:28

## 2018-08-27 RX ADMIN — HEPARIN SODIUM 5000 UNIT(S): 5000 INJECTION INTRAVENOUS; SUBCUTANEOUS at 06:32

## 2018-08-27 RX ADMIN — Medication 2 PUFF(S): at 09:58

## 2018-08-27 RX ADMIN — SODIUM CHLORIDE 10 MILLILITER(S): 9 INJECTION, SOLUTION INTRAVENOUS at 12:11

## 2018-08-27 RX ADMIN — HEPARIN SODIUM 5000 UNIT(S): 5000 INJECTION INTRAVENOUS; SUBCUTANEOUS at 13:41

## 2018-08-27 RX ADMIN — Medication 15 MILLIGRAM(S): at 16:10

## 2018-08-27 RX ADMIN — Medication 100 MILLIEQUIVALENT(S): at 11:31

## 2018-08-27 RX ADMIN — Medication 1 MILLIGRAM(S): at 12:11

## 2018-08-27 RX ADMIN — Medication 1000 MILLIGRAM(S): at 11:30

## 2018-08-27 RX ADMIN — Medication 15 MILLIGRAM(S): at 14:25

## 2018-08-27 RX ADMIN — PANTOPRAZOLE SODIUM 40 MILLIGRAM(S): 20 TABLET, DELAYED RELEASE ORAL at 12:10

## 2018-08-27 RX ADMIN — Medication 100 MILLIEQUIVALENT(S): at 10:56

## 2018-08-27 RX ADMIN — PREGABALIN 1000 MICROGRAM(S): 225 CAPSULE ORAL at 12:11

## 2018-08-27 RX ADMIN — Medication 100 MILLIGRAM(S): at 12:11

## 2018-08-27 RX ADMIN — NYSTATIN CREAM 1 APPLICATION(S): 100000 CREAM TOPICAL at 17:23

## 2018-08-27 RX ADMIN — Medication 100 MILLIGRAM(S): at 22:02

## 2018-08-27 RX ADMIN — Medication 100 MILLIGRAM(S): at 15:11

## 2018-08-27 RX ADMIN — Medication 0.5 MILLIGRAM(S): at 16:11

## 2018-08-27 RX ADMIN — Medication 40 MILLIEQUIVALENT(S): at 12:20

## 2018-08-27 RX ADMIN — CHLORHEXIDINE GLUCONATE 15 MILLILITER(S): 213 SOLUTION TOPICAL at 06:32

## 2018-08-27 RX ADMIN — HEPARIN SODIUM 5000 UNIT(S): 5000 INJECTION INTRAVENOUS; SUBCUTANEOUS at 22:02

## 2018-08-27 RX ADMIN — SODIUM CHLORIDE 3 MILLILITER(S): 9 INJECTION INTRAMUSCULAR; INTRAVENOUS; SUBCUTANEOUS at 21:50

## 2018-08-27 RX ADMIN — NYSTATIN CREAM 1 APPLICATION(S): 100000 CREAM TOPICAL at 06:32

## 2018-08-27 RX ADMIN — Medication 2 PUFF(S): at 16:45

## 2018-08-27 RX ADMIN — CHLORHEXIDINE GLUCONATE 1 APPLICATION(S): 213 SOLUTION TOPICAL at 06:32

## 2018-08-27 RX ADMIN — Medication 2 PUFF(S): at 04:29

## 2018-08-27 RX ADMIN — SODIUM CHLORIDE 3 MILLILITER(S): 9 INJECTION INTRAMUSCULAR; INTRAVENOUS; SUBCUTANEOUS at 13:41

## 2018-08-27 RX ADMIN — Medication 250 MILLIGRAM(S): at 19:15

## 2018-08-27 RX ADMIN — CHLORHEXIDINE GLUCONATE 15 MILLILITER(S): 213 SOLUTION TOPICAL at 17:23
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
## 2018-08-27 NOTE — PROGRESS NOTE ADULT - SUBJECTIVE AND OBJECTIVE BOX
Infectious Diseases progress note:    Subjective: No acute o/n events.  AFebrile.  WBC remains elevated.   s/p L pleural tap    ROS:  CONSTITUTIONAL:  No fever, chills, rigors  CARDIOVASCULAR:  No chest pain or palpitations  RESPIRATORY:   No SOB, cough, dyspnea on exertion.  No wheezing  GASTROINTESTINAL:  No abd pain, N/V, diarrhea/constipation  EXTREMITIES:  No swelling or joint pain  GENITOURINARY:  No burning on urination, increased frequency or urgency.  No flank pain  NEUROLOGIC:  No HA, visual disturbances  SKIN: No rashes    Allergies    No Known Allergies    Intolerances        ANTIBIOTICS/RELEVANT:  antimicrobials  metroNIDAZOLE  IVPB 500 milliGRAM(s) IV Intermittent every 8 hours  vancomycin  IVPB      vancomycin  IVPB 1000 milliGRAM(s) IV Intermittent every 12 hours    immunologic:    OTHER:  acetaminophen    Suspension 650 milliGRAM(s) Oral every 6 hours PRN  ALBUTerol    90 MICROgram(s) HFA Inhaler 2 Puff(s) Inhalation every 6 hours PRN  ALPRAZolam 0.5 milliGRAM(s) Oral every 8 hours  chlorhexidine 0.12% Liquid 15 milliLiter(s) Swish and Spit two times a day  chlorhexidine 4% Liquid 1 Application(s) Topical <User Schedule>  cyanocobalamin 1000 MICROGram(s) Oral daily  folic acid 1 milliGRAM(s) Oral daily  heparin  Injectable 5000 Unit(s) SubCutaneous every 8 hours  insulin lispro (HumaLOG) corrective regimen sliding scale   SubCutaneous every 6 hours  ipratropium 17 MICROgram(s) HFA Inhaler 2 Puff(s) Inhalation every 6 hours  ketorolac   Injectable 15 milliGRAM(s) IV Push once  lactated ringers 1000 milliLiter(s) IV Continuous <Continuous>  nystatin Powder 1 Application(s) Topical two times a day  ondansetron Injectable 4 milliGRAM(s) IV Push every 6 hours PRN  pantoprazole  Injectable 40 milliGRAM(s) IV Push daily  sodium chloride 0.9% lock flush 3 milliLiter(s) IV Push every 8 hours  thiamine 100 milliGRAM(s) Oral daily      Objective:  Vital Signs Last 24 Hrs  T(C): 37.1 (27 Aug 2018 16:00), Max: 37.3 (27 Aug 2018 00:00)  T(F): 98.8 (27 Aug 2018 16:00), Max: 99.2 (27 Aug 2018 11:00)  HR: 90 (27 Aug 2018 16:45) (82 - 105)  BP: 108/77 (27 Aug 2018 15:00) (95/63 - 124/86)  BP(mean): 83 (27 Aug 2018 15:00) (66 - 95)  RR: 22 (27 Aug 2018 15:00) (16 - 33)  SpO2: 100% (27 Aug 2018 16:45) (80% - 100%)    PHYSICAL EXAM:  Constitutional:NAD  Eyes:MEMO, EOMI  Ear/Nose/Throat: no thrush, mucositis.  Moist mucous membranes	  Neck:no JVD, no lymphadenopathy, supple, tracheostomy  Respiratory: CTA emi  Cardiovascular: S1S2 RRR, no murmurs  Gastrointestinal:soft, nontender,  nondistended (+) BS, peg  Extremities:no e/e/c  Skin:  no rashes, open wounds or ulcerations        LABS:                        7.4    13.84 )-----------( 253      ( 27 Aug 2018 03:30 )             24.2     08-27    138  |  108<H>  |  10  ----------------------------<  103<H>  3.9   |  28  |  0.61    Ca    8.7      27 Aug 2018 03:30  Phos  3.5     08-26  Mg     2.0     08-26    TPro  6.1  /  Alb  2.8<L>  /  TBili  1.4<H>  /  DBili  x   /  AST  33  /  ALT  27  /  AlkPhos  483<H>  08-27                Vancomycin Level, Trough: 12.8 ug/mL (08-26 @ 16:40)  Vancomycin Level, Trough: 7.9 ug/mL (08-25 @ 13:00)  Vancomycin Level, Trough: 7.2 ug/mL (08-23 @ 11:10)              MICROBIOLOGY:    Culture - Body Fluid with Gram Stain (08.27.18 @ 11:32)    Gram Stain:   NOS^No Organisms Seen  WBC^White Blood Cells  QNTY CELLS IN GRAM STAIN: RARE (1+)    Specimen Source: PLEURAL FLUID    Culture - Respiratory with Gram Stain (08.20.18 @ 14:07)    Gram Stain Sputum:   Test not performed    Culture - Respiratory:   NO GROWTH - PRELIMINARY RESULTS  FEW  NRF^Normal Respiratory Margot  QUANTITY OF GROWTH: FEW    Specimen Source: ENDOTRACHEAL SPECIMEN          RADIOLOGY & ADDITIONAL STUDIES:    < from: Xray Chest 1 View- PORTABLE-Routine (08.27.18 @ 06:38) >    IMPRESSION:  Small left pneumothorax, slightly larger as compared to the   prior image.    No significant change inloculated right pleural effusion with likely   associated passive atelectasis.    Increased reticular opacities in the right lung are unchanged.    Left mid zone lung airspace opacities are more pronounced. This could be   due to atelectasis, infection, and/or pulmonary edema.    < end of copied text >

## 2018-08-28 LAB
BUN SERPL-MCNC: 12 MG/DL — SIGNIFICANT CHANGE UP (ref 7–23)
CALCIUM SERPL-MCNC: 8.3 MG/DL — LOW (ref 8.4–10.5)
CHLORIDE SERPL-SCNC: 101 MMOL/L — SIGNIFICANT CHANGE UP (ref 98–107)
CO2 SERPL-SCNC: 26 MMOL/L — SIGNIFICANT CHANGE UP (ref 22–31)
CREAT SERPL-MCNC: 0.59 MG/DL — SIGNIFICANT CHANGE UP (ref 0.5–1.3)
FUNGUS SPEC QL CULT: SIGNIFICANT CHANGE UP
FUNGUS SPEC QL CULT: SIGNIFICANT CHANGE UP
GLUCOSE SERPL-MCNC: 97 MG/DL — SIGNIFICANT CHANGE UP (ref 70–99)
HCT VFR BLD CALC: 23.1 % — LOW (ref 39–50)
HGB BLD-MCNC: 7 G/DL — CRITICAL LOW (ref 13–17)
MCHC RBC-ENTMCNC: 30.3 % — LOW (ref 32–36)
MCHC RBC-ENTMCNC: 31.3 PG — SIGNIFICANT CHANGE UP (ref 27–34)
MCV RBC AUTO: 103.1 FL — HIGH (ref 80–100)
NRBC # FLD: 0 — SIGNIFICANT CHANGE UP
PLATELET # BLD AUTO: 245 K/UL — SIGNIFICANT CHANGE UP (ref 150–400)
PMV BLD: 11.6 FL — SIGNIFICANT CHANGE UP (ref 7–13)
POTASSIUM SERPL-MCNC: 4.3 MMOL/L — SIGNIFICANT CHANGE UP (ref 3.5–5.3)
POTASSIUM SERPL-SCNC: 4.3 MMOL/L — SIGNIFICANT CHANGE UP (ref 3.5–5.3)
RBC # BLD: 2.24 M/UL — LOW (ref 4.2–5.8)
RBC # FLD: 23.3 % — HIGH (ref 10.3–14.5)
SODIUM SERPL-SCNC: 138 MMOL/L — SIGNIFICANT CHANGE UP (ref 135–145)
SPECIMEN SOURCE: SIGNIFICANT CHANGE UP
VANCOMYCIN TROUGH SERPL-MCNC: 19.4 UG/ML — SIGNIFICANT CHANGE UP (ref 10–20)
WBC # BLD: 11.77 K/UL — HIGH (ref 3.8–10.5)
WBC # FLD AUTO: 11.77 K/UL — HIGH (ref 3.8–10.5)

## 2018-08-28 PROCEDURE — 99291 CRITICAL CARE FIRST HOUR: CPT

## 2018-08-28 PROCEDURE — 71045 X-RAY EXAM CHEST 1 VIEW: CPT | Mod: 26

## 2018-08-28 RX ORDER — ACETAMINOPHEN 500 MG
1000 TABLET ORAL ONCE
Qty: 0 | Refills: 0 | Status: COMPLETED | OUTPATIENT
Start: 2018-08-28 | End: 2018-08-28

## 2018-08-28 RX ORDER — FERROUS SULFATE 325(65) MG
300 TABLET ORAL
Qty: 0 | Refills: 0 | Status: DISCONTINUED | OUTPATIENT
Start: 2018-08-28 | End: 2018-09-06

## 2018-08-28 RX ADMIN — CHLORHEXIDINE GLUCONATE 1 APPLICATION(S): 213 SOLUTION TOPICAL at 06:53

## 2018-08-28 RX ADMIN — SODIUM CHLORIDE 10 MILLILITER(S): 9 INJECTION, SOLUTION INTRAVENOUS at 19:00

## 2018-08-28 RX ADMIN — Medication 400 MILLIGRAM(S): at 22:06

## 2018-08-28 RX ADMIN — SODIUM CHLORIDE 3 MILLILITER(S): 9 INJECTION INTRAMUSCULAR; INTRAVENOUS; SUBCUTANEOUS at 13:00

## 2018-08-28 RX ADMIN — Medication 100 MILLIGRAM(S): at 21:55

## 2018-08-28 RX ADMIN — Medication 400 MILLIGRAM(S): at 13:00

## 2018-08-28 RX ADMIN — HEPARIN SODIUM 5000 UNIT(S): 5000 INJECTION INTRAVENOUS; SUBCUTANEOUS at 13:07

## 2018-08-28 RX ADMIN — SODIUM CHLORIDE 10 MILLILITER(S): 9 INJECTION, SOLUTION INTRAVENOUS at 13:06

## 2018-08-28 RX ADMIN — CHLORHEXIDINE GLUCONATE 15 MILLILITER(S): 213 SOLUTION TOPICAL at 06:53

## 2018-08-28 RX ADMIN — PREGABALIN 1000 MICROGRAM(S): 225 CAPSULE ORAL at 13:06

## 2018-08-28 RX ADMIN — Medication 1000 MILLIGRAM(S): at 22:21

## 2018-08-28 RX ADMIN — Medication 2 PUFF(S): at 10:02

## 2018-08-28 RX ADMIN — NYSTATIN CREAM 1 APPLICATION(S): 100000 CREAM TOPICAL at 06:53

## 2018-08-28 RX ADMIN — Medication 2 PUFF(S): at 15:18

## 2018-08-28 RX ADMIN — Medication 300 MILLIGRAM(S): at 13:06

## 2018-08-28 RX ADMIN — Medication 300 MILLIGRAM(S): at 17:00

## 2018-08-28 RX ADMIN — Medication 2 PUFF(S): at 04:11

## 2018-08-28 RX ADMIN — Medication 2 PUFF(S): at 22:19

## 2018-08-28 RX ADMIN — Medication 1000 MILLIGRAM(S): at 13:15

## 2018-08-28 RX ADMIN — Medication 100 MILLIGRAM(S): at 13:07

## 2018-08-28 RX ADMIN — CHLORHEXIDINE GLUCONATE 15 MILLILITER(S): 213 SOLUTION TOPICAL at 17:00

## 2018-08-28 RX ADMIN — NYSTATIN CREAM 1 APPLICATION(S): 100000 CREAM TOPICAL at 17:00

## 2018-08-28 RX ADMIN — HEPARIN SODIUM 5000 UNIT(S): 5000 INJECTION INTRAVENOUS; SUBCUTANEOUS at 06:53

## 2018-08-28 RX ADMIN — Medication 300 MILLIGRAM(S): at 08:30

## 2018-08-28 RX ADMIN — SODIUM CHLORIDE 3 MILLILITER(S): 9 INJECTION INTRAMUSCULAR; INTRAVENOUS; SUBCUTANEOUS at 22:04

## 2018-08-28 RX ADMIN — Medication 250 MILLIGRAM(S): at 17:59

## 2018-08-28 RX ADMIN — Medication 250 MILLIGRAM(S): at 06:53

## 2018-08-28 RX ADMIN — PANTOPRAZOLE SODIUM 40 MILLIGRAM(S): 20 TABLET, DELAYED RELEASE ORAL at 13:07

## 2018-08-28 RX ADMIN — Medication 100 MILLIGRAM(S): at 06:53

## 2018-08-28 RX ADMIN — SODIUM CHLORIDE 3 MILLILITER(S): 9 INJECTION INTRAMUSCULAR; INTRAVENOUS; SUBCUTANEOUS at 08:00

## 2018-08-28 RX ADMIN — Medication 0.5 MILLIGRAM(S): at 21:55

## 2018-08-28 RX ADMIN — Medication 0.5 MILLIGRAM(S): at 06:53

## 2018-08-28 RX ADMIN — HEPARIN SODIUM 5000 UNIT(S): 5000 INJECTION INTRAVENOUS; SUBCUTANEOUS at 21:56

## 2018-08-28 RX ADMIN — Medication 1 MILLIGRAM(S): at 13:07

## 2018-08-28 RX ADMIN — Medication 0.5 MILLIGRAM(S): at 13:07
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
## 2018-08-28 NOTE — CHART NOTE - NSCHARTNOTEFT_GEN_A_CORE
NUTRITION FOLLOW-UP:    Nutrition consult requested 2/2 assessment and tube feeding.  Pt previously on Nepro and formula changed to Jevity 1.2 2/2 diarrhea.  May be related to antibiotics.  Spoke w/team in regards to TF goals.  Pt previously on Nepro 2/2 ALY-now resolved.  No need for Nepro formula at this time.  Pt noted w/wt fluctuations since admission - current wt is 5kg above admission wt.  Pt w/1+ edema L and R ankles.  Pt w/suspected DTI on sacral spine.  Pt receiving supplements of cyanocobalamin, thiamine, ferrous sulfate and folic acid    Weight:  8/24 - 82.6kg     8/21 - 83.3kg     7/26 - 77.6kg     IBW -  72kg  Labs:  H/H 7.0/23.1  T.Ca 8.3  FS     Current Diet:  Jevity 1.2 @40ml/h and 1 pack prosource/d via g-tube  Enteral Recommendations:  Estimated kcal needs = ~22-25kcal/kg IBW =4869-5599 kcal/d.  Estimated protein needs = 1.3gm protein/kg = 94gms/d.  Suggest advance TF to goal of 60ml/h to provide 1728kcal w/79gm protein and continue prosource 1 pack/d to increase protein intake to 94gms/d t meet current needs.      RD to Remain Available: yes    Additional Recommendations:   1) Monitor weights, labs, BM's, skin integrity, tolerance to enteral nutrition   2) Advance TF as tolerated to meet current needs.

## 2018-08-28 NOTE — PROGRESS NOTE ADULT - SUBJECTIVE AND OBJECTIVE BOX
Infectious Diseases progress note:    Subjective: NAD, afebrile.  Awake, alert.  L chest tube in place.      ROS:  CONSTITUTIONAL:  No fever, chills, rigors  CARDIOVASCULAR:  No chest pain or palpitations  RESPIRATORY:   No SOB, cough, dyspnea on exertion.  No wheezing  GASTROINTESTINAL:  No abd pain, N/V, diarrhea/constipation  EXTREMITIES:  No swelling or joint pain  GENITOURINARY:  No burning on urination, increased frequency or urgency.  No flank pain  NEUROLOGIC:  No HA, visual disturbances  SKIN: No rashes    Allergies    No Known Allergies    Intolerances        ANTIBIOTICS/RELEVANT:  antimicrobials  metroNIDAZOLE  IVPB 500 milliGRAM(s) IV Intermittent every 8 hours  vancomycin  IVPB      vancomycin  IVPB 1000 milliGRAM(s) IV Intermittent every 12 hours    immunologic:    OTHER:  acetaminophen    Suspension 650 milliGRAM(s) Oral every 6 hours PRN  ALBUTerol    90 MICROgram(s) HFA Inhaler 2 Puff(s) Inhalation every 6 hours PRN  ALPRAZolam 0.5 milliGRAM(s) Oral every 8 hours  chlorhexidine 0.12% Liquid 15 milliLiter(s) Swish and Spit two times a day  chlorhexidine 4% Liquid 1 Application(s) Topical <User Schedule>  cyanocobalamin 1000 MICROGram(s) Oral daily  ferrous    sulfate Liquid 300 milliGRAM(s) Oral three times a day with meals  folic acid 1 milliGRAM(s) Oral daily  heparin  Injectable 5000 Unit(s) SubCutaneous every 8 hours  insulin lispro (HumaLOG) corrective regimen sliding scale   SubCutaneous every 6 hours  ipratropium 17 MICROgram(s) HFA Inhaler 2 Puff(s) Inhalation every 6 hours  lactated ringers 1000 milliLiter(s) IV Continuous <Continuous>  nystatin Powder 1 Application(s) Topical two times a day  ondansetron Injectable 4 milliGRAM(s) IV Push every 6 hours PRN  pantoprazole  Injectable 40 milliGRAM(s) IV Push daily  sodium chloride 0.9% lock flush 3 milliLiter(s) IV Push every 8 hours  thiamine 100 milliGRAM(s) Oral daily      Objective:  Vital Signs Last 24 Hrs  T(C): 36.8 (28 Aug 2018 16:00), Max: 37.3 (28 Aug 2018 00:00)  T(F): 98.2 (28 Aug 2018 16:00), Max: 99.2 (28 Aug 2018 04:00)  HR: 96 (28 Aug 2018 15:19) (82 - 111)  BP: 111/74 (28 Aug 2018 15:00) (98/62 - 126/81)  BP(mean): 83 (28 Aug 2018 15:00) (69 - 91)  RR: 30 (28 Aug 2018 15:00) (18 - 30)  SpO2: 98% (28 Aug 2018 15:19) (95% - 100%)    PHYSICAL EXAM:  Constitutional:NAD  Eyes:MEMO, EOMI  Ear/Nose/Throat: no thrush, mucositis.  Moist mucous membranes	  Neck:no JVD, no lymphadenopathy, supple  Respiratory: CTA emi, L chest tube, tracheostomy  Cardiovascular: S1S2 RRR, no murmurs  Gastrointestinal:soft, nontender,  nondistended (+) BS, peg  Extremities: b/l LE pitting edema  Skin:  no rashes, open wounds or ulcerations        LABS:                        7.0    11.77 )-----------( 245      ( 28 Aug 2018 04:30 )             23.1     08-28    138  |  101  |  12  ----------------------------<  97  4.3   |  26  |  0.59    Ca    8.3<L>      28 Aug 2018 04:30    TPro  6.1  /  Alb  2.8<L>  /  TBili  1.4<H>  /  DBili  x   /  AST  33  /  ALT  27  /  AlkPhos  483<H>  08-27                Vancomycin Level, Trough: 19.4 ug/mL (08-28 @ 04:30)  Vancomycin Level, Trough: 12.8 ug/mL (08-26 @ 16:40)  Vancomycin Level, Trough: 7.9 ug/mL (08-25 @ 13:00)              MICROBIOLOGY:    Culture - Yeast and Fungus (08.27.18 @ 11:32)    Culture - Yeast and Fungus:   CULTURE NEGATIVE FOR YEASTS AND MOLDS  PRELIMINARY RESULTS    Specimen Source: PLEURAL FLUID    Culture - Body Fluid with Gram Stain (08.27.18 @ 11:32)    Culture - Body Fluid:   NO GROWTH - PRELIMINARY RESULTS    Gram Stain:   NOS^No Organisms Seen  WBC^White Blood Cells  QNTY CELLS IN GRAM STAIN: RARE (1+)    Specimen Source: PLEURAL FLUID    Culture - Respiratory with Gram Stain (08.20.18 @ 14:07)    Culture - Respiratory:   NO GROWTH - PRELIMINARY RESULTS  FEW  NRF^Normal Respiratory Margot  QUANTITY OF GROWTH: FEW    Gram Stain Sputum:   Test not performed    Specimen Source: ENDOTRACHEAL SPECIMEN    Culture - Blood (08.20.18 @ 05:05)    Culture - Blood:   NO ORGANISMS ISOLATED    Specimen Source: BLOOD PERIPHERAL          RADIOLOGY & ADDITIONAL STUDIES:    < from: Xray Chest 1 View- PORTABLE-Routine (08.28.18 @ 07:50) >  IMPRESSION:  Tracheostomy tube in place. Left pigtail catheter as above.    Small left apical pneumothorax, decreased in size.    Previous left midlung zone apparent airspace opacities show significant   improvement with almost complete resolution favoring previous asymmetric   edema or atelectasis.    Above right-sided findings are unchanged.    < end of copied text >      < from: Transthoracic Echocardiogram (08.21.18 @ 11:23) >  CONCLUSIONS:  1. Normal mitral valve.  2. Aortic valve not well visualized;probably normal.  3. Endocardial visualization enhanced with intravenous  injection of echo contrast (Definity). Mild segmental left  ventricular systolic dysfunction. The inferior wall is  hypokinetic.  4. Normal right ventricular size and function.  ------------------------------------------------------------------------  Confirmed on  8/21/2018 - 13:28:02 by JENNIFER Jones    < end of copied text >
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
## 2018-08-29 LAB
ANTIBODY ID 1_1: SIGNIFICANT CHANGE UP
ANTIBODY ID 1_2: SIGNIFICANT CHANGE UP
BLD GP AB SCN SERPL QL: POSITIVE — SIGNIFICANT CHANGE UP
BUN SERPL-MCNC: 10 MG/DL — SIGNIFICANT CHANGE UP (ref 7–23)
CALCIUM SERPL-MCNC: 8.3 MG/DL — LOW (ref 8.4–10.5)
CHLORIDE SERPL-SCNC: 103 MMOL/L — SIGNIFICANT CHANGE UP (ref 98–107)
CO2 SERPL-SCNC: 25 MMOL/L — SIGNIFICANT CHANGE UP (ref 22–31)
CREAT SERPL-MCNC: 0.58 MG/DL — SIGNIFICANT CHANGE UP (ref 0.5–1.3)
DAT C3-SP REAG RBC QL: POSITIVE — SIGNIFICANT CHANGE UP
DAT POLY-SP REAG RBC QL: POSITIVE — SIGNIFICANT CHANGE UP
DIRECT COOMBS IGG: NEGATIVE — SIGNIFICANT CHANGE UP
GLUCOSE SERPL-MCNC: 107 MG/DL — HIGH (ref 70–99)
HCT VFR BLD CALC: 22 % — LOW (ref 39–50)
HGB BLD-MCNC: 6.7 G/DL — CRITICAL LOW (ref 13–17)
MCHC RBC-ENTMCNC: 30.5 % — LOW (ref 32–36)
MCHC RBC-ENTMCNC: 31.5 PG — SIGNIFICANT CHANGE UP (ref 27–34)
MCV RBC AUTO: 103.3 FL — HIGH (ref 80–100)
NRBC # FLD: 0 — SIGNIFICANT CHANGE UP
OB PNL STL: NEGATIVE — SIGNIFICANT CHANGE UP
PLATELET # BLD AUTO: 228 K/UL — SIGNIFICANT CHANGE UP (ref 150–400)
PMV BLD: 11.3 FL — SIGNIFICANT CHANGE UP (ref 7–13)
POTASSIUM SERPL-MCNC: 3.9 MMOL/L — SIGNIFICANT CHANGE UP (ref 3.5–5.3)
POTASSIUM SERPL-SCNC: 3.9 MMOL/L — SIGNIFICANT CHANGE UP (ref 3.5–5.3)
RBC # BLD: 2.13 M/UL — LOW (ref 4.2–5.8)
RBC # FLD: 23.3 % — HIGH (ref 10.3–14.5)
RH IG SCN BLD-IMP: NEGATIVE — SIGNIFICANT CHANGE UP
SODIUM SERPL-SCNC: 138 MMOL/L — SIGNIFICANT CHANGE UP (ref 135–145)
VANCOMYCIN TROUGH SERPL-MCNC: 17.2 UG/ML — SIGNIFICANT CHANGE UP (ref 10–20)
WBC # BLD: 9.88 K/UL — SIGNIFICANT CHANGE UP (ref 3.8–10.5)
WBC # FLD AUTO: 9.88 K/UL — SIGNIFICANT CHANGE UP (ref 3.8–10.5)

## 2018-08-29 PROCEDURE — 99291 CRITICAL CARE FIRST HOUR: CPT

## 2018-08-29 PROCEDURE — 71045 X-RAY EXAM CHEST 1 VIEW: CPT | Mod: 26

## 2018-08-29 PROCEDURE — 86077 PHYS BLOOD BANK SERV XMATCH: CPT

## 2018-08-29 RX ORDER — POTASSIUM CHLORIDE 20 MEQ
20 PACKET (EA) ORAL DAILY
Qty: 0 | Refills: 0 | Status: COMPLETED | OUTPATIENT
Start: 2018-08-30 | End: 2018-09-03

## 2018-08-29 RX ORDER — VANCOMYCIN HCL 1 G
1500 VIAL (EA) INTRAVENOUS DAILY
Qty: 0 | Refills: 0 | Status: DISCONTINUED | OUTPATIENT
Start: 2018-08-30 | End: 2018-09-04

## 2018-08-29 RX ORDER — FUROSEMIDE 40 MG
40 TABLET ORAL DAILY
Qty: 0 | Refills: 0 | Status: COMPLETED | OUTPATIENT
Start: 2018-08-30 | End: 2018-09-03

## 2018-08-29 RX ORDER — ACETAMINOPHEN 500 MG
1000 TABLET ORAL ONCE
Qty: 0 | Refills: 0 | Status: DISCONTINUED | OUTPATIENT
Start: 2018-08-29 | End: 2018-08-31

## 2018-08-29 RX ORDER — FENTANYL CITRATE 50 UG/ML
25 INJECTION INTRAVENOUS ONCE
Qty: 0 | Refills: 0 | Status: DISCONTINUED | OUTPATIENT
Start: 2018-08-29 | End: 2018-08-31

## 2018-08-29 RX ORDER — ACETAMINOPHEN 500 MG
1000 TABLET ORAL ONCE
Qty: 0 | Refills: 0 | Status: COMPLETED | OUTPATIENT
Start: 2018-08-29 | End: 2018-08-29

## 2018-08-29 RX ORDER — OXYCODONE HYDROCHLORIDE 5 MG/1
10 TABLET ORAL ONCE
Qty: 0 | Refills: 0 | Status: DISCONTINUED | OUTPATIENT
Start: 2018-08-29 | End: 2018-08-29

## 2018-08-29 RX ORDER — FUROSEMIDE 40 MG
10 TABLET ORAL ONCE
Qty: 0 | Refills: 0 | Status: COMPLETED | OUTPATIENT
Start: 2018-08-29 | End: 2018-08-30

## 2018-08-29 RX ADMIN — SODIUM CHLORIDE 3 MILLILITER(S): 9 INJECTION INTRAMUSCULAR; INTRAVENOUS; SUBCUTANEOUS at 06:46

## 2018-08-29 RX ADMIN — SODIUM CHLORIDE 10 MILLILITER(S): 9 INJECTION, SOLUTION INTRAVENOUS at 08:43

## 2018-08-29 RX ADMIN — Medication 250 MILLIGRAM(S): at 06:20

## 2018-08-29 RX ADMIN — Medication 250 MILLIGRAM(S): at 18:00

## 2018-08-29 RX ADMIN — SODIUM CHLORIDE 3 MILLILITER(S): 9 INJECTION INTRAMUSCULAR; INTRAVENOUS; SUBCUTANEOUS at 12:14

## 2018-08-29 RX ADMIN — Medication 100 MILLIGRAM(S): at 13:58

## 2018-08-29 RX ADMIN — Medication 0.5 MILLIGRAM(S): at 06:19

## 2018-08-29 RX ADMIN — Medication 1 MILLIGRAM(S): at 12:14

## 2018-08-29 RX ADMIN — CHLORHEXIDINE GLUCONATE 15 MILLILITER(S): 213 SOLUTION TOPICAL at 06:19

## 2018-08-29 RX ADMIN — Medication 100 MILLIGRAM(S): at 07:20

## 2018-08-29 RX ADMIN — HEPARIN SODIUM 5000 UNIT(S): 5000 INJECTION INTRAVENOUS; SUBCUTANEOUS at 06:55

## 2018-08-29 RX ADMIN — Medication 300 MILLIGRAM(S): at 12:13

## 2018-08-29 RX ADMIN — PREGABALIN 1000 MICROGRAM(S): 225 CAPSULE ORAL at 12:14

## 2018-08-29 RX ADMIN — Medication 2 PUFF(S): at 10:09

## 2018-08-29 RX ADMIN — Medication 400 MILLIGRAM(S): at 08:30

## 2018-08-29 RX ADMIN — HEPARIN SODIUM 5000 UNIT(S): 5000 INJECTION INTRAVENOUS; SUBCUTANEOUS at 13:41

## 2018-08-29 RX ADMIN — PANTOPRAZOLE SODIUM 40 MILLIGRAM(S): 20 TABLET, DELAYED RELEASE ORAL at 12:13

## 2018-08-29 RX ADMIN — CHLORHEXIDINE GLUCONATE 1 APPLICATION(S): 213 SOLUTION TOPICAL at 06:20

## 2018-08-29 RX ADMIN — SODIUM CHLORIDE 3 MILLILITER(S): 9 INJECTION INTRAMUSCULAR; INTRAVENOUS; SUBCUTANEOUS at 23:39

## 2018-08-29 RX ADMIN — Medication 100 MILLIGRAM(S): at 12:14

## 2018-08-29 RX ADMIN — HEPARIN SODIUM 5000 UNIT(S): 5000 INJECTION INTRAVENOUS; SUBCUTANEOUS at 22:00

## 2018-08-29 RX ADMIN — Medication 2 PUFF(S): at 04:04

## 2018-08-29 RX ADMIN — Medication 100 MILLIGRAM(S): at 22:30

## 2018-08-29 RX ADMIN — Medication 0.5 MILLIGRAM(S): at 16:26

## 2018-08-29 RX ADMIN — NYSTATIN CREAM 1 APPLICATION(S): 100000 CREAM TOPICAL at 06:55

## 2018-08-29 RX ADMIN — Medication 2 PUFF(S): at 16:35

## 2018-08-29 RX ADMIN — NYSTATIN CREAM 1 APPLICATION(S): 100000 CREAM TOPICAL at 18:23

## 2018-08-29 RX ADMIN — Medication 300 MILLIGRAM(S): at 16:28

## 2018-08-29 RX ADMIN — OXYCODONE HYDROCHLORIDE 10 MILLIGRAM(S): 5 TABLET ORAL at 10:30

## 2018-08-29 RX ADMIN — Medication 2 PUFF(S): at 22:39

## 2018-08-29 RX ADMIN — CHLORHEXIDINE GLUCONATE 15 MILLILITER(S): 213 SOLUTION TOPICAL at 18:23

## 2018-08-29 RX ADMIN — Medication 300 MILLIGRAM(S): at 09:57

## 2018-08-29 RX ADMIN — OXYCODONE HYDROCHLORIDE 10 MILLIGRAM(S): 5 TABLET ORAL at 09:56

## 2018-08-29 RX ADMIN — Medication 1000 MILLIGRAM(S): at 09:00
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
## 2018-08-29 NOTE — PROGRESS NOTE ADULT - SUBJECTIVE AND OBJECTIVE BOX
Infectious Diseases progress note:    Subjective: NAD, getting Rt posterior chest wound dressing change.  Wound is clean, no malodor or surrounding erythema.  Pt afebrile.      ROS:  CONSTITUTIONAL:  No fever, chills, rigors  CARDIOVASCULAR:  No chest pain or palpitations  RESPIRATORY:   No SOB, cough, dyspnea on exertion.  No wheezing  GASTROINTESTINAL:  No abd pain, N/V, diarrhea/constipation  EXTREMITIES:  No swelling or joint pain  GENITOURINARY:  No burning on urination, increased frequency or urgency.  No flank pain  NEUROLOGIC:  No HA, visual disturbances  SKIN: No rashes    Allergies    No Known Allergies    Intolerances    tigecycline (Other)      ANTIBIOTICS/RELEVANT:  antimicrobials  metroNIDAZOLE  IVPB 500 milliGRAM(s) IV Intermittent every 8 hours  vancomycin  IVPB      vancomycin  IVPB 1000 milliGRAM(s) IV Intermittent every 12 hours    immunologic:    OTHER:  acetaminophen    Suspension 650 milliGRAM(s) Oral every 6 hours PRN  ALBUTerol    90 MICROgram(s) HFA Inhaler 2 Puff(s) Inhalation every 6 hours PRN  ALPRAZolam 0.5 milliGRAM(s) Oral every 8 hours  chlorhexidine 0.12% Liquid 15 milliLiter(s) Swish and Spit two times a day  chlorhexidine 4% Liquid 1 Application(s) Topical <User Schedule>  cyanocobalamin 1000 MICROGram(s) Oral daily  fentaNYL    Injectable 25 MICROGram(s) IV Push once  ferrous    sulfate Liquid 300 milliGRAM(s) Oral three times a day with meals  folic acid 1 milliGRAM(s) Oral daily  heparin  Injectable 5000 Unit(s) SubCutaneous every 8 hours  insulin lispro (HumaLOG) corrective regimen sliding scale   SubCutaneous every 6 hours  ipratropium 17 MICROgram(s) HFA Inhaler 2 Puff(s) Inhalation every 6 hours  lactated ringers 1000 milliLiter(s) IV Continuous <Continuous>  nystatin Powder 1 Application(s) Topical two times a day  ondansetron Injectable 4 milliGRAM(s) IV Push every 6 hours PRN  pantoprazole  Injectable 40 milliGRAM(s) IV Push daily  sodium chloride 0.9% lock flush 3 milliLiter(s) IV Push every 8 hours  thiamine 100 milliGRAM(s) Oral daily      Objective:  Vital Signs Last 24 Hrs  T(C): 37.1 (29 Aug 2018 10:40), Max: 37.4 (29 Aug 2018 08:30)  T(F): 98.8 (29 Aug 2018 10:40), Max: 99.4 (29 Aug 2018 10:25)  HR: 95 (29 Aug 2018 10:40) (87 - 110)  BP: 106/70 (29 Aug 2018 10:40) (96/60 - 130/81)  BP(mean): 78 (29 Aug 2018 10:40) (66 - 92)  RR: 28 (29 Aug 2018 10:40) (14 - 31)  SpO2: 96% (29 Aug 2018 10:40) (93% - 100%)    PHYSICAL EXAM:  Constitutional:NAD  Eyes:MEMO, EOMI  Ear/Nose/Throat: no thrush, mucositis.  Moist mucous membranes	  Neck:no JVD, no lymphadenopathy, supple, tracheostomy  Respiratory: CTA emi, L pigtail  Cardiovascular: S1S2 RRR, no murmurs  Gastrointestinal:soft, nontender,  nondistended (+) BS  Extremities:no e/e/c  Skin:  no rashes, open wounds or ulcerations        LABS:                        6.7    9.88  )-----------( 228      ( 29 Aug 2018 03:45 )             22.0     08-29    138  |  103  |  10  ----------------------------<  107<H>  3.9   |  25  |  0.58    Ca    8.3<L>      29 Aug 2018 03:45                  Vancomycin Level, Trough: 17.2 ug/mL (08-29 @ 03:45)  Vancomycin Level, Trough: 19.4 ug/mL (08-28 @ 04:30)  Vancomycin Level, Trough: 12.8 ug/mL (08-26 @ 16:40)  Vancomycin Level, Trough: 7.9 ug/mL (08-25 @ 13:00)              MICROBIOLOGY:    Culture - Yeast and Fungus (08.27.18 @ 11:32)    Culture - Yeast and Fungus:   CULTURE NEGATIVE FOR YEASTS AND MOLDS  PRELIMINARY RESULTS    Specimen Source: PLEURAL FLUID    Culture - Body Fluid with Gram Stain (08.27.18 @ 11:32)    Culture - Body Fluid:   NO GROWTH - PRELIMINARY RESULTS    Gram Stain:   NOS^No Organisms Seen  WBC^White Blood Cells  QNTY CELLS IN GRAM STAIN: RARE (1+)    Specimen Source: PLEURAL FLUID          RADIOLOGY & ADDITIONAL STUDIES:    < from: Xray Chest 1 View- PORTABLE-Routine (08.29.18 @ 06:56) >  IMPRESSION: The patient is status post tracheostomy. A left pleural   catheter is noted. There is a small left pneumothorax with interval mild   increase in size since the prior study. Continued follow-up is   recommended. There is a small right pleural effusion unchanged. Bilateral   increased markings with superimposed bibasilar patchy opacities   demonstrate interval progression since the prior study.    < end of copied text >
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
## 2018-08-30 LAB
ALBUMIN SERPL ELPH-MCNC: 3.6 G/DL — SIGNIFICANT CHANGE UP (ref 3.3–5)
ALP SERPL-CCNC: 478 U/L — HIGH (ref 40–120)
ALT FLD-CCNC: 25 U/L — SIGNIFICANT CHANGE UP (ref 4–41)
AST SERPL-CCNC: 28 U/L — SIGNIFICANT CHANGE UP (ref 4–40)
BILIRUB DIRECT SERPL-MCNC: 0.9 MG/DL — HIGH (ref 0.1–0.2)
BILIRUB SERPL-MCNC: 1.6 MG/DL — HIGH (ref 0.2–1.2)
BUN SERPL-MCNC: 8 MG/DL — SIGNIFICANT CHANGE UP (ref 7–23)
CALCIUM SERPL-MCNC: 8.6 MG/DL — SIGNIFICANT CHANGE UP (ref 8.4–10.5)
CHLORIDE SERPL-SCNC: 102 MMOL/L — SIGNIFICANT CHANGE UP (ref 98–107)
CO2 SERPL-SCNC: 25 MMOL/L — SIGNIFICANT CHANGE UP (ref 22–31)
CREAT SERPL-MCNC: 0.57 MG/DL — SIGNIFICANT CHANGE UP (ref 0.5–1.3)
GLUCOSE SERPL-MCNC: 103 MG/DL — HIGH (ref 70–99)
HAPTOGLOB SERPL-MCNC: 175 MG/DL — SIGNIFICANT CHANGE UP (ref 34–200)
HCT VFR BLD CALC: 21.4 % — LOW (ref 39–50)
HCT VFR BLD CALC: 33.9 % — LOW (ref 39–50)
HGB BLD-MCNC: 10.1 G/DL — LOW (ref 13–17)
HGB BLD-MCNC: 6.5 G/DL — CRITICAL LOW (ref 13–17)
LDH SERPL L TO P-CCNC: 221 U/L — SIGNIFICANT CHANGE UP (ref 135–225)
MAGNESIUM SERPL-MCNC: 1.8 MG/DL — SIGNIFICANT CHANGE UP (ref 1.6–2.6)
MCHC RBC-ENTMCNC: 29.8 % — LOW (ref 32–36)
MCHC RBC-ENTMCNC: 30.4 % — LOW (ref 32–36)
MCHC RBC-ENTMCNC: 30.7 PG — SIGNIFICANT CHANGE UP (ref 27–34)
MCHC RBC-ENTMCNC: 31 PG — SIGNIFICANT CHANGE UP (ref 27–34)
MCV RBC AUTO: 101.9 FL — HIGH (ref 80–100)
MCV RBC AUTO: 103 FL — HIGH (ref 80–100)
NRBC # FLD: 0 — SIGNIFICANT CHANGE UP
NRBC # FLD: 0 — SIGNIFICANT CHANGE UP
PHOSPHATE SERPL-MCNC: 4.3 MG/DL — SIGNIFICANT CHANGE UP (ref 2.5–4.5)
PLATELET # BLD AUTO: 265 K/UL — SIGNIFICANT CHANGE UP (ref 150–400)
PLATELET # BLD AUTO: 277 K/UL — SIGNIFICANT CHANGE UP (ref 150–400)
PMV BLD: 11.4 FL — SIGNIFICANT CHANGE UP (ref 7–13)
PMV BLD: 11.8 FL — SIGNIFICANT CHANGE UP (ref 7–13)
POTASSIUM SERPL-MCNC: 4.3 MMOL/L — SIGNIFICANT CHANGE UP (ref 3.5–5.3)
POTASSIUM SERPL-SCNC: 4.3 MMOL/L — SIGNIFICANT CHANGE UP (ref 3.5–5.3)
PROT SERPL-MCNC: 8.2 G/DL — SIGNIFICANT CHANGE UP (ref 6–8.3)
RBC # BLD: 2.1 M/UL — LOW (ref 4.2–5.8)
RBC # BLD: 3.29 M/UL — LOW (ref 4.2–5.8)
RBC # FLD: 23.5 % — HIGH (ref 10.3–14.5)
RBC # FLD: 23.7 % — HIGH (ref 10.3–14.5)
RETICS #: 212 K/UL — HIGH (ref 25–125)
RETICS/RBC NFR: 6.3 % — HIGH (ref 0.5–2.5)
SODIUM SERPL-SCNC: 138 MMOL/L — SIGNIFICANT CHANGE UP (ref 135–145)
VANCOMYCIN TROUGH SERPL-MCNC: 16.5 UG/ML — SIGNIFICANT CHANGE UP (ref 10–20)
WBC # BLD: 10.71 K/UL — HIGH (ref 3.8–10.5)
WBC # BLD: 12.6 K/UL — HIGH (ref 3.8–10.5)
WBC # FLD AUTO: 10.71 K/UL — HIGH (ref 3.8–10.5)
WBC # FLD AUTO: 12.6 K/UL — HIGH (ref 3.8–10.5)

## 2018-08-30 PROCEDURE — 99254 IP/OBS CNSLTJ NEW/EST MOD 60: CPT | Mod: GC

## 2018-08-30 PROCEDURE — 71045 X-RAY EXAM CHEST 1 VIEW: CPT | Mod: 26

## 2018-08-30 PROCEDURE — 99291 CRITICAL CARE FIRST HOUR: CPT

## 2018-08-30 RX ORDER — ACETAMINOPHEN 500 MG
1000 TABLET ORAL ONCE
Qty: 0 | Refills: 0 | Status: DISCONTINUED | OUTPATIENT
Start: 2018-08-30 | End: 2018-09-01

## 2018-08-30 RX ORDER — POTASSIUM CHLORIDE 20 MEQ
20 PACKET (EA) ORAL ONCE
Qty: 0 | Refills: 0 | Status: COMPLETED | OUTPATIENT
Start: 2018-08-29 | End: 2018-08-29

## 2018-08-30 RX ORDER — ACETAMINOPHEN 500 MG
1000 TABLET ORAL ONCE
Qty: 0 | Refills: 0 | Status: COMPLETED | OUTPATIENT
Start: 2018-08-30 | End: 2018-08-30

## 2018-08-30 RX ORDER — CALCIUM GLUCONATE 100 MG/ML
1 VIAL (ML) INTRAVENOUS ONCE
Qty: 0 | Refills: 0 | Status: COMPLETED | OUTPATIENT
Start: 2018-08-29 | End: 2018-08-29

## 2018-08-30 RX ORDER — ACETAMINOPHEN 500 MG
1000 TABLET ORAL ONCE
Qty: 0 | Refills: 0 | Status: DISCONTINUED | OUTPATIENT
Start: 2018-08-30 | End: 2018-08-31

## 2018-08-30 RX ADMIN — Medication 300 MILLIGRAM(S): at 12:10

## 2018-08-30 RX ADMIN — Medication 300 MILLIGRAM(S): at 18:26

## 2018-08-30 RX ADMIN — Medication 0.5 MILLIGRAM(S): at 00:14

## 2018-08-30 RX ADMIN — Medication 0.5 MILLIGRAM(S): at 18:25

## 2018-08-30 RX ADMIN — Medication 2 PUFF(S): at 09:35

## 2018-08-30 RX ADMIN — CHLORHEXIDINE GLUCONATE 15 MILLILITER(S): 213 SOLUTION TOPICAL at 18:25

## 2018-08-30 RX ADMIN — Medication 1 MILLIGRAM(S): at 12:10

## 2018-08-30 RX ADMIN — Medication 40 MILLIGRAM(S): at 14:00

## 2018-08-30 RX ADMIN — NYSTATIN CREAM 1 APPLICATION(S): 100000 CREAM TOPICAL at 06:30

## 2018-08-30 RX ADMIN — Medication 2 PUFF(S): at 22:42

## 2018-08-30 RX ADMIN — Medication 0.5 MILLIGRAM(S): at 08:42

## 2018-08-30 RX ADMIN — Medication 100 MILLIGRAM(S): at 12:10

## 2018-08-30 RX ADMIN — CHLORHEXIDINE GLUCONATE 15 MILLILITER(S): 213 SOLUTION TOPICAL at 05:53

## 2018-08-30 RX ADMIN — Medication 1000 MILLIGRAM(S): at 14:30

## 2018-08-30 RX ADMIN — Medication 400 MILLIGRAM(S): at 14:00

## 2018-08-30 RX ADMIN — HEPARIN SODIUM 5000 UNIT(S): 5000 INJECTION INTRAVENOUS; SUBCUTANEOUS at 21:06

## 2018-08-30 RX ADMIN — Medication 2 PUFF(S): at 03:53

## 2018-08-30 RX ADMIN — Medication 100 MILLIGRAM(S): at 15:00

## 2018-08-30 RX ADMIN — Medication 300 MILLIGRAM(S): at 09:29

## 2018-08-30 RX ADMIN — SODIUM CHLORIDE 3 MILLILITER(S): 9 INJECTION INTRAMUSCULAR; INTRAVENOUS; SUBCUTANEOUS at 21:06

## 2018-08-30 RX ADMIN — Medication 100 MILLIGRAM(S): at 21:06

## 2018-08-30 RX ADMIN — CHLORHEXIDINE GLUCONATE 1 APPLICATION(S): 213 SOLUTION TOPICAL at 05:53

## 2018-08-30 RX ADMIN — Medication 20 MILLIEQUIVALENT(S): at 00:25

## 2018-08-30 RX ADMIN — Medication 2 PUFF(S): at 15:59

## 2018-08-30 RX ADMIN — Medication 20 MILLIEQUIVALENT(S): at 12:10

## 2018-08-30 RX ADMIN — PANTOPRAZOLE SODIUM 40 MILLIGRAM(S): 20 TABLET, DELAYED RELEASE ORAL at 12:10

## 2018-08-30 RX ADMIN — SODIUM CHLORIDE 3 MILLILITER(S): 9 INJECTION INTRAMUSCULAR; INTRAVENOUS; SUBCUTANEOUS at 14:00

## 2018-08-30 RX ADMIN — HEPARIN SODIUM 5000 UNIT(S): 5000 INJECTION INTRAVENOUS; SUBCUTANEOUS at 07:21

## 2018-08-30 RX ADMIN — Medication 10 MILLIGRAM(S): at 07:21

## 2018-08-30 RX ADMIN — SODIUM CHLORIDE 3 MILLILITER(S): 9 INJECTION INTRAMUSCULAR; INTRAVENOUS; SUBCUTANEOUS at 05:53

## 2018-08-30 RX ADMIN — Medication 300 MILLIGRAM(S): at 12:25

## 2018-08-30 RX ADMIN — Medication 200 GRAM(S): at 00:25

## 2018-08-30 RX ADMIN — NYSTATIN CREAM 1 APPLICATION(S): 100000 CREAM TOPICAL at 18:25

## 2018-08-30 RX ADMIN — Medication 100 MILLIGRAM(S): at 06:30

## 2018-08-30 RX ADMIN — PREGABALIN 1000 MICROGRAM(S): 225 CAPSULE ORAL at 12:10

## 2018-08-30 RX ADMIN — HEPARIN SODIUM 5000 UNIT(S): 5000 INJECTION INTRAVENOUS; SUBCUTANEOUS at 14:00

## 2018-08-30 NOTE — CONSULT NOTE ADULT - ASSESSMENT
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    # Anemia  -unclear source of blood loss; chest tube drainage mostly serous, FOBT negative.  -Haptoglobin has been persistently above 100, LDH was only mildly elevated, Tbili 1.6 with indirect bili 0.7; it does not appear that there is excessive hemolysis.   -Coomb's test was noted to be positive for poly and C3. However, positive Nelson test does not always cause active hemolysis.  -reticulocyte count 6.3%, which is appropriate for anemia. (Not bone marrow problem)  -autoimmune hemolysis is unlikely to be the cause of his anemia requiring blood transfusion.  -would recommend checking other causes of anemia/bleeding source     d/w attending Dr. Pascual De La Garza Chi, MD.  Heme-Onc fellow  [phone]

## 2018-08-30 NOTE — CONSULT NOTE ADULT - SUBJECTIVE AND OBJECTIVE BOX
42 year old with no past medical history admitted for management of empyema Pt underwent thoracostomy, RUL drainage of abscess, course complicated by R hemothorax (8/6) s/p drainage.  Pt was given 4 units of PRBC from Aug 6-8. He also required intubation and now s/p Trach and PEG. Pt's Hgb slowly trended down and required additional 2 units of PRBC on 8/18. Nelson test at that time was noted to be positive for poly and C3, IgG was negative.  Anti JKb and anti K was also noted to be positive. Chest tube output is now about 10 cc/hr, serous, slight pink. FOBT was negative. Pt received another unit of PRBC on 8/29. 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    Past Medical History:  No pertinent past medical history.    Past Surgical History:  No significant past surgical history.    Family History:  No pertinent family history in first degree relatives.    Social History:  Smoker- 1 pack/day x20 years  Alcohol- occasional use- every weekend      REVIEW OF SYSTEMS:    CONSTITUTIONAL: fatigued, no fever  EYES/ENT: No visual changes, no throat pain   RESPIRATORY: No cough, wheezing, hemoptysis; No shortness of breath  CARDIOVASCULAR: +  chest pain with cough  GASTROINTESTINAL: No abdominal, nausea, vomiting, or hematemesis; No diarrhea or constipation. No melena or hematochezia.  GENITOURINARY: No dysuria, frequency or hematuria  NEUROLOGICAL: No dizziness, numbness, or weakness  SKIN: No itching, burning, rashes, or lesions   All other review of systems is negative unless indicated above.    VITAL SIGNS:  Vital Signs Last 24 Hrs  T(C): 37.3 (30 Aug 2018 16:00), Max: 37.4 (29 Aug 2018 20:00)  T(F): 99.1 (30 Aug 2018 16:00), Max: 99.3 (29 Aug 2018 20:00)  HR: 91 (30 Aug 2018 18:00) (82 - 112)  BP: 110/73 (30 Aug 2018 18:00) (100/64 - 137/91)  BP(mean): 81 (30 Aug 2018 18:00) (71 - 101)  RR: 21 (30 Aug 2018 18:00) (20 - 36)  SpO2: 99% (30 Aug 2018 18:00) (97% - 100%)      PHYSICAL EXAM:     GENERAL: no acute distress  HEENT: tracheostomy tube in place  RESPIRATORY: LCTAB/L, no rhonchi, rales, or wheezing  CARDIOVASCULAR: RRR, no murmurs, gallops, rubs  ABDOMINAL: soft, non-tender, non-distended, positive bowel sounds   EXTREMITIES: + 2+edema  NEUROLOGICAL: alert and oriented x 3, non-focal  SKIN: no rashes or lesions   MUSCULOSKELETAL: no gross joint deformity                          10.1   10.71 )-----------( 277      ( 30 Aug 2018 07:15 )             33.9     08-30    138  |  102  |  8   ----------------------------<  103<H>  4.3   |  25  |  0.57    Ca    8.6      30 Aug 2018 05:00  Phos  4.3     08-30  Mg     1.8     08-30    TPro  8.2  /  Alb  3.6  /  TBili  1.6<H>  /  DBili  0.9<H>  /  AST  28  /  ALT  25  /  AlkPhos  478<H>  08-30      CAPILLARY BLOOD GLUCOSE      POCT Blood Glucose.: 125 mg/dL (30 Aug 2018 13:34)  POCT Blood Glucose.: 107 mg/dL (30 Aug 2018 07:24)  POCT Blood Glucose.: 106 mg/dL (30 Aug 2018 00:31)      MEDICATIONS  (STANDING):  acetaminophen  IVPB. 1000 milliGRAM(s) IV Intermittent once  acetaminophen  IVPB. 1000 milliGRAM(s) IV Intermittent once  ALPRAZolam 0.5 milliGRAM(s) Oral every 8 hours  chlorhexidine 0.12% Liquid 15 milliLiter(s) Swish and Spit two times a day  chlorhexidine 4% Liquid 1 Application(s) Topical <User Schedule>  cyanocobalamin 1000 MICROGram(s) Oral daily  fentaNYL    Injectable 25 MICROGram(s) IV Push once  ferrous    sulfate Liquid 300 milliGRAM(s) Oral three times a day with meals  folic acid 1 milliGRAM(s) Oral daily  furosemide    Tablet 40 milliGRAM(s) Oral daily  heparin  Injectable 5000 Unit(s) SubCutaneous every 8 hours  insulin lispro (HumaLOG) corrective regimen sliding scale   SubCutaneous every 6 hours  ipratropium 17 MICROgram(s) HFA Inhaler 2 Puff(s) Inhalation every 6 hours  lactated ringers 1000 milliLiter(s) (10 mL/Hr) IV Continuous <Continuous>  metroNIDAZOLE  IVPB 500 milliGRAM(s) IV Intermittent every 8 hours  nystatin Powder 1 Application(s) Topical two times a day  pantoprazole  Injectable 40 milliGRAM(s) IV Push daily  potassium chloride   Powder 20 milliEquivalent(s) Oral daily  sodium chloride 0.9% lock flush 3 milliLiter(s) IV Push every 8 hours  thiamine 100 milliGRAM(s) Oral daily  vancomycin  IVPB 1500 milliGRAM(s) IV Intermittent daily
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
## 2018-08-30 NOTE — PROGRESS NOTE ADULT - SUBJECTIVE AND OBJECTIVE BOX
Infectious Diseases progress note:    Subjective: No acute o/n events.  Afebrile.  WBC mildly elevated    ROS:  CONSTITUTIONAL:  No fever, chills, rigors  CARDIOVASCULAR:  No chest pain or palpitations  RESPIRATORY:   No SOB, cough, dyspnea on exertion.  No wheezing  GASTROINTESTINAL:  No abd pain, N/V, diarrhea/constipation  EXTREMITIES:  No swelling or joint pain  GENITOURINARY:  No burning on urination, increased frequency or urgency.  No flank pain  NEUROLOGIC:  No HA, visual disturbances  SKIN: No rashes    Allergies    No Known Allergies    Intolerances    tigecycline (Other)      ANTIBIOTICS/RELEVANT:  antimicrobials  metroNIDAZOLE  IVPB 500 milliGRAM(s) IV Intermittent every 8 hours  vancomycin  IVPB 1500 milliGRAM(s) IV Intermittent daily    immunologic:    OTHER:  acetaminophen    Suspension 650 milliGRAM(s) Oral every 6 hours PRN  acetaminophen  IVPB. 1000 milliGRAM(s) IV Intermittent once  acetaminophen  IVPB. 1000 milliGRAM(s) IV Intermittent once  ALBUTerol    90 MICROgram(s) HFA Inhaler 2 Puff(s) Inhalation every 6 hours PRN  ALPRAZolam 0.5 milliGRAM(s) Oral every 8 hours  chlorhexidine 0.12% Liquid 15 milliLiter(s) Swish and Spit two times a day  chlorhexidine 4% Liquid 1 Application(s) Topical <User Schedule>  cyanocobalamin 1000 MICROGram(s) Oral daily  fentaNYL    Injectable 25 MICROGram(s) IV Push once  ferrous    sulfate Liquid 300 milliGRAM(s) Oral three times a day with meals  folic acid 1 milliGRAM(s) Oral daily  furosemide    Tablet 40 milliGRAM(s) Oral daily  heparin  Injectable 5000 Unit(s) SubCutaneous every 8 hours  insulin lispro (HumaLOG) corrective regimen sliding scale   SubCutaneous every 6 hours  ipratropium 17 MICROgram(s) HFA Inhaler 2 Puff(s) Inhalation every 6 hours  lactated ringers 1000 milliLiter(s) IV Continuous <Continuous>  nystatin Powder 1 Application(s) Topical two times a day  ondansetron Injectable 4 milliGRAM(s) IV Push every 6 hours PRN  pantoprazole  Injectable 40 milliGRAM(s) IV Push daily  potassium chloride   Powder 20 milliEquivalent(s) Oral daily  sodium chloride 0.9% lock flush 3 milliLiter(s) IV Push every 8 hours  thiamine 100 milliGRAM(s) Oral daily      Objective:  Vital Signs Last 24 Hrs  T(C): 37.3 (30 Aug 2018 16:00), Max: 37.4 (29 Aug 2018 20:00)  T(F): 99.1 (30 Aug 2018 16:00), Max: 99.3 (29 Aug 2018 20:00)  HR: 92 (30 Aug 2018 17:00) (82 - 112)  BP: 120/80 (30 Aug 2018 17:00) (100/64 - 137/91)  BP(mean): 89 (30 Aug 2018 17:00) (71 - 101)  RR: 27 (30 Aug 2018 17:00) (20 - 36)  SpO2: 98% (30 Aug 2018 17:00) (97% - 100%)    PHYSICAL EXAM:  Constitutional:NAD  Eyes:MEMO, EOMI  Ear/Nose/Throat: no thrush, mucositis.  Moist mucous membranes	  Neck:no JVD, no lymphadenopathy, supple  Respiratory: CTA emi, L chest tube, tracheostomy  Cardiovascular: S1S2 RRR, no murmurs  Gastrointestinal:soft, nontender,  nondistended (+) BS, peg  Extremities:no e/e/c  Skin:  rt post chest wound vac      LABS:                        10.1   10.71 )-----------( 277      ( 30 Aug 2018 07:15 )             33.9     08-30    138  |  102  |  8   ----------------------------<  103<H>  4.3   |  25  |  0.57    Ca    8.6      30 Aug 2018 05:00  Phos  4.3     08-30  Mg     1.8     08-30    TPro  8.2  /  Alb  3.6  /  TBili  1.6<H>  /  DBili  0.9<H>  /  AST  28  /  ALT  25  /  AlkPhos  478<H>  08-30                Vancomycin Level, Trough: 16.5 ug/mL (08-30 @ 05:00)  Vancomycin Level, Trough: 17.2 ug/mL (08-29 @ 03:45)  Vancomycin Level, Trough: 19.4 ug/mL (08-28 @ 04:30)  Vancomycin Level, Trough: 12.8 ug/mL (08-26 @ 16:40)              MICROBIOLOGY:    Culture - Yeast and Fungus (08.27.18 @ 11:32)    Culture - Yeast and Fungus:   CULTURE NEGATIVE FOR YEASTS AND MOLDS  PRELIMINARY RESULTS  CULTURE NEGATIVE FOR YEASTS AND MOLDS AFTER 2 DAYS    Specimen Source: PLEURAL FLUID    Culture - Body Fluid with Gram Stain (08.27.18 @ 11:32)    Culture - Body Fluid:   NO GROWTH - PRELIMINARY RESULTS  NO ORGANISMS ISOLATED AT 24 HOURS  NO ORGANISMS ISOLATED AT 48 HRS.    Gram Stain:   NOS^No Organisms Seen  WBC^White Blood Cells  QNTY CELLS IN GRAM STAIN: RARE (1+)    Specimen Source: PLEURAL FLUID          RADIOLOGY & ADDITIONAL STUDIES:    < from: Xray Chest 1 View- PORTABLE-Routine (08.29.18 @ 06:56) >    IMPRESSION: The patient is status post tracheostomy. A left pleural   catheter is noted. There is a small left pneumothorax with interval mild   increase in size since the prior study. Continued follow-up is   recommended. There is a small right pleural effusion unchanged. Bilateral   increased markings with superimposed bibasilar patchy opacities   demonstrate interval progression since the prior study.    < end of copied text >

## 2018-08-31 LAB
ALBUMIN SERPL ELPH-MCNC: 3 G/DL — LOW (ref 3.3–5)
ALP SERPL-CCNC: 367 U/L — HIGH (ref 40–120)
ALT FLD-CCNC: 18 U/L — SIGNIFICANT CHANGE UP (ref 4–41)
AST SERPL-CCNC: 22 U/L — SIGNIFICANT CHANGE UP (ref 4–40)
BASOPHILS # BLD AUTO: 0.05 K/UL — SIGNIFICANT CHANGE UP (ref 0–0.2)
BASOPHILS NFR BLD AUTO: 0.5 % — SIGNIFICANT CHANGE UP (ref 0–2)
BILIRUB SERPL-MCNC: 1.2 MG/DL — SIGNIFICANT CHANGE UP (ref 0.2–1.2)
BUN SERPL-MCNC: 10 MG/DL — SIGNIFICANT CHANGE UP (ref 7–23)
CA-I BLD-SCNC: 1.11 MMOL/L — SIGNIFICANT CHANGE UP (ref 1.03–1.23)
CALCIUM SERPL-MCNC: 8.5 MG/DL — SIGNIFICANT CHANGE UP (ref 8.4–10.5)
CHLORIDE SERPL-SCNC: 103 MMOL/L — SIGNIFICANT CHANGE UP (ref 98–107)
CO2 SERPL-SCNC: 25 MMOL/L — SIGNIFICANT CHANGE UP (ref 22–31)
CREAT SERPL-MCNC: 0.59 MG/DL — SIGNIFICANT CHANGE UP (ref 0.5–1.3)
EOSINOPHIL # BLD AUTO: 1.42 K/UL — HIGH (ref 0–0.5)
EOSINOPHIL NFR BLD AUTO: 13.2 % — HIGH (ref 0–6)
GLUCOSE SERPL-MCNC: 96 MG/DL — SIGNIFICANT CHANGE UP (ref 70–99)
HCT VFR BLD CALC: 29.7 % — LOW (ref 39–50)
HGB BLD-MCNC: 9.2 G/DL — LOW (ref 13–17)
IMM GRANULOCYTES # BLD AUTO: 0.07 # — SIGNIFICANT CHANGE UP
IMM GRANULOCYTES NFR BLD AUTO: 0.7 % — SIGNIFICANT CHANGE UP (ref 0–1.5)
LYMPHOCYTES # BLD AUTO: 1.64 K/UL — SIGNIFICANT CHANGE UP (ref 1–3.3)
LYMPHOCYTES # BLD AUTO: 15.3 % — SIGNIFICANT CHANGE UP (ref 13–44)
MAGNESIUM SERPL-MCNC: 1.8 MG/DL — SIGNIFICANT CHANGE UP (ref 1.6–2.6)
MCHC RBC-ENTMCNC: 31 % — LOW (ref 32–36)
MCHC RBC-ENTMCNC: 31.8 PG — SIGNIFICANT CHANGE UP (ref 27–34)
MCV RBC AUTO: 102.8 FL — HIGH (ref 80–100)
MONOCYTES # BLD AUTO: 1.1 K/UL — HIGH (ref 0–0.9)
MONOCYTES NFR BLD AUTO: 10.2 % — SIGNIFICANT CHANGE UP (ref 2–14)
NEUTROPHILS # BLD AUTO: 6.46 K/UL — SIGNIFICANT CHANGE UP (ref 1.8–7.4)
NEUTROPHILS NFR BLD AUTO: 60.1 % — SIGNIFICANT CHANGE UP (ref 43–77)
NRBC # FLD: 0 — SIGNIFICANT CHANGE UP
PHOSPHATE SERPL-MCNC: 4.5 MG/DL — SIGNIFICANT CHANGE UP (ref 2.5–4.5)
PLATELET # BLD AUTO: 236 K/UL — SIGNIFICANT CHANGE UP (ref 150–400)
PMV BLD: 11.5 FL — SIGNIFICANT CHANGE UP (ref 7–13)
POTASSIUM SERPL-MCNC: 4.3 MMOL/L — SIGNIFICANT CHANGE UP (ref 3.5–5.3)
POTASSIUM SERPL-SCNC: 4.3 MMOL/L — SIGNIFICANT CHANGE UP (ref 3.5–5.3)
PREALB SERPL-MCNC: 19 MG/DL — LOW (ref 20–40)
PROT SERPL-MCNC: 6.8 G/DL — SIGNIFICANT CHANGE UP (ref 6–8.3)
RBC # BLD: 2.89 M/UL — LOW (ref 4.2–5.8)
RBC # FLD: 23.2 % — HIGH (ref 10.3–14.5)
RETICS #: 157 K/UL — HIGH (ref 25–125)
RETICS/RBC NFR: 5.4 % — HIGH (ref 0.5–2.5)
SODIUM SERPL-SCNC: 140 MMOL/L — SIGNIFICANT CHANGE UP (ref 135–145)
WBC # BLD: 10.74 K/UL — HIGH (ref 3.8–10.5)
WBC # FLD AUTO: 10.74 K/UL — HIGH (ref 3.8–10.5)

## 2018-08-31 PROCEDURE — 71045 X-RAY EXAM CHEST 1 VIEW: CPT | Mod: 26

## 2018-08-31 PROCEDURE — 99291 CRITICAL CARE FIRST HOUR: CPT

## 2018-08-31 RX ORDER — FENTANYL CITRATE 50 UG/ML
25 INJECTION INTRAVENOUS ONCE
Qty: 0 | Refills: 0 | Status: DISCONTINUED | OUTPATIENT
Start: 2018-08-31 | End: 2018-08-31

## 2018-08-31 RX ORDER — METOCLOPRAMIDE HCL 10 MG
10 TABLET ORAL ONCE
Qty: 0 | Refills: 0 | Status: COMPLETED | OUTPATIENT
Start: 2018-08-31 | End: 2018-08-31

## 2018-08-31 RX ORDER — MAGNESIUM SULFATE 500 MG/ML
2 VIAL (ML) INJECTION ONCE
Qty: 0 | Refills: 0 | Status: COMPLETED | OUTPATIENT
Start: 2018-08-31 | End: 2018-08-31

## 2018-08-31 RX ORDER — OXYCODONE HYDROCHLORIDE 5 MG/1
10 TABLET ORAL ONCE
Qty: 0 | Refills: 0 | Status: DISCONTINUED | OUTPATIENT
Start: 2018-08-31 | End: 2018-08-31

## 2018-08-31 RX ADMIN — Medication 2 PUFF(S): at 23:18

## 2018-08-31 RX ADMIN — FENTANYL CITRATE 25 MICROGRAM(S): 50 INJECTION INTRAVENOUS at 16:49

## 2018-08-31 RX ADMIN — Medication 2 PUFF(S): at 03:30

## 2018-08-31 RX ADMIN — Medication 300 MILLIGRAM(S): at 18:51

## 2018-08-31 RX ADMIN — Medication 300 MILLIGRAM(S): at 12:13

## 2018-08-31 RX ADMIN — Medication 40 MILLIGRAM(S): at 05:36

## 2018-08-31 RX ADMIN — SODIUM CHLORIDE 3 MILLILITER(S): 9 INJECTION INTRAMUSCULAR; INTRAVENOUS; SUBCUTANEOUS at 22:12

## 2018-08-31 RX ADMIN — FENTANYL CITRATE 25 MICROGRAM(S): 50 INJECTION INTRAVENOUS at 16:19

## 2018-08-31 RX ADMIN — NYSTATIN CREAM 1 APPLICATION(S): 100000 CREAM TOPICAL at 18:51

## 2018-08-31 RX ADMIN — Medication 50 GRAM(S): at 06:26

## 2018-08-31 RX ADMIN — Medication 100 MILLIGRAM(S): at 13:02

## 2018-08-31 RX ADMIN — OXYCODONE HYDROCHLORIDE 10 MILLIGRAM(S): 5 TABLET ORAL at 15:41

## 2018-08-31 RX ADMIN — Medication 100 MILLIGRAM(S): at 05:36

## 2018-08-31 RX ADMIN — HEPARIN SODIUM 5000 UNIT(S): 5000 INJECTION INTRAVENOUS; SUBCUTANEOUS at 14:33

## 2018-08-31 RX ADMIN — HEPARIN SODIUM 5000 UNIT(S): 5000 INJECTION INTRAVENOUS; SUBCUTANEOUS at 22:11

## 2018-08-31 RX ADMIN — SODIUM CHLORIDE 10 MILLILITER(S): 9 INJECTION, SOLUTION INTRAVENOUS at 22:11

## 2018-08-31 RX ADMIN — Medication 100 MILLIGRAM(S): at 22:11

## 2018-08-31 RX ADMIN — CHLORHEXIDINE GLUCONATE 1 APPLICATION(S): 213 SOLUTION TOPICAL at 04:00

## 2018-08-31 RX ADMIN — CHLORHEXIDINE GLUCONATE 1 APPLICATION(S): 213 SOLUTION TOPICAL at 22:00

## 2018-08-31 RX ADMIN — PANTOPRAZOLE SODIUM 40 MILLIGRAM(S): 20 TABLET, DELAYED RELEASE ORAL at 12:13

## 2018-08-31 RX ADMIN — Medication 100 MILLIGRAM(S): at 14:33

## 2018-08-31 RX ADMIN — Medication 300 MILLIGRAM(S): at 09:58

## 2018-08-31 RX ADMIN — PREGABALIN 1000 MICROGRAM(S): 225 CAPSULE ORAL at 13:02

## 2018-08-31 RX ADMIN — HEPARIN SODIUM 5000 UNIT(S): 5000 INJECTION INTRAVENOUS; SUBCUTANEOUS at 05:36

## 2018-08-31 RX ADMIN — Medication 1 MILLIGRAM(S): at 12:14

## 2018-08-31 RX ADMIN — CHLORHEXIDINE GLUCONATE 15 MILLILITER(S): 213 SOLUTION TOPICAL at 18:51

## 2018-08-31 RX ADMIN — SODIUM CHLORIDE 3 MILLILITER(S): 9 INJECTION INTRAMUSCULAR; INTRAVENOUS; SUBCUTANEOUS at 13:03

## 2018-08-31 RX ADMIN — Medication 10 MILLIGRAM(S): at 22:11

## 2018-08-31 RX ADMIN — Medication 2 PUFF(S): at 10:26

## 2018-08-31 RX ADMIN — CHLORHEXIDINE GLUCONATE 15 MILLILITER(S): 213 SOLUTION TOPICAL at 05:35

## 2018-08-31 RX ADMIN — NYSTATIN CREAM 1 APPLICATION(S): 100000 CREAM TOPICAL at 05:36

## 2018-08-31 RX ADMIN — Medication 0.5 MILLIGRAM(S): at 09:58

## 2018-08-31 RX ADMIN — Medication 20 MILLIEQUIVALENT(S): at 12:12

## 2018-08-31 RX ADMIN — SODIUM CHLORIDE 3 MILLILITER(S): 9 INJECTION INTRAMUSCULAR; INTRAVENOUS; SUBCUTANEOUS at 05:36

## 2018-08-31 NOTE — PROGRESS NOTE ADULT - SUBJECTIVE AND OBJECTIVE BOX
Infectious Diseases progress note:    Subjective:  Sitting in chair, awake and alert.  No new fevers.  WBC with mild elevation    ROS:  CONSTITUTIONAL:  No fever, chills, rigors  CARDIOVASCULAR:  No chest pain or palpitations  RESPIRATORY:   No SOB, cough, dyspnea on exertion.  No wheezing  GASTROINTESTINAL:  No abd pain, N/V, diarrhea/constipation  EXTREMITIES:  No swelling or joint pain  GENITOURINARY:  No burning on urination, increased frequency or urgency.  No flank pain  NEUROLOGIC:  No HA, visual disturbances  SKIN: No rashes    Allergies    No Known Allergies    Intolerances    tigecycline (Other)      ANTIBIOTICS/RELEVANT:  antimicrobials  metroNIDAZOLE  IVPB 500 milliGRAM(s) IV Intermittent every 8 hours  vancomycin  IVPB 1500 milliGRAM(s) IV Intermittent daily    immunologic:    OTHER:  acetaminophen    Suspension 650 milliGRAM(s) Oral every 6 hours PRN  acetaminophen  IVPB. 1000 milliGRAM(s) IV Intermittent once PRN  ALBUTerol    90 MICROgram(s) HFA Inhaler 2 Puff(s) Inhalation every 6 hours PRN  ALPRAZolam 0.5 milliGRAM(s) Oral every 8 hours  chlorhexidine 0.12% Liquid 15 milliLiter(s) Swish and Spit two times a day  chlorhexidine 4% Liquid 1 Application(s) Topical <User Schedule>  cyanocobalamin 1000 MICROGram(s) Oral daily  ferrous    sulfate Liquid 300 milliGRAM(s) Oral three times a day with meals  folic acid 1 milliGRAM(s) Oral daily  furosemide    Tablet 40 milliGRAM(s) Oral daily  heparin  Injectable 5000 Unit(s) SubCutaneous every 8 hours  ipratropium 17 MICROgram(s) HFA Inhaler 2 Puff(s) Inhalation every 6 hours  lactated ringers 1000 milliLiter(s) IV Continuous <Continuous>  nystatin Powder 1 Application(s) Topical two times a day  ondansetron Injectable 4 milliGRAM(s) IV Push every 6 hours PRN  pantoprazole  Injectable 40 milliGRAM(s) IV Push daily  potassium chloride   Powder 20 milliEquivalent(s) Oral daily  sodium chloride 0.9% lock flush 3 milliLiter(s) IV Push every 8 hours  thiamine 100 milliGRAM(s) Oral daily      Objective:  Vital Signs Last 24 Hrs  T(C): 37 (31 Aug 2018 16:00), Max: 37.3 (31 Aug 2018 08:00)  T(F): 98.6 (31 Aug 2018 16:00), Max: 99.2 (31 Aug 2018 12:00)  HR: 102 (31 Aug 2018 19:00) (86 - 116)  BP: 116/76 (31 Aug 2018 19:00) (94/69 - 124/86)  BP(mean): 85 (31 Aug 2018 19:00) (74 - 95)  RR: 21 (31 Aug 2018 19:00) (21 - 31)  SpO2: 99% (31 Aug 2018 19:00) (95% - 100%)    PHYSICAL EXAM:  Constitutional:NAD  Eyes:MEMO, EOMI  Ear/Nose/Throat: no thrush, mucositis.  Moist mucous membranes	  Neck:no JVD, no lymphadenopathy, supple, tracheostomy  Respiratory: CTA emi, chest tube  Cardiovascular: S1S2 RRR, no murmurs  Gastrointestinal:soft, nontender,  nondistended (+) BS, peg  Extremities:no e/e/c  Skin:  no rashes, open wounds or ulcerations.  right posterior chest wound vac        LABS:                        9.2    10.74 )-----------( 236      ( 31 Aug 2018 03:30 )             29.7     08-31    140  |  103  |  10  ----------------------------<  96  4.3   |  25  |  0.59    Ca    8.5      31 Aug 2018 03:30  Phos  4.5     08-31  Mg     1.8     08-31    TPro  6.8  /  Alb  3.0<L>  /  TBili  1.2  /  DBili  x   /  AST  22  /  ALT  18  /  AlkPhos  367<H>  08-31                Vancomycin Level, Trough: 16.5 ug/mL (08-30 @ 05:00)  Vancomycin Level, Trough: 17.2 ug/mL (08-29 @ 03:45)  Vancomycin Level, Trough: 19.4 ug/mL (08-28 @ 04:30)              MICROBIOLOGY:    Culture - Yeast and Fungus (08.27.18 @ 11:32)    Culture - Yeast and Fungus:   CULTURE NEGATIVE FOR YEASTS AND MOLDS  PRELIMINARY RESULTS  CULTURE NEGATIVE FOR YEASTS AND MOLDS AFTER 2 DAYS    Specimen Source: PLEURAL FLUID    Culture - Body Fluid with Gram Stain (08.27.18 @ 11:32)    Culture - Body Fluid:   NO GROWTH - PRELIMINARY RESULTS  NO ORGANISMS ISOLATED AT 24 HOURS  NO ORGANISMS ISOLATED AT 48 HRS.  NO ORGANISMS ISOLATED AT 72 HRS.    Gram Stain:   NOS^No Organisms Seen  WBC^White Blood Cells  QNTY CELLS IN GRAM STAIN: RARE (1+)    Specimen Source: PLEURAL FLUID    Culture - Respiratory with Gram Stain (08.20.18 @ 14:07)    Culture - Respiratory:   NO GROWTH - PRELIMINARY RESULTS  FEW  NRF^Normal Respiratory Margot  QUANTITY OF GROWTH: FEW    Gram Stain Sputum:   Test not performed    Specimen Source: ENDOTRACHEAL SPECIMEN          RADIOLOGY & ADDITIONAL STUDIES:    < from: Xray Chest 1 View- PORTABLE-Routine (08.31.18 @ 07:31) >  IMPRESSION:   No change in small apical left pneumothorax.    Redemonstration of small left-sided pleural effusion. The small   right-sided pleural effusion now appears to have a loculated component.    Lines and tubes as above.    < end of copied text >      < from: Xray Chest 1 View- PORTABLE-Routine (08.30.18 @ 06:26) >  IMPRESSION:   No significant change in small apical left pneumothorax.    No significant change in small bilateral pleural effusions and   interstitial edema.     Lines and tubes as above.    < end of copied text >
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
## 2018-09-01 LAB
BUN SERPL-MCNC: 11 MG/DL — SIGNIFICANT CHANGE UP (ref 7–23)
CA-I BLD-SCNC: 1.2 MMOL/L — SIGNIFICANT CHANGE UP (ref 1.03–1.23)
CALCIUM SERPL-MCNC: 9 MG/DL — SIGNIFICANT CHANGE UP (ref 8.4–10.5)
CHLORIDE SERPL-SCNC: 101 MMOL/L — SIGNIFICANT CHANGE UP (ref 98–107)
CO2 SERPL-SCNC: 26 MMOL/L — SIGNIFICANT CHANGE UP (ref 22–31)
CREAT SERPL-MCNC: 0.61 MG/DL — SIGNIFICANT CHANGE UP (ref 0.5–1.3)
GLUCOSE SERPL-MCNC: 93 MG/DL — SIGNIFICANT CHANGE UP (ref 70–99)
HCT VFR BLD CALC: 32 % — LOW (ref 39–50)
HGB BLD-MCNC: 9.6 G/DL — LOW (ref 13–17)
MAGNESIUM SERPL-MCNC: 2 MG/DL — SIGNIFICANT CHANGE UP (ref 1.6–2.6)
MCHC RBC-ENTMCNC: 30 % — LOW (ref 32–36)
MCHC RBC-ENTMCNC: 30.9 PG — SIGNIFICANT CHANGE UP (ref 27–34)
MCV RBC AUTO: 102.9 FL — HIGH (ref 80–100)
NRBC # FLD: 0 — SIGNIFICANT CHANGE UP
PHOSPHATE SERPL-MCNC: 4.3 MG/DL — SIGNIFICANT CHANGE UP (ref 2.5–4.5)
PLATELET # BLD AUTO: 265 K/UL — SIGNIFICANT CHANGE UP (ref 150–400)
PMV BLD: 11.8 FL — SIGNIFICANT CHANGE UP (ref 7–13)
POTASSIUM SERPL-MCNC: 4 MMOL/L — SIGNIFICANT CHANGE UP (ref 3.5–5.3)
POTASSIUM SERPL-SCNC: 4 MMOL/L — SIGNIFICANT CHANGE UP (ref 3.5–5.3)
RBC # BLD: 3.11 M/UL — LOW (ref 4.2–5.8)
RBC # FLD: 22.7 % — HIGH (ref 10.3–14.5)
SODIUM SERPL-SCNC: 141 MMOL/L — SIGNIFICANT CHANGE UP (ref 135–145)
VANCOMYCIN TROUGH SERPL-MCNC: 7.9 UG/ML — LOW (ref 10–20)
WBC # BLD: 11.21 K/UL — HIGH (ref 3.8–10.5)
WBC # FLD AUTO: 11.21 K/UL — HIGH (ref 3.8–10.5)

## 2018-09-01 PROCEDURE — 71045 X-RAY EXAM CHEST 1 VIEW: CPT | Mod: 26

## 2018-09-01 PROCEDURE — 99233 SBSQ HOSP IP/OBS HIGH 50: CPT

## 2018-09-01 RX ORDER — ACETAMINOPHEN 500 MG
1000 TABLET ORAL ONCE
Qty: 0 | Refills: 0 | Status: COMPLETED | OUTPATIENT
Start: 2018-09-01 | End: 2018-09-08

## 2018-09-01 RX ADMIN — CHLORHEXIDINE GLUCONATE 15 MILLILITER(S): 213 SOLUTION TOPICAL at 17:13

## 2018-09-01 RX ADMIN — Medication 0.5 MILLIGRAM(S): at 17:27

## 2018-09-01 RX ADMIN — SODIUM CHLORIDE 10 MILLILITER(S): 9 INJECTION, SOLUTION INTRAVENOUS at 20:40

## 2018-09-01 RX ADMIN — Medication 300 MILLIGRAM(S): at 10:48

## 2018-09-01 RX ADMIN — Medication 300 MILLIGRAM(S): at 13:29

## 2018-09-01 RX ADMIN — Medication 0.5 MILLIGRAM(S): at 10:48

## 2018-09-01 RX ADMIN — NYSTATIN CREAM 1 APPLICATION(S): 100000 CREAM TOPICAL at 17:13

## 2018-09-01 RX ADMIN — Medication 2 PUFF(S): at 03:39

## 2018-09-01 RX ADMIN — Medication 0.5 MILLIGRAM(S): at 02:11

## 2018-09-01 RX ADMIN — SODIUM CHLORIDE 3 MILLILITER(S): 9 INJECTION INTRAMUSCULAR; INTRAVENOUS; SUBCUTANEOUS at 05:02

## 2018-09-01 RX ADMIN — Medication 2 PUFF(S): at 11:51

## 2018-09-01 RX ADMIN — Medication 20 MILLIEQUIVALENT(S): at 13:29

## 2018-09-01 RX ADMIN — HEPARIN SODIUM 5000 UNIT(S): 5000 INJECTION INTRAVENOUS; SUBCUTANEOUS at 23:00

## 2018-09-01 RX ADMIN — SODIUM CHLORIDE 3 MILLILITER(S): 9 INJECTION INTRAMUSCULAR; INTRAVENOUS; SUBCUTANEOUS at 22:00

## 2018-09-01 RX ADMIN — HEPARIN SODIUM 5000 UNIT(S): 5000 INJECTION INTRAVENOUS; SUBCUTANEOUS at 05:01

## 2018-09-01 RX ADMIN — PREGABALIN 1000 MICROGRAM(S): 225 CAPSULE ORAL at 13:29

## 2018-09-01 RX ADMIN — Medication 0.5 MILLIGRAM(S): at 23:18

## 2018-09-01 RX ADMIN — Medication 2 PUFF(S): at 22:04

## 2018-09-01 RX ADMIN — Medication 100 MILLIGRAM(S): at 13:29

## 2018-09-01 RX ADMIN — Medication 40 MILLIGRAM(S): at 05:01

## 2018-09-01 RX ADMIN — NYSTATIN CREAM 1 APPLICATION(S): 100000 CREAM TOPICAL at 05:01

## 2018-09-01 RX ADMIN — Medication 300 MILLIGRAM(S): at 17:13

## 2018-09-01 RX ADMIN — Medication 100 MILLIGRAM(S): at 23:18

## 2018-09-01 RX ADMIN — Medication 100 MILLIGRAM(S): at 13:30

## 2018-09-01 RX ADMIN — CHLORHEXIDINE GLUCONATE 15 MILLILITER(S): 213 SOLUTION TOPICAL at 06:45

## 2018-09-01 RX ADMIN — Medication 2 PUFF(S): at 16:19

## 2018-09-01 RX ADMIN — PANTOPRAZOLE SODIUM 40 MILLIGRAM(S): 20 TABLET, DELAYED RELEASE ORAL at 13:29

## 2018-09-01 RX ADMIN — Medication 1 MILLIGRAM(S): at 13:29

## 2018-09-01 RX ADMIN — Medication 300 MILLIGRAM(S): at 15:12

## 2018-09-01 RX ADMIN — HEPARIN SODIUM 5000 UNIT(S): 5000 INJECTION INTRAVENOUS; SUBCUTANEOUS at 13:29

## 2018-09-01 RX ADMIN — SODIUM CHLORIDE 3 MILLILITER(S): 9 INJECTION INTRAMUSCULAR; INTRAVENOUS; SUBCUTANEOUS at 13:24

## 2018-09-01 RX ADMIN — Medication 100 MILLIGRAM(S): at 05:01

## 2018-09-01 NOTE — SWALLOW BEDSIDE ASSESSMENT ADULT - ASR SWALLOW ASPIRATION MONITOR
pneumonia/throat clearing/upper respiratory infection/change of breathing pattern/position upright (90Y)/gurgly voice/oral hygiene/cough/fever
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
## 2018-09-01 NOTE — SWALLOW BEDSIDE ASSESSMENT ADULT - COMMENTS
As per charting, patient is a 42 year old male who presented to [facility] with CT chest showing multiple pleural loculations some with gas concerning for empyema.  The largest collection is noted in the right paraspinal region, associated with consolidation and possible associated necrosis of the posterior segment of the right upper lobe.  Also noted on CT scan age indeterminant pulmonary arterial filling defects.  Also there is dependent right lower lobe airspace consolidation.  Patient transferred to [facility], plan for OR for vats, drainage and possible decortication, s/p PEG and trach 8/9/18.    Patient was received awake, alert and cooperative; sitting upright in yecenia chair at bedside. Shiley XLT tracheotomy with deflated cuff status and trach collar in place. Patient unable to produce voicing due to excessive cough upon clinician attempt to finger occlude over trach site. Patient is primarily Polish speaking however he was able to comprehend simple directives and yes/no questions presented in basic English for the purpose of this evaluation. Recommendations discussed with primary RN and Dr. Kolb. As per charting, patient is a 42 year old male who presented to [facility] with CT chest showing multiple pleural loculations some with gas concerning for empyema.  The largest collection is noted in the right paraspinal region, associated with consolidation and possible associated necrosis of the posterior segment of the right upper lobe.  Also noted on CT scan age indeterminant pulmonary arterial filling defects.  Also there is dependent right lower lobe airspace consolidation.  Patient transferred to [facility], plan for OR for vats, drainage and possible decortication, s/p PEG and trach 8/9/18.    Patient was received awake, alert and cooperative; sitting upright in yecenia chair at bedside. Shiley XLT tracheotomy with deflated cuff status and trach collar in place. Cough noted at baseline accompanied by expectoration of clear secretions. Unable to assess voicing given increased/excessive coughing upon clinician attempt to finger occlude over trach site. Patient is primarily Polish speaking however he was able to comprehend simple directives and yes/no questions presented in basic English for the purpose of this evaluation. Recommendations discussed with primary RN and Dr. Kolb.

## 2018-09-01 NOTE — PROGRESS NOTE ADULT - SUBJECTIVE AND OBJECTIVE BOX
Infectious Diseases progress note:    Subjective: Resting comfortably, NAD.  Denies sob, has occasional cough, no phlegm.  Afebrile.  Wbc remains slightly elevated    ROS:  CONSTITUTIONAL:  No fever, chills, rigors  CARDIOVASCULAR:  No chest pain or palpitations  RESPIRATORY:   No SOB, cough, dyspnea on exertion.  No wheezing  GASTROINTESTINAL:  No abd pain, N/V, diarrhea/constipation  EXTREMITIES:  No swelling or joint pain  GENITOURINARY:  No burning on urination, increased frequency or urgency.  No flank pain  NEUROLOGIC:  No HA, visual disturbances  SKIN: No rashes    Allergies    No Known Allergies    Intolerances    tigecycline (Other)      ANTIBIOTICS/RELEVANT:  antimicrobials  metroNIDAZOLE  IVPB 500 milliGRAM(s) IV Intermittent every 8 hours  vancomycin  IVPB 1500 milliGRAM(s) IV Intermittent daily    immunologic:    OTHER:  acetaminophen    Suspension 650 milliGRAM(s) Oral every 6 hours PRN  acetaminophen  IVPB. 1000 milliGRAM(s) IV Intermittent once PRN  ALBUTerol    90 MICROgram(s) HFA Inhaler 2 Puff(s) Inhalation every 6 hours PRN  ALPRAZolam 0.5 milliGRAM(s) Oral every 8 hours  chlorhexidine 0.12% Liquid 15 milliLiter(s) Swish and Spit two times a day  chlorhexidine 4% Liquid 1 Application(s) Topical <User Schedule>  cyanocobalamin 1000 MICROGram(s) Oral daily  ferrous    sulfate Liquid 300 milliGRAM(s) Oral three times a day with meals  folic acid 1 milliGRAM(s) Oral daily  furosemide    Tablet 40 milliGRAM(s) Oral daily  heparin  Injectable 5000 Unit(s) SubCutaneous every 8 hours  ipratropium 17 MICROgram(s) HFA Inhaler 2 Puff(s) Inhalation every 6 hours  lactated ringers 1000 milliLiter(s) IV Continuous <Continuous>  nystatin Powder 1 Application(s) Topical two times a day  ondansetron Injectable 4 milliGRAM(s) IV Push every 6 hours PRN  pantoprazole  Injectable 40 milliGRAM(s) IV Push daily  potassium chloride   Powder 20 milliEquivalent(s) Oral daily  sodium chloride 0.9% lock flush 3 milliLiter(s) IV Push every 8 hours  thiamine 100 milliGRAM(s) Oral daily      Objective:  Vital Signs Last 24 Hrs  T(C): 36.8 (01 Sep 2018 16:00), Max: 37.2 (01 Sep 2018 12:00)  T(F): 98.3 (01 Sep 2018 16:00), Max: 99 (01 Sep 2018 12:00)  HR: 98 (01 Sep 2018 18:00) (92 - 105)  BP: 115/69 (01 Sep 2018 18:00) (101/62 - 119/79)  BP(mean): 80 (01 Sep 2018 18:00) (72 - 89)  RR: 23 (01 Sep 2018 18:00) (15 - 33)  SpO2: 98% (01 Sep 2018 18:00) (96% - 100%)    PHYSICAL EXAM:  Constitutional:NAD  Eyes:MEMO, EOMI  Ear/Nose/Throat: no thrush, mucositis.  Moist mucous membranes	  Neck:no JVD, no lymphadenopathy, supple, tracheostomy  Respiratory: CTA emi, chest tube  Cardiovascular: S1S2 RRR, no murmurs  Gastrointestinal:soft, nontender,  nondistended (+) BS, peg  Extremities:no e/e/c  Skin:  rt posterior chest wound vac        LABS:                        9.6    11.21 )-----------( 265      ( 01 Sep 2018 03:50 )             32.0     09-01    141  |  101  |  11  ----------------------------<  93  4.0   |  26  |  0.61    Ca    9.0      01 Sep 2018 03:50  Phos  4.3     09-01  Mg     2.0     09-01    TPro  6.8  /  Alb  3.0<L>  /  TBili  1.2  /  DBili  x   /  AST  22  /  ALT  18  /  AlkPhos  367<H>  08-31                Vancomycin Level, Trough: 7.9 ug/mL (09-01 @ 13:45)  Vancomycin Level, Trough: 16.5 ug/mL (08-30 @ 05:00)  Vancomycin Level, Trough: 17.2 ug/mL (08-29 @ 03:45)  Vancomycin Level, Trough: 19.4 ug/mL (08-28 @ 04:30)              MICROBIOLOGY:          RADIOLOGY & ADDITIONAL STUDIES:

## 2018-09-01 NOTE — SWALLOW BEDSIDE ASSESSMENT ADULT - SWALLOW EVAL: DIAGNOSIS
RN present at bedside and provided intratracheal suctioning prior to PO trials. Patient was provided with trials of puree (5cc via teaspoon x3 trials) impregnated in green dye. Patient demonstrates a functional oral stage of swallow characterized by adequate bolus collection, manipulation and anterior-posterior transfer. Patient demonstrates a suspected severe pharyngeal dysphagia characterized by timely pharyngeal triggering, decreased hyolaryngeal excursion upon digital palpation, multiple/effortful swallows suggestive of pharyngeal stasis, and immediate cough after 2/3 trials suggesting laryngeal penetration vs penetration. Intratracheal suctioning performed by RN following PO trials indicated no evidence of green tinged secretions, however given overall clinical presentation patient would benefit from objective testing as secretion management improves to r/o aspiration and determine tolerance for safe initiation of PO intake. RN present at bedside and provided intratracheal suctioning prior to PO trials. Patient was provided with trials of puree (5cc via teaspoon x3 trials) impregnated in green dye. Patient demonstrates a functional oral stage of swallow characterized by adequate bolus collection, manipulation and anterior-posterior transfer. Patient demonstrates a suspected severe pharyngeal dysphagia characterized by timely pharyngeal triggering, decreased hyolaryngeal excursion upon digital palpation, multiple/effortful swallows suggestive of pharyngeal stasis, and immediate cough after 2/3 trials suggesting laryngeal penetration vs penetration. Intratracheal suctioning performed by RN following PO trials indicated no evidence of green tinged secretions, however given overall clinical presentation patient would benefit from objective testing when secretion management improves to r/o aspiration and determine tolerance for safe initiation of PO intake.
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
## 2018-09-02 LAB
BACTERIA FLD CULT: SIGNIFICANT CHANGE UP
BUN SERPL-MCNC: 12 MG/DL — SIGNIFICANT CHANGE UP (ref 7–23)
CA-I BLD-SCNC: 1.19 MMOL/L — SIGNIFICANT CHANGE UP (ref 1.03–1.23)
CALCIUM SERPL-MCNC: 8.8 MG/DL — SIGNIFICANT CHANGE UP (ref 8.4–10.5)
CHLORIDE SERPL-SCNC: 102 MMOL/L — SIGNIFICANT CHANGE UP (ref 98–107)
CO2 SERPL-SCNC: 25 MMOL/L — SIGNIFICANT CHANGE UP (ref 22–31)
CREAT SERPL-MCNC: 0.58 MG/DL — SIGNIFICANT CHANGE UP (ref 0.5–1.3)
GLUCOSE SERPL-MCNC: 102 MG/DL — HIGH (ref 70–99)
HCT VFR BLD CALC: 29.4 % — LOW (ref 39–50)
HGB BLD-MCNC: 9.5 G/DL — LOW (ref 13–17)
MAGNESIUM SERPL-MCNC: 1.8 MG/DL — SIGNIFICANT CHANGE UP (ref 1.6–2.6)
MCHC RBC-ENTMCNC: 32.3 % — SIGNIFICANT CHANGE UP (ref 32–36)
MCHC RBC-ENTMCNC: 32.9 PG — SIGNIFICANT CHANGE UP (ref 27–34)
MCV RBC AUTO: 101.7 FL — HIGH (ref 80–100)
NRBC # FLD: 0 — SIGNIFICANT CHANGE UP
PHOSPHATE SERPL-MCNC: 4.8 MG/DL — HIGH (ref 2.5–4.5)
PLATELET # BLD AUTO: 225 K/UL — SIGNIFICANT CHANGE UP (ref 150–400)
PMV BLD: 11.1 FL — SIGNIFICANT CHANGE UP (ref 7–13)
POTASSIUM SERPL-MCNC: 4.1 MMOL/L — SIGNIFICANT CHANGE UP (ref 3.5–5.3)
POTASSIUM SERPL-SCNC: 4.1 MMOL/L — SIGNIFICANT CHANGE UP (ref 3.5–5.3)
RBC # BLD: 2.89 M/UL — LOW (ref 4.2–5.8)
RBC # FLD: 22.2 % — HIGH (ref 10.3–14.5)
SODIUM SERPL-SCNC: 141 MMOL/L — SIGNIFICANT CHANGE UP (ref 135–145)
WBC # BLD: 10.3 K/UL — SIGNIFICANT CHANGE UP (ref 3.8–10.5)
WBC # FLD AUTO: 10.3 K/UL — SIGNIFICANT CHANGE UP (ref 3.8–10.5)

## 2018-09-02 PROCEDURE — 71045 X-RAY EXAM CHEST 1 VIEW: CPT | Mod: 26

## 2018-09-02 PROCEDURE — 99233 SBSQ HOSP IP/OBS HIGH 50: CPT

## 2018-09-02 RX ADMIN — Medication 2 PUFF(S): at 11:23

## 2018-09-02 RX ADMIN — Medication 300 MILLIGRAM(S): at 17:49

## 2018-09-02 RX ADMIN — SODIUM CHLORIDE 3 MILLILITER(S): 9 INJECTION INTRAMUSCULAR; INTRAVENOUS; SUBCUTANEOUS at 22:55

## 2018-09-02 RX ADMIN — SODIUM CHLORIDE 3 MILLILITER(S): 9 INJECTION INTRAMUSCULAR; INTRAVENOUS; SUBCUTANEOUS at 10:23

## 2018-09-02 RX ADMIN — Medication 100 MILLIGRAM(S): at 12:59

## 2018-09-02 RX ADMIN — PANTOPRAZOLE SODIUM 40 MILLIGRAM(S): 20 TABLET, DELAYED RELEASE ORAL at 11:06

## 2018-09-02 RX ADMIN — HEPARIN SODIUM 5000 UNIT(S): 5000 INJECTION INTRAVENOUS; SUBCUTANEOUS at 05:58

## 2018-09-02 RX ADMIN — CHLORHEXIDINE GLUCONATE 1 APPLICATION(S): 213 SOLUTION TOPICAL at 05:58

## 2018-09-02 RX ADMIN — Medication 100 MILLIGRAM(S): at 23:05

## 2018-09-02 RX ADMIN — Medication 1 MILLIGRAM(S): at 11:05

## 2018-09-02 RX ADMIN — Medication 2 PUFF(S): at 22:15

## 2018-09-02 RX ADMIN — Medication 40 MILLIGRAM(S): at 05:58

## 2018-09-02 RX ADMIN — HEPARIN SODIUM 5000 UNIT(S): 5000 INJECTION INTRAVENOUS; SUBCUTANEOUS at 22:55

## 2018-09-02 RX ADMIN — Medication 100 MILLIGRAM(S): at 05:58

## 2018-09-02 RX ADMIN — Medication 20 MILLIEQUIVALENT(S): at 11:06

## 2018-09-02 RX ADMIN — Medication 300 MILLIGRAM(S): at 10:30

## 2018-09-02 RX ADMIN — Medication 300 MILLIGRAM(S): at 11:05

## 2018-09-02 RX ADMIN — CHLORHEXIDINE GLUCONATE 15 MILLILITER(S): 213 SOLUTION TOPICAL at 17:49

## 2018-09-02 RX ADMIN — CHLORHEXIDINE GLUCONATE 15 MILLILITER(S): 213 SOLUTION TOPICAL at 05:58

## 2018-09-02 RX ADMIN — NYSTATIN CREAM 1 APPLICATION(S): 100000 CREAM TOPICAL at 05:58

## 2018-09-02 RX ADMIN — Medication 2 PUFF(S): at 15:56

## 2018-09-02 RX ADMIN — Medication 2 PUFF(S): at 03:25

## 2018-09-02 RX ADMIN — Medication 100 MILLIGRAM(S): at 11:06

## 2018-09-02 RX ADMIN — Medication 0.5 MILLIGRAM(S): at 16:50

## 2018-09-02 RX ADMIN — Medication 0.5 MILLIGRAM(S): at 11:05

## 2018-09-02 RX ADMIN — PREGABALIN 1000 MICROGRAM(S): 225 CAPSULE ORAL at 11:05

## 2018-09-02 RX ADMIN — NYSTATIN CREAM 1 APPLICATION(S): 100000 CREAM TOPICAL at 17:49

## 2018-09-02 RX ADMIN — SODIUM CHLORIDE 3 MILLILITER(S): 9 INJECTION INTRAMUSCULAR; INTRAVENOUS; SUBCUTANEOUS at 05:59

## 2018-09-02 RX ADMIN — Medication 0.5 MILLIGRAM(S): at 23:05

## 2018-09-02 RX ADMIN — SODIUM CHLORIDE 10 MILLILITER(S): 9 INJECTION, SOLUTION INTRAVENOUS at 23:05

## 2018-09-02 RX ADMIN — HEPARIN SODIUM 5000 UNIT(S): 5000 INJECTION INTRAVENOUS; SUBCUTANEOUS at 12:59

## 2018-09-02 RX ADMIN — Medication 300 MILLIGRAM(S): at 15:29
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
## 2018-09-03 LAB
BUN SERPL-MCNC: 11 MG/DL — SIGNIFICANT CHANGE UP (ref 7–23)
CA-I BLD-SCNC: 1.06 MMOL/L — SIGNIFICANT CHANGE UP (ref 1.03–1.23)
CALCIUM SERPL-MCNC: 8.8 MG/DL — SIGNIFICANT CHANGE UP (ref 8.4–10.5)
CHLORIDE SERPL-SCNC: 99 MMOL/L — SIGNIFICANT CHANGE UP (ref 98–107)
CO2 SERPL-SCNC: 23 MMOL/L — SIGNIFICANT CHANGE UP (ref 22–31)
CREAT SERPL-MCNC: 0.55 MG/DL — SIGNIFICANT CHANGE UP (ref 0.5–1.3)
GLUCOSE SERPL-MCNC: 99 MG/DL — SIGNIFICANT CHANGE UP (ref 70–99)
HCT VFR BLD CALC: 29.1 % — LOW (ref 39–50)
HGB BLD-MCNC: 9 G/DL — LOW (ref 13–17)
MAGNESIUM SERPL-MCNC: 1.8 MG/DL — SIGNIFICANT CHANGE UP (ref 1.6–2.6)
MCHC RBC-ENTMCNC: 30.9 % — LOW (ref 32–36)
MCHC RBC-ENTMCNC: 31.7 PG — SIGNIFICANT CHANGE UP (ref 27–34)
MCV RBC AUTO: 102.5 FL — HIGH (ref 80–100)
NRBC # FLD: 0 — SIGNIFICANT CHANGE UP
PHOSPHATE SERPL-MCNC: 4.8 MG/DL — HIGH (ref 2.5–4.5)
PLATELET # BLD AUTO: 210 K/UL — SIGNIFICANT CHANGE UP (ref 150–400)
PMV BLD: 11 FL — SIGNIFICANT CHANGE UP (ref 7–13)
POTASSIUM SERPL-MCNC: 4.9 MMOL/L — SIGNIFICANT CHANGE UP (ref 3.5–5.3)
POTASSIUM SERPL-SCNC: 4.9 MMOL/L — SIGNIFICANT CHANGE UP (ref 3.5–5.3)
RBC # BLD: 2.84 M/UL — LOW (ref 4.2–5.8)
RBC # FLD: 21.3 % — HIGH (ref 10.3–14.5)
SODIUM SERPL-SCNC: 136 MMOL/L — SIGNIFICANT CHANGE UP (ref 135–145)
WBC # BLD: 9.63 K/UL — SIGNIFICANT CHANGE UP (ref 3.8–10.5)
WBC # FLD AUTO: 9.63 K/UL — SIGNIFICANT CHANGE UP (ref 3.8–10.5)

## 2018-09-03 PROCEDURE — 71045 X-RAY EXAM CHEST 1 VIEW: CPT | Mod: 26,76

## 2018-09-03 PROCEDURE — 99233 SBSQ HOSP IP/OBS HIGH 50: CPT

## 2018-09-03 RX ORDER — HYDROMORPHONE HYDROCHLORIDE 2 MG/ML
0.5 INJECTION INTRAMUSCULAR; INTRAVENOUS; SUBCUTANEOUS ONCE
Qty: 0 | Refills: 0 | Status: DISCONTINUED | OUTPATIENT
Start: 2018-09-03 | End: 2018-09-03

## 2018-09-03 RX ADMIN — Medication 2 PUFF(S): at 22:25

## 2018-09-03 RX ADMIN — Medication 300 MILLIGRAM(S): at 11:31

## 2018-09-03 RX ADMIN — HEPARIN SODIUM 5000 UNIT(S): 5000 INJECTION INTRAVENOUS; SUBCUTANEOUS at 13:28

## 2018-09-03 RX ADMIN — HYDROMORPHONE HYDROCHLORIDE 0.5 MILLIGRAM(S): 2 INJECTION INTRAMUSCULAR; INTRAVENOUS; SUBCUTANEOUS at 12:45

## 2018-09-03 RX ADMIN — Medication 2 PUFF(S): at 03:37

## 2018-09-03 RX ADMIN — Medication 300 MILLIGRAM(S): at 16:56

## 2018-09-03 RX ADMIN — Medication 100 MILLIGRAM(S): at 15:28

## 2018-09-03 RX ADMIN — Medication 100 MILLIGRAM(S): at 05:41

## 2018-09-03 RX ADMIN — PREGABALIN 1000 MICROGRAM(S): 225 CAPSULE ORAL at 11:30

## 2018-09-03 RX ADMIN — CHLORHEXIDINE GLUCONATE 15 MILLILITER(S): 213 SOLUTION TOPICAL at 16:56

## 2018-09-03 RX ADMIN — SODIUM CHLORIDE 3 MILLILITER(S): 9 INJECTION INTRAMUSCULAR; INTRAVENOUS; SUBCUTANEOUS at 05:41

## 2018-09-03 RX ADMIN — CHLORHEXIDINE GLUCONATE 15 MILLILITER(S): 213 SOLUTION TOPICAL at 05:41

## 2018-09-03 RX ADMIN — Medication 100 MILLIGRAM(S): at 11:30

## 2018-09-03 RX ADMIN — CHLORHEXIDINE GLUCONATE 1 APPLICATION(S): 213 SOLUTION TOPICAL at 05:41

## 2018-09-03 RX ADMIN — Medication 1 MILLIGRAM(S): at 11:30

## 2018-09-03 RX ADMIN — HYDROMORPHONE HYDROCHLORIDE 0.5 MILLIGRAM(S): 2 INJECTION INTRAMUSCULAR; INTRAVENOUS; SUBCUTANEOUS at 13:00

## 2018-09-03 RX ADMIN — Medication 20 MILLIEQUIVALENT(S): at 11:30

## 2018-09-03 RX ADMIN — Medication 300 MILLIGRAM(S): at 08:25

## 2018-09-03 RX ADMIN — Medication 100 MILLIGRAM(S): at 21:48

## 2018-09-03 RX ADMIN — Medication 2 PUFF(S): at 10:46

## 2018-09-03 RX ADMIN — NYSTATIN CREAM 1 APPLICATION(S): 100000 CREAM TOPICAL at 05:41

## 2018-09-03 RX ADMIN — Medication 300 MILLIGRAM(S): at 12:44

## 2018-09-03 RX ADMIN — Medication 0.5 MILLIGRAM(S): at 08:25

## 2018-09-03 RX ADMIN — NYSTATIN CREAM 1 APPLICATION(S): 100000 CREAM TOPICAL at 16:56

## 2018-09-03 RX ADMIN — HEPARIN SODIUM 5000 UNIT(S): 5000 INJECTION INTRAVENOUS; SUBCUTANEOUS at 21:47

## 2018-09-03 RX ADMIN — HEPARIN SODIUM 5000 UNIT(S): 5000 INJECTION INTRAVENOUS; SUBCUTANEOUS at 05:41

## 2018-09-03 RX ADMIN — PANTOPRAZOLE SODIUM 40 MILLIGRAM(S): 20 TABLET, DELAYED RELEASE ORAL at 11:30

## 2018-09-03 RX ADMIN — Medication 40 MILLIGRAM(S): at 05:41

## 2018-09-03 RX ADMIN — Medication 2 PUFF(S): at 15:45

## 2018-09-03 RX ADMIN — SODIUM CHLORIDE 3 MILLILITER(S): 9 INJECTION INTRAMUSCULAR; INTRAVENOUS; SUBCUTANEOUS at 21:03

## 2018-09-03 RX ADMIN — SODIUM CHLORIDE 3 MILLILITER(S): 9 INJECTION INTRAMUSCULAR; INTRAVENOUS; SUBCUTANEOUS at 13:28

## 2018-09-03 RX ADMIN — Medication 0.5 MILLIGRAM(S): at 16:21

## 2018-09-03 NOTE — CHART NOTE - NSCHARTNOTEFT_GEN_A_CORE
Patient placed in left lateral decubitus position. Wound vac taken off suction, wound vac removed.  Wound 13cm in length, 3cm at widest width, and 2.5cm depth. 1 piece of black sponge placed at base and tunneled under bridge of tissue.  1 other black sponge piece placed on top.  Vac dressing applied. Pt tolerated vac change well.
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
## 2018-09-04 LAB
ALBUMIN SERPL ELPH-MCNC: 3 G/DL — LOW (ref 3.3–5)
ALP SERPL-CCNC: 301 U/L — HIGH (ref 40–120)
ALT FLD-CCNC: 17 U/L — SIGNIFICANT CHANGE UP (ref 4–41)
AST SERPL-CCNC: 26 U/L — SIGNIFICANT CHANGE UP (ref 4–40)
BASOPHILS # BLD AUTO: 0.05 K/UL — SIGNIFICANT CHANGE UP (ref 0–0.2)
BASOPHILS NFR BLD AUTO: 0.6 % — SIGNIFICANT CHANGE UP (ref 0–2)
BILIRUB SERPL-MCNC: 1 MG/DL — SIGNIFICANT CHANGE UP (ref 0.2–1.2)
BUN SERPL-MCNC: 12 MG/DL — SIGNIFICANT CHANGE UP (ref 7–23)
CALCIUM SERPL-MCNC: 9.1 MG/DL — SIGNIFICANT CHANGE UP (ref 8.4–10.5)
CHLORIDE SERPL-SCNC: 101 MMOL/L — SIGNIFICANT CHANGE UP (ref 98–107)
CO2 SERPL-SCNC: 24 MMOL/L — SIGNIFICANT CHANGE UP (ref 22–31)
CREAT SERPL-MCNC: 0.59 MG/DL — SIGNIFICANT CHANGE UP (ref 0.5–1.3)
EOSINOPHIL # BLD AUTO: 1.4 K/UL — HIGH (ref 0–0.5)
EOSINOPHIL NFR BLD AUTO: 16 % — HIGH (ref 0–6)
FUNGUS SPEC QL CULT: SIGNIFICANT CHANGE UP
GLUCOSE SERPL-MCNC: 110 MG/DL — HIGH (ref 70–99)
HCT VFR BLD CALC: 28.4 % — LOW (ref 39–50)
HGB BLD-MCNC: 8.7 G/DL — LOW (ref 13–17)
IMM GRANULOCYTES # BLD AUTO: 0.03 # — SIGNIFICANT CHANGE UP
IMM GRANULOCYTES NFR BLD AUTO: 0.3 % — SIGNIFICANT CHANGE UP (ref 0–1.5)
LYMPHOCYTES # BLD AUTO: 1.46 K/UL — SIGNIFICANT CHANGE UP (ref 1–3.3)
LYMPHOCYTES # BLD AUTO: 16.6 % — SIGNIFICANT CHANGE UP (ref 13–44)
MCHC RBC-ENTMCNC: 30.6 % — LOW (ref 32–36)
MCHC RBC-ENTMCNC: 31.2 PG — SIGNIFICANT CHANGE UP (ref 27–34)
MCV RBC AUTO: 101.8 FL — HIGH (ref 80–100)
MONOCYTES # BLD AUTO: 0.97 K/UL — HIGH (ref 0–0.9)
MONOCYTES NFR BLD AUTO: 11.1 % — SIGNIFICANT CHANGE UP (ref 2–14)
NEUTROPHILS # BLD AUTO: 4.86 K/UL — SIGNIFICANT CHANGE UP (ref 1.8–7.4)
NEUTROPHILS NFR BLD AUTO: 55.4 % — SIGNIFICANT CHANGE UP (ref 43–77)
NRBC # FLD: 0 — SIGNIFICANT CHANGE UP
PLATELET # BLD AUTO: 212 K/UL — SIGNIFICANT CHANGE UP (ref 150–400)
PMV BLD: 10.5 FL — SIGNIFICANT CHANGE UP (ref 7–13)
POTASSIUM SERPL-MCNC: 3.8 MMOL/L — SIGNIFICANT CHANGE UP (ref 3.5–5.3)
POTASSIUM SERPL-SCNC: 3.8 MMOL/L — SIGNIFICANT CHANGE UP (ref 3.5–5.3)
PROT SERPL-MCNC: 7.1 G/DL — SIGNIFICANT CHANGE UP (ref 6–8.3)
RBC # BLD: 2.79 M/UL — LOW (ref 4.2–5.8)
RBC # FLD: 21.1 % — HIGH (ref 10.3–14.5)
SODIUM SERPL-SCNC: 138 MMOL/L — SIGNIFICANT CHANGE UP (ref 135–145)
VANCOMYCIN TROUGH SERPL-MCNC: 7.9 UG/ML — LOW (ref 10–20)
WBC # BLD: 8.77 K/UL — SIGNIFICANT CHANGE UP (ref 3.8–10.5)
WBC # FLD AUTO: 8.77 K/UL — SIGNIFICANT CHANGE UP (ref 3.8–10.5)

## 2018-09-04 PROCEDURE — 74230 X-RAY XM SWLNG FUNCJ C+: CPT | Mod: 26

## 2018-09-04 PROCEDURE — 71045 X-RAY EXAM CHEST 1 VIEW: CPT | Mod: 26

## 2018-09-04 PROCEDURE — 99233 SBSQ HOSP IP/OBS HIGH 50: CPT

## 2018-09-04 RX ORDER — ALPRAZOLAM 0.25 MG
0.5 TABLET ORAL EVERY 8 HOURS
Qty: 0 | Refills: 0 | Status: DISCONTINUED | OUTPATIENT
Start: 2018-09-04 | End: 2018-09-06

## 2018-09-04 RX ORDER — VANCOMYCIN HCL 1 G
1000 VIAL (EA) INTRAVENOUS EVERY 12 HOURS
Qty: 0 | Refills: 0 | Status: DISCONTINUED | OUTPATIENT
Start: 2018-09-05 | End: 2018-09-12

## 2018-09-04 RX ADMIN — PREGABALIN 1000 MICROGRAM(S): 225 CAPSULE ORAL at 12:02

## 2018-09-04 RX ADMIN — Medication 0.5 MILLIGRAM(S): at 21:30

## 2018-09-04 RX ADMIN — SODIUM CHLORIDE 10 MILLILITER(S): 9 INJECTION, SOLUTION INTRAVENOUS at 07:18

## 2018-09-04 RX ADMIN — Medication 300 MILLIGRAM(S): at 12:02

## 2018-09-04 RX ADMIN — CHLORHEXIDINE GLUCONATE 15 MILLILITER(S): 213 SOLUTION TOPICAL at 06:47

## 2018-09-04 RX ADMIN — Medication 100 MILLIGRAM(S): at 06:48

## 2018-09-04 RX ADMIN — HEPARIN SODIUM 5000 UNIT(S): 5000 INJECTION INTRAVENOUS; SUBCUTANEOUS at 21:30

## 2018-09-04 RX ADMIN — Medication 300 MILLIGRAM(S): at 08:01

## 2018-09-04 RX ADMIN — Medication 2 PUFF(S): at 17:06

## 2018-09-04 RX ADMIN — PANTOPRAZOLE SODIUM 40 MILLIGRAM(S): 20 TABLET, DELAYED RELEASE ORAL at 12:02

## 2018-09-04 RX ADMIN — Medication 2 PUFF(S): at 10:28

## 2018-09-04 RX ADMIN — Medication 300 MILLIGRAM(S): at 15:41

## 2018-09-04 RX ADMIN — SODIUM CHLORIDE 3 MILLILITER(S): 9 INJECTION INTRAMUSCULAR; INTRAVENOUS; SUBCUTANEOUS at 06:10

## 2018-09-04 RX ADMIN — Medication 100 MILLIGRAM(S): at 21:30

## 2018-09-04 RX ADMIN — SODIUM CHLORIDE 3 MILLILITER(S): 9 INJECTION INTRAMUSCULAR; INTRAVENOUS; SUBCUTANEOUS at 13:00

## 2018-09-04 RX ADMIN — CHLORHEXIDINE GLUCONATE 15 MILLILITER(S): 213 SOLUTION TOPICAL at 17:28

## 2018-09-04 RX ADMIN — SODIUM CHLORIDE 3 MILLILITER(S): 9 INJECTION INTRAMUSCULAR; INTRAVENOUS; SUBCUTANEOUS at 22:24

## 2018-09-04 RX ADMIN — Medication 1 MILLIGRAM(S): at 12:02

## 2018-09-04 RX ADMIN — Medication 0.5 MILLIGRAM(S): at 13:00

## 2018-09-04 RX ADMIN — NYSTATIN CREAM 1 APPLICATION(S): 100000 CREAM TOPICAL at 17:29

## 2018-09-04 RX ADMIN — Medication 100 MILLIGRAM(S): at 13:00

## 2018-09-04 RX ADMIN — HEPARIN SODIUM 5000 UNIT(S): 5000 INJECTION INTRAVENOUS; SUBCUTANEOUS at 06:47

## 2018-09-04 RX ADMIN — Medication 300 MILLIGRAM(S): at 17:28

## 2018-09-04 RX ADMIN — Medication 2 PUFF(S): at 03:27

## 2018-09-04 RX ADMIN — Medication 100 MILLIGRAM(S): at 12:02

## 2018-09-04 RX ADMIN — Medication 2 PUFF(S): at 22:52

## 2018-09-04 RX ADMIN — NYSTATIN CREAM 1 APPLICATION(S): 100000 CREAM TOPICAL at 06:48

## 2018-09-04 RX ADMIN — HEPARIN SODIUM 5000 UNIT(S): 5000 INJECTION INTRAVENOUS; SUBCUTANEOUS at 13:00

## 2018-09-04 NOTE — SWALLOW VFSS/MBS ASSESSMENT ADULT - MODE OF PRESENTATION
spoon/fed by clinician fed by clinician cup/self fed fed by clinician/Patient with PMV in place Patient with PMV in place./cup/self fed
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
## 2018-09-04 NOTE — SWALLOW VFSS/MBS ASSESSMENT ADULT - ESOPHAGEAL STAGE
Pharyngeal Stage Comments: delayed initiation of the pharyngeal swallow, adequate laryngeal elevation, reduced tongue base retraction, reduced pharyngeal constriction

## 2018-09-04 NOTE — SWALLOW VFSS/MBS ASSESSMENT ADULT - RECOMMENDED CONSISTENCY
1.) Mechanical Soft Consistency with Thin Liquids  2.) Feeding/Swallowing Guidelines: Sit upright, small bites, chew/mash mechanical soft solids well, single cup sips of thin liquids; two swallows per bite/sip.  3.) Aspiration Precautions  4.) Maintain Good Oral Hygiene Care
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
## 2018-09-04 NOTE — PROGRESS NOTE ADULT - SUBJECTIVE AND OBJECTIVE BOX
Infectious Diseases progress note:    Subjective: No new fevers, sitting comfortably.  c/o pain around wound vac site.  s/p vac change yesterday    ROS:  CONSTITUTIONAL:  No fever, chills, rigors  CARDIOVASCULAR:  No chest pain or palpitations  RESPIRATORY:   No SOB, cough, dyspnea on exertion.  No wheezing  GASTROINTESTINAL:  No abd pain, N/V, diarrhea/constipation  EXTREMITIES:  No swelling or joint pain  GENITOURINARY:  No burning on urination, increased frequency or urgency.  No flank pain  NEUROLOGIC:  No HA, visual disturbances  SKIN: No rashes    Allergies    No Known Allergies    Intolerances    tigecycline (Other)      ANTIBIOTICS/RELEVANT:  antimicrobials  metroNIDAZOLE  IVPB 500 milliGRAM(s) IV Intermittent every 8 hours    immunologic:    OTHER:  acetaminophen    Suspension 650 milliGRAM(s) Oral every 6 hours PRN  acetaminophen  IVPB. 1000 milliGRAM(s) IV Intermittent once PRN  ALBUTerol    90 MICROgram(s) HFA Inhaler 2 Puff(s) Inhalation every 6 hours PRN  ALPRAZolam 0.5 milliGRAM(s) Oral every 8 hours  chlorhexidine 0.12% Liquid 15 milliLiter(s) Swish and Spit two times a day  chlorhexidine 4% Liquid 1 Application(s) Topical <User Schedule>  cyanocobalamin 1000 MICROGram(s) Oral daily  ferrous    sulfate Liquid 300 milliGRAM(s) Oral three times a day with meals  folic acid 1 milliGRAM(s) Oral daily  heparin  Injectable 5000 Unit(s) SubCutaneous every 8 hours  ipratropium 17 MICROgram(s) HFA Inhaler 2 Puff(s) Inhalation every 6 hours  lactated ringers 1000 milliLiter(s) IV Continuous <Continuous>  nystatin Powder 1 Application(s) Topical two times a day  ondansetron Injectable 4 milliGRAM(s) IV Push every 6 hours PRN  pantoprazole  Injectable 40 milliGRAM(s) IV Push daily  sodium chloride 0.9% lock flush 3 milliLiter(s) IV Push every 8 hours  thiamine 100 milliGRAM(s) Oral daily      Objective:  Vital Signs Last 24 Hrs  T(C): 37.4 (04 Sep 2018 20:00), Max: 37.4 (04 Sep 2018 20:00)  T(F): 99.4 (04 Sep 2018 20:00), Max: 99.4 (04 Sep 2018 20:00)  HR: 98 (04 Sep 2018 23:00) (78 - 108)  BP: 108/69 (04 Sep 2018 23:00) (99/53 - 127/78)  BP(mean): 78 (04 Sep 2018 23:00) (64 - 94)  RR: 36 (04 Sep 2018 23:00) (2 - 36)  SpO2: 98% (04 Sep 2018 23:00) (96% - 100%)    PHYSICAL EXAM:  Constitutional:NAD  Eyes:MEMO, EOMI  Ear/Nose/Throat: no thrush, mucositis.  Moist mucous membranes	  Neck:no JVD, no lymphadenopathy, supple  Respiratory: CTA emi  Cardiovascular: S1S2 RRR, no murmurs  Gastrointestinal:soft, nontender,  nondistended (+) BS  Extremities:no e/e/c  Skin:  rt post chest wound vac        LABS:                        8.7    8.77  )-----------( 212      ( 04 Sep 2018 03:50 )             28.4     09-04    138  |  101  |  12  ----------------------------<  110<H>  3.8   |  24  |  0.59    Ca    9.1      04 Sep 2018 03:50  Phos  4.8     09-03  Mg     1.8     09-03    TPro  7.1  /  Alb  3.0<L>  /  TBili  1.0  /  DBili  x   /  AST  26  /  ALT  17  /  AlkPhos  301<H>  09-04                Vancomycin Level, Trough: 7.9 ug/mL (09-04 @ 13:00)  Vancomycin Level, Trough: 7.9 ug/mL (09-01 @ 13:45)              MICROBIOLOGY:    Culture - Yeast and Fungus (08.27.18 @ 11:32)    Culture - Yeast and Fungus:   CULTURE NEGATIVE FOR YEASTS AND MOLDS  PRELIMINARY RESULTS  CULTURE NEGATIVE FOR YEASTS AND MOLDS AFTER 2 DAYS  CULTURE NEGATIVE FOR YEASTS AND MOLDS   AFTER 1 WEEK    Specimen Source: PLEURAL FLUID    Culture - Body Fluid with Gram Stain (08.27.18 @ 11:32)    Culture - Body Fluid:   NO GROWTH - PRELIMINARY RESULTS  NO ORGANISMS ISOLATED AT 24 HOURS  NO ORGANISMS ISOLATED AT 48 HRS.  NO ORGANISMS ISOLATED AT 72 HRS.  NO GROWTH AFTER 4 DAYS INCUBATION  NO GROWTH AFTER 5 DAYS INCUBATION    Gram Stain:   NOS^No Organisms Seen  WBC^White Blood Cells  QNTY CELLS IN GRAM STAIN: RARE (1+)    Specimen Source: PLEURAL FLUID            RADIOLOGY & ADDITIONAL STUDIES:    < from: Xray Chest 1 View- PORTABLE-Routine (09.04.18 @ 06:45) >  IMPRESSION:  Previous small left apical pneumothorax is not seen.    Unchanged small partially loculated right pleural effusion with likely   associated passive atelectasis.    Question of mild pulmonary vascular congestion versus vascular crowding   from low lung volumes.    < end of copied text >

## 2018-09-04 NOTE — SWALLOW VFSS/MBS ASSESSMENT ADULT - PHARYNGEAL PHASE COMMENTS
adequate initiation of the pharyngeal swallow, adequate laryngeal elevation, reduced tongue base retraction and adequate pharyngeal constriction delayed initiation of the pharyngeal swallow, adequate laryngeal elevation, reduced tongue base retraction, adequate pharyngeal constriction delayed initiation of the pharyngeal swallow, adequate laryngeal elevation, reduced tongue base retraction, reduced pharyngeal constriction adequate initiation of the pharyngeal swallow, adequate laryngeal elevation, adequate tongue base retraction, adequate pharyngeal constriction

## 2018-09-04 NOTE — SWALLOW VFSS/MBS ASSESSMENT ADULT - COMMENTS
42yMale s/p Drainage of right lung abscess  07/27/2018, postop course complicated by sepsis, hypotension, respiratory failure, hemothorax.  Procedure:  Drainage of lung abscess  07/27/2018  right upper lobe    Decortication of right lung  07/27/2018      Right thoracotomy  07/27/2018      VATS (video-assisted thoracoscopic surgery)  07/27/2018  right   Flexible bronchoscopy  07/27/2018   Flexible bronchoscopy 08/02/2018  Evacuation of hemothorax  08/06/2018   Trach & PEG  08/09/2018  Patient was seen for a Clinical swallow Eval on 9/1/2018 (See Consult).   Patient arrived to Radiology unit accompanied by Nurse for medical monitoring. Patient is with a #6XLT Tracheostomy on Room Air.  Patient is also with PMV with adequate voicing ability.  Of Note: PO trials were given with and without PMV in place. Patient is a 41 y/o Male s/p Drainage of right lung abscess  07/27/2018, postop course complicated by sepsis, hypotension, respiratory failure, hemothorax.    Procedure:  Drainage of lung abscess  07/27/2018  right upper lobe    Decortication of right lung  07/27/2018      Right thoracotomy  07/27/2018      VATS (video-assisted thoracoscopic surgery)  07/27/2018  right   Flexible bronchoscopy  07/27/2018   Flexible bronchoscopy 08/02/2018  Evacuation of hemothorax  08/06/2018   Trach & PEG  08/09/2018    Patient was seen for a Clinical swallow Eval on 9/1/2018 (See Consult).     Patient arrived to Radiology unit accompanied by Nurse for medical monitoring. Patient is with a #6XLT Tracheostomy on Room Air.  Patient is also in process of utilizing PMV which is available for placement. PMV was placed and patient exhibited adequate voicing ability.      Of Note: PO trials were given with and without PMV in place.

## 2018-09-04 NOTE — SWALLOW VFSS/MBS ASSESSMENT ADULT - DIAGNOSTIC IMPRESSIONS
Patient presents with Mild Oral Stage and Mild Pharyngeal Stage Dysphagia. The Oral Stage is characterized by adequate oral containment, slow chewing/mashing for mechanical texture solid due to upper edentulous state, adequate bolus manipulation, adequate tongue motion with adequate anterior to posterior transfer of the bolus with adequate oral clearance post swallow.    The Pharyngeal Stage is characterized by delayed initiation of the pharyngeal swallow (Bolus head is at the vallecular for Thin Liquids), adequate laryngeal elevation, reduced tongue base retraction resulting in trace/mild vallecular residue post primary swallow for puree/mechanical texture solid/nectar thick liquid/thin liquid and reduced pharyngeal constriction resulting in trace/mild pyriform residue post swallow for nectar thick liquid/thin liquid.   There is mild pharyngeal clearance deficit located in the vallecular for puree/mechanical texture post swallow; trace/mild pharyngeal clearance located in the vallecular and pyriforms for nectar thick liquids and thin liquids post swallow.   There was No Aspiration observed before, during or after the swallow across all PO Trials with and without PMV in place.  This Cinesophagram was performed with and without PMV in place. Patient presents with Mild Oral Stage and Mild Pharyngeal Stage Dysphagia. The Oral Stage is characterized by adequate oral containment, slow chewing/mashing for mechanical texture solid due to upper edentulous state, adequate bolus manipulation, adequate tongue motion with adequate anterior to posterior transfer of the bolus with adequate oral clearance post swallow.    The Pharyngeal Stage is characterized by delayed initiation of the pharyngeal swallow (Bolus head is at the vallecular for Thin Liquids), adequate laryngeal elevation, reduced tongue base retraction resulting in trace/mild vallecular residue post primary swallow for puree/mechanical texture solid/nectar thick liquid/thin liquid and reduced pharyngeal constriction resulting in trace/mild pyriform residue post swallow for nectar thick liquid/thin liquid.   There is mild pharyngeal clearance deficit located in the vallecular for puree/mechanical texture post swallow; trace/mild pharyngeal clearance located in the vallecular and pyriforms for nectar thick liquids and thin liquids post swallow.   There was No Aspiration observed before, during or after the swallow across all PO Trials with and without PMV in place.      Of Note: This Cinesophagram was performed with and without PMV in place. Patient presents with Mild Oral Stage and Mild Pharyngeal Stage Dysphagia. The Oral Stage is characterized by adequate oral containment, slow chewing/mashing for mechanical texture solid due to upper edentulous state, adequate bolus manipulation, adequate tongue motion with adequate anterior to posterior transfer of the bolus with adequate oral clearance post swallow.    The Pharyngeal Stage is characterized by delayed initiation of the pharyngeal swallow (Bolus head is at the vallecular for Thin Liquids; bolus head is at the pyriforms for mechanical texture), adequate laryngeal elevation, reduced tongue base retraction resulting in trace/mild vallecular residue post primary swallow for puree/mechanical texture solid/nectar thick liquid/thin liquid and reduced pharyngeal constriction resulting in trace/mild pyriform residue post swallow for nectar thick liquid/thin liquid.   There is mild pharyngeal clearance deficit located in the vallecular for puree/mechanical texture post swallow; trace/mild pharyngeal clearance located in the vallecular and pyriforms for nectar thick liquids and thin liquids post swallow.   There was No Aspiration observed before, during or after the swallow across all PO Trials with and without PMV in place.      Of Note: This Cinesophagram was performed with and without PMV in place.

## 2018-09-05 PROCEDURE — 99233 SBSQ HOSP IP/OBS HIGH 50: CPT

## 2018-09-05 RX ORDER — ACETAMINOPHEN 500 MG
1000 TABLET ORAL ONCE
Qty: 0 | Refills: 0 | Status: COMPLETED | OUTPATIENT
Start: 2018-09-05 | End: 2018-09-06

## 2018-09-05 RX ORDER — HYDROMORPHONE HYDROCHLORIDE 2 MG/ML
0.5 INJECTION INTRAMUSCULAR; INTRAVENOUS; SUBCUTANEOUS ONCE
Qty: 0 | Refills: 0 | Status: DISCONTINUED | OUTPATIENT
Start: 2018-09-05 | End: 2018-09-05

## 2018-09-05 RX ADMIN — SODIUM CHLORIDE 3 MILLILITER(S): 9 INJECTION INTRAMUSCULAR; INTRAVENOUS; SUBCUTANEOUS at 13:08

## 2018-09-05 RX ADMIN — Medication 2 PUFF(S): at 11:55

## 2018-09-05 RX ADMIN — SODIUM CHLORIDE 10 MILLILITER(S): 9 INJECTION, SOLUTION INTRAVENOUS at 22:08

## 2018-09-05 RX ADMIN — HEPARIN SODIUM 5000 UNIT(S): 5000 INJECTION INTRAVENOUS; SUBCUTANEOUS at 05:19

## 2018-09-05 RX ADMIN — Medication 100 MILLIGRAM(S): at 22:09

## 2018-09-05 RX ADMIN — PREGABALIN 1000 MICROGRAM(S): 225 CAPSULE ORAL at 11:17

## 2018-09-05 RX ADMIN — Medication 0.5 MILLIGRAM(S): at 22:09

## 2018-09-05 RX ADMIN — CHLORHEXIDINE GLUCONATE 15 MILLILITER(S): 213 SOLUTION TOPICAL at 05:17

## 2018-09-05 RX ADMIN — Medication 0.5 MILLIGRAM(S): at 05:17

## 2018-09-05 RX ADMIN — Medication 300 MILLIGRAM(S): at 08:33

## 2018-09-05 RX ADMIN — Medication 2 PUFF(S): at 04:08

## 2018-09-05 RX ADMIN — Medication 100 MILLIGRAM(S): at 05:18

## 2018-09-05 RX ADMIN — SODIUM CHLORIDE 3 MILLILITER(S): 9 INJECTION INTRAMUSCULAR; INTRAVENOUS; SUBCUTANEOUS at 06:27

## 2018-09-05 RX ADMIN — NYSTATIN CREAM 1 APPLICATION(S): 100000 CREAM TOPICAL at 17:15

## 2018-09-05 RX ADMIN — Medication 0.5 MILLIGRAM(S): at 13:43

## 2018-09-05 RX ADMIN — HYDROMORPHONE HYDROCHLORIDE 0.5 MILLIGRAM(S): 2 INJECTION INTRAMUSCULAR; INTRAVENOUS; SUBCUTANEOUS at 13:45

## 2018-09-05 RX ADMIN — Medication 1 MILLIGRAM(S): at 11:17

## 2018-09-05 RX ADMIN — Medication 2 PUFF(S): at 16:32

## 2018-09-05 RX ADMIN — Medication 300 MILLIGRAM(S): at 11:16

## 2018-09-05 RX ADMIN — SODIUM CHLORIDE 10 MILLILITER(S): 9 INJECTION, SOLUTION INTRAVENOUS at 08:00

## 2018-09-05 RX ADMIN — Medication 300 MILLIGRAM(S): at 16:23

## 2018-09-05 RX ADMIN — HEPARIN SODIUM 5000 UNIT(S): 5000 INJECTION INTRAVENOUS; SUBCUTANEOUS at 13:43

## 2018-09-05 RX ADMIN — Medication 250 MILLIGRAM(S): at 17:15

## 2018-09-05 RX ADMIN — HEPARIN SODIUM 5000 UNIT(S): 5000 INJECTION INTRAVENOUS; SUBCUTANEOUS at 22:09

## 2018-09-05 RX ADMIN — CHLORHEXIDINE GLUCONATE 1 APPLICATION(S): 213 SOLUTION TOPICAL at 05:17

## 2018-09-05 RX ADMIN — NYSTATIN CREAM 1 APPLICATION(S): 100000 CREAM TOPICAL at 05:19

## 2018-09-05 RX ADMIN — HYDROMORPHONE HYDROCHLORIDE 0.5 MILLIGRAM(S): 2 INJECTION INTRAMUSCULAR; INTRAVENOUS; SUBCUTANEOUS at 14:00

## 2018-09-05 RX ADMIN — PANTOPRAZOLE SODIUM 40 MILLIGRAM(S): 20 TABLET, DELAYED RELEASE ORAL at 11:16

## 2018-09-05 RX ADMIN — Medication 100 MILLIGRAM(S): at 13:43

## 2018-09-05 RX ADMIN — Medication 2 PUFF(S): at 23:03

## 2018-09-05 RX ADMIN — Medication 250 MILLIGRAM(S): at 05:21

## 2018-09-05 RX ADMIN — Medication 100 MILLIGRAM(S): at 11:17

## 2018-09-05 RX ADMIN — SODIUM CHLORIDE 3 MILLILITER(S): 9 INJECTION INTRAMUSCULAR; INTRAVENOUS; SUBCUTANEOUS at 21:47
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
## 2018-09-05 NOTE — PROGRESS NOTE ADULT - SUBJECTIVE AND OBJECTIVE BOX
Infectious Diseases progress note:    Subjective:  Occasional coughing at bedside.  No fevers.  No pain at wound vac site today.      ROS:  CONSTITUTIONAL:  No fever, chills, rigors  CARDIOVASCULAR:  No chest pain or palpitations  RESPIRATORY:   No SOB, cough, dyspnea on exertion.  No wheezing  GASTROINTESTINAL:  No abd pain, N/V, diarrhea/constipation  EXTREMITIES:  No swelling or joint pain  GENITOURINARY:  No burning on urination, increased frequency or urgency.  No flank pain  NEUROLOGIC:  No HA, visual disturbances  SKIN: No rashes    Allergies    No Known Allergies    Intolerances    tigecycline (Other)      ANTIBIOTICS/RELEVANT:  antimicrobials  metroNIDAZOLE  IVPB 500 milliGRAM(s) IV Intermittent every 8 hours  vancomycin  IVPB 1000 milliGRAM(s) IV Intermittent every 12 hours    immunologic:    OTHER:  acetaminophen    Suspension 650 milliGRAM(s) Oral every 6 hours PRN  acetaminophen  IVPB .. 1000 milliGRAM(s) IV Intermittent once PRN  acetaminophen  IVPB. 1000 milliGRAM(s) IV Intermittent once PRN  ALBUTerol    90 MICROgram(s) HFA Inhaler 2 Puff(s) Inhalation every 6 hours PRN  ALPRAZolam 0.5 milliGRAM(s) Oral every 8 hours  chlorhexidine 0.12% Liquid 15 milliLiter(s) Swish and Spit two times a day  chlorhexidine 4% Liquid 1 Application(s) Topical <User Schedule>  cyanocobalamin 1000 MICROGram(s) Oral daily  ferrous    sulfate Liquid 300 milliGRAM(s) Oral three times a day with meals  folic acid 1 milliGRAM(s) Oral daily  heparin  Injectable 5000 Unit(s) SubCutaneous every 8 hours  ipratropium 17 MICROgram(s) HFA Inhaler 2 Puff(s) Inhalation every 6 hours  lactated ringers 1000 milliLiter(s) IV Continuous <Continuous>  nystatin Powder 1 Application(s) Topical two times a day  ondansetron Injectable 4 milliGRAM(s) IV Push every 6 hours PRN  pantoprazole  Injectable 40 milliGRAM(s) IV Push daily  sodium chloride 0.9% lock flush 3 milliLiter(s) IV Push every 8 hours  thiamine 100 milliGRAM(s) Oral daily      Objective:  Vital Signs Last 24 Hrs  T(C): 37.2 (05 Sep 2018 20:00), Max: 37.6 (05 Sep 2018 08:00)  T(F): 99 (05 Sep 2018 20:00), Max: 99.7 (05 Sep 2018 12:00)  HR: 98 (05 Sep 2018 23:04) (80 - 111)  BP: 132/89 (05 Sep 2018 22:00) (101/65 - 132/89)  BP(mean): 96 (05 Sep 2018 22:00) (72 - 96)  RR: 20 (05 Sep 2018 22:00) (20 - 36)  SpO2: 98% (05 Sep 2018 23:04) (96% - 100%)    PHYSICAL EXAM:  Constitutional:NAD  Eyes:MEMO, EOMI  Ear/Nose/Throat: no thrush, mucositis.  Moist mucous membranes	  Neck:no JVD, no lymphadenopathy, supple, tracheostomy  Respiratory: CTA emi  Cardiovascular: S1S2 RRR, no murmurs  Gastrointestinal:soft, nontender,  nondistended (+) BS, peg  Extremities:no e/e/c  Skin:  rt post chest wound vac        LABS:                        8.7    8.77  )-----------( 212      ( 04 Sep 2018 03:50 )             28.4     09-04    138  |  101  |  12  ----------------------------<  110<H>  3.8   |  24  |  0.59    Ca    9.1      04 Sep 2018 03:50    TPro  7.1  /  Alb  3.0<L>  /  TBili  1.0  /  DBili  x   /  AST  26  /  ALT  17  /  AlkPhos  301<H>  09-04                Vancomycin Level, Trough: 7.9 ug/mL (09-04 @ 13:00)  Vancomycin Level, Trough: 7.9 ug/mL (09-01 @ 13:45)              MICROBIOLOGY:    Culture - Yeast and Fungus (08.27.18 @ 11:32)    Culture - Yeast and Fungus:   CULTURE NEGATIVE FOR YEASTS AND MOLDS  PRELIMINARY RESULTS  CULTURE NEGATIVE FOR YEASTS AND MOLDS AFTER 2 DAYS  CULTURE NEGATIVE FOR YEASTS AND MOLDS   AFTER 1 WEEK    Specimen Source: PLEURAL FLUID    Culture - Body Fluid with Gram Stain (08.27.18 @ 11:32)    Culture - Body Fluid:   NO GROWTH - PRELIMINARY RESULTS  NO ORGANISMS ISOLATED AT 24 HOURS  NO ORGANISMS ISOLATED AT 48 HRS.  NO ORGANISMS ISOLATED AT 72 HRS.  NO GROWTH AFTER 4 DAYS INCUBATION  NO GROWTH AFTER 5 DAYS INCUBATION    Gram Stain:   NOS^No Organisms Seen  WBC^White Blood Cells  QNTY CELLS IN GRAM STAIN: RARE (1+)    Specimen Source: PLEURAL FLUID    Culture - Respiratory with Gram Stain (08.20.18 @ 14:07)    Culture - Respiratory:   NO GROWTH - PRELIMINARY RESULTS  FEW  NRF^Normal Respiratory Margot  QUANTITY OF GROWTH: FEW    Gram Stain Sputum:   Test not performed    Specimen Source: ENDOTRACHEAL SPECIMEN          RADIOLOGY & ADDITIONAL STUDIES:    < from: Xray Cinesophagram (09.04.18 @ 12:39) >  IMPRESSION:       There was no aspiration observed before, during, or after the swallow   across all oral trials with and without the PMV in place.    For further information and recommendations, please refer to the speech   pathologist finalreport which is available for review in the electronic   medical record.    < end of copied text >
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
## 2018-09-06 LAB
ACID FAST STN FLD: SIGNIFICANT CHANGE UP
ANTIBODY ID 1_1: SIGNIFICANT CHANGE UP
ANTIBODY ID 1_2: SIGNIFICANT CHANGE UP
BLD GP AB SCN SERPL QL: POSITIVE — SIGNIFICANT CHANGE UP
BUN SERPL-MCNC: 10 MG/DL — SIGNIFICANT CHANGE UP (ref 7–23)
CA-I BLD-SCNC: 1.11 MMOL/L — SIGNIFICANT CHANGE UP (ref 1.03–1.23)
CALCIUM SERPL-MCNC: 8.8 MG/DL — SIGNIFICANT CHANGE UP (ref 8.4–10.5)
CHLORIDE SERPL-SCNC: 102 MMOL/L — SIGNIFICANT CHANGE UP (ref 98–107)
CO2 SERPL-SCNC: 21 MMOL/L — LOW (ref 22–31)
CREAT SERPL-MCNC: 0.55 MG/DL — SIGNIFICANT CHANGE UP (ref 0.5–1.3)
DAT C3-SP REAG RBC QL: POSITIVE — SIGNIFICANT CHANGE UP
DAT POLY-SP REAG RBC QL: POSITIVE — SIGNIFICANT CHANGE UP
DIRECT COOMBS IGG: NEGATIVE — SIGNIFICANT CHANGE UP
GLUCOSE SERPL-MCNC: 101 MG/DL — HIGH (ref 70–99)
HCT VFR BLD CALC: 29.2 % — LOW (ref 39–50)
HGB BLD-MCNC: 9.2 G/DL — LOW (ref 13–17)
MAGNESIUM SERPL-MCNC: 1.8 MG/DL — SIGNIFICANT CHANGE UP (ref 1.6–2.6)
MCHC RBC-ENTMCNC: 31.5 % — LOW (ref 32–36)
MCHC RBC-ENTMCNC: 33 PG — SIGNIFICANT CHANGE UP (ref 27–34)
MCV RBC AUTO: 104.7 FL — HIGH (ref 80–100)
NRBC # FLD: 0 — SIGNIFICANT CHANGE UP
PHOSPHATE SERPL-MCNC: 4.8 MG/DL — HIGH (ref 2.5–4.5)
PLATELET # BLD AUTO: 219 K/UL — SIGNIFICANT CHANGE UP (ref 150–400)
PMV BLD: 10.5 FL — SIGNIFICANT CHANGE UP (ref 7–13)
POTASSIUM SERPL-MCNC: 4.2 MMOL/L — SIGNIFICANT CHANGE UP (ref 3.5–5.3)
POTASSIUM SERPL-SCNC: 4.2 MMOL/L — SIGNIFICANT CHANGE UP (ref 3.5–5.3)
RBC # BLD: 2.79 M/UL — LOW (ref 4.2–5.8)
RBC # FLD: 19.7 % — HIGH (ref 10.3–14.5)
RH IG SCN BLD-IMP: NEGATIVE — SIGNIFICANT CHANGE UP
SODIUM SERPL-SCNC: 138 MMOL/L — SIGNIFICANT CHANGE UP (ref 135–145)
VANCOMYCIN TROUGH SERPL-MCNC: 13.8 UG/ML — SIGNIFICANT CHANGE UP (ref 10–20)
WBC # BLD: 9.31 K/UL — SIGNIFICANT CHANGE UP (ref 3.8–10.5)
WBC # FLD AUTO: 9.31 K/UL — SIGNIFICANT CHANGE UP (ref 3.8–10.5)

## 2018-09-06 PROCEDURE — 71045 X-RAY EXAM CHEST 1 VIEW: CPT | Mod: 26

## 2018-09-06 PROCEDURE — 99233 SBSQ HOSP IP/OBS HIGH 50: CPT

## 2018-09-06 RX ORDER — FERROUS SULFATE 325(65) MG
325 TABLET ORAL THREE TIMES A DAY
Qty: 0 | Refills: 0 | Status: DISCONTINUED | OUTPATIENT
Start: 2018-09-06 | End: 2018-09-12

## 2018-09-06 RX ORDER — MAGNESIUM SULFATE 500 MG/ML
2 VIAL (ML) INJECTION ONCE
Qty: 0 | Refills: 0 | Status: COMPLETED | OUTPATIENT
Start: 2018-09-06 | End: 2018-09-06

## 2018-09-06 RX ORDER — ALPRAZOLAM 0.25 MG
0.5 TABLET ORAL THREE TIMES A DAY
Qty: 0 | Refills: 0 | Status: DISCONTINUED | OUTPATIENT
Start: 2018-09-06 | End: 2018-09-10

## 2018-09-06 RX ORDER — PANTOPRAZOLE SODIUM 20 MG/1
40 TABLET, DELAYED RELEASE ORAL
Qty: 0 | Refills: 0 | Status: DISCONTINUED | OUTPATIENT
Start: 2018-09-06 | End: 2018-09-12

## 2018-09-06 RX ADMIN — Medication 2 PUFF(S): at 16:13

## 2018-09-06 RX ADMIN — Medication 100 MILLIGRAM(S): at 21:04

## 2018-09-06 RX ADMIN — Medication 0.5 MILLIGRAM(S): at 06:14

## 2018-09-06 RX ADMIN — HEPARIN SODIUM 5000 UNIT(S): 5000 INJECTION INTRAVENOUS; SUBCUTANEOUS at 06:13

## 2018-09-06 RX ADMIN — NYSTATIN CREAM 1 APPLICATION(S): 100000 CREAM TOPICAL at 06:13

## 2018-09-06 RX ADMIN — Medication 250 MILLIGRAM(S): at 06:55

## 2018-09-06 RX ADMIN — SODIUM CHLORIDE 3 MILLILITER(S): 9 INJECTION INTRAMUSCULAR; INTRAVENOUS; SUBCUTANEOUS at 13:22

## 2018-09-06 RX ADMIN — SODIUM CHLORIDE 10 MILLILITER(S): 9 INJECTION, SOLUTION INTRAVENOUS at 07:22

## 2018-09-06 RX ADMIN — Medication 300 MILLIGRAM(S): at 07:22

## 2018-09-06 RX ADMIN — CHLORHEXIDINE GLUCONATE 1 APPLICATION(S): 213 SOLUTION TOPICAL at 06:13

## 2018-09-06 RX ADMIN — SODIUM CHLORIDE 3 MILLILITER(S): 9 INJECTION INTRAMUSCULAR; INTRAVENOUS; SUBCUTANEOUS at 06:15

## 2018-09-06 RX ADMIN — Medication 0.5 MILLIGRAM(S): at 13:21

## 2018-09-06 RX ADMIN — Medication 325 MILLIGRAM(S): at 21:04

## 2018-09-06 RX ADMIN — CHLORHEXIDINE GLUCONATE 15 MILLILITER(S): 213 SOLUTION TOPICAL at 06:12

## 2018-09-06 RX ADMIN — CHLORHEXIDINE GLUCONATE 15 MILLILITER(S): 213 SOLUTION TOPICAL at 17:03

## 2018-09-06 RX ADMIN — HEPARIN SODIUM 5000 UNIT(S): 5000 INJECTION INTRAVENOUS; SUBCUTANEOUS at 13:21

## 2018-09-06 RX ADMIN — Medication 400 MILLIGRAM(S): at 18:15

## 2018-09-06 RX ADMIN — Medication 2 PUFF(S): at 04:36

## 2018-09-06 RX ADMIN — Medication 325 MILLIGRAM(S): at 13:21

## 2018-09-06 RX ADMIN — Medication 100 MILLIGRAM(S): at 18:13

## 2018-09-06 RX ADMIN — Medication 100 MILLIGRAM(S): at 13:21

## 2018-09-06 RX ADMIN — HEPARIN SODIUM 5000 UNIT(S): 5000 INJECTION INTRAVENOUS; SUBCUTANEOUS at 21:04

## 2018-09-06 RX ADMIN — PANTOPRAZOLE SODIUM 40 MILLIGRAM(S): 20 TABLET, DELAYED RELEASE ORAL at 11:22

## 2018-09-06 RX ADMIN — Medication 2 PUFF(S): at 22:43

## 2018-09-06 RX ADMIN — Medication 1000 MILLIGRAM(S): at 18:30

## 2018-09-06 RX ADMIN — Medication 2 PUFF(S): at 10:22

## 2018-09-06 RX ADMIN — Medication 50 GRAM(S): at 06:57

## 2018-09-06 RX ADMIN — NYSTATIN CREAM 1 APPLICATION(S): 100000 CREAM TOPICAL at 17:01

## 2018-09-06 RX ADMIN — Medication 0.5 MILLIGRAM(S): at 21:55

## 2018-09-06 RX ADMIN — SODIUM CHLORIDE 3 MILLILITER(S): 9 INJECTION INTRAMUSCULAR; INTRAVENOUS; SUBCUTANEOUS at 21:06

## 2018-09-06 RX ADMIN — Medication 250 MILLIGRAM(S): at 17:02

## 2018-09-06 RX ADMIN — Medication 100 MILLIGRAM(S): at 06:12

## 2018-09-06 RX ADMIN — Medication 1 TABLET(S): at 11:22
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
## 2018-09-07 LAB
ACID FAST STN SPEC: SIGNIFICANT CHANGE UP
VANCOMYCIN TROUGH SERPL-MCNC: 14.2 UG/ML — SIGNIFICANT CHANGE UP (ref 10–20)

## 2018-09-07 PROCEDURE — 99233 SBSQ HOSP IP/OBS HIGH 50: CPT

## 2018-09-07 RX ORDER — KETOROLAC TROMETHAMINE 30 MG/ML
15 SYRINGE (ML) INJECTION EVERY 6 HOURS
Qty: 0 | Refills: 0 | Status: DISCONTINUED | OUTPATIENT
Start: 2018-09-07 | End: 2018-09-07

## 2018-09-07 RX ADMIN — SODIUM CHLORIDE 3 MILLILITER(S): 9 INJECTION INTRAMUSCULAR; INTRAVENOUS; SUBCUTANEOUS at 05:25

## 2018-09-07 RX ADMIN — NYSTATIN CREAM 1 APPLICATION(S): 100000 CREAM TOPICAL at 05:31

## 2018-09-07 RX ADMIN — Medication 325 MILLIGRAM(S): at 22:01

## 2018-09-07 RX ADMIN — Medication 2 PUFF(S): at 21:52

## 2018-09-07 RX ADMIN — SODIUM CHLORIDE 3 MILLILITER(S): 9 INJECTION INTRAMUSCULAR; INTRAVENOUS; SUBCUTANEOUS at 13:03

## 2018-09-07 RX ADMIN — Medication 2 PUFF(S): at 09:08

## 2018-09-07 RX ADMIN — HEPARIN SODIUM 5000 UNIT(S): 5000 INJECTION INTRAVENOUS; SUBCUTANEOUS at 13:04

## 2018-09-07 RX ADMIN — Medication 0.5 MILLIGRAM(S): at 22:01

## 2018-09-07 RX ADMIN — CHLORHEXIDINE GLUCONATE 1 APPLICATION(S): 213 SOLUTION TOPICAL at 05:31

## 2018-09-07 RX ADMIN — Medication 100 MILLIGRAM(S): at 06:15

## 2018-09-07 RX ADMIN — Medication 15 MILLIGRAM(S): at 13:05

## 2018-09-07 RX ADMIN — Medication 15 MILLIGRAM(S): at 13:20

## 2018-09-07 RX ADMIN — Medication 2 PUFF(S): at 17:01

## 2018-09-07 RX ADMIN — HEPARIN SODIUM 5000 UNIT(S): 5000 INJECTION INTRAVENOUS; SUBCUTANEOUS at 05:35

## 2018-09-07 RX ADMIN — Medication 0.5 MILLIGRAM(S): at 13:04

## 2018-09-07 RX ADMIN — NYSTATIN CREAM 1 APPLICATION(S): 100000 CREAM TOPICAL at 18:02

## 2018-09-07 RX ADMIN — Medication 250 MILLIGRAM(S): at 05:25

## 2018-09-07 RX ADMIN — CHLORHEXIDINE GLUCONATE 15 MILLILITER(S): 213 SOLUTION TOPICAL at 18:02

## 2018-09-07 RX ADMIN — SODIUM CHLORIDE 10 MILLILITER(S): 9 INJECTION, SOLUTION INTRAVENOUS at 05:54

## 2018-09-07 RX ADMIN — SODIUM CHLORIDE 10 MILLILITER(S): 9 INJECTION, SOLUTION INTRAVENOUS at 07:34

## 2018-09-07 RX ADMIN — PANTOPRAZOLE SODIUM 40 MILLIGRAM(S): 20 TABLET, DELAYED RELEASE ORAL at 05:35

## 2018-09-07 RX ADMIN — Medication 100 MILLIGRAM(S): at 13:04

## 2018-09-07 RX ADMIN — SODIUM CHLORIDE 3 MILLILITER(S): 9 INJECTION INTRAMUSCULAR; INTRAVENOUS; SUBCUTANEOUS at 21:32

## 2018-09-07 RX ADMIN — Medication 325 MILLIGRAM(S): at 05:36

## 2018-09-07 RX ADMIN — Medication 1 TABLET(S): at 13:04

## 2018-09-07 RX ADMIN — Medication 100 MILLIGRAM(S): at 22:01

## 2018-09-07 RX ADMIN — Medication 250 MILLIGRAM(S): at 18:12

## 2018-09-07 RX ADMIN — Medication 0.5 MILLIGRAM(S): at 05:35

## 2018-09-07 RX ADMIN — Medication 325 MILLIGRAM(S): at 13:04

## 2018-09-07 RX ADMIN — HEPARIN SODIUM 5000 UNIT(S): 5000 INJECTION INTRAVENOUS; SUBCUTANEOUS at 22:01

## 2018-09-07 NOTE — CHART NOTE - NSCHARTNOTEFT_GEN_A_CORE
NUTRITION FOLLOW-UP:  Met with Pt.  Pt. advanced to PO diet of mechanical soft with thin liquids, based on MBS findings & SLP recommendations on 9/4/18 & enteral feedings D/C'd today 9/7/18.  As per chart and discussion with Pt, tolerating PO diet & he states he ate 100% of lunch w/o difficulty or issues chewing/swallowing.  Discussed recommended diet consistency with Pt.  He denies food allergies, or nausea/vomiting/diarrhea/constipation @ this time.  Reports usual body weight as 185lbs PTA, which he weighed ~3 mths ago.   Upon RDN asking, Pt. does endorse weight loss x 2 mths PTA, as well as during this hospitalization, although is unable to quantify amount of decrease.  Weight documentation variable, however is indicative of possible decrease since admission (see below).  Suggest addition of Ensure Enlive and continuation of Multivitamin to help promote improvement in skin integrity.      Weight: 66.2kg on 9/7/18              63.3kg on 8/31/18              75kg on 7/24/18 (admission weight)    Edema: +Pedal edema    Skin:  Sacrum unstageable pressure injury.    Pertinent Medications: MEDICATIONS  (STANDING):  ALPRAZolam 0.5 milliGRAM(s) Oral three times a day  chlorhexidine 0.12% Liquid 15 milliLiter(s) Swish and Spit two times a day  chlorhexidine 4% Liquid 1 Application(s) Topical <User Schedule>  ferrous    sulfate 325 milliGRAM(s) Oral three times a day  heparin  Injectable 5000 Unit(s) SubCutaneous every 8 hours  ipratropium 17 MICROgram(s) HFA Inhaler 2 Puff(s) Inhalation every 6 hours  lactated ringers 1000 milliLiter(s) (10 mL/Hr) IV Continuous <Continuous>  metroNIDAZOLE  IVPB 500 milliGRAM(s) IV Intermittent every 8 hours  multivitamin 1 Tablet(s) Oral daily  nystatin Powder 1 Application(s) Topical two times a day  pantoprazole    Tablet 40 milliGRAM(s) Oral before breakfast  sodium chloride 0.9% lock flush 3 milliLiter(s) IV Push every 8 hours  vancomycin  IVPB 1000 milliGRAM(s) IV Intermittent every 12 hours    MEDICATIONS  (PRN):  acetaminophen  IVPB. 1000 milliGRAM(s) IV Intermittent once PRN Moderate Pain (4 - 6)  ALBUTerol    90 MICROgram(s) HFA Inhaler 2 Puff(s) Inhalation every 6 hours PRN Shortness of Breath and/or Wheezing  guaiFENesin    Syrup 100 milliGRAM(s) Oral every 6 hours PRN Cough  ketorolac   Injectable 15 milliGRAM(s) IV Push every 6 hours PRN Moderate Pain (4 - 6)  ondansetron Injectable 4 milliGRAM(s) IV Push every 6 hours PRN Nausea and/or Vomiting    Pertinent Labs:  09-06 Na138 mmol/L Glu 101 mg/dL<H> K+ 4.2 mmol/L Cr  0.55 mg/dL BUN 10 mg/dL 09-06 Phos 4.8 mg/dL<H> 09-04 Alb 3.0 g/dL<L> 08-31 PAB 19 mg/dL<L>      Current Diet:  Dysphagia II (mechanical soft) w thin liquids        PLAN/RECOMMENDATIONS:    1) Add Ensure Enlive 8oz PO 2x daily to mechanical soft diet.  2) Obtain daily weights  3) Continue Multivitamin   4) RDN remains available and will f/u PRN.          Candy Bailey RDN, CDN pager [phone]
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
## 2018-09-08 LAB
ALBUMIN SERPL ELPH-MCNC: 3.1 G/DL — LOW (ref 3.3–5)
ALP SERPL-CCNC: 249 U/L — HIGH (ref 40–120)
ALT FLD-CCNC: 16 U/L — SIGNIFICANT CHANGE UP (ref 4–41)
AST SERPL-CCNC: 24 U/L — SIGNIFICANT CHANGE UP (ref 4–40)
BILIRUB SERPL-MCNC: 0.9 MG/DL — SIGNIFICANT CHANGE UP (ref 0.2–1.2)
BUN SERPL-MCNC: 6 MG/DL — LOW (ref 7–23)
CALCIUM SERPL-MCNC: 9.1 MG/DL — SIGNIFICANT CHANGE UP (ref 8.4–10.5)
CHLORIDE SERPL-SCNC: 102 MMOL/L — SIGNIFICANT CHANGE UP (ref 98–107)
CO2 SERPL-SCNC: 22 MMOL/L — SIGNIFICANT CHANGE UP (ref 22–31)
CREAT SERPL-MCNC: 0.57 MG/DL — SIGNIFICANT CHANGE UP (ref 0.5–1.3)
GLUCOSE SERPL-MCNC: 105 MG/DL — HIGH (ref 70–99)
HCT VFR BLD CALC: 29.6 % — LOW (ref 39–50)
HGB BLD-MCNC: 9.2 G/DL — LOW (ref 13–17)
MCHC RBC-ENTMCNC: 31.1 % — LOW (ref 32–36)
MCHC RBC-ENTMCNC: 31.8 PG — SIGNIFICANT CHANGE UP (ref 27–34)
MCV RBC AUTO: 102.4 FL — HIGH (ref 80–100)
NRBC # FLD: 0 — SIGNIFICANT CHANGE UP
PLATELET # BLD AUTO: 239 K/UL — SIGNIFICANT CHANGE UP (ref 150–400)
PMV BLD: 10.7 FL — SIGNIFICANT CHANGE UP (ref 7–13)
POTASSIUM SERPL-MCNC: 4.1 MMOL/L — SIGNIFICANT CHANGE UP (ref 3.5–5.3)
POTASSIUM SERPL-SCNC: 4.1 MMOL/L — SIGNIFICANT CHANGE UP (ref 3.5–5.3)
PROT SERPL-MCNC: 7.4 G/DL — SIGNIFICANT CHANGE UP (ref 6–8.3)
RBC # BLD: 2.89 M/UL — LOW (ref 4.2–5.8)
RBC # FLD: 18.7 % — HIGH (ref 10.3–14.5)
SODIUM SERPL-SCNC: 138 MMOL/L — SIGNIFICANT CHANGE UP (ref 135–145)
WBC # BLD: 8.55 K/UL — SIGNIFICANT CHANGE UP (ref 3.8–10.5)
WBC # FLD AUTO: 8.55 K/UL — SIGNIFICANT CHANGE UP (ref 3.8–10.5)

## 2018-09-08 PROCEDURE — 71045 X-RAY EXAM CHEST 1 VIEW: CPT | Mod: 26

## 2018-09-08 PROCEDURE — 99233 SBSQ HOSP IP/OBS HIGH 50: CPT

## 2018-09-08 RX ADMIN — Medication 325 MILLIGRAM(S): at 07:07

## 2018-09-08 RX ADMIN — Medication 2 PUFF(S): at 03:08

## 2018-09-08 RX ADMIN — SODIUM CHLORIDE 3 MILLILITER(S): 9 INJECTION INTRAMUSCULAR; INTRAVENOUS; SUBCUTANEOUS at 07:07

## 2018-09-08 RX ADMIN — HEPARIN SODIUM 5000 UNIT(S): 5000 INJECTION INTRAVENOUS; SUBCUTANEOUS at 14:39

## 2018-09-08 RX ADMIN — CHLORHEXIDINE GLUCONATE 15 MILLILITER(S): 213 SOLUTION TOPICAL at 07:06

## 2018-09-08 RX ADMIN — CHLORHEXIDINE GLUCONATE 1 APPLICATION(S): 213 SOLUTION TOPICAL at 07:07

## 2018-09-08 RX ADMIN — Medication 0.5 MILLIGRAM(S): at 07:07

## 2018-09-08 RX ADMIN — Medication 2 PUFF(S): at 22:42

## 2018-09-08 RX ADMIN — PANTOPRAZOLE SODIUM 40 MILLIGRAM(S): 20 TABLET, DELAYED RELEASE ORAL at 07:07

## 2018-09-08 RX ADMIN — Medication 0.5 MILLIGRAM(S): at 13:28

## 2018-09-08 RX ADMIN — SODIUM CHLORIDE 3 MILLILITER(S): 9 INJECTION INTRAMUSCULAR; INTRAVENOUS; SUBCUTANEOUS at 21:49

## 2018-09-08 RX ADMIN — CHLORHEXIDINE GLUCONATE 15 MILLILITER(S): 213 SOLUTION TOPICAL at 17:57

## 2018-09-08 RX ADMIN — Medication 325 MILLIGRAM(S): at 14:39

## 2018-09-08 RX ADMIN — HEPARIN SODIUM 5000 UNIT(S): 5000 INJECTION INTRAVENOUS; SUBCUTANEOUS at 07:07

## 2018-09-08 RX ADMIN — HEPARIN SODIUM 5000 UNIT(S): 5000 INJECTION INTRAVENOUS; SUBCUTANEOUS at 23:20

## 2018-09-08 RX ADMIN — Medication 400 MILLIGRAM(S): at 08:00

## 2018-09-08 RX ADMIN — Medication 2 PUFF(S): at 15:34

## 2018-09-08 RX ADMIN — Medication 250 MILLIGRAM(S): at 07:06

## 2018-09-08 RX ADMIN — Medication 0.5 MILLIGRAM(S): at 23:20

## 2018-09-08 RX ADMIN — Medication 100 MILLIGRAM(S): at 14:39

## 2018-09-08 RX ADMIN — Medication 100 MILLIGRAM(S): at 23:21

## 2018-09-08 RX ADMIN — NYSTATIN CREAM 1 APPLICATION(S): 100000 CREAM TOPICAL at 17:57

## 2018-09-08 RX ADMIN — Medication 325 MILLIGRAM(S): at 23:20

## 2018-09-08 RX ADMIN — Medication 1 TABLET(S): at 12:24

## 2018-09-08 RX ADMIN — Medication 2 PUFF(S): at 11:13

## 2018-09-08 RX ADMIN — Medication 250 MILLIGRAM(S): at 17:58

## 2018-09-08 RX ADMIN — SODIUM CHLORIDE 3 MILLILITER(S): 9 INJECTION INTRAMUSCULAR; INTRAVENOUS; SUBCUTANEOUS at 13:28

## 2018-09-08 RX ADMIN — Medication 1000 MILLIGRAM(S): at 08:15

## 2018-09-08 RX ADMIN — Medication 100 MILLIGRAM(S): at 07:00

## 2018-09-08 RX ADMIN — NYSTATIN CREAM 1 APPLICATION(S): 100000 CREAM TOPICAL at 07:06
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
## 2018-09-09 LAB — VANCOMYCIN TROUGH SERPL-MCNC: 14.3 UG/ML — SIGNIFICANT CHANGE UP (ref 10–20)

## 2018-09-09 PROCEDURE — 86077 PHYS BLOOD BANK SERV XMATCH: CPT

## 2018-09-09 PROCEDURE — 99233 SBSQ HOSP IP/OBS HIGH 50: CPT

## 2018-09-09 RX ADMIN — Medication 100 MILLIGRAM(S): at 15:12

## 2018-09-09 RX ADMIN — Medication 100 MILLIGRAM(S): at 22:19

## 2018-09-09 RX ADMIN — Medication 2 PUFF(S): at 09:45

## 2018-09-09 RX ADMIN — CHLORHEXIDINE GLUCONATE 1 APPLICATION(S): 213 SOLUTION TOPICAL at 06:20

## 2018-09-09 RX ADMIN — Medication 1 TABLET(S): at 14:30

## 2018-09-09 RX ADMIN — Medication 325 MILLIGRAM(S): at 22:19

## 2018-09-09 RX ADMIN — HEPARIN SODIUM 5000 UNIT(S): 5000 INJECTION INTRAVENOUS; SUBCUTANEOUS at 22:19

## 2018-09-09 RX ADMIN — Medication 0.5 MILLIGRAM(S): at 06:22

## 2018-09-09 RX ADMIN — Medication 325 MILLIGRAM(S): at 14:30

## 2018-09-09 RX ADMIN — SODIUM CHLORIDE 3 MILLILITER(S): 9 INJECTION INTRAMUSCULAR; INTRAVENOUS; SUBCUTANEOUS at 06:21

## 2018-09-09 RX ADMIN — Medication 250 MILLIGRAM(S): at 07:00

## 2018-09-09 RX ADMIN — Medication 2 PUFF(S): at 15:55

## 2018-09-09 RX ADMIN — CHLORHEXIDINE GLUCONATE 15 MILLILITER(S): 213 SOLUTION TOPICAL at 06:20

## 2018-09-09 RX ADMIN — SODIUM CHLORIDE 3 MILLILITER(S): 9 INJECTION INTRAMUSCULAR; INTRAVENOUS; SUBCUTANEOUS at 12:28

## 2018-09-09 RX ADMIN — Medication 100 MILLIGRAM(S): at 06:23

## 2018-09-09 RX ADMIN — NYSTATIN CREAM 1 APPLICATION(S): 100000 CREAM TOPICAL at 15:12

## 2018-09-09 RX ADMIN — Medication 325 MILLIGRAM(S): at 06:22

## 2018-09-09 RX ADMIN — Medication 2 PUFF(S): at 22:24

## 2018-09-09 RX ADMIN — SODIUM CHLORIDE 10 MILLILITER(S): 9 INJECTION, SOLUTION INTRAVENOUS at 20:16

## 2018-09-09 RX ADMIN — Medication 250 MILLIGRAM(S): at 16:52

## 2018-09-09 RX ADMIN — HEPARIN SODIUM 5000 UNIT(S): 5000 INJECTION INTRAVENOUS; SUBCUTANEOUS at 06:22

## 2018-09-09 RX ADMIN — PANTOPRAZOLE SODIUM 40 MILLIGRAM(S): 20 TABLET, DELAYED RELEASE ORAL at 06:22

## 2018-09-09 RX ADMIN — Medication 0.5 MILLIGRAM(S): at 22:19

## 2018-09-09 RX ADMIN — SODIUM CHLORIDE 3 MILLILITER(S): 9 INJECTION INTRAMUSCULAR; INTRAVENOUS; SUBCUTANEOUS at 22:19

## 2018-09-09 RX ADMIN — NYSTATIN CREAM 1 APPLICATION(S): 100000 CREAM TOPICAL at 06:24

## 2018-09-09 RX ADMIN — Medication 0.5 MILLIGRAM(S): at 14:30

## 2018-09-09 RX ADMIN — HEPARIN SODIUM 5000 UNIT(S): 5000 INJECTION INTRAVENOUS; SUBCUTANEOUS at 14:30

## 2018-09-09 RX ADMIN — Medication 2 PUFF(S): at 04:11

## 2018-09-09 RX ADMIN — CHLORHEXIDINE GLUCONATE 15 MILLILITER(S): 213 SOLUTION TOPICAL at 15:12
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
## 2018-09-10 LAB
ALBUMIN SERPL ELPH-MCNC: 3 G/DL — LOW (ref 3.3–5)
ALP SERPL-CCNC: 226 U/L — HIGH (ref 40–120)
ALT FLD-CCNC: 20 U/L — SIGNIFICANT CHANGE UP (ref 4–41)
AST SERPL-CCNC: 29 U/L — SIGNIFICANT CHANGE UP (ref 4–40)
BILIRUB SERPL-MCNC: 0.7 MG/DL — SIGNIFICANT CHANGE UP (ref 0.2–1.2)
BUN SERPL-MCNC: 6 MG/DL — LOW (ref 7–23)
CALCIUM SERPL-MCNC: 9.3 MG/DL — SIGNIFICANT CHANGE UP (ref 8.4–10.5)
CHLORIDE SERPL-SCNC: 100 MMOL/L — SIGNIFICANT CHANGE UP (ref 98–107)
CO2 SERPL-SCNC: 21 MMOL/L — LOW (ref 22–31)
CREAT SERPL-MCNC: 0.58 MG/DL — SIGNIFICANT CHANGE UP (ref 0.5–1.3)
GLUCOSE SERPL-MCNC: 94 MG/DL — SIGNIFICANT CHANGE UP (ref 70–99)
HCT VFR BLD CALC: 31 % — LOW (ref 39–50)
HGB BLD-MCNC: 9.6 G/DL — LOW (ref 13–17)
MAGNESIUM SERPL-MCNC: 1.6 MG/DL — SIGNIFICANT CHANGE UP (ref 1.6–2.6)
MCHC RBC-ENTMCNC: 31 % — LOW (ref 32–36)
MCHC RBC-ENTMCNC: 31.8 PG — SIGNIFICANT CHANGE UP (ref 27–34)
MCV RBC AUTO: 102.6 FL — HIGH (ref 80–100)
NRBC # FLD: 0 — SIGNIFICANT CHANGE UP
PHOSPHATE SERPL-MCNC: 5.3 MG/DL — HIGH (ref 2.5–4.5)
PLATELET # BLD AUTO: 235 K/UL — SIGNIFICANT CHANGE UP (ref 150–400)
PMV BLD: 10.1 FL — SIGNIFICANT CHANGE UP (ref 7–13)
POTASSIUM SERPL-MCNC: 4.1 MMOL/L — SIGNIFICANT CHANGE UP (ref 3.5–5.3)
POTASSIUM SERPL-SCNC: 4.1 MMOL/L — SIGNIFICANT CHANGE UP (ref 3.5–5.3)
PROT SERPL-MCNC: 7.5 G/DL — SIGNIFICANT CHANGE UP (ref 6–8.3)
RBC # BLD: 3.02 M/UL — LOW (ref 4.2–5.8)
RBC # FLD: 18.1 % — HIGH (ref 10.3–14.5)
SODIUM SERPL-SCNC: 135 MMOL/L — SIGNIFICANT CHANGE UP (ref 135–145)
VANCOMYCIN TROUGH SERPL-MCNC: 17 UG/ML — SIGNIFICANT CHANGE UP (ref 10–20)
WBC # BLD: 7.58 K/UL — SIGNIFICANT CHANGE UP (ref 3.8–10.5)
WBC # FLD AUTO: 7.58 K/UL — SIGNIFICANT CHANGE UP (ref 3.8–10.5)

## 2018-09-10 PROCEDURE — 71045 X-RAY EXAM CHEST 1 VIEW: CPT | Mod: 26

## 2018-09-10 PROCEDURE — 99233 SBSQ HOSP IP/OBS HIGH 50: CPT

## 2018-09-10 RX ORDER — OXYCODONE AND ACETAMINOPHEN 5; 325 MG/1; MG/1
2 TABLET ORAL ONCE
Qty: 0 | Refills: 0 | Status: DISCONTINUED | OUTPATIENT
Start: 2018-09-10 | End: 2018-09-10

## 2018-09-10 RX ORDER — SENNA PLUS 8.6 MG/1
2 TABLET ORAL AT BEDTIME
Qty: 0 | Refills: 0 | Status: DISCONTINUED | OUTPATIENT
Start: 2018-09-10 | End: 2018-09-12

## 2018-09-10 RX ORDER — MAGNESIUM SULFATE 500 MG/ML
2 VIAL (ML) INJECTION
Qty: 0 | Refills: 0 | Status: COMPLETED | OUTPATIENT
Start: 2018-09-10 | End: 2018-09-10

## 2018-09-10 RX ORDER — ALPRAZOLAM 0.25 MG
0.25 TABLET ORAL THREE TIMES A DAY
Qty: 0 | Refills: 0 | Status: DISCONTINUED | OUTPATIENT
Start: 2018-09-10 | End: 2018-09-12

## 2018-09-10 RX ORDER — DOCUSATE SODIUM 100 MG
100 CAPSULE ORAL
Qty: 0 | Refills: 0 | Status: DISCONTINUED | OUTPATIENT
Start: 2018-09-10 | End: 2018-09-12

## 2018-09-10 RX ADMIN — CHLORHEXIDINE GLUCONATE 15 MILLILITER(S): 213 SOLUTION TOPICAL at 18:11

## 2018-09-10 RX ADMIN — Medication 325 MILLIGRAM(S): at 14:20

## 2018-09-10 RX ADMIN — Medication 325 MILLIGRAM(S): at 21:06

## 2018-09-10 RX ADMIN — HEPARIN SODIUM 5000 UNIT(S): 5000 INJECTION INTRAVENOUS; SUBCUTANEOUS at 14:20

## 2018-09-10 RX ADMIN — Medication 100 MILLIGRAM(S): at 21:05

## 2018-09-10 RX ADMIN — Medication 250 MILLIGRAM(S): at 18:11

## 2018-09-10 RX ADMIN — NYSTATIN CREAM 1 APPLICATION(S): 100000 CREAM TOPICAL at 05:02

## 2018-09-10 RX ADMIN — Medication 2 PUFF(S): at 21:06

## 2018-09-10 RX ADMIN — SODIUM CHLORIDE 3 MILLILITER(S): 9 INJECTION INTRAMUSCULAR; INTRAVENOUS; SUBCUTANEOUS at 05:02

## 2018-09-10 RX ADMIN — Medication 250 MILLIGRAM(S): at 05:07

## 2018-09-10 RX ADMIN — Medication 1 TABLET(S): at 14:20

## 2018-09-10 RX ADMIN — HEPARIN SODIUM 5000 UNIT(S): 5000 INJECTION INTRAVENOUS; SUBCUTANEOUS at 05:07

## 2018-09-10 RX ADMIN — Medication 2 PUFF(S): at 09:30

## 2018-09-10 RX ADMIN — NYSTATIN CREAM 1 APPLICATION(S): 100000 CREAM TOPICAL at 18:11

## 2018-09-10 RX ADMIN — Medication 100 MILLIGRAM(S): at 14:20

## 2018-09-10 RX ADMIN — CHLORHEXIDINE GLUCONATE 15 MILLILITER(S): 213 SOLUTION TOPICAL at 05:02

## 2018-09-10 RX ADMIN — Medication 2 PUFF(S): at 03:09

## 2018-09-10 RX ADMIN — Medication 100 MILLIGRAM(S): at 06:41

## 2018-09-10 RX ADMIN — SODIUM CHLORIDE 3 MILLILITER(S): 9 INJECTION INTRAMUSCULAR; INTRAVENOUS; SUBCUTANEOUS at 21:06

## 2018-09-10 RX ADMIN — PANTOPRAZOLE SODIUM 40 MILLIGRAM(S): 20 TABLET, DELAYED RELEASE ORAL at 06:41

## 2018-09-10 RX ADMIN — HEPARIN SODIUM 5000 UNIT(S): 5000 INJECTION INTRAVENOUS; SUBCUTANEOUS at 21:06

## 2018-09-10 RX ADMIN — Medication 0.25 MILLIGRAM(S): at 21:06

## 2018-09-10 RX ADMIN — Medication 0.5 MILLIGRAM(S): at 05:08

## 2018-09-10 RX ADMIN — Medication 100 MILLIGRAM(S): at 18:11

## 2018-09-10 RX ADMIN — Medication 2 PUFF(S): at 16:15

## 2018-09-10 RX ADMIN — Medication 50 GRAM(S): at 05:07

## 2018-09-10 RX ADMIN — Medication 50 GRAM(S): at 04:15

## 2018-09-10 RX ADMIN — Medication 325 MILLIGRAM(S): at 05:07

## 2018-09-10 RX ADMIN — OXYCODONE AND ACETAMINOPHEN 2 TABLET(S): 5; 325 TABLET ORAL at 14:11

## 2018-09-10 RX ADMIN — OXYCODONE AND ACETAMINOPHEN 2 TABLET(S): 5; 325 TABLET ORAL at 10:53

## 2018-09-10 RX ADMIN — SODIUM CHLORIDE 3 MILLILITER(S): 9 INJECTION INTRAMUSCULAR; INTRAVENOUS; SUBCUTANEOUS at 14:11

## 2018-09-10 RX ADMIN — CHLORHEXIDINE GLUCONATE 1 APPLICATION(S): 213 SOLUTION TOPICAL at 05:02

## 2018-09-10 RX ADMIN — Medication 0.25 MILLIGRAM(S): at 14:20

## 2018-09-10 NOTE — PROGRESS NOTE ADULT - SUBJECTIVE AND OBJECTIVE BOX
Infectious Diseases progress note:    Subjective: NAD, comfortable.  Trach removed on 9/7.  No cough or fever.  Has some pain around posterior chest wound site at time of vac change, then gets better.    ROS:  CONSTITUTIONAL:  No fever, chills, rigors  CARDIOVASCULAR:  No chest pain or palpitations  RESPIRATORY:   No SOB, cough, dyspnea on exertion.  No wheezing  GASTROINTESTINAL:  No abd pain, N/V, diarrhea/constipation  EXTREMITIES:  No swelling or joint pain  GENITOURINARY:  No burning on urination, increased frequency or urgency.  No flank pain  NEUROLOGIC:  No HA, visual disturbances  SKIN: No rashes    Allergies    No Known Allergies    Intolerances    tigecycline (Other)      ANTIBIOTICS/RELEVANT:  antimicrobials  metroNIDAZOLE  IVPB 500 milliGRAM(s) IV Intermittent every 8 hours  vancomycin  IVPB 1000 milliGRAM(s) IV Intermittent every 12 hours    immunologic:    OTHER:  ALPRAZolam 0.25 milliGRAM(s) Oral three times a day  chlorhexidine 0.12% Liquid 15 milliLiter(s) Swish and Spit two times a day  chlorhexidine 4% Liquid 1 Application(s) Topical <User Schedule>  docusate sodium 100 milliGRAM(s) Oral two times a day  ferrous    sulfate 325 milliGRAM(s) Oral three times a day  guaiFENesin    Syrup 100 milliGRAM(s) Oral every 6 hours PRN  heparin  Injectable 5000 Unit(s) SubCutaneous every 8 hours  ipratropium 17 MICROgram(s) HFA Inhaler 2 Puff(s) Inhalation every 6 hours  ketorolac   Injectable 15 milliGRAM(s) IV Push every 6 hours PRN  lactated ringers 1000 milliLiter(s) IV Continuous <Continuous>  multivitamin 1 Tablet(s) Oral daily  nystatin Powder 1 Application(s) Topical two times a day  ondansetron Injectable 4 milliGRAM(s) IV Push every 6 hours PRN  pantoprazole    Tablet 40 milliGRAM(s) Oral before breakfast  senna 2 Tablet(s) Oral at bedtime PRN  sodium chloride 0.9% lock flush 3 milliLiter(s) IV Push every 8 hours      Objective:  Vital Signs Last 24 Hrs  T(C): 37.6 (10 Sep 2018 11:03), Max: 37.7 (09 Sep 2018 16:00)  T(F): 99.6 (10 Sep 2018 11:03), Max: 99.9 (09 Sep 2018 16:00)  HR: 115 (10 Sep 2018 11:03) (95 - 115)  BP: 110/70 (10 Sep 2018 11:03) (91/43 - 119/75)  BP(mean): 79 (10 Sep 2018 10:00) (65 - 88)  RR: 20 (10 Sep 2018 11:03) (18 - 37)  SpO2: 99% (10 Sep 2018 11:03) (96% - 100%)    PHYSICAL EXAM:  Constitutional:NAD  Eyes:MEMO, EOMI  Ear/Nose/Throat: no thrush, mucositis.  Moist mucous membranes	  Neck:no JVD, no lymphadenopathy, supple  Respiratory: CTA emi  Cardiovascular: S1S2 RRR, no murmurs  Gastrointestinal:soft, nontender,  nondistended (+) BS, peg in place  Extremities:no e/e/c  Skin:  post chest wound vac        LABS:                        9.6    7.58  )-----------( 235      ( 10 Sep 2018 02:38 )             31.0     09-10    135  |  100  |  6<L>  ----------------------------<  94  4.1   |  21<L>  |  0.58    Ca    9.3      10 Sep 2018 02:38  Phos  5.3     09-10  Mg     1.6     09-10    TPro  7.5  /  Alb  3.0<L>  /  TBili  0.7  /  DBili  x   /  AST  29  /  ALT  20  /  AlkPhos  226<H>  09-10                Vancomycin Level, Trough: 17.0 ug/mL (09-10 @ 02:38)  Vancomycin Level, Trough: 14.3 ug/mL (09-09 @ 05:30)  Vancomycin Level, Trough: 14.2 ug/mL (09-07 @ 16:20)  Vancomycin Level, Trough: 13.8 ug/mL (09-06 @ 05:42)              MICROBIOLOGY:    Culture - Yeast and Fungus (08.27.18 @ 11:32)    Culture - Yeast and Fungus:   CULTURE NEGATIVE FOR YEASTS AND MOLDS  PRELIMINARY RESULTS  CULTURE NEGATIVE FOR YEASTS AND MOLDS AFTER 2 DAYS  CULTURE NEGATIVE FOR YEASTS AND MOLDS   AFTER 1 WEEK    Specimen Source: PLEURAL FLUID    Culture - Body Fluid with Gram Stain (08.27.18 @ 11:32)    Gram Stain:   NOS^No Organisms Seen  WBC^White Blood Cells  QNTY CELLS IN GRAM STAIN: RARE (1+)    Culture - Body Fluid:   NO GROWTH - PRELIMINARY RESULTS  NO ORGANISMS ISOLATED AT 24 HOURS  NO ORGANISMS ISOLATED AT 48 HRS.  NO ORGANISMS ISOLATED AT 72 HRS.  NO GROWTH AFTER 4 DAYS INCUBATION  NO GROWTH AFTER 5 DAYS INCUBATION    Specimen Source: PLEURAL FLUID          RADIOLOGY & ADDITIONAL STUDIES:    < from: Xray Chest 1 View- PORTABLE-Routine (09.10.18 @ 06:47) >    Impression:    Interstitial edema with patchy atelectasis in the right lung is unchanged.    Trace bilateral effusions.    < end of copied text >
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
## 2018-09-11 ENCOUNTER — TRANSCRIPTION ENCOUNTER (OUTPATIENT)
Age: 43
End: 2018-09-11

## 2018-09-11 LAB — VANCOMYCIN TROUGH SERPL-MCNC: 9.6 UG/ML — LOW (ref 10–20)

## 2018-09-11 RX ADMIN — Medication 1 TABLET(S): at 12:35

## 2018-09-11 RX ADMIN — Medication 325 MILLIGRAM(S): at 13:10

## 2018-09-11 RX ADMIN — Medication 100 MILLIGRAM(S): at 05:42

## 2018-09-11 RX ADMIN — CHLORHEXIDINE GLUCONATE 1 APPLICATION(S): 213 SOLUTION TOPICAL at 05:47

## 2018-09-11 RX ADMIN — NYSTATIN CREAM 1 APPLICATION(S): 100000 CREAM TOPICAL at 05:47

## 2018-09-11 RX ADMIN — Medication 2 PUFF(S): at 10:40

## 2018-09-11 RX ADMIN — Medication 250 MILLIGRAM(S): at 05:41

## 2018-09-11 RX ADMIN — Medication 0.25 MILLIGRAM(S): at 21:10

## 2018-09-11 RX ADMIN — HEPARIN SODIUM 5000 UNIT(S): 5000 INJECTION INTRAVENOUS; SUBCUTANEOUS at 21:10

## 2018-09-11 RX ADMIN — PANTOPRAZOLE SODIUM 40 MILLIGRAM(S): 20 TABLET, DELAYED RELEASE ORAL at 05:46

## 2018-09-11 RX ADMIN — CHLORHEXIDINE GLUCONATE 15 MILLILITER(S): 213 SOLUTION TOPICAL at 05:46

## 2018-09-11 RX ADMIN — Medication 100 MILLIGRAM(S): at 13:10

## 2018-09-11 RX ADMIN — Medication 0.25 MILLIGRAM(S): at 05:46

## 2018-09-11 RX ADMIN — Medication 100 MILLIGRAM(S): at 05:46

## 2018-09-11 RX ADMIN — SODIUM CHLORIDE 3 MILLILITER(S): 9 INJECTION INTRAMUSCULAR; INTRAVENOUS; SUBCUTANEOUS at 21:10

## 2018-09-11 RX ADMIN — HEPARIN SODIUM 5000 UNIT(S): 5000 INJECTION INTRAVENOUS; SUBCUTANEOUS at 05:46

## 2018-09-11 RX ADMIN — Medication 2 PUFF(S): at 17:21

## 2018-09-11 RX ADMIN — Medication 325 MILLIGRAM(S): at 21:09

## 2018-09-11 RX ADMIN — Medication 2 PUFF(S): at 05:46

## 2018-09-11 RX ADMIN — SODIUM CHLORIDE 3 MILLILITER(S): 9 INJECTION INTRAMUSCULAR; INTRAVENOUS; SUBCUTANEOUS at 05:47

## 2018-09-11 RX ADMIN — Medication 250 MILLIGRAM(S): at 17:20

## 2018-09-11 RX ADMIN — Medication 325 MILLIGRAM(S): at 05:46

## 2018-09-11 RX ADMIN — SODIUM CHLORIDE 3 MILLILITER(S): 9 INJECTION INTRAMUSCULAR; INTRAVENOUS; SUBCUTANEOUS at 12:39

## 2018-09-11 RX ADMIN — Medication 100 MILLIGRAM(S): at 21:10

## 2018-09-11 RX ADMIN — HEPARIN SODIUM 5000 UNIT(S): 5000 INJECTION INTRAVENOUS; SUBCUTANEOUS at 13:10

## 2018-09-11 RX ADMIN — Medication 0.25 MILLIGRAM(S): at 12:35
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
## 2018-09-11 NOTE — DISCHARGE NOTE ADULT - INSTRUCTIONS
Mechanical soft diet with thin liquids Mechanical soft diet with thin liquids. Stay upright for all meals.

## 2018-09-11 NOTE — DISCHARGE NOTE ADULT - CARE PLAN
Principal Discharge DX:	Empyema  Goal:	s/p drainage of empyema  Assessment and plan of treatment:	Follow up with Dr. Sorto in 2 weeks   Chest x-ray prior to appointment with Dr. Sorto.    Visiting nurse service for wound vac management   Follow up with primary care provider in one week   Pain management   Increase ambulation as tolerated   Incentive spirometer Principal Discharge DX:	Empyema  Goal:	s/p drainage of empyema  Assessment and plan of treatment:	Follow up with Dr. Sorto in 2 weeks. Call for an apt. [phone]  Chest x-ray prior to appointment with Dr. Sorto and bring those images with you.   Visiting nurse service for wound vac management, they will come and place wound VAC tomorrow.   Avoid alcohol consumption. Try to wean off the Xanax.  Keep other wounds uncovered. Continue diet as instructed. You may need to place a dry dressing to where old feeding tube once until site closes and stops leaking. Change dressing daily or as needed.   Coordinate taking a shower for when the nurse is coming to change VAC dressing, otherwise just do sponge bathing.    Follow up with primary care provider in one week   Pain management   Increase ambulation as tolerated. You may climb stairs.   Incentive spirometer

## 2018-09-11 NOTE — PROGRESS NOTE ADULT - REASON FOR ADMISSION
loculated pleural effusion

## 2018-09-11 NOTE — DISCHARGE NOTE ADULT - PATIENT PORTAL LINK FT
You can access the [facility] Patient Portal, offered by [facility], by registering with the following website: [email or web address]

## 2018-09-11 NOTE — PROGRESS NOTE ADULT - SUBJECTIVE AND OBJECTIVE BOX
Subjective:  no acute complaints, PEG removed today - pt tolerated procedure well on clear liquid diet until tomorrow AM  plan for DC home after antibiotic dosing tomorrow    Vital Signs:  Vital Signs Last 24 Hrs  T(C): 37.2 (09-11-18 @ 07:58), Max: 37.2 (09-11-18 @ 07:58)  T(F): 98.9 (09-11-18 @ 07:58), Max: 98.9 (09-11-18 @ 07:58)  HR: 100 (09-11-18 @ 07:58) (94 - 108)  BP: 117/96 (09-11-18 @ 07:58) (88/55 - 117/96)  RR: 18 (09-11-18 @ 07:58) (16 - 18)  SpO2: 97% (09-11-18 @ 07:58) (97% - 100%) on (O2)    Telemetry/Alarms:  General: WN/WD NAD  Neurology: Awake, nonfocal, SWAN x 4  Eyes: Scleras clear, PERRLA/ EOMI, Gross vision intact  ENT:Gross hearing intact, grossly patent pharynx, no stridor  Neck: Neck supple, trachea midline, No JVD,   Respiratory: CTA B/L, No wheezing, rales, rhonchi  CV: RRR, S1S2, no murmurs, rubs or gallops  Abdominal: Soft, NT, ND +BS, PEG removed today  Extremities: No edema, + peripheral pulses  Skin: No Rashes, Hematoma, Ecchymosis  Lymphatic: No Neck, axilla, groin LAD  Psych: Oriented x 3, normal affect  Incisions:   Tubes:  Relevant labs, radiology and Medications reviewed                        9.6    7.58  )-----------( 235      ( 10 Sep 2018 02:38 )             31.0     09-10    135  |  100  |  6<L>  ----------------------------<  94  4.1   |  21<L>  |  0.58    Ca    9.3      10 Sep 2018 02:38  Phos  5.3     09-10  Mg     1.6     09-10    TPro  7.5  /  Alb  3.0<L>  /  TBili  0.7  /  DBili  x   /  AST  29  /  ALT  20  /  AlkPhos  226<H>  09-10      MEDICATIONS  (STANDING):  ALPRAZolam 0.25 milliGRAM(s) Oral three times a day  chlorhexidine 0.12% Liquid 15 milliLiter(s) Swish and Spit two times a day  chlorhexidine 4% Liquid 1 Application(s) Topical <User Schedule>  docusate sodium 100 milliGRAM(s) Oral two times a day  ferrous    sulfate 325 milliGRAM(s) Oral three times a day  heparin  Injectable 5000 Unit(s) SubCutaneous every 8 hours  ipratropium 17 MICROgram(s) HFA Inhaler 2 Puff(s) Inhalation every 6 hours  metroNIDAZOLE  IVPB 500 milliGRAM(s) IV Intermittent every 8 hours  multivitamin 1 Tablet(s) Oral daily  nystatin Powder 1 Application(s) Topical two times a day  pantoprazole    Tablet 40 milliGRAM(s) Oral before breakfast  sodium chloride 0.9% lock flush 3 milliLiter(s) IV Push every 8 hours  vancomycin  IVPB 1000 milliGRAM(s) IV Intermittent every 12 hours    MEDICATIONS  (PRN):  guaiFENesin    Syrup 100 milliGRAM(s) Oral every 6 hours PRN Cough  ketorolac   Injectable 15 milliGRAM(s) IV Push every 6 hours PRN Moderate Pain (4 - 6)  ondansetron Injectable 4 milliGRAM(s) IV Push every 6 hours PRN Nausea and/or Vomiting  senna 2 Tablet(s) Oral at bedtime PRN Constipation    Pertinent Physical Exam  I&O's Summary    10 Sep 2018 07:01  -  11 Sep 2018 07:00  --------------------------------------------------------  IN: 440 mL / OUT: 2450 mL / NET: -2010 mL    11 Sep 2018 07:01  -  11 Sep 2018 11:27  --------------------------------------------------------  IN: 20 mL / OUT: 600 mL / NET: -580 mL        Assessment    42yMale s/p Drainage of right lung abscess  07/27/2018, postop course complicated by sepsis, hypotension, ETOH withdrawal, respiratory failure, hemothorax. Trach decannulated 9/07/18  & is doing well.       Procedures:  Drainage of lung abscess  07/27/2018  right upper lobe    Decortication of right lung  07/27/2018      Right thoracotomy  07/27/2018      VATS (video-assisted thoracoscopic surgery)  07/27/2018  right   Flexible bronchoscopy  07/27/2018   Flexible bronchoscopy 08/02/2018  Evacuation of hemothorax  08/06/2018   Trach & PEG  08/09/2018  Trach exchange 8/13/18  Right chest wound bedside debridement 8/18/18      PLAN  Neuro: Pain management  Pulm: Encourage coughing, deep breathing and use of incentive spirometry. Wean off supplemental oxygen as able. Daily CXR.   Cardio: Monitor telemetry/alarms  GI: PEG removed, clear liquids today and regular diet resumed tomorrow  Renal: monitor urine output, supplement electrolytes as needed  Vasc: Heparin SC/SCDs for DVT prophylaxis  Heme: Stable H/H. .   ID: vanco flagyl until 9/12/18  Therapy: OOB/ambulate    Disposition: Aim to D/C to home tmrw  Discussed with Cardiothoracic Team at AM rounds.

## 2018-09-11 NOTE — DISCHARGE NOTE ADULT - PLAN OF CARE
s/p drainage of empyema Follow up with Dr. Sorto in 2 weeks   Chest x-ray prior to appointment with Dr. Sorto.    Visiting nurse service for wound vac management   Follow up with primary care provider in one week   Pain management   Increase ambulation as tolerated   Incentive spirometer Follow up with Dr. Sorto in 2 weeks. Call for an apt. [phone]  Chest x-ray prior to appointment with Dr. Sorto and bring those images with you.   Visiting nurse service for wound vac management, they will come and place wound VAC tomorrow.   Avoid alcohol consumption. Try to wean off the Xanax.  Keep other wounds uncovered. Continue diet as instructed. You may need to place a dry dressing to where old feeding tube once until site closes and stops leaking. Change dressing daily or as needed.   Coordinate taking a shower for when the nurse is coming to change VAC dressing, otherwise just do sponge bathing.    Follow up with primary care provider in one week   Pain management   Increase ambulation as tolerated. You may climb stairs.   Incentive spirometer

## 2018-09-11 NOTE — DISCHARGE NOTE ADULT - NS AS ACTIVITY OBS
Showering allowed/Walking-Indoors allowed/Stairs allowed/No Heavy lifting/straining/Do not make important decisions/Do not drive or operate machinery/Walking-Outdoors allowed

## 2018-09-11 NOTE — DISCHARGE NOTE ADULT - HOME CARE AGENCY
[facility] [phone] initial visit will be day after discharge home. A nurse will call prior to the home visit.

## 2018-09-11 NOTE — DISCHARGE NOTE ADULT - MEDICATION SUMMARY - MEDICATIONS TO TAKE
I will START or STAY ON the medications listed below when I get home from the hospital:    Tylenol 325 mg oral tablet  -- 2 tab(s) by mouth every 6 hours, As Needed for pain  -- Indication: For Pain    ALPRAZolam 0.25 mg oral tablet  -- 1 tab(s) by mouth 2 times a day. MDD:2  -- Indication: For Anxiety/for alcohol withdrawal. Begin to wean yourself off the Xanax. After 4-5 days try to take only once a day. Avoid any alcohol.     guaiFENesin 100 mg/5 mL oral liquid  -- 5 milliliter(s) by mouth every 6 hours, As needed, Cough  -- Indication: For cough as neede    FeroSul 325 mg (65 mg elemental iron) oral tablet  -- 1 tab(s) by mouth 3 times a day  -- Indication: For Anemia. Over the counter. Take for another 2 weeks.     senna oral tablet  -- 2 tab(s) by mouth once a day (at bedtime), As needed, Constipation  -- Indication: For constipation as needed    docusate sodium 100 mg oral capsule  -- 1 cap(s) by mouth 2 times a day  -- Indication: For constipation as you need it    Multiple Vitamins oral tablet  -- 1 tab(s) by mouth once a day  -- Indication: For vitamin

## 2018-09-12 VITALS
OXYGEN SATURATION: 100 % | DIASTOLIC BLOOD PRESSURE: 80 MMHG | HEART RATE: 118 BPM | TEMPERATURE: 98 F | RESPIRATION RATE: 20 BRPM | SYSTOLIC BLOOD PRESSURE: 112 MMHG

## 2018-09-12 PROCEDURE — 99238 HOSP IP/OBS DSCHRG MGMT 30/<: CPT

## 2018-09-12 RX ORDER — FERROUS SULFATE 325(65) MG
1 TABLET ORAL
Qty: 0 | Refills: 0 | DISCHARGE
Start: 2018-09-12

## 2018-09-12 RX ORDER — ALPRAZOLAM 0.25 MG
1 TABLET ORAL
Qty: 14 | Refills: 0
Start: 2018-09-12 | End: 2018-09-18

## 2018-09-12 RX ORDER — SENNA PLUS 8.6 MG/1
2 TABLET ORAL
Qty: 0 | Refills: 0 | DISCHARGE
Start: 2018-09-12

## 2018-09-12 RX ORDER — DOCUSATE SODIUM 100 MG
1 CAPSULE ORAL
Qty: 0 | Refills: 0 | DISCHARGE
Start: 2018-09-12

## 2018-09-12 RX ADMIN — Medication 325 MILLIGRAM(S): at 14:08

## 2018-09-12 RX ADMIN — SODIUM CHLORIDE 3 MILLILITER(S): 9 INJECTION INTRAMUSCULAR; INTRAVENOUS; SUBCUTANEOUS at 14:13

## 2018-09-12 RX ADMIN — Medication 2 PUFF(S): at 05:04

## 2018-09-12 RX ADMIN — Medication 100 MILLIGRAM(S): at 05:03

## 2018-09-12 RX ADMIN — Medication 325 MILLIGRAM(S): at 05:04

## 2018-09-12 RX ADMIN — Medication 0.25 MILLIGRAM(S): at 14:08

## 2018-09-12 RX ADMIN — CHLORHEXIDINE GLUCONATE 1 APPLICATION(S): 213 SOLUTION TOPICAL at 04:58

## 2018-09-12 RX ADMIN — SODIUM CHLORIDE 3 MILLILITER(S): 9 INJECTION INTRAMUSCULAR; INTRAVENOUS; SUBCUTANEOUS at 05:04

## 2018-09-12 RX ADMIN — Medication 250 MILLIGRAM(S): at 05:04

## 2018-09-12 RX ADMIN — Medication 1 TABLET(S): at 12:16

## 2018-09-12 RX ADMIN — Medication 100 MILLIGRAM(S): at 14:08

## 2018-09-12 RX ADMIN — HEPARIN SODIUM 5000 UNIT(S): 5000 INJECTION INTRAVENOUS; SUBCUTANEOUS at 05:04

## 2018-09-12 RX ADMIN — Medication 250 MILLIGRAM(S): at 14:08

## 2018-09-12 RX ADMIN — PANTOPRAZOLE SODIUM 40 MILLIGRAM(S): 20 TABLET, DELAYED RELEASE ORAL at 05:04

## 2018-09-12 RX ADMIN — HEPARIN SODIUM 5000 UNIT(S): 5000 INJECTION INTRAVENOUS; SUBCUTANEOUS at 14:08

## 2018-09-13 LAB — PH FLD: 7.2 PH — SIGNIFICANT CHANGE UP

## 2018-09-17 LAB — ACID FAST STN SPEC: SIGNIFICANT CHANGE UP

## 2018-09-27 ENCOUNTER — APPOINTMENT (OUTPATIENT)
Dept: THORACIC SURGERY | Facility: CLINIC | Age: 43
End: 2018-09-27
Payer: MEDICAID

## 2018-09-27 ENCOUNTER — OUTPATIENT (OUTPATIENT)
Dept: OUTPATIENT SERVICES | Facility: HOSPITAL | Age: 43
LOS: 1 days | End: 2018-09-27
Payer: MEDICAID

## 2018-09-27 ENCOUNTER — APPOINTMENT (OUTPATIENT)
Dept: RADIOLOGY | Facility: HOSPITAL | Age: 43
End: 2018-09-27

## 2018-09-27 VITALS
SYSTOLIC BLOOD PRESSURE: 128 MMHG | OXYGEN SATURATION: 100 % | DIASTOLIC BLOOD PRESSURE: 88 MMHG | WEIGHT: 150 LBS | BODY MASS INDEX: 24.11 KG/M2 | RESPIRATION RATE: 16 BRPM | HEART RATE: 105 BPM | HEIGHT: 66 IN | TEMPERATURE: 98 F

## 2018-09-27 DIAGNOSIS — J86.9 PYOTHORAX WITHOUT FISTULA: ICD-10-CM

## 2018-09-27 LAB — FUNGUS SPEC QL CULT: SIGNIFICANT CHANGE UP

## 2018-09-27 PROCEDURE — 99024 POSTOP FOLLOW-UP VISIT: CPT

## 2018-09-27 PROCEDURE — 71047 X-RAY EXAM CHEST 3 VIEWS: CPT | Mod: 26

## 2018-09-27 RX ORDER — PANTOPRAZOLE 40 MG/1
40 TABLET, DELAYED RELEASE ORAL DAILY
Qty: 30 | Refills: 0 | Status: COMPLETED | COMMUNITY
Start: 2018-09-27

## 2018-10-11 ENCOUNTER — APPOINTMENT (OUTPATIENT)
Dept: THORACIC SURGERY | Facility: CLINIC | Age: 43
End: 2018-10-11
Payer: MEDICAID

## 2018-10-11 VITALS
SYSTOLIC BLOOD PRESSURE: 119 MMHG | OXYGEN SATURATION: 100 % | HEIGHT: 66 IN | TEMPERATURE: 98.1 F | DIASTOLIC BLOOD PRESSURE: 83 MMHG | BODY MASS INDEX: 24.11 KG/M2 | HEART RATE: 110 BPM | WEIGHT: 150 LBS | RESPIRATION RATE: 18 BRPM

## 2018-10-11 PROCEDURE — 99024 POSTOP FOLLOW-UP VISIT: CPT

## 2018-11-01 ENCOUNTER — APPOINTMENT (OUTPATIENT)
Dept: THORACIC SURGERY | Facility: CLINIC | Age: 43
End: 2018-11-01
Payer: MEDICAID

## 2018-11-01 VITALS
BODY MASS INDEX: 25.23 KG/M2 | OXYGEN SATURATION: 100 % | HEART RATE: 100 BPM | DIASTOLIC BLOOD PRESSURE: 92 MMHG | HEIGHT: 66 IN | RESPIRATION RATE: 16 BRPM | WEIGHT: 157 LBS | SYSTOLIC BLOOD PRESSURE: 145 MMHG | TEMPERATURE: 98.2 F

## 2018-11-01 PROCEDURE — 99024 POSTOP FOLLOW-UP VISIT: CPT

## 2018-11-01 RX ORDER — OXYCODONE AND ACETAMINOPHEN 5; 325 MG/1; MG/1
5-325 TABLET ORAL
Qty: 30 | Refills: 0 | Status: DISCONTINUED | COMMUNITY
Start: 2018-10-11 | End: 2018-11-01

## 2018-11-01 RX ORDER — OXYCODONE AND ACETAMINOPHEN 5; 325 MG/1; MG/1
5-325 TABLET ORAL EVERY 4 HOURS
Qty: 30 | Refills: 0 | Status: DISCONTINUED | COMMUNITY
Start: 2018-09-27 | End: 2018-11-01

## 2018-12-06 ENCOUNTER — APPOINTMENT (OUTPATIENT)
Dept: RADIOLOGY | Facility: HOSPITAL | Age: 43
End: 2018-12-06

## 2018-12-06 ENCOUNTER — APPOINTMENT (OUTPATIENT)
Dept: THORACIC SURGERY | Facility: CLINIC | Age: 43
End: 2018-12-06
Payer: MEDICAID

## 2018-12-06 ENCOUNTER — OUTPATIENT (OUTPATIENT)
Dept: OUTPATIENT SERVICES | Facility: HOSPITAL | Age: 43
LOS: 1 days | End: 2018-12-06
Payer: MEDICAID

## 2018-12-06 VITALS
HEIGHT: 66 IN | HEART RATE: 101 BPM | TEMPERATURE: 98.2 F | RESPIRATION RATE: 16 BRPM | DIASTOLIC BLOOD PRESSURE: 95 MMHG | WEIGHT: 166 LBS | BODY MASS INDEX: 26.68 KG/M2 | OXYGEN SATURATION: 99 % | SYSTOLIC BLOOD PRESSURE: 138 MMHG

## 2018-12-06 DIAGNOSIS — J86.9 PYOTHORAX WITHOUT FISTULA: ICD-10-CM

## 2018-12-06 PROCEDURE — 71047 X-RAY EXAM CHEST 3 VIEWS: CPT | Mod: 26

## 2018-12-06 PROCEDURE — 99214 OFFICE O/P EST MOD 30 MIN: CPT

## 2018-12-10 RX ORDER — OXYCODONE AND ACETAMINOPHEN 5; 325 MG/1; MG/1
5-325 TABLET ORAL EVERY 8 HOURS
Qty: 42 | Refills: 0 | Status: DISCONTINUED | COMMUNITY
Start: 2018-12-06 | End: 2018-12-10

## 2019-01-10 ENCOUNTER — APPOINTMENT (OUTPATIENT)
Dept: THORACIC SURGERY | Facility: CLINIC | Age: 44
End: 2019-01-10
Payer: MEDICAID

## 2019-01-10 VITALS
WEIGHT: 174 LBS | RESPIRATION RATE: 16 BRPM | HEART RATE: 106 BPM | HEIGHT: 66 IN | DIASTOLIC BLOOD PRESSURE: 97 MMHG | TEMPERATURE: 98.4 F | OXYGEN SATURATION: 99 % | SYSTOLIC BLOOD PRESSURE: 138 MMHG | BODY MASS INDEX: 27.97 KG/M2

## 2019-01-10 PROCEDURE — 99215 OFFICE O/P EST HI 40 MIN: CPT

## 2019-01-10 RX ORDER — PANTOPRAZOLE 40 MG/1
40 TABLET, DELAYED RELEASE ORAL
Qty: 30 | Refills: 2 | Status: DISCONTINUED | COMMUNITY
Start: 2018-09-27 | End: 2019-01-10

## 2019-01-10 NOTE — PHYSICAL EXAM
[Sclera] : the sclera and conjunctiva were normal [Extraocular Movements] : extraocular movements were intact [Neck Appearance] : the appearance of the neck was normal [Neck Cervical Mass (___cm)] : no neck mass was observed [Jugular Venous Distention Increased] : there was no jugular-venous distention [Respiration, Rhythm And Depth] : normal respiratory rhythm and effort [Exaggerated Use Of Accessory Muscles For Inspiration] : no accessory muscle use [Auscultation Breath Sounds / Voice Sounds] : lungs were clear to auscultation bilaterally [Heart Rate And Rhythm] : heart rate was normal and rhythm regular [Chest Visual Inspection Thoracic Asymmetry] : no chest asymmetry [Diminished Respiratory Excursion] : normal chest expansion [2+] : left 2+ [Bowel Sounds] : normal bowel sounds [Abdomen Soft] : soft [Abdomen Tenderness] : non-tender [Cervical Lymph Nodes Enlarged Posterior Bilaterally] : posterior cervical [Cervical Lymph Nodes Enlarged Anterior Bilaterally] : anterior cervical [Supraclavicular Lymph Nodes Enlarged Bilaterally] : supraclavicular [No CVA Tenderness] : no ~M costovertebral angle tenderness [Abnormal Walk] : normal gait [] : no rash [No Focal Deficits] : no focal deficits [Oriented To Time, Place, And Person] : oriented to person, place, and time [Impaired Insight] : insight and judgment were intact [Affect] : the affect was normal [Mood] : the mood was normal [FreeTextEntry1] : Wound VAC right posterior thoracotomy site.

## 2019-01-10 NOTE — ASSESSMENT
[FreeTextEntry1] : 43 year old Polish speaking male who presents today for follow up wound reassessment. He was admitted to [facility] on 7/25/18 for loculated pleural effusion, and hospital course was complicated by sepsis, hypotension, ETOH withdrawal, respiratory failure, hemothorax, tracheostomy (decannulated 9/7/18), and PEG (removed 9/11/18). He was discharged home on 9/12/18 with right thorax VAC dressing + VNS ordered to change VAC dressing 3x/week. \par \par I removed the wound VAC and assessed the wound. It seems to be healing well but slowly. Tunneling is approx 4.5cm today. No sign of infection. I packed the wound with Wound VAC sponge and reattached the VAC. No leak present. The patient tolerated dressing change well. I have recommended he return in 4 weeks for wound reassessment. Prescription for Oxycodone and Protonix will be sent to his pharmacy. I have advised him to follow up with PCP for blood pressure assessment. \par \par I spoke to Ibeth, Wound care nurse, and asked her to use the wound VAC sponge (black sponge) to pack the wound going forward. \par \par Written by Katelin Samayoa NP, acting as a scribe for Dr. Ginette Sorto.\par The documentation recorded by the scribe accurately reflects the service I personally performed and the decisions made by me. Ginette Sorto MD

## 2019-01-10 NOTE — HISTORY OF PRESENT ILLNESS
[FreeTextEntry1] : 43 year old Polish speaking male who presents today for follow up wound assessment. He was admitted to [facility] on 7/25/18 for loculated pleural effusion, and hospital course was complicated by sepsis, hypotension, ETOH withdrawal, respiratory failure, hemothorax, tracheostomy (decannulated 9/7/18), and PEG (removed 9/11/18). He was discharged home on 9/12/18 with right thorax VAC dressing + VNS ordered to change VAC dressing 3x/week. \par \par Continued wound care and VAC dressing changes 3x a week by VNS. He reports discomfort at wound site treated with pain medication as needed. He reports having a goo appetite. The patient denies shortness of breath, fever, chills, dysphagia or hemoptysis.

## 2019-02-07 ENCOUNTER — APPOINTMENT (OUTPATIENT)
Dept: THORACIC SURGERY | Facility: CLINIC | Age: 44
End: 2019-02-07
Payer: MEDICAID

## 2019-02-07 VITALS
RESPIRATION RATE: 18 BRPM | SYSTOLIC BLOOD PRESSURE: 144 MMHG | HEART RATE: 90 BPM | DIASTOLIC BLOOD PRESSURE: 98 MMHG | OXYGEN SATURATION: 99 % | TEMPERATURE: 98.5 F | WEIGHT: 178 LBS | BODY MASS INDEX: 28.61 KG/M2 | HEIGHT: 66 IN

## 2019-02-07 PROCEDURE — 99215 OFFICE O/P EST HI 40 MIN: CPT

## 2019-02-07 RX ORDER — OXYCODONE AND ACETAMINOPHEN 5; 325 MG/1; MG/1
5-325 TABLET ORAL
Qty: 30 | Refills: 0 | Status: DISCONTINUED | COMMUNITY
Start: 2018-10-15 | End: 2019-02-07

## 2019-02-07 RX ORDER — OXYCODONE AND ACETAMINOPHEN 5; 325 MG/1; MG/1
5-325 TABLET ORAL
Qty: 42 | Refills: 0 | Status: DISCONTINUED | COMMUNITY
Start: 2018-12-10 | End: 2019-02-07
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
## 2019-03-07 ENCOUNTER — APPOINTMENT (OUTPATIENT)
Dept: THORACIC SURGERY | Facility: CLINIC | Age: 44
End: 2019-03-07
Payer: MEDICAID

## 2019-03-07 VITALS
HEART RATE: 78 BPM | TEMPERATURE: 98.5 F | RESPIRATION RATE: 20 BRPM | SYSTOLIC BLOOD PRESSURE: 138 MMHG | DIASTOLIC BLOOD PRESSURE: 98 MMHG | OXYGEN SATURATION: 100 %

## 2019-03-07 PROCEDURE — 99215 OFFICE O/P EST HI 40 MIN: CPT

## 2019-03-07 RX ORDER — LEVOFLOXACIN 500 MG/1
500 TABLET, FILM COATED ORAL
Qty: 7 | Refills: 0 | Status: DISCONTINUED | COMMUNITY
Start: 2019-02-07 | End: 2019-03-07

## 2019-03-07 RX ORDER — OXYCODONE AND ACETAMINOPHEN 5; 325 MG/1; MG/1
5-325 TABLET ORAL
Qty: 42 | Refills: 0 | Status: DISCONTINUED | COMMUNITY
Start: 2019-01-10 | End: 2019-03-07

## 2019-03-07 RX ORDER — OXYCODONE AND ACETAMINOPHEN 5; 325 MG/1; MG/1
5-325 TABLET ORAL EVERY 8 HOURS
Qty: 30 | Refills: 0 | Status: DISCONTINUED | COMMUNITY
Start: 2018-11-01 | End: 2019-03-07

## 2019-03-07 RX ORDER — PANTOPRAZOLE 40 MG/1
40 TABLET, DELAYED RELEASE ORAL DAILY
Qty: 14 | Refills: 1 | Status: DISCONTINUED | COMMUNITY
Start: 2018-12-06 | End: 2019-03-07

## 2019-03-07 NOTE — CONSULT LETTER
[Courtesy Letter:] : I had the pleasure of seeing your patient, [unfilled], in my office today. [Please see my note below.] : Please see my note below. [Sincerely,] : Sincerely, [FreeTextEntry3] : \par \par \par Ginette Sorto\par Attending Surgeon\par Division of Thoracic Surgery\par \par [employer] [facility]

## 2019-03-07 NOTE — ASSESSMENT
[FreeTextEntry1] : 43 year old Polish speaking male who presents today for follow up wound assessment. He was admitted to [facility] on 7/25/18 for loculated pleural effusion, and hospital course was complicated by sepsis, hypotension, ETOH withdrawal, respiratory failure, hemothorax, tracheostomy (decannulated 9/7/18), and PEG (removed 9/11/18). He was discharged home on 9/12/18 with right thorax VAC dressing + VNS ordered to change VAC dressing 3x/week. \par \par Continued wound care and VAC dressing changes 2x a week by VNS. \par We changed his wound VAC dressing in the office today, the incision is smaller than prior with depth of 4.5cm, he does not require wound VAC, but needs wound packing once / day until healed. He will require PO antibiotics.\par \par I have reviewed the patient's medical records and diagnostic images at the time of this office consultation and have made the following recommendation.\par Plan:\par 1. D/C wound VAC. \par 2. Right sided chest surgical incision wound packing once/day until healed, VNS was called and voice message left. \par 3. Levaquin 500mg, po., 1 tab Qd x 7 days. \par 4. RTC in 4 weeks, no CXR needed. \par \par \par \par \par Written by Denise Hugo NP, acting as a scribe for Dr. Ginette Sorto.     \par “The documentation recorded by the scribe accurately reflects the service I personally performed and the decisions made by me.” Ginette Sorto MD.\par \par

## 2019-03-07 NOTE — REVIEW OF SYSTEMS
[Cough] : cough [Heartburn] : heartburn [Skin Wound] : skin wound [Negative] : Heme/Lymph [Shortness Of Breath] : no shortness of breath [Wheezing] : no wheezing [SOB on Exertion] : no shortness of breath during exertion [Orthopnea] : no orthopnea [FreeTextEntry6] : occasional cough, nonproductive

## 2019-03-07 NOTE — PHYSICAL EXAM
[Sclera] : the sclera and conjunctiva were normal [Neck Appearance] : the appearance of the neck was normal [Neck Cervical Mass (___cm)] : no neck mass was observed [] : no respiratory distress [Respiration, Rhythm And Depth] : normal respiratory rhythm and effort [Exaggerated Use Of Accessory Muscles For Inspiration] : no accessory muscle use [Auscultation Breath Sounds / Voice Sounds] : lungs were clear to auscultation bilaterally [Apical Impulse] : the apical impulse was normal [Heart Rate And Rhythm] : heart rate was normal and rhythm regular [Heart Sounds] : normal S1 and S2 [Heart Sounds Gallop] : no gallops [Murmurs] : no murmurs [Heart Sounds Pericardial Friction Rub] : no pericardial rub [Examination Of The Chest] : the chest was normal in appearance [Abdomen Soft] : soft [Abdomen Tenderness] : non-tender [No CVA Tenderness] : no ~M costovertebral angle tenderness [No Spinal Tenderness] : no spinal tenderness [Abnormal Walk] : normal gait [Skin Color & Pigmentation] : normal skin color and pigmentation [Skin Turgor] : normal skin turgor [No Focal Deficits] : no focal deficits [Oriented To Time, Place, And Person] : oriented to person, place, and time [Affect] : the affect was normal

## 2019-03-07 NOTE — PHYSICAL EXAM
[Sclera] : the sclera and conjunctiva were normal [PERRL With Normal Accommodation] : pupils were equal in size, round, and reactive to light [Neck Appearance] : the appearance of the neck was normal [Respiration, Rhythm And Depth] : normal respiratory rhythm and effort [Auscultation Breath Sounds / Voice Sounds] : lungs were clear to auscultation bilaterally [Heart Rate And Rhythm] : heart rate was normal and rhythm regular [Heart Sounds] : normal S1 and S2 [Examination Of The Chest] : the chest was normal in appearance [2+] : left 2+ [No Abnormalities] : the abdominal aorta was not enlarged and no bruit was heard [No Pulse Delay] : no pulse delay [No Pulse Discrepancy] : no pulse discrepancy detected [Full Pulse] : peripheral pulses were full [Bowel Sounds] : normal bowel sounds [Abdomen Soft] : soft [Abdomen Tenderness] : non-tender [Cervical Lymph Nodes Enlarged Posterior Bilaterally] : posterior cervical [Cervical Lymph Nodes Enlarged Anterior Bilaterally] : anterior cervical [No CVA Tenderness] : no ~M costovertebral angle tenderness [Abnormal Walk] : normal gait [Involuntary Movements] : no involuntary movements were seen [Skin Color & Pigmentation] : normal skin color and pigmentation [Skin Turgor] : normal skin turgor [] : no rash [No Focal Deficits] : no focal deficits [Oriented To Time, Place, And Person] : oriented to person, place, and time [Affect] : the affect was normal [Mood] : the mood was normal [Fingers] :  capillary refill of the fingers was normal [Left Fingers] :  capillary refill of the left fingers was normal [Right Fingers] :  capillary refill of the right fingers was normal [FreeTextEntry1] : deferred

## 2019-03-07 NOTE — HISTORY OF PRESENT ILLNESS
[FreeTextEntry1] : 43 year old Polish speaking male who presents today for follow up wound assessment. He was admitted to [facility] on 7/25/18 for loculated pleural effusion, and hospital course was complicated by sepsis, hypotension, ETOH withdrawal, respiratory failure, hemothorax, tracheostomy (decannulated 9/7/18), and PEG (removed 9/11/18). He was discharged home on 9/12/18 with right thorax VAC dressing + VNS ordered to change VAC dressing 3x/week. \par \par Pt was least seen 2/7/19 for wound follow up, and he was placed on Levofloxacin 500mg po QD x 7 days which he reports was completed as directed. Continued wound care  dressing changes 3x a week (Monday, Wednesday and Friday) by VNS. \par \par Today, he denies chest pain, SOB, cough, fever, chills, hemoptysis. He admits pain to the incision site, for which he takes Ibuprofen as needed.

## 2019-03-07 NOTE — HISTORY OF PRESENT ILLNESS
[FreeTextEntry1] : Dale Kim is a 43 year old male who presents today for follow up wound assessment. He was admitted to [facility] on 7/25/18 for loculated pleural effusion. Hospital course was complicated by sepsis, hypotension, ETOH withdrawal, respiratory failure, hemothorax, tracheostomy (decannulated 9/7/18), and PEG (removed 9/11/18). He was discharged home on 9/12/18 with right thorax VAC dressing. VAC dressing has since been discontinued after last visit on 2/7/19 and he receives wound care with VNS 3x a week. At that visit he was also placed on Levaquin 500 mg QD for 7 days, which he reports was completed as prescribed. \par \par He denies any fever, chills, shortness of breath, chest pain, hemoptysis, or recent illness. Reports occasional nonproductive cough. Reports intermittent pain near incision described as "soft" rated 4/10 that is relieved by Ibuprofen. Of note, he states VNS provides wound care 3x per week and dressings are often saturated between visits.

## 2019-03-07 NOTE — ASSESSMENT
[FreeTextEntry1] : Thoracotomy incision has granulation tissue protruding at upper pole with 1.5 cm tunneling, drainage beige and foul smelling. Treated with silver nitrate and debrided and then packed with dry gauze and DSD.\par \par I have reviewed the patient's medical records and diagnostic images during the time of this office visit, and I have made the following recommendation: \par 1. Continue wound care daily by VNS. VNS notified.\par 2. Bactrim 400mg once daily x 5 days\par 3. Return to office for follow up in one month, no scans required\par \par \par Written by Yelena Vincent NP, acting as a scribe for Sierra Sorto MD.\par \par The documentation recorded by the scribe accurately reflects the service I personally performed and the decisions made by me. SIERRA SORTO MD \par

## 2019-03-15 NOTE — H&P ADULT - NSHPLANGTRANSLATORFT_GEN_A_CORE
Dr. Montes Billing Type: Third-Party Bill Performing Laboratory: [id number] Expected Date Of Service: 03/15/2019 Bill For Surgical Tray: no

## 2019-03-28 ENCOUNTER — INPATIENT (INPATIENT)
Facility: HOSPITAL | Age: 44
LOS: 11 days | Discharge: HOME HEALTH | End: 2019-04-09
Attending: THORACIC SURGERY (CARDIOTHORACIC VASCULAR SURGERY) | Admitting: THORACIC SURGERY (CARDIOTHORACIC VASCULAR SURGERY)
Payer: MEDICAID

## 2019-03-28 VITALS
DIASTOLIC BLOOD PRESSURE: 99 MMHG | OXYGEN SATURATION: 100 % | HEART RATE: 126 BPM | SYSTOLIC BLOOD PRESSURE: 147 MMHG | RESPIRATION RATE: 16 BRPM | TEMPERATURE: 98 F

## 2019-03-28 DIAGNOSIS — J18.9 PNEUMONIA, UNSPECIFIED ORGANISM: ICD-10-CM

## 2019-03-28 LAB
ALBUMIN SERPL ELPH-MCNC: 4.3 G/DL — SIGNIFICANT CHANGE UP (ref 3.3–5)
ALP SERPL-CCNC: 84 U/L — SIGNIFICANT CHANGE UP (ref 40–120)
ALT FLD-CCNC: 11 U/L — SIGNIFICANT CHANGE UP (ref 4–41)
ANION GAP SERPL CALC-SCNC: 15 MMO/L — HIGH (ref 7–14)
APPEARANCE UR: CLEAR — SIGNIFICANT CHANGE UP
APTT BLD: 37.6 SEC — HIGH (ref 27.5–36.3)
AST SERPL-CCNC: 19 U/L — SIGNIFICANT CHANGE UP (ref 4–40)
BACTERIA # UR AUTO: NEGATIVE — SIGNIFICANT CHANGE UP
BASE EXCESS BLDV CALC-SCNC: 3.2 MMOL/L — SIGNIFICANT CHANGE UP
BASOPHILS # BLD AUTO: 0.04 K/UL — SIGNIFICANT CHANGE UP (ref 0–0.2)
BASOPHILS NFR BLD AUTO: 0.3 % — SIGNIFICANT CHANGE UP (ref 0–2)
BILIRUB SERPL-MCNC: 0.5 MG/DL — SIGNIFICANT CHANGE UP (ref 0.2–1.2)
BILIRUB UR-MCNC: NEGATIVE — SIGNIFICANT CHANGE UP
BLOOD GAS VENOUS - CREATININE: 0.6 MG/DL — SIGNIFICANT CHANGE UP (ref 0.5–1.3)
BLOOD UR QL VISUAL: HIGH
BUN SERPL-MCNC: 10 MG/DL — SIGNIFICANT CHANGE UP (ref 7–23)
CALCIUM SERPL-MCNC: 9.8 MG/DL — SIGNIFICANT CHANGE UP (ref 8.4–10.5)
CHLORIDE BLDV-SCNC: 100 MMOL/L — SIGNIFICANT CHANGE UP (ref 96–108)
CHLORIDE SERPL-SCNC: 96 MMOL/L — LOW (ref 98–107)
CO2 SERPL-SCNC: 26 MMOL/L — SIGNIFICANT CHANGE UP (ref 22–31)
COLOR SPEC: YELLOW — SIGNIFICANT CHANGE UP
CREAT SERPL-MCNC: 0.8 MG/DL — SIGNIFICANT CHANGE UP (ref 0.5–1.3)
EOSINOPHIL # BLD AUTO: 0.02 K/UL — SIGNIFICANT CHANGE UP (ref 0–0.5)
EOSINOPHIL NFR BLD AUTO: 0.1 % — SIGNIFICANT CHANGE UP (ref 0–6)
GAS PNL BLDV: 138 MMOL/L — SIGNIFICANT CHANGE UP (ref 136–146)
GLUCOSE BLDV-MCNC: 108 — HIGH (ref 70–99)
GLUCOSE SERPL-MCNC: 109 MG/DL — HIGH (ref 70–99)
GLUCOSE UR-MCNC: NEGATIVE — SIGNIFICANT CHANGE UP
HCO3 BLDV-SCNC: 25 MMOL/L — SIGNIFICANT CHANGE UP (ref 20–27)
HCT VFR BLD CALC: 46.4 % — SIGNIFICANT CHANGE UP (ref 39–50)
HCT VFR BLDV CALC: 45.7 % — SIGNIFICANT CHANGE UP (ref 39–51)
HGB BLD-MCNC: 14.8 G/DL — SIGNIFICANT CHANGE UP (ref 13–17)
HGB BLDV-MCNC: 14.9 G/DL — SIGNIFICANT CHANGE UP (ref 13–17)
HYALINE CASTS # UR AUTO: NEGATIVE — SIGNIFICANT CHANGE UP
IMM GRANULOCYTES NFR BLD AUTO: 0.5 % — SIGNIFICANT CHANGE UP (ref 0–1.5)
INR BLD: 1.33 — HIGH (ref 0.88–1.17)
KETONES UR-MCNC: NEGATIVE — SIGNIFICANT CHANGE UP
LACTATE BLDV-MCNC: 1.7 MMOL/L — SIGNIFICANT CHANGE UP (ref 0.5–2)
LEUKOCYTE ESTERASE UR-ACNC: NEGATIVE — SIGNIFICANT CHANGE UP
LYMPHOCYTES # BLD AUTO: 1.51 K/UL — SIGNIFICANT CHANGE UP (ref 1–3.3)
LYMPHOCYTES # BLD AUTO: 10.9 % — LOW (ref 13–44)
MAGNESIUM SERPL-MCNC: 1.9 MG/DL — SIGNIFICANT CHANGE UP (ref 1.6–2.6)
MCHC RBC-ENTMCNC: 27.9 PG — SIGNIFICANT CHANGE UP (ref 27–34)
MCHC RBC-ENTMCNC: 31.9 % — LOW (ref 32–36)
MCV RBC AUTO: 87.4 FL — SIGNIFICANT CHANGE UP (ref 80–100)
MONOCYTES # BLD AUTO: 1.14 K/UL — HIGH (ref 0–0.9)
MONOCYTES NFR BLD AUTO: 8.2 % — SIGNIFICANT CHANGE UP (ref 2–14)
NEUTROPHILS # BLD AUTO: 11.11 K/UL — HIGH (ref 1.8–7.4)
NEUTROPHILS NFR BLD AUTO: 80 % — HIGH (ref 43–77)
NITRITE UR-MCNC: NEGATIVE — SIGNIFICANT CHANGE UP
NRBC # FLD: 0 K/UL — SIGNIFICANT CHANGE UP (ref 0–0)
PCO2 BLDV: 52 MMHG — HIGH (ref 41–51)
PH BLDV: 7.36 PH — SIGNIFICANT CHANGE UP (ref 7.32–7.43)
PH UR: 6.5 — SIGNIFICANT CHANGE UP (ref 5–8)
PHOSPHATE SERPL-MCNC: 4.1 MG/DL — SIGNIFICANT CHANGE UP (ref 2.5–4.5)
PLATELET # BLD AUTO: 330 K/UL — SIGNIFICANT CHANGE UP (ref 150–400)
PMV BLD: 9.8 FL — SIGNIFICANT CHANGE UP (ref 7–13)
PO2 BLDV: 25 MMHG — LOW (ref 35–40)
POTASSIUM BLDV-SCNC: 4.5 MMOL/L — SIGNIFICANT CHANGE UP (ref 3.4–4.5)
POTASSIUM SERPL-MCNC: 4.7 MMOL/L — SIGNIFICANT CHANGE UP (ref 3.5–5.3)
POTASSIUM SERPL-SCNC: 4.7 MMOL/L — SIGNIFICANT CHANGE UP (ref 3.5–5.3)
PROT SERPL-MCNC: 8.3 G/DL — SIGNIFICANT CHANGE UP (ref 6–8.3)
PROT UR-MCNC: 20 — SIGNIFICANT CHANGE UP
PROTHROM AB SERPL-ACNC: 15.3 SEC — HIGH (ref 9.8–13.1)
RBC # BLD: 5.31 M/UL — SIGNIFICANT CHANGE UP (ref 4.2–5.8)
RBC # FLD: 12.6 % — SIGNIFICANT CHANGE UP (ref 10.3–14.5)
RBC CASTS # UR COMP ASSIST: HIGH (ref 0–?)
SAO2 % BLDV: 36.4 % — LOW (ref 60–85)
SODIUM SERPL-SCNC: 137 MMOL/L — SIGNIFICANT CHANGE UP (ref 135–145)
SP GR SPEC: 1.02 — SIGNIFICANT CHANGE UP (ref 1–1.04)
SQUAMOUS # UR AUTO: SIGNIFICANT CHANGE UP
UROBILINOGEN FLD QL: NORMAL — SIGNIFICANT CHANGE UP
WBC # BLD: 13.89 K/UL — HIGH (ref 3.8–10.5)
WBC # FLD AUTO: 13.89 K/UL — HIGH (ref 3.8–10.5)
WBC UR QL: SIGNIFICANT CHANGE UP (ref 0–?)

## 2019-03-28 PROCEDURE — 71045 X-RAY EXAM CHEST 1 VIEW: CPT | Mod: 26

## 2019-03-28 PROCEDURE — 71260 CT THORAX DX C+: CPT | Mod: 26

## 2019-03-28 PROCEDURE — 99222 1ST HOSP IP/OBS MODERATE 55: CPT

## 2019-03-28 RX ORDER — PANTOPRAZOLE SODIUM 20 MG/1
40 TABLET, DELAYED RELEASE ORAL
Qty: 0 | Refills: 0 | Status: DISCONTINUED | OUTPATIENT
Start: 2019-03-28 | End: 2019-04-05

## 2019-03-28 RX ORDER — ACETAMINOPHEN 500 MG
650 TABLET ORAL EVERY 6 HOURS
Qty: 0 | Refills: 0 | Status: DISCONTINUED | OUTPATIENT
Start: 2019-03-28 | End: 2019-04-03

## 2019-03-28 RX ORDER — VANCOMYCIN HCL 1 G
1000 VIAL (EA) INTRAVENOUS ONCE
Qty: 0 | Refills: 0 | Status: COMPLETED | OUTPATIENT
Start: 2019-03-28 | End: 2019-03-29

## 2019-03-28 RX ORDER — HEPARIN SODIUM 5000 [USP'U]/ML
5000 INJECTION INTRAVENOUS; SUBCUTANEOUS EVERY 12 HOURS
Qty: 0 | Refills: 0 | Status: DISCONTINUED | OUTPATIENT
Start: 2019-03-28 | End: 2019-04-05

## 2019-03-28 RX ORDER — SODIUM CHLORIDE 9 MG/ML
3 INJECTION INTRAMUSCULAR; INTRAVENOUS; SUBCUTANEOUS EVERY 8 HOURS
Qty: 0 | Refills: 0 | Status: DISCONTINUED | OUTPATIENT
Start: 2019-03-28 | End: 2019-04-05

## 2019-03-28 RX ORDER — VANCOMYCIN HCL 1 G
1000 VIAL (EA) INTRAVENOUS EVERY 12 HOURS
Qty: 0 | Refills: 0 | Status: DISCONTINUED | OUTPATIENT
Start: 2019-03-28 | End: 2019-03-31

## 2019-03-28 RX ORDER — SODIUM CHLORIDE 9 MG/ML
2100 INJECTION, SOLUTION INTRAVENOUS ONCE
Qty: 0 | Refills: 0 | Status: COMPLETED | OUTPATIENT
Start: 2019-03-28 | End: 2019-03-28

## 2019-03-28 RX ORDER — PIPERACILLIN AND TAZOBACTAM 4; .5 G/20ML; G/20ML
3.38 INJECTION, POWDER, LYOPHILIZED, FOR SOLUTION INTRAVENOUS ONCE
Qty: 0 | Refills: 0 | Status: COMPLETED | OUTPATIENT
Start: 2019-03-28 | End: 2019-03-29

## 2019-03-28 RX ORDER — PIPERACILLIN AND TAZOBACTAM 4; .5 G/20ML; G/20ML
3.38 INJECTION, POWDER, LYOPHILIZED, FOR SOLUTION INTRAVENOUS EVERY 8 HOURS
Qty: 0 | Refills: 0 | Status: DISCONTINUED | OUTPATIENT
Start: 2019-03-28 | End: 2019-04-01

## 2019-03-28 RX ADMIN — SODIUM CHLORIDE 4200 MILLILITER(S): 9 INJECTION, SOLUTION INTRAVENOUS at 17:59

## 2019-03-28 NOTE — ED PROVIDER NOTE - OBJECTIVE STATEMENT
43yom s/p complicated hospital stay in July 2018 for loculated right sided pleural effusion requiring VATs with a chronic right posterior chest wound was found to be febrile and tachycardic by visiting nurse today. Pt endorses some mild pain to the right back which is unchanged as well as some purulent drainage. Occasional cough. No shortness of breath, chest pain, abdominal pain, nausea, vomiting, diarrhea, sick contacts, travel. No recent antibiotic use.

## 2019-03-28 NOTE — ED ADULT TRIAGE NOTE - CHIEF COMPLAINT QUOTE
Pt sent in by visiting nurse for fever/tachycardia, pt denies any c/o or symptoms, pt w right side back wound x 7 months s/p sx for drainage, pt unable to provide further information about hx.

## 2019-03-28 NOTE — CONSULT NOTE ADULT - SUBJECTIVE AND OBJECTIVE BOX
HPI:    This 43 year old male presents today to the ER after his visiting nurse was concerned for fever and tachycardia.  She had been there for follow up wound care. He had been admitted to [facility] on 18 for a loculated pleural effusion and his hospital course was complicated by sepsis, hypotension, EtOH withdrawal, respiratory failure, a hemothorax, and the need for a tracheostomy and a PEG. On 18 he underwent a FB, R VATS, R thoracotomy, drainage of empyema, R lung decortication.  On 18 he underwent a R thoracotomy, evacuation of hemothorax, FB, BAL.  On 18 he had a trach and a PEG placed.  He was decannulated on 18 and the PEG was removed on 18. He was discharged home on 18 with a right thorax VAC dressing.  The VAC dressing was continued until 19 when it was replaced with a dry sterile dressing.  He was put on a 7 day course of Levaquin at that time as well.  After that, he initially received wound care by the Visiting Nurse Service 3 times a week.  At his last visit on 3/7/19, he only reported an occasional nonproductive cough, mild incisional pain relieved by Ibuprofen, and dressings often becoming saturated between visits.  He was noted to have a superficial postoperative wound infection with beige drainage.  Silver nitrate was used on some granulation tissue and the wound was debrided and packed with dry gauze and covered with a dry sterile dressing.  He was prescribed a 5 day course of Bactrim and told to follow up in one month.  In the ER today, he is without new complaints.  He admits to mild pain at his wound, the copious drainage, and an occasional cough.  He denies any chills, SOB, chest pain, nausea, vomiting, or diarrhea.        PAST MEDICAL & SURGICAL HISTORY:  PSH as above  PMH: GERD, history of smoking, empyema as above      REVIEW OF SYSTEMS    Respiratory: occasional non-productive cough; no shortness of breath, no wheezing, no SUAREZ, and no orthopnea   Gastrointestinal: heartburn.   Integumentary: skin wound.   Constitutional, Eyes, ENT, Cardiovascular, Genitourinary, Musculoskeletal, Neurological, Psychiatric, Endocrine and Heme/Lymph are otherwise negative.     MEDICATIONS  Ibuprofen PRN  Pantoprazole Sodium 40 MG Oral Tablet Delayed Release; 1 TABLET DAILY    Allergies    No Known Allergies    Intolerances    tigecycline - cholestasis      SOCIAL HISTORY:         Alcohol use : 4-5 beers per day  Cigarette smoker: smoked 1 PPD for 23 years. Quit 2018 (admission to hospital)             with a son            Primarily Polish-speaking    FAMILY HISTORY:  No pertinent family history in first degree relatives      Vital Signs Last 24 Hrs  T(C): 37.6 (28 Mar 2019 17:28), Max: 37.6 (28 Mar 2019 17:28)  T(F): 99.6 (28 Mar 2019 17:28), Max: 99.6 (28 Mar 2019 17:28)  HR: 107 (28 Mar 2019 17:28) (107 - 126)  BP: 126/95 (28 Mar 2019 17:28) (126/95 - 147/99)  RR: 16 (28 Mar 2019 17:28) (16 - 16)  SpO2: 98% (28 Mar 2019 17:28) (98% - 100%)    General: WN/WD NAD  Neurology: Awake, nonfocal, SWAN x 4  Eyes: Scleras clear, PERRLA/ EOMI, Gross vision intact  ENT: Gross hearing intact, grossly patent pharynx, no stridor  Neck: Neck supple, trachea midline, No JVD; well- healed trach site   Respiratory: CTA B/L, No wheezing, rales, rhonchi  Chest: R posterior chest thoracotomy wound.  Approximately 10 cm in length.  Intact except for a punctate opening at the lateral edge.  Tan, serous drainage noted from that opening.  No foul odor.    CV: RRR, S1S2, no murmurs, rubs or gallops  Abdominal: Soft, NT, ND +BS; well-healed PEG site   Extremities: No edema, + peripheral pulses  Skin: No Rashes, Hematoma, Ecchymosis  Lymphatic: No Neck, axilla, groin LAD  Psych: Oriented x 3, normal affect      LABS:                        14.8   13.89 )-----------( 330      ( 28 Mar 2019 17:15 )             46.4     -    137  |  96<L>  |  10  ----------------------------<  109<H>  4.7   |  26  |  0.80    Ca    9.8      28 Mar 2019 17:15  Phos  4.1     03-  Mg     1.9     -    TPro  8.3  /  Alb  4.3  /  TBili  0.5  /  DBili  x   /  AST  19  /  ALT  11  /  AlkPhos  84      PT/INR - ( 28 Mar 2019 18:50 )   PT: 15.3 SEC;   INR: 1.33     PTT - ( 28 Mar 2019 18:50 )  PTT:37.6 SEC    Urinalysis Basic - ( 28 Mar 2019 20:04 )  Color: YELLOW / Appearance: CLEAR / S.017 / pH: 6.5  Gluc: NEGATIVE / Ketone: NEGATIVE  / Bili: NEGATIVE / Urobili: NORMAL   Blood: MODERATE / Protein: 20 / Nitrite: NEGATIVE   Leuk Esterase: NEGATIVE / RBC: 26-50 / WBC 0-2   Sq Epi: OCC / Non Sq Epi: x / Bacteria: NEGATIVE    RADIOLOGY & ADDITIONAL STUDIES:  CT chest:   New right chest wall collection containing air and fluid measures   approximately 9.0 x 1.9 x 4.7 cm and extends into the intercostal and   extrapleural spaces with associated subpleural based collection as detailed   above, concerning for developing abscesses. Right third through fifth   chronic fracture deformities with new cortical erosions, suggestive of   osteomyelitis. Given air within the extrapleural space, probable fistula is   also a consideration. Subcutaneous air tract seen extending along right   posterolateral chest wall to the skin (image 66 of series 3), likely in   keeping with patient's reported drainage.   Patchy ground glass opacities in the bilateral lungs, left greater than   right. Left upper lobe and left lower lobe nodular consolidations. Findings   likely represent multifocal pneumonia.       ASSESSMENT:   PNA    PLAN:  Admit to hospital for medical management of PNA.  Continue dressing changes  No thoracic surgery intervention at this time but thoracic surgery will follow up HPI:    This 43 year old male presents today to the ER after his visiting nurse was concerned for fever and tachycardia.  She had been there for follow up wound care. He had been admitted to [facility] on 18 for a loculated pleural effusion and his hospital course was complicated by sepsis, hypotension, EtOH withdrawal, respiratory failure, a hemothorax, and the need for a tracheostomy and a PEG. On 18 he underwent a FB, R VATS, R thoracotomy, drainage of empyema, R lung decortication.  On 18 he underwent a R thoracotomy, evacuation of hemothorax, FB, BAL.  On 18 he had a trach and a PEG placed.  He was decannulated on 18 and the PEG was removed on 18. He was discharged home on 18 with a right thorax VAC dressing.  The VAC dressing was continued until 19 when it was replaced with a dry sterile dressing.  He was put on a 7 day course of Levaquin at that time as well.  After that, he initially received wound care by the Visiting Nurse Service 3 times a week.  At his last visit on 3/7/19, he only reported an occasional nonproductive cough, mild incisional pain relieved by Ibuprofen, and dressings often becoming saturated between visits.  He was noted to have a superficial postoperative wound infection with beige drainage.  Silver nitrate was used on some granulation tissue and the wound was debrided and packed with dry gauze and covered with a dry sterile dressing.  He was prescribed a 5 day course of Bactrim and told to follow up in one month.  In the ER today, he is without new complaints.  He admits to mild pain at his wound, the copious drainage, and an occasional cough.  He denies any chills, SOB, chest pain, nausea, vomiting, or diarrhea.        PAST MEDICAL & SURGICAL HISTORY:  PSH as above  PMH: GERD, history of smoking, empyema as above      REVIEW OF SYSTEMS    Respiratory: occasional non-productive cough; no shortness of breath, no wheezing, no SUAREZ, and no orthopnea   Gastrointestinal: heartburn.   Integumentary: skin wound.   Constitutional, Eyes, ENT, Cardiovascular, Genitourinary, Musculoskeletal, Neurological, Psychiatric, Endocrine and Heme/Lymph are otherwise negative.     MEDICATIONS  Ibuprofen PRN  Pantoprazole Sodium 40 MG Oral Tablet Delayed Release; 1 TABLET DAILY    Allergies    No Known Allergies    Intolerances    tigecycline - cholestasis      SOCIAL HISTORY:         Alcohol use : 4-5 beers per day  Cigarette smoker: smoked 1 PPD for 23 years. Quit 2018 (admission to hospital)             with a son            Primarily Polish-speaking    FAMILY HISTORY:  No pertinent family history in first degree relatives      Vital Signs Last 24 Hrs  T(C): 37.6 (28 Mar 2019 17:28), Max: 37.6 (28 Mar 2019 17:28)  T(F): 99.6 (28 Mar 2019 17:28), Max: 99.6 (28 Mar 2019 17:28)  HR: 107 (28 Mar 2019 17:28) (107 - 126)  BP: 126/95 (28 Mar 2019 17:28) (126/95 - 147/99)  RR: 16 (28 Mar 2019 17:28) (16 - 16)  SpO2: 98% (28 Mar 2019 17:28) (98% - 100%)    General: WN/WD NAD  Neurology: Awake, nonfocal, SWAN x 4  Eyes: Scleras clear, PERRLA/ EOMI, Gross vision intact  ENT: Gross hearing intact, grossly patent pharynx, no stridor  Neck: Neck supple, trachea midline, No JVD; well- healed trach site   Respiratory: CTA B/L, No wheezing, rales, rhonchi  Chest: R posterior chest thoracotomy wound.  Approximately 10 cm in length.  Intact except for a punctate opening at the medial edge.  Tan, serous drainage noted from that opening.  No foul odor.    CV: RRR, S1S2, no murmurs, rubs or gallops  Abdominal: Soft, NT, ND +BS; well-healed PEG site   Extremities: No edema, + peripheral pulses  Skin: No Rashes, Hematoma, Ecchymosis  Lymphatic: No Neck, axilla, groin LAD  Psych: Oriented x 3, normal affect      LABS:                        14.8   13.89 )-----------( 330      ( 28 Mar 2019 17:15 )             46.4     -    137  |  96<L>  |  10  ----------------------------<  109<H>  4.7   |  26  |  0.80    Ca    9.8      28 Mar 2019 17:15  Phos  4.1     03-  Mg     1.9     -    TPro  8.3  /  Alb  4.3  /  TBili  0.5  /  DBili  x   /  AST  19  /  ALT  11  /  AlkPhos  84      PT/INR - ( 28 Mar 2019 18:50 )   PT: 15.3 SEC;   INR: 1.33     PTT - ( 28 Mar 2019 18:50 )  PTT:37.6 SEC    Urinalysis Basic - ( 28 Mar 2019 20:04 )  Color: YELLOW / Appearance: CLEAR / S.017 / pH: 6.5  Gluc: NEGATIVE / Ketone: NEGATIVE  / Bili: NEGATIVE / Urobili: NORMAL   Blood: MODERATE / Protein: 20 / Nitrite: NEGATIVE   Leuk Esterase: NEGATIVE / RBC: 26-50 / WBC 0-2   Sq Epi: OCC / Non Sq Epi: x / Bacteria: NEGATIVE    RADIOLOGY & ADDITIONAL STUDIES:  CT chest:   New right chest wall collection containing air and fluid measures   approximately 9.0 x 1.9 x 4.7 cm and extends into the intercostal and   extrapleural spaces with associated subpleural based collection as detailed   above, concerning for developing abscesses. Right third through fifth   chronic fracture deformities with new cortical erosions, suggestive of   osteomyelitis. Given air within the extrapleural space, probable fistula is   also a consideration. Subcutaneous air tract seen extending along right   posterolateral chest wall to the skin (image 66 of series 3), likely in   keeping with patient's reported drainage.   Patchy ground glass opacities in the bilateral lungs, left greater than   right. Left upper lobe and left lower lobe nodular consolidations. Findings   likely represent multifocal pneumonia.       ASSESSMENT:   PNA    PLAN:  Admit to hospital for medical management of PNA.  Continue dressing changes  No thoracic surgery intervention at this time but thoracic surgery will follow up

## 2019-03-28 NOTE — ED ADULT NURSE NOTE - OBJECTIVE STATEMENT
Pt w/ hx of back wound with home nurse visits to drain received to rm 4 aaox4 ambulatory sent by home nurse d/t reported fever and tachycardia.  Pt denies subjective fever NVD abdominal pain chest pain dizziness or any symptoms, Pt denies change in amount of drainage or tenderness to wound site.  Pt p/w NAD breaths even unlabored skin warm dry intact afebrile, 20 g IV access obtained at R.AC labs as ordered will monitor.

## 2019-03-28 NOTE — ED PROVIDER NOTE - PROGRESS NOTE DETAILS
Robin: CT w/ recurrent fluid collection right lung concerning for developing abscesses, also w/ superimposed multifocal pneumonia. Thoracic surgery initially deferring admission to medicine but after discussion with CT attending will admit to thoracic.

## 2019-03-28 NOTE — ED PROVIDER NOTE - RESPIRATORY, MLM
Breath sounds clear and equal bilaterally. right upper back surgical wound approx 4 cm w/ purulent discharge Breath sounds clear and equal bilaterally. right upper back surgical wound approx 4 cm  discharge

## 2019-03-28 NOTE — ED PROVIDER NOTE - ATTENDING CONTRIBUTION TO CARE
3yom s/p complicated hospital stay in July 2018 for loculated right sided pleural effusion requiring VATs with a chronic right posterior chest wound presenting with fever noted by his visiting nurse earlier today. Patient notes he has been feeling at his baseline for last few months. NO recent changes. Has occasional cough and mild r. side back pain in region of wound which has been present and unchanged since his admission last summer. No ha, cp, sob, neck pain, congestion, nausea, vomiting, abd pain, diarrhea, dysuria, le edema. No pain to wound in r. upper back, has been healing well per vns.  exam  GEN - NAD; well appearing; A+O x3   HEAD - NC/AT   EYES- PERRL, EOMI  ENT: Airway patent, mmm, Oral cavity and pharynx normal. No inflammation, swelling, exudate, or lesions.  NECK: Neck supple, non-tender without lymphadenopathy, no masses.  PULMONARY - CTA b/l, symmetric breath sounds.   r. upper back with approx 1cm open wound, no surrounding induration, crepitus, warmth  CARDIAC -s1s2, RRR, no M,G,R  ABDOMEN - +BS, ND, NT, soft, no guarding, no rebound, no masses   BACK - no CVA tenderness, Normal  spine   EXTREMITIES - FROM, symmetric pulses, capillary refill < 2 seconds, no edema   SKIN - no rash or bruising   NEUROLOGIC - alert, speech clear, no focal deficits  PSYCH -nl mood/affect, nl insight.  a/p- Patient with fever found today by nurse, notes no focal new complaints, r. back wound with some drainage which patient states is baseline for him, no increase, no odor. Well appearing, afebrile in ed, will check labs, ct chest, fluids, pain ctrl prn (declining at this time), monitor, reass. 3yom s/p complicated hospital stay in July 2018 for loculated right sided pleural effusion requiring VATs with a chronic right posterior chest wound presenting with fever noted by his visiting nurse earlier today. Patient notes he has been feeling at his baseline for last few months. NO recent changes. Has occasional cough and mild r. side back pain in region of wound which has been present and unchanged since his admission last summer. No ha, cp, sob, neck pain, congestion, nausea, vomiting, abd pain, diarrhea, dysuria, le edema. No pain to wound in r. upper back, has been healing well per vns.  exam  GEN - NAD; well appearing; A+O x3   HEAD - NC/AT   EYES- PERRL, EOMI  ENT: Airway patent, mmm, Oral cavity and pharynx normal. No inflammation, swelling, exudate, or lesions.  NECK: Neck supple, non-tender without lymphadenopathy, no masses.  PULMONARY - CTA b/l, symmetric breath sounds.   r. upper back with approx 1cm open wound, no surrounding induration, crepitus, warmth  CARDIAC -s1s2, tachy RR, no M,G,R  ABDOMEN - +BS, ND, NT, soft, no guarding, no rebound, no masses   BACK - no CVA tenderness, Normal  spine   EXTREMITIES - FROM, symmetric pulses, capillary refill < 2 seconds, no edema   SKIN - no rash or bruising   NEUROLOGIC - alert, speech clear, no focal deficits  PSYCH -nl mood/affect, nl insight.  a/p- Patient with fever found today by nurse, notes no focal new complaints, r. back wound with some drainage which patient states is baseline for him, no increase, no odor. Well appearing, afebrile in ed, will check labs, ct chest, fluids, pain ctrl prn (declining at this time), monitor, reass.

## 2019-03-28 NOTE — ED PROVIDER NOTE - CLINICAL SUMMARY MEDICAL DECISION MAKING FREE TEXT BOX
fever but no clear source, will get CT of the chest to eval for recurrence of infection fever but no clear source, will get CT of the chest to eval for recurrence of infection, ct surg eval, labs, fluids, monitor, reass fever but no clear source, will get CT of the chest to eval for recurrence of infection, ct surg eval, labs, fluids, monitor, reassess

## 2019-03-29 DIAGNOSIS — Z98.890 OTHER SPECIFIED POSTPROCEDURAL STATES: Chronic | ICD-10-CM

## 2019-03-29 DIAGNOSIS — L02.213 CUTANEOUS ABSCESS OF CHEST WALL: ICD-10-CM

## 2019-03-29 DIAGNOSIS — J18.9 PNEUMONIA, UNSPECIFIED ORGANISM: ICD-10-CM

## 2019-03-29 DIAGNOSIS — Z93.1 GASTROSTOMY STATUS: Chronic | ICD-10-CM

## 2019-03-29 LAB
ANION GAP SERPL CALC-SCNC: 12 MMO/L — SIGNIFICANT CHANGE UP (ref 7–14)
BUN SERPL-MCNC: 9 MG/DL — SIGNIFICANT CHANGE UP (ref 7–23)
CALCIUM SERPL-MCNC: 9 MG/DL — SIGNIFICANT CHANGE UP (ref 8.4–10.5)
CHLORIDE SERPL-SCNC: 100 MMOL/L — SIGNIFICANT CHANGE UP (ref 98–107)
CO2 SERPL-SCNC: 25 MMOL/L — SIGNIFICANT CHANGE UP (ref 22–31)
CREAT SERPL-MCNC: 0.78 MG/DL — SIGNIFICANT CHANGE UP (ref 0.5–1.3)
GLUCOSE SERPL-MCNC: 178 MG/DL — HIGH (ref 70–99)
GRAM STN WND: SIGNIFICANT CHANGE UP
HCT VFR BLD CALC: 38.2 % — LOW (ref 39–50)
HGB BLD-MCNC: 12.2 G/DL — LOW (ref 13–17)
MCHC RBC-ENTMCNC: 27.8 PG — SIGNIFICANT CHANGE UP (ref 27–34)
MCHC RBC-ENTMCNC: 31.9 % — LOW (ref 32–36)
MCV RBC AUTO: 87 FL — SIGNIFICANT CHANGE UP (ref 80–100)
NRBC # FLD: 0 K/UL — SIGNIFICANT CHANGE UP (ref 0–0)
PLATELET # BLD AUTO: 222 K/UL — SIGNIFICANT CHANGE UP (ref 150–400)
PMV BLD: 9.5 FL — SIGNIFICANT CHANGE UP (ref 7–13)
POTASSIUM SERPL-MCNC: 3.6 MMOL/L — SIGNIFICANT CHANGE UP (ref 3.5–5.3)
POTASSIUM SERPL-SCNC: 3.6 MMOL/L — SIGNIFICANT CHANGE UP (ref 3.5–5.3)
RBC # BLD: 4.39 M/UL — SIGNIFICANT CHANGE UP (ref 4.2–5.8)
RBC # FLD: 12.7 % — SIGNIFICANT CHANGE UP (ref 10.3–14.5)
SODIUM SERPL-SCNC: 137 MMOL/L — SIGNIFICANT CHANGE UP (ref 135–145)
SPECIMEN SOURCE: SIGNIFICANT CHANGE UP
WBC # BLD: 5.2 K/UL — SIGNIFICANT CHANGE UP (ref 3.8–10.5)
WBC # FLD AUTO: 5.2 K/UL — SIGNIFICANT CHANGE UP (ref 3.8–10.5)

## 2019-03-29 RX ORDER — LIDOCAINE HCL 20 MG/ML
20 VIAL (ML) INJECTION ONCE
Qty: 0 | Refills: 0 | Status: COMPLETED | OUTPATIENT
Start: 2019-03-29 | End: 2019-03-29

## 2019-03-29 RX ADMIN — PANTOPRAZOLE SODIUM 40 MILLIGRAM(S): 20 TABLET, DELAYED RELEASE ORAL at 06:48

## 2019-03-29 RX ADMIN — PIPERACILLIN AND TAZOBACTAM 200 GRAM(S): 4; .5 INJECTION, POWDER, LYOPHILIZED, FOR SOLUTION INTRAVENOUS at 02:09

## 2019-03-29 RX ADMIN — Medication 20 MILLILITER(S): at 10:38

## 2019-03-29 RX ADMIN — Medication 250 MILLIGRAM(S): at 21:20

## 2019-03-29 RX ADMIN — SODIUM CHLORIDE 3 MILLILITER(S): 9 INJECTION INTRAMUSCULAR; INTRAVENOUS; SUBCUTANEOUS at 21:04

## 2019-03-29 RX ADMIN — PIPERACILLIN AND TAZOBACTAM 25 GRAM(S): 4; .5 INJECTION, POWDER, LYOPHILIZED, FOR SOLUTION INTRAVENOUS at 15:45

## 2019-03-29 RX ADMIN — Medication 650 MILLIGRAM(S): at 11:00

## 2019-03-29 RX ADMIN — Medication 250 MILLIGRAM(S): at 00:49

## 2019-03-29 RX ADMIN — Medication 650 MILLIGRAM(S): at 10:06

## 2019-03-29 RX ADMIN — PIPERACILLIN AND TAZOBACTAM 25 GRAM(S): 4; .5 INJECTION, POWDER, LYOPHILIZED, FOR SOLUTION INTRAVENOUS at 10:26

## 2019-03-29 RX ADMIN — Medication 250 MILLIGRAM(S): at 14:31

## 2019-03-29 RX ADMIN — PIPERACILLIN AND TAZOBACTAM 25 GRAM(S): 4; .5 INJECTION, POWDER, LYOPHILIZED, FOR SOLUTION INTRAVENOUS at 21:20

## 2019-03-29 RX ADMIN — SODIUM CHLORIDE 3 MILLILITER(S): 9 INJECTION INTRAMUSCULAR; INTRAVENOUS; SUBCUTANEOUS at 14:33

## 2019-03-29 RX ADMIN — SODIUM CHLORIDE 3 MILLILITER(S): 9 INJECTION INTRAMUSCULAR; INTRAVENOUS; SUBCUTANEOUS at 06:43

## 2019-03-29 RX ADMIN — HEPARIN SODIUM 5000 UNIT(S): 5000 INJECTION INTRAVENOUS; SUBCUTANEOUS at 06:49

## 2019-03-29 RX ADMIN — HEPARIN SODIUM 5000 UNIT(S): 5000 INJECTION INTRAVENOUS; SUBCUTANEOUS at 17:09

## 2019-03-29 NOTE — H&P ADULT - NSHPPHYSICALEXAM_GEN_ALL_CORE
Vital Signs Last 24 Hrs  T(C): 37.6 (28 Mar 2019 17:28), Max: 37.6 (28 Mar 2019 17:28)  T(F): 99.6 (28 Mar 2019 17:28), Max: 99.6 (28 Mar 2019 17:28)  HR: 107 (28 Mar 2019 17:28) (107 - 126)  BP: 126/95 (28 Mar 2019 17:28) (126/95 - 147/99)  RR: 16 (28 Mar 2019 17:28) (16 - 16)  SpO2: 98% (28 Mar 2019 17:28) (98% - 100%)    General: WN/WD NAD  Neurology: Awake, nonfocal, SWAN x 4  Eyes: Scleras clear, PERRLA/ EOMI, Gross vision intact  ENT: Gross hearing intact, grossly patent pharynx, no stridor  Neck: Neck supple, trachea midline, No JVD; well- healed trach site   Respiratory: CTA B/L, No wheezing, rales, rhonchi  Chest: R posterior chest thoracotomy wound.  Approximately 10 cm in length.  Intact except for a punctate opening at the medial edge.  Tan, serous drainage noted from that opening.  No foul odor.    CV: RRR, S1S2, no murmurs, rubs or gallops  Abdominal: Soft, NT, ND +BS; well-healed PEG site   Extremities: No edema, + peripheral pulses  Skin: No Rashes, Hematoma, Ecchymosis  Lymphatic: No Neck, axilla, groin LAD  Psych: Oriented x 3, normal affect

## 2019-03-29 NOTE — H&P ADULT - NSHPLABSRESULTS_GEN_ALL_CORE
LABS:                        14.8   13.89 )-----------( 330      ( 28 Mar 2019 17:15 )             46.4         137  |  96<L>  |  10  ----------------------------<  109<H>  4.7   |  26  |  0.80    Ca    9.8      28 Mar 2019 17:15  Phos  4.1       Mg     1.9         TPro  8.3  /  Alb  4.3  /  TBili  0.5  /  DBili  x   /  AST  19  /  ALT  11  /  AlkPhos  84      PT/INR - ( 28 Mar 2019 18:50 )   PT: 15.3 SEC;   INR: 1.33     PTT - ( 28 Mar 2019 18:50 )  PTT:37.6 SEC    Urinalysis Basic - ( 28 Mar 2019 20:04 )  Color: YELLOW / Appearance: CLEAR / S.017 / pH: 6.5  Gluc: NEGATIVE / Ketone: NEGATIVE  / Bili: NEGATIVE / Urobili: NORMAL   Blood: MODERATE / Protein: 20 / Nitrite: NEGATIVE   Leuk Esterase: NEGATIVE / RBC: 26-50 / WBC 0-2   Sq Epi: OCC / Non Sq Epi: x / Bacteria: NEGATIVE    Blood cultures pending    CT Chest:  New right chest wall collection containing air and fluid measures   approximately 9.0 x 1.9 x 4.7 cm and extends into the intercostal and   extrapleural spaces with associated subpleural based collection as detailed   above, concerning for developing abscesses. Right third through fifth   chronic fracture deformities with new cortical erosions, suggestive of   osteomyelitis. Given air within the extrapleural space, probable fistula is   also a consideration. Subcutaneous air tract seen extending along right   posterolateral chest wall to the skin, likely in keeping with patient's reported drainage.   Patchy ground glass opacities in the bilateral lungs, left greater than   right. Left upper lobe and left lower lobe nodular consolidations. Findings   likely represent multifocal pneumonia.

## 2019-03-29 NOTE — H&P ADULT - NSHPREVIEWOFSYSTEMS_GEN_ALL_CORE
Respiratory: occasional non-productive cough; no shortness of breath, no wheezing, no SUAREZ, and no orthopnea   Gastrointestinal: heartburn.   Integumentary: skin wound.   Constitutional, Eyes, ENT, Cardiovascular, Genitourinary, Musculoskeletal, Neurological, Psychiatric, Endocrine and Heme/Lymph are otherwise negative.

## 2019-03-29 NOTE — H&P ADULT - NSHPSOCIALHISTORY_GEN_ALL_CORE
Alcohol use : 4-5 beers per day  Cigarette smoker: smoked 1 PPD for 23 years. Quit July 25, 2018 (admission to hospital)   with a son  Primarily Polish-speaking

## 2019-03-29 NOTE — H&P ADULT - NSICDXPASTSURGICALHX_GEN_ALL_CORE_FT
PAST SURGICAL HISTORY:  History of thoracotomy     History of tracheostomy     S/P percutaneous endoscopic gastrostomy (PEG) tube placement

## 2019-03-30 LAB
ANION GAP SERPL CALC-SCNC: 13 MMO/L — SIGNIFICANT CHANGE UP (ref 7–14)
BACTERIA UR CULT: SIGNIFICANT CHANGE UP
BUN SERPL-MCNC: 7 MG/DL — SIGNIFICANT CHANGE UP (ref 7–23)
CALCIUM SERPL-MCNC: 8.5 MG/DL — SIGNIFICANT CHANGE UP (ref 8.4–10.5)
CHLORIDE SERPL-SCNC: 99 MMOL/L — SIGNIFICANT CHANGE UP (ref 98–107)
CO2 SERPL-SCNC: 26 MMOL/L — SIGNIFICANT CHANGE UP (ref 22–31)
CREAT SERPL-MCNC: 0.75 MG/DL — SIGNIFICANT CHANGE UP (ref 0.5–1.3)
GLUCOSE SERPL-MCNC: 105 MG/DL — HIGH (ref 70–99)
HCT VFR BLD CALC: 39.3 % — SIGNIFICANT CHANGE UP (ref 39–50)
HGB BLD-MCNC: 12.2 G/DL — LOW (ref 13–17)
MCHC RBC-ENTMCNC: 27.8 PG — SIGNIFICANT CHANGE UP (ref 27–34)
MCHC RBC-ENTMCNC: 31 % — LOW (ref 32–36)
MCV RBC AUTO: 89.5 FL — SIGNIFICANT CHANGE UP (ref 80–100)
NRBC # FLD: 0 K/UL — SIGNIFICANT CHANGE UP (ref 0–0)
PLATELET # BLD AUTO: 217 K/UL — SIGNIFICANT CHANGE UP (ref 150–400)
PMV BLD: 10 FL — SIGNIFICANT CHANGE UP (ref 7–13)
POTASSIUM SERPL-MCNC: 3.2 MMOL/L — LOW (ref 3.5–5.3)
POTASSIUM SERPL-SCNC: 3.2 MMOL/L — LOW (ref 3.5–5.3)
RBC # BLD: 4.39 M/UL — SIGNIFICANT CHANGE UP (ref 4.2–5.8)
RBC # FLD: 12.2 % — SIGNIFICANT CHANGE UP (ref 10.3–14.5)
SODIUM SERPL-SCNC: 138 MMOL/L — SIGNIFICANT CHANGE UP (ref 135–145)
SPECIMEN SOURCE: SIGNIFICANT CHANGE UP
SPECIMEN SOURCE: SIGNIFICANT CHANGE UP
VANCOMYCIN TROUGH SERPL-MCNC: 14.8 UG/ML — SIGNIFICANT CHANGE UP (ref 10–20)
WBC # BLD: 5.07 K/UL — SIGNIFICANT CHANGE UP (ref 3.8–10.5)
WBC # FLD AUTO: 5.07 K/UL — SIGNIFICANT CHANGE UP (ref 3.8–10.5)

## 2019-03-30 PROCEDURE — 99233 SBSQ HOSP IP/OBS HIGH 50: CPT

## 2019-03-30 RX ORDER — POTASSIUM CHLORIDE 20 MEQ
40 PACKET (EA) ORAL EVERY 4 HOURS
Qty: 0 | Refills: 0 | Status: COMPLETED | OUTPATIENT
Start: 2019-03-30 | End: 2019-03-30

## 2019-03-30 RX ADMIN — PIPERACILLIN AND TAZOBACTAM 25 GRAM(S): 4; .5 INJECTION, POWDER, LYOPHILIZED, FOR SOLUTION INTRAVENOUS at 21:14

## 2019-03-30 RX ADMIN — SODIUM CHLORIDE 3 MILLILITER(S): 9 INJECTION INTRAMUSCULAR; INTRAVENOUS; SUBCUTANEOUS at 21:13

## 2019-03-30 RX ADMIN — Medication 650 MILLIGRAM(S): at 23:15

## 2019-03-30 RX ADMIN — Medication 250 MILLIGRAM(S): at 04:51

## 2019-03-30 RX ADMIN — HEPARIN SODIUM 5000 UNIT(S): 5000 INJECTION INTRAVENOUS; SUBCUTANEOUS at 17:12

## 2019-03-30 RX ADMIN — PIPERACILLIN AND TAZOBACTAM 25 GRAM(S): 4; .5 INJECTION, POWDER, LYOPHILIZED, FOR SOLUTION INTRAVENOUS at 04:52

## 2019-03-30 RX ADMIN — PANTOPRAZOLE SODIUM 40 MILLIGRAM(S): 20 TABLET, DELAYED RELEASE ORAL at 04:51

## 2019-03-30 RX ADMIN — Medication 40 MILLIEQUIVALENT(S): at 13:22

## 2019-03-30 RX ADMIN — HEPARIN SODIUM 5000 UNIT(S): 5000 INJECTION INTRAVENOUS; SUBCUTANEOUS at 04:51

## 2019-03-30 RX ADMIN — Medication 650 MILLIGRAM(S): at 04:52

## 2019-03-30 RX ADMIN — Medication 650 MILLIGRAM(S): at 05:22

## 2019-03-30 RX ADMIN — Medication 40 MILLIEQUIVALENT(S): at 10:27

## 2019-03-30 RX ADMIN — Medication 650 MILLIGRAM(S): at 22:15

## 2019-03-30 RX ADMIN — SODIUM CHLORIDE 3 MILLILITER(S): 9 INJECTION INTRAMUSCULAR; INTRAVENOUS; SUBCUTANEOUS at 04:42

## 2019-03-30 RX ADMIN — Medication 250 MILLIGRAM(S): at 17:12

## 2019-03-30 RX ADMIN — PIPERACILLIN AND TAZOBACTAM 25 GRAM(S): 4; .5 INJECTION, POWDER, LYOPHILIZED, FOR SOLUTION INTRAVENOUS at 13:22

## 2019-03-30 RX ADMIN — SODIUM CHLORIDE 3 MILLILITER(S): 9 INJECTION INTRAMUSCULAR; INTRAVENOUS; SUBCUTANEOUS at 13:22

## 2019-03-30 NOTE — PROGRESS NOTE ADULT - SUBJECTIVE AND OBJECTIVE BOX
Subjective: "I feel ok, nothing is bothering me" pt denies fever, chills, CP or SOB. AMb without issues. Minimal pain at wound site.     Vital Signs:  Vital Signs Last 24 Hrs  T(C): 36.5 (03-30-19 @ 08:48), Max: 37.3 (03-30-19 @ 00:32)  T(F): 97.7 (03-30-19 @ 08:48), Max: 99.1 (03-30-19 @ 00:32)  HR: 71 (03-30-19 @ 08:48) (71 - 95)  BP: 105/67 (03-30-19 @ 08:48) (100/70 - 115/69)  RR: 16 (03-30-19 @ 08:48) (16 - 18)  SpO2: 97% (03-30-19 @ 08:48) (97% - 99%) on (O2)    Telemetry/Alarms:  General: WN/WD NAD  Neurology: Awake, nonfocal, SWAN x 4  Eyes: Scleras clear, PERRLA/ EOMI, Gross vision intact  ENT:Gross hearing intact, grossly patent pharynx, no stridor  Neck: Neck supple, trachea midline, No JVD,   Respiratory: CTA B/L, No wheezing, rales, rhonchi. Dec BS to left side  CV: RRR, S1S2, no murmurs, rubs or gallops  Abdominal: Soft, NT, ND +BS, no BM  Extremities: No edema, + peripheral pulses  Skin: No Rashes, Hematoma, Ecchymosis  Lymphatic: No Neck, axilla, groin LAD  Psych: Oriented x 3, normal affect  Incisions: Rt. posterior old thoracotomy site w open area at proximal end. Purulent drainage w manipulation,  some tunneling noted.     Relevant labs, radiology and Medications reviewed                        12.2   5.07  )-----------( 217      ( 30 Mar 2019 06:45 )             39.3     03-30    138  |  99  |  7   ----------------------------<  105<H>  3.2<L>   |  26  |  0.75    Ca    8.5      30 Mar 2019 06:45  Phos  4.1     03-28  Mg     1.9     03-28    TPro  8.3  /  Alb  4.3  /  TBili  0.5  /  DBili  x   /  AST  19  /  ALT  11  /  AlkPhos  84  03-28    PT/INR - ( 28 Mar 2019 18:50 )   PT: 15.3 SEC;   INR: 1.33          PTT - ( 28 Mar 2019 18:50 )  PTT:37.6 SEC  Blood culture negative  Wound culture grm + rods      MEDICATIONS  (STANDING):  heparin  Injectable 5000 Unit(s) SubCutaneous every 12 hours  pantoprazole    Tablet 40 milliGRAM(s) Oral before breakfast  piperacillin/tazobactam IVPB. 3.375 Gram(s) IV Intermittent every 8 hours  potassium chloride    Tablet ER 40 milliEquivalent(s) Oral every 4 hours  sodium chloride 0.9% lock flush 3 milliLiter(s) IV Push every 8 hours  vancomycin  IVPB 1000 milliGRAM(s) IV Intermittent every 12 hours    MEDICATIONS  (PRN):  acetaminophen   Tablet .. 650 milliGRAM(s) Oral every 6 hours PRN Mild Pain (1 - 3)    Pertinent Physical Exam  I&O's Summary    29 Mar 2019 07:01  -  30 Mar 2019 07:00  --------------------------------------------------------  IN: 600 mL / OUT: 600 mL / NET: 0 mL    Social: +ETOH  Former smoker    Assessment  43y Male  w/ PAST MEDICAL & SURGICAL HISTORY:  Empyema lung  Chronic GERD  History of tracheostomy  History of thoracotomy  S/P percutaneous endoscopic gastrostomy (PEG) tube placement  admitted with complaints of Patient is a 43y old  Male who presents with a chief complaint of Fever, Tachycardia (29 Mar 2019 00:42)          This 43 year old male presents today to the ER after his visiting nurse was concerned for fever and tachycardia.  She had been there for follow up wound care. He had been admitted to [facility] on 7/25/18 for a loculated pleural effusion and his hospital course was complicated by sepsis, hypotension, EtOH withdrawal, respiratory failure, a hemothorax, and the need for a tracheostomy and a PEG. On 7/27/18 he underwent a FB, R VATS, R thoracotomy, drainage of empyema, R lung decortication.  On 8/6/18 he underwent a R thoracotomy, evacuation of hemothorax, FB, BAL.  On 8/9/18 he had a trach and a PEG placed.  He was decannulated on 9/7/18 and the PEG was removed on 9/11/18. He was discharged home on 9/12/18 with a right thorax VAC dressing.  The VAC dressing was continued until 2/7/19 when it was replaced with a dry sterile dressing.  He was put on a 7 day course of Levaquin at that time as well.  After that, he initially received wound care by the Visiting Nurse Service 3 times a week.  At his last visit on 3/7/19, he only reported an occasional nonproductive cough, mild incisional pain relieved by Ibuprofen, and dressings often becoming saturated between visits.  He was noted to have a superficial postoperative wound infection with beige drainage.  Silver nitrate was used on some granulation tissue and the wound was debrided and packed with dry gauze and covered with a dry sterile dressing.  He was prescribed a 5 day course of Bactrim and told to follow up in one month.  In the ER today, he is without new complaints.  He admits to mild pain at his wound, the copious drainage, and an occasional cough.  He denies any chills, SOB, chest pain, nausea, vomiting, or diarrhea.    3/29-Local I&D of wound done. Culture sent. Packing placed. Continued on Antibx. Today with continued purulent drainage w tunneling at site  PLAN  Neuro: Pain management  Pulm: Encourage coughing, deep breathing and use of incentive spirometry.    Cardio: Monitor telemetry/alarms. Tachycardia improved  GI: Tolerating diet. Continue stool softeners.  Renal: monitor urine output, supplement electrolytes as needed  Vasc: Heparin SC/SCDs for DVT prophylaxis  Heme: Stable H/H. .   ID: FU cultures, Cont Vanco/Zosyn. Vanco trough tonight. Cont local wound care  Therapy: OOB/ambulate  Disposition: Plan for OR on Monday for Rt. CW exploration, possible VAC placement.   Discussed with Cardiothoracic Team at AM rounds. Subjective: "I feel ok, nothing is bothering me" pt denies fever, chills, CP or SOB. AMb without issues. Minimal pain at wound site.     Vital Signs:  Vital Signs Last 24 Hrs  T(C): 36.5 (03-30-19 @ 08:48), Max: 37.3 (03-30-19 @ 00:32)  T(F): 97.7 (03-30-19 @ 08:48), Max: 99.1 (03-30-19 @ 00:32)  HR: 71 (03-30-19 @ 08:48) (71 - 95)  BP: 105/67 (03-30-19 @ 08:48) (100/70 - 115/69)  RR: 16 (03-30-19 @ 08:48) (16 - 18)  SpO2: 97% (03-30-19 @ 08:48) (97% - 99%) on (O2)    Telemetry/Alarms:  General: WN/WD NAD  Neurology: Awake, nonfocal, SWAN x 4  Eyes: Scleras clear, PERRLA/ EOMI, Gross vision intact  ENT:Gross hearing intact, grossly patent pharynx, no stridor  Neck: Neck supple, trachea midline, No JVD,   Respiratory: CTA B/L, No wheezing, rales, rhonchi. Dec BS to left side  CV: RRR, S1S2, no murmurs, rubs or gallops  Abdominal: Soft, NT, ND +BS, no BM  Extremities: No edema, + peripheral pulses  Skin: No Rashes, Hematoma, Ecchymosis  Lymphatic: No Neck, axilla, groin LAD  Psych: Oriented x 3, normal affect  Incisions: Rt. posterior old thoracotomy site w open area at proximal end. Purulent drainage w manipulation,  some tunneling noted.     Relevant labs, radiology and Medications reviewed                        12.2   5.07  )-----------( 217      ( 30 Mar 2019 06:45 )             39.3     03-30    138  |  99  |  7   ----------------------------<  105<H>  3.2<L>   |  26  |  0.75    Ca    8.5      30 Mar 2019 06:45  Phos  4.1     03-28  Mg     1.9     03-28    TPro  8.3  /  Alb  4.3  /  TBili  0.5  /  DBili  x   /  AST  19  /  ALT  11  /  AlkPhos  84  03-28    PT/INR - ( 28 Mar 2019 18:50 )   PT: 15.3 SEC;   INR: 1.33          PTT - ( 28 Mar 2019 18:50 )  PTT:37.6 SEC  Blood culture negative  Wound culture grm + rods      MEDICATIONS  (STANDING):  heparin  Injectable 5000 Unit(s) SubCutaneous every 12 hours  pantoprazole    Tablet 40 milliGRAM(s) Oral before breakfast  piperacillin/tazobactam IVPB. 3.375 Gram(s) IV Intermittent every 8 hours  potassium chloride    Tablet ER 40 milliEquivalent(s) Oral every 4 hours  sodium chloride 0.9% lock flush 3 milliLiter(s) IV Push every 8 hours  vancomycin  IVPB 1000 milliGRAM(s) IV Intermittent every 12 hours    MEDICATIONS  (PRN):  acetaminophen   Tablet .. 650 milliGRAM(s) Oral every 6 hours PRN Mild Pain (1 - 3)    Pertinent Physical Exam  I&O's Summary    29 Mar 2019 07:01  -  30 Mar 2019 07:00  --------------------------------------------------------  IN: 600 mL / OUT: 600 mL / NET: 0 mL    Social: +ETOH  Former smoker    Assessment  43y Male  w/ PAST MEDICAL & SURGICAL HISTORY:  Empyema lung  Chronic GERD  History of tracheostomy  History of thoracotomy  S/P percutaneous endoscopic gastrostomy (PEG) tube placement  admitted with complaints of Patient is a 43y old  Male who presents with a chief complaint of Fever, Tachycardia (29 Mar 2019 00:42)          This 43 year old male presents today to the ER after his visiting nurse was concerned for fever and tachycardia.  She had been there for follow up wound care. He had been admitted to [facility] on 7/25/18 for a loculated pleural effusion and his hospital course was complicated by sepsis, hypotension, EtOH withdrawal, respiratory failure, a hemothorax, and the need for a tracheostomy and a PEG. On 7/27/18 he underwent a FB, R VATS, R thoracotomy, drainage of empyema, R lung decortication.  On 8/6/18 he underwent a R thoracotomy, evacuation of hemothorax, FB, BAL.  On 8/9/18 he had a trach and a PEG placed.  He was decannulated on 9/7/18 and the PEG was removed on 9/11/18. He was discharged home on 9/12/18 with a right thorax VAC dressing.  The VAC dressing was continued until 2/7/19 when it was replaced with a dry sterile dressing.  He was put on a 7 day course of Levaquin at that time as well.  After that, he initially received wound care by the Visiting Nurse Service 3 times a week.  At his last visit on 3/7/19, he only reported an occasional nonproductive cough, mild incisional pain relieved by Ibuprofen, and dressings often becoming saturated between visits.  He was noted to have a superficial postoperative wound infection with beige drainage.  Silver nitrate was used on some granulation tissue and the wound was debrided and packed with dry gauze and covered with a dry sterile dressing.  He was prescribed a 5 day course of Bactrim and told to follow up in one month.  In the ER today, he is without new complaints.  He admits to mild pain at his wound, the copious drainage, and an occasional cough.  He denies any chills, SOB, chest pain, nausea, vomiting, or diarrhea.    3/29-Local I&D of wound done. Culture sent. Packing placed. Continued on Antibx. Today with continued purulent drainage w tunneling at site  PLAN  Neuro: Pain management  Pulm: Encourage coughing, deep breathing and use of incentive spirometry.    Cardio: Monitor telemetry/alarms. Tachycardia improved  GI: Tolerating diet. Continue stool softeners.  Renal: monitor urine output, supplement electrolytes as needed. Treat hypokalemia  Vasc: Heparin SC/SCDs for DVT prophylaxis  Heme: Stable H/H. .   ID: FU cultures, Cont Vanco/Zosyn. Vanco trough tonight. Cont local wound care  Therapy: OOB/ambulate  Disposition: Plan for OR on Monday for Rt. CW exploration, possible VAC placement.   Discussed with Cardiothoracic Team at AM rounds.

## 2019-03-31 LAB
ANION GAP SERPL CALC-SCNC: 10 MMO/L — SIGNIFICANT CHANGE UP (ref 7–14)
BLD GP AB SCN SERPL QL: NEGATIVE — SIGNIFICANT CHANGE UP
BUN SERPL-MCNC: 6 MG/DL — LOW (ref 7–23)
CALCIUM SERPL-MCNC: 8.8 MG/DL — SIGNIFICANT CHANGE UP (ref 8.4–10.5)
CHLORIDE SERPL-SCNC: 104 MMOL/L — SIGNIFICANT CHANGE UP (ref 98–107)
CO2 SERPL-SCNC: 25 MMOL/L — SIGNIFICANT CHANGE UP (ref 22–31)
CREAT SERPL-MCNC: 0.82 MG/DL — SIGNIFICANT CHANGE UP (ref 0.5–1.3)
GLUCOSE SERPL-MCNC: 108 MG/DL — HIGH (ref 70–99)
HCT VFR BLD CALC: 40.2 % — SIGNIFICANT CHANGE UP (ref 39–50)
HGB BLD-MCNC: 12.7 G/DL — LOW (ref 13–17)
MCHC RBC-ENTMCNC: 28.2 PG — SIGNIFICANT CHANGE UP (ref 27–34)
MCHC RBC-ENTMCNC: 31.6 % — LOW (ref 32–36)
MCV RBC AUTO: 89.1 FL — SIGNIFICANT CHANGE UP (ref 80–100)
NRBC # FLD: 0 K/UL — SIGNIFICANT CHANGE UP (ref 0–0)
PLATELET # BLD AUTO: 229 K/UL — SIGNIFICANT CHANGE UP (ref 150–400)
PMV BLD: 10 FL — SIGNIFICANT CHANGE UP (ref 7–13)
POTASSIUM SERPL-MCNC: 3.7 MMOL/L — SIGNIFICANT CHANGE UP (ref 3.5–5.3)
POTASSIUM SERPL-SCNC: 3.7 MMOL/L — SIGNIFICANT CHANGE UP (ref 3.5–5.3)
RBC # BLD: 4.51 M/UL — SIGNIFICANT CHANGE UP (ref 4.2–5.8)
RBC # FLD: 12.4 % — SIGNIFICANT CHANGE UP (ref 10.3–14.5)
RH IG SCN BLD-IMP: NEGATIVE — SIGNIFICANT CHANGE UP
SODIUM SERPL-SCNC: 139 MMOL/L — SIGNIFICANT CHANGE UP (ref 135–145)
WBC # BLD: 4.91 K/UL — SIGNIFICANT CHANGE UP (ref 3.8–10.5)
WBC # FLD AUTO: 4.91 K/UL — SIGNIFICANT CHANGE UP (ref 3.8–10.5)

## 2019-03-31 PROCEDURE — 99233 SBSQ HOSP IP/OBS HIGH 50: CPT

## 2019-03-31 RX ORDER — DIPHENHYDRAMINE HCL 50 MG
25 CAPSULE ORAL ONCE
Qty: 0 | Refills: 0 | Status: COMPLETED | OUTPATIENT
Start: 2019-03-31 | End: 2019-03-31

## 2019-03-31 RX ORDER — OXYCODONE HYDROCHLORIDE 5 MG/1
5 TABLET ORAL EVERY 4 HOURS
Qty: 0 | Refills: 0 | Status: DISCONTINUED | OUTPATIENT
Start: 2019-03-31 | End: 2019-04-02

## 2019-03-31 RX ORDER — OXYCODONE HYDROCHLORIDE 5 MG/1
10 TABLET ORAL EVERY 4 HOURS
Qty: 0 | Refills: 0 | Status: DISCONTINUED | OUTPATIENT
Start: 2019-03-31 | End: 2019-04-02

## 2019-03-31 RX ADMIN — PIPERACILLIN AND TAZOBACTAM 25 GRAM(S): 4; .5 INJECTION, POWDER, LYOPHILIZED, FOR SOLUTION INTRAVENOUS at 21:55

## 2019-03-31 RX ADMIN — OXYCODONE HYDROCHLORIDE 5 MILLIGRAM(S): 5 TABLET ORAL at 03:40

## 2019-03-31 RX ADMIN — HEPARIN SODIUM 5000 UNIT(S): 5000 INJECTION INTRAVENOUS; SUBCUTANEOUS at 05:02

## 2019-03-31 RX ADMIN — Medication 250 MILLIGRAM(S): at 05:02

## 2019-03-31 RX ADMIN — SODIUM CHLORIDE 3 MILLILITER(S): 9 INJECTION INTRAMUSCULAR; INTRAVENOUS; SUBCUTANEOUS at 05:01

## 2019-03-31 RX ADMIN — SODIUM CHLORIDE 3 MILLILITER(S): 9 INJECTION INTRAMUSCULAR; INTRAVENOUS; SUBCUTANEOUS at 12:04

## 2019-03-31 RX ADMIN — SODIUM CHLORIDE 3 MILLILITER(S): 9 INJECTION INTRAMUSCULAR; INTRAVENOUS; SUBCUTANEOUS at 21:00

## 2019-03-31 RX ADMIN — PIPERACILLIN AND TAZOBACTAM 25 GRAM(S): 4; .5 INJECTION, POWDER, LYOPHILIZED, FOR SOLUTION INTRAVENOUS at 05:42

## 2019-03-31 RX ADMIN — PIPERACILLIN AND TAZOBACTAM 25 GRAM(S): 4; .5 INJECTION, POWDER, LYOPHILIZED, FOR SOLUTION INTRAVENOUS at 12:05

## 2019-03-31 RX ADMIN — OXYCODONE HYDROCHLORIDE 5 MILLIGRAM(S): 5 TABLET ORAL at 02:39

## 2019-03-31 RX ADMIN — HEPARIN SODIUM 5000 UNIT(S): 5000 INJECTION INTRAVENOUS; SUBCUTANEOUS at 17:27

## 2019-03-31 RX ADMIN — PANTOPRAZOLE SODIUM 40 MILLIGRAM(S): 20 TABLET, DELAYED RELEASE ORAL at 05:02

## 2019-03-31 RX ADMIN — OXYCODONE HYDROCHLORIDE 5 MILLIGRAM(S): 5 TABLET ORAL at 22:41

## 2019-03-31 RX ADMIN — OXYCODONE HYDROCHLORIDE 5 MILLIGRAM(S): 5 TABLET ORAL at 21:55

## 2019-03-31 RX ADMIN — Medication 25 MILLIGRAM(S): at 05:42

## 2019-03-31 NOTE — PROGRESS NOTE ADULT - SUBJECTIVE AND OBJECTIVE BOX
Subjective: "Im OK"   No fevers, no chills, no SOB.  No events overnight    Vital Signs:  Vital Signs Last 24 Hrs  T(C): 36.5 (03-30-19 @ 08:48), Max: 37.3 (03-30-19 @ 00:32)  T(F): 97.7 (03-30-19 @ 08:48), Max: 99.1 (03-30-19 @ 00:32)  HR: 71 (03-30-19 @ 08:48) (71 - 95)  BP: 105/67 (03-30-19 @ 08:48) (100/70 - 115/69)  RR: 16 (03-30-19 @ 08:48) (16 - 18)  SpO2: 97% (03-30-19 @ 08:48) (97% - 99%) on (O2)    Telemetry/Alarms:  General: WN/WD NAD  Neurology: Awake, nonfocal, SWAN x 4  Eyes: Scleras clear, PERRLA/ EOMI, Gross vision intact  ENT:Gross hearing intact, grossly patent pharynx, no stridor  Neck: Neck supple, trachea midline, No JVD,   Respiratory: CTA B/L, No wheezing, rales, rhonchi. Dec BS to left side  CV: RRR, S1S2, no murmurs, rubs or gallops  Abdominal: Soft, NT, ND +BS, no BM  Extremities: No edema, + peripheral pulses  Skin: Pt developed a rash on his arm when getting IV Vancomycin. Infusion was stopped and benadryl was given.  Lymphatic: No Neck, axilla, groin LAD  Psych: Oriented x 3, normal affect  Incisions: Rt. posterior old thoracotomy site w open area at proximal end. Purulent drainage w manipulation,  some tunneling noted.     Relevant labs, radiology and Medications reviewed                        12.2   5.07  )-----------( 217      ( 30 Mar 2019 06:45 )             39.3     03-30    138  |  99  |  7   ----------------------------<  105<H>  3.2<L>   |  26  |  0.75    Ca    8.5      30 Mar 2019 06:45  Phos  4.1     03-28  Mg     1.9     03-28    TPro  8.3  /  Alb  4.3  /  TBili  0.5  /  DBili  x   /  AST  19  /  ALT  11  /  AlkPhos  84  03-28    PT/INR - ( 28 Mar 2019 18:50 )   PT: 15.3 SEC;   INR: 1.33          PTT - ( 28 Mar 2019 18:50 )  PTT:37.6 SEC  Blood culture negative  Wound culture grm + rods      MEDICATIONS  (STANDING):  heparin  Injectable 5000 Unit(s) SubCutaneous every 12 hours  pantoprazole    Tablet 40 milliGRAM(s) Oral before breakfast  piperacillin/tazobactam IVPB. 3.375 Gram(s) IV Intermittent every 8 hours  potassium chloride    Tablet ER 40 milliEquivalent(s) Oral every 4 hours  sodium chloride 0.9% lock flush 3 milliLiter(s) IV Push every 8 hours  vancomycin  IVPB 1000 milliGRAM(s) IV Intermittent every 12 hours    MEDICATIONS  (PRN):  acetaminophen   Tablet .. 650 milliGRAM(s) Oral every 6 hours PRN Mild Pain (1 - 3)    Pertinent Physical Exam  I&O's Summary    29 Mar 2019 07:01  -  30 Mar 2019 07:00  --------------------------------------------------------  IN: 600 mL / OUT: 600 mL / NET: 0 mL    Social: +ETOH  Former smoker    Assessment  43y Male  w/ PAST MEDICAL & SURGICAL HISTORY:  Empyema lung  Chronic GERD  History of tracheostomy  History of thoracotomy  S/P percutaneous endoscopic gastrostomy (PEG) tube placement  admitted with complaints of Patient is a 43y old  Male who presents with a chief complaint of Fever, Tachycardia (29 Mar 2019 00:42)          This 43 year old male presents today to the ER after his visiting nurse was concerned for fever and tachycardia.  She had been there for follow up wound care. He had been admitted to [facility] on 7/25/18 for a loculated pleural effusion and his hospital course was complicated by sepsis, hypotension, EtOH withdrawal, respiratory failure, a hemothorax, and the need for a tracheostomy and a PEG. On 7/27/18 he underwent a FB, R VATS, R thoracotomy, drainage of empyema, R lung decortication.  On 8/6/18 he underwent a R thoracotomy, evacuation of hemothorax, FB, BAL.  On 8/9/18 he had a trach and a PEG placed.  He was decannulated on 9/7/18 and the PEG was removed on 9/11/18. He was discharged home on 9/12/18 with a right thorax VAC dressing.  The VAC dressing was continued until 2/7/19 when it was replaced with a dry sterile dressing.  He was put on a 7 day course of Levaquin at that time as well.  After that, he initially received wound care by the Visiting Nurse Service 3 times a week.  At his last visit on 3/7/19, he only reported an occasional nonproductive cough, mild incisional pain relieved by Ibuprofen, and dressings often becoming saturated between visits.  He was noted to have a superficial postoperative wound infection with beige drainage.  Silver nitrate was used on some granulation tissue and the wound was debrided and packed with dry gauze and covered with a dry sterile dressing.  He was prescribed a 5 day course of Bactrim and told to follow up in one month.  In the ER today, he is without new complaints.  He admits to mild pain at his wound, the copious drainage, and an occasional cough.  He denies any chills, SOB, chest pain, nausea, vomiting, or diarrhea.    3/29-Local I&D of wound done. Culture sent. Packing placed. Continued on Antibx. Today with continued purulent drainage w tunneling at site  PLAN  Neuro: Pain management  Pulm: Encourage coughing, deep breathing and use of incentive spirometry.    Cardio: Monitor telemetry/alarms. Tachycardia improved  GI: Tolerating diet. Continue stool softeners.  Renal: monitor urine output, supplement electrolytes as needed. Treat hypokalemia  Vasc: Heparin SC/SCDs for DVT prophylaxis  Heme: Stable H/H. .   ID: FU cultures, Cont Zosyn. Will hold Vanco per Dr Mortensen. Cont local wound care  Therapy: OOB/ambulate  Disposition: Plan for OR on Monday for Rt. CW exploration, possible VAC placement.   Discussed with Dr Mortensen.

## 2019-03-31 NOTE — PROVIDER CONTACT NOTE (OTHER) - ASSESSMENT
Denies chest pain, Shortness of breath. Arm swollen 20 minutes into vancomycin administration. IV flushes and blood return present. Localized redness with itching on left forearm.

## 2019-04-01 ENCOUNTER — TRANSCRIPTION ENCOUNTER (OUTPATIENT)
Age: 44
End: 2019-04-01

## 2019-04-01 LAB
-  CEFAZOLIN: SIGNIFICANT CHANGE UP
-  CIPROFLOXACIN: SIGNIFICANT CHANGE UP
-  CLINDAMYCIN: SIGNIFICANT CHANGE UP
-  DAPTOMYCIN: SIGNIFICANT CHANGE UP
-  ERYTHROMYCIN: SIGNIFICANT CHANGE UP
-  GENTAMICIN: SIGNIFICANT CHANGE UP
-  LEVOFLOXACIN: SIGNIFICANT CHANGE UP
-  LINEZOLID: SIGNIFICANT CHANGE UP
-  MOXIFLOXACIN(AEROBIC): SIGNIFICANT CHANGE UP
-  OXACILLIN: SIGNIFICANT CHANGE UP
-  PENICILLIN: SIGNIFICANT CHANGE UP
-  RIFAMPIN.: SIGNIFICANT CHANGE UP
-  TETRACYCLINE: SIGNIFICANT CHANGE UP
-  TRIMETHOPRIM/SULFAMETHOXAZOLE: SIGNIFICANT CHANGE UP
-  VANCOMYCIN: SIGNIFICANT CHANGE UP
ANION GAP SERPL CALC-SCNC: 13 MMO/L — SIGNIFICANT CHANGE UP (ref 7–14)
BACTERIA WND CULT: SIGNIFICANT CHANGE UP
BUN SERPL-MCNC: 10 MG/DL — SIGNIFICANT CHANGE UP (ref 7–23)
CALCIUM SERPL-MCNC: 9.4 MG/DL — SIGNIFICANT CHANGE UP (ref 8.4–10.5)
CHLORIDE SERPL-SCNC: 102 MMOL/L — SIGNIFICANT CHANGE UP (ref 98–107)
CO2 SERPL-SCNC: 24 MMOL/L — SIGNIFICANT CHANGE UP (ref 22–31)
CREAT SERPL-MCNC: 0.98 MG/DL — SIGNIFICANT CHANGE UP (ref 0.5–1.3)
GLUCOSE SERPL-MCNC: 86 MG/DL — SIGNIFICANT CHANGE UP (ref 70–99)
HCT VFR BLD CALC: 44.9 % — SIGNIFICANT CHANGE UP (ref 39–50)
HGB BLD-MCNC: 13.9 G/DL — SIGNIFICANT CHANGE UP (ref 13–17)
MCHC RBC-ENTMCNC: 28 PG — SIGNIFICANT CHANGE UP (ref 27–34)
MCHC RBC-ENTMCNC: 31 % — LOW (ref 32–36)
MCV RBC AUTO: 90.5 FL — SIGNIFICANT CHANGE UP (ref 80–100)
METHOD TYPE: SIGNIFICANT CHANGE UP
NRBC # FLD: 0 K/UL — SIGNIFICANT CHANGE UP (ref 0–0)
ORGANISM # SPEC MICROSCOPIC CNT: SIGNIFICANT CHANGE UP
PLATELET # BLD AUTO: 261 K/UL — SIGNIFICANT CHANGE UP (ref 150–400)
PMV BLD: 10 FL — SIGNIFICANT CHANGE UP (ref 7–13)
POTASSIUM SERPL-MCNC: 4.3 MMOL/L — SIGNIFICANT CHANGE UP (ref 3.5–5.3)
POTASSIUM SERPL-SCNC: 4.3 MMOL/L — SIGNIFICANT CHANGE UP (ref 3.5–5.3)
RBC # BLD: 4.96 M/UL — SIGNIFICANT CHANGE UP (ref 4.2–5.8)
RBC # FLD: 12.3 % — SIGNIFICANT CHANGE UP (ref 10.3–14.5)
SODIUM SERPL-SCNC: 139 MMOL/L — SIGNIFICANT CHANGE UP (ref 135–145)
WBC # BLD: 6.23 K/UL — SIGNIFICANT CHANGE UP (ref 3.8–10.5)
WBC # FLD AUTO: 6.23 K/UL — SIGNIFICANT CHANGE UP (ref 3.8–10.5)

## 2019-04-01 PROCEDURE — 99233 SBSQ HOSP IP/OBS HIGH 50: CPT

## 2019-04-01 PROCEDURE — 71045 X-RAY EXAM CHEST 1 VIEW: CPT | Mod: 26

## 2019-04-01 RX ORDER — LINEZOLID 600 MG/300ML
600 INJECTION, SOLUTION INTRAVENOUS EVERY 12 HOURS
Qty: 0 | Refills: 0 | Status: DISCONTINUED | OUTPATIENT
Start: 2019-04-01 | End: 2019-04-04

## 2019-04-01 RX ADMIN — OXYCODONE HYDROCHLORIDE 10 MILLIGRAM(S): 5 TABLET ORAL at 05:56

## 2019-04-01 RX ADMIN — PIPERACILLIN AND TAZOBACTAM 25 GRAM(S): 4; .5 INJECTION, POWDER, LYOPHILIZED, FOR SOLUTION INTRAVENOUS at 05:05

## 2019-04-01 RX ADMIN — SODIUM CHLORIDE 3 MILLILITER(S): 9 INJECTION INTRAMUSCULAR; INTRAVENOUS; SUBCUTANEOUS at 15:09

## 2019-04-01 RX ADMIN — SODIUM CHLORIDE 3 MILLILITER(S): 9 INJECTION INTRAMUSCULAR; INTRAVENOUS; SUBCUTANEOUS at 21:00

## 2019-04-01 RX ADMIN — OXYCODONE HYDROCHLORIDE 10 MILLIGRAM(S): 5 TABLET ORAL at 21:05

## 2019-04-01 RX ADMIN — HEPARIN SODIUM 5000 UNIT(S): 5000 INJECTION INTRAVENOUS; SUBCUTANEOUS at 17:20

## 2019-04-01 RX ADMIN — OXYCODONE HYDROCHLORIDE 10 MILLIGRAM(S): 5 TABLET ORAL at 21:41

## 2019-04-01 RX ADMIN — LINEZOLID 300 MILLIGRAM(S): 600 INJECTION, SOLUTION INTRAVENOUS at 21:05

## 2019-04-01 RX ADMIN — OXYCODONE HYDROCHLORIDE 10 MILLIGRAM(S): 5 TABLET ORAL at 05:06

## 2019-04-01 RX ADMIN — HEPARIN SODIUM 5000 UNIT(S): 5000 INJECTION INTRAVENOUS; SUBCUTANEOUS at 05:05

## 2019-04-01 RX ADMIN — SODIUM CHLORIDE 3 MILLILITER(S): 9 INJECTION INTRAMUSCULAR; INTRAVENOUS; SUBCUTANEOUS at 05:00

## 2019-04-01 RX ADMIN — PIPERACILLIN AND TAZOBACTAM 25 GRAM(S): 4; .5 INJECTION, POWDER, LYOPHILIZED, FOR SOLUTION INTRAVENOUS at 13:29

## 2019-04-01 RX ADMIN — PANTOPRAZOLE SODIUM 40 MILLIGRAM(S): 20 TABLET, DELAYED RELEASE ORAL at 05:05

## 2019-04-01 NOTE — CONSULT NOTE ADULT - SUBJECTIVE AND OBJECTIVE BOX
HPI:   Patient is a  43 year old male presented to the ER after his visiting nurse was concerned for fever and tachycardia.  She had been there for follow up wound care. He had been admitted to [facility] on 7/25/18 for a loculated pleural effusion and his hospital course was complicated by sepsis, hypotension, EtOH withdrawal, respiratory failure, a hemothorax, and the need for a tracheostomy and a PEG. On 7/27/18 he underwent a R VATS, R thoracotomy, drainage of empyema, R lung decortication.  On 8/6/18 he underwent a R thoracotomy, evacuation of hemothorax, FB, BAL.  On 8/9/18 he had a trach and a PEG placed.  He was decannulated on 9/7/18 and the PEG was removed on 9/11/18. He was discharged home on 9/12/18 with a right thorax VAC dressing.  The VAC dressing was continued until 2/7/19 when it was replaced with a dry sterile dressing.  He was put on a 7 day course of Levaquin at that time as well.  After that, he initially received wound care by the Visiting Nurse Service 3 times a week.  At his last visit on 3/7/19, he only reported an occasional nonproductive cough, mild incisional pain relieved by Ibuprofen, and dressings often becoming saturated between visits.  He was noted to have a superficial postoperative wound infection. Wound culture was taken now and is being reported growing MRSA and Streptococcus. We were consulted to see the patient.    REVIEW OF SYSTEMS:  All other review of systems negative (Comprehensive ROS)    PAST MEDICAL & SURGICAL HISTORY:  Empyema lung  Chronic GERD  History of tracheostomy  History of thoracotomy  S/P percutaneous endoscopic gastrostomy (PEG) tube placement      Allergies    vancomycin (Rash; Urticaria)    Intolerances    tigecycline (Other)          FAMILY HISTORY:  No pertinent family history in first degree relatives      SOCIAL HISTORY:  Smoking:  hx of use    ETOH: hx of use      Single     T(F): 97.8 (04-01-19 @ 16:08), Max: 98.5 (04-01-19 @ 12:40)  HR: 84 (04-01-19 @ 16:08)  BP: 114/78 (04-01-19 @ 16:08)  RR: 16 (04-01-19 @ 16:08)  SpO2: 97% (04-01-19 @ 16:08)  Wt(kg): --    PHYSICAL EXAM:  General: alert, no acute distress  Eyes:  anicteric, no conjunctival injection, no discharge  Oropharynx: no lesions or injection 	  Neck: supple, without adenopathy  Lungs: clear to auscultation  right upper thoracic area with a non healing wound, the dressing noted to have bloody discharge on the bandage   Heart: regular rate and rhythm; no murmur, rubs or gallops  Abdomen: soft, nondistended, nontender, without mass or organomegaly  Skin: no lesions  Extremities: no clubbing, cyanosis, or edema  Neurologic: alert, oriented, moves all extremities    LAB RESULTS:                        13.9   6.23  )-----------( 261      ( 01 Apr 2019 06:51 )             44.9     04-01    139  |  102  |  10  ----------------------------<  86  4.3   |  24  |  0.98    Ca    9.4      01 Apr 2019 06:51            MICROBIOLOGY:  RECENT CULTURES:  03-29 @ 11:09 BACK Staph. aureus *MRSA*  Streptococcus Viridans Group        03-28 @ 20:18 URINE MIDSTREAM         03-28 @ 18:41 BLOOD VENOUS         NO ORGANISMS ISOLATED  NO ORGANISMS ISOLATED AT 72 HRS.  03-28 @ 17:59 BLOOD PERIPHERAL         NO ORGANISMS ISOLATED  NO ORGANISMS ISOLATED AT 72 HRS.        RADIOLOGY REVIEWED:      Antimicrobials Day #    piperacillin/tazobactam IVPB. 3.375 Gram(s) IV Intermittent every 8 hours    Other Medications:  acetaminophen   Tablet .. 650 milliGRAM(s) Oral every 6 hours PRN  heparin  Injectable 5000 Unit(s) SubCutaneous every 12 hours  oxyCODONE    IR 5 milliGRAM(s) Oral every 4 hours PRN  oxyCODONE    IR 10 milliGRAM(s) Oral every 4 hours PRN  pantoprazole    Tablet 40 milliGRAM(s) Oral before breakfast  sodium chloride 0.9% lock flush 3 milliLiter(s) IV Push every 8 hours

## 2019-04-01 NOTE — PROGRESS NOTE ADULT - SUBJECTIVE AND OBJECTIVE BOX
Subjective: "I feel ok, no pain or problems" Denies fever, chills. Ambulating frequently.     Vital Signs:  Vital Signs Last 24 Hrs  T(C): 36.3 (04-01-19 @ 04:56), Max: 36.7 (03-31-19 @ 12:03)  T(F): 97.4 (04-01-19 @ 04:56), Max: 98.1 (03-31-19 @ 12:03)  HR: 80 (04-01-19 @ 04:56) (68 - 80)  BP: 111/76 (04-01-19 @ 04:56) (104/69 - 116/63)  RR: 18 (04-01-19 @ 04:56) (17 - 18)  SpO2: 100% (04-01-19 @ 04:56) (97% - 100%) on (O2)    Telemetry/Alarms:  General: WN/WD NAD  Neurology: Awake, nonfocal, SWAN x 4  Eyes: Scleras clear, PERRLA/ EOMI, Gross vision intact  ENT:Gross hearing intact, grossly patent pharynx, no stridor  Neck: Neck supple, trachea midline, No JVD,   Respiratory: CTA B/L, No wheezing, rales, rhonchi  CV: RRR, S1S2, no murmurs, rubs or gallops  Abdominal: Soft, NT, ND +BS,   Extremities: No edema, + peripheral pulses  Skin: No Rashes, Hematoma, Ecchymosis  Lymphatic: No Neck, axilla, groin LAD  Psych: Oriented x 3, normal affect  Incisions: Rt. thoracotomy healed, open proximal end w packing, purulent drainage  Tubes:none  Relevant labs, radiology and Medications reviewed                        13.9   6.23  )-----------( 261      ( 01 Apr 2019 06:51 )             44.9     04-01    139  |  102  |  10  ----------------------------<  86  4.3   |  24  |  0.98    Ca    9.4      01 Apr 2019 06:51    Wound culture- +Staph Aureus, Strept Viridans    MEDICATIONS  (STANDING):  heparin  Injectable 5000 Unit(s) SubCutaneous every 12 hours  pantoprazole    Tablet 40 milliGRAM(s) Oral before breakfast  piperacillin/tazobactam IVPB. 3.375 Gram(s) IV Intermittent every 8 hours  sodium chloride 0.9% lock flush 3 milliLiter(s) IV Push every 8 hours    MEDICATIONS  (PRN):  acetaminophen   Tablet .. 650 milliGRAM(s) Oral every 6 hours PRN Mild Pain (1 - 3)  oxyCODONE    IR 5 milliGRAM(s) Oral every 4 hours PRN Moderate Pain (4 - 6)  oxyCODONE    IR 10 milliGRAM(s) Oral every 4 hours PRN Severe Pain (7 - 10)    Pertinent Physical Exam  I&O's Summary    31 Mar 2019 07:01  -  01 Apr 2019 07:00  --------------------------------------------------------  IN: 0 mL / OUT: 900 mL / NET: -900 mL    Social; Lives w family  +ETOH  Former smoker.     Assessment  43y Male  w/ PAST MEDICAL & SURGICAL HISTORY:  Empyema lung  Chronic GERD  History of tracheostomy  History of thoracotomy  S/P percutaneous endoscopic gastrostomy (PEG) tube placement  admitted with complaints of Patient is a 43y old  Male who presents with a chief complaint of Fever, Tachycardia (31 Mar 2019 13:05)  This 43 year old male presents today to the ER after his visiting nurse was concerned for fever and tachycardia.  She had been there for follow up wound care. He had been admitted to [facility] on 7/25/18 for a loculated pleural effusion and his hospital course was complicated by sepsis, hypotension, EtOH withdrawal, respiratory failure, a hemothorax, and the need for a tracheostomy and a PEG. On 7/27/18 he underwent a FB, R VATS, R thoracotomy, drainage of empyema, R lung decortication.  On 8/6/18 he underwent a R thoracotomy, evacuation of hemothorax, FB, BAL.  On 8/9/18 he had a trach and a PEG placed.  He was decannulated on 9/7/18 and the PEG was removed on 9/11/18. He was discharged home on 9/12/18 with a right thorax VAC dressing.  The VAC dressing was continued until 2/7/19 when it was replaced with a dry sterile dressing.  He was put on a 7 day course of Levaquin at that time as well.  After that, he initially received wound care by the Visiting Nurse Service 3 times a week.  At his last visit on 3/7/19, he only reported an occasional nonproductive cough, mild incisional pain relieved by Ibuprofen, and dressings often becoming saturated between visits.  He was noted to have a superficial postoperative wound infection with beige drainage.  Silver nitrate was used on some granulation tissue and the wound was debrided and packed with dry gauze and covered with a dry sterile dressing.  He was prescribed a 5 day course of Bactrim and told to follow up in one month.  In the ER today, he is without new complaints.  He admits to mild pain at his wound, the copious drainage, and an occasional cough.  He denies any chills, SOB, chest pain, nausea, vomiting, or diarrhea.    3/29-Local I&D of wound done. Culture sent. Packing placed. Continued on Antibx. Today with continued purulent drainage w tunneling at site  4/1-+wound cultures. Awaiting sensitivity. Vanco stopped yesterday for rash. Cont on Zosyn. For OR today.   PLAN  Neuro: Pain management  Pulm: Encourage coughing, deep breathing and use of incentive spirometry.   Cardio: Monitor telemetry/alarms  GI: Tolerating diet. Continue stool softeners.  Renal: monitor urine output, supplement electrolytes as needed  Vasc: Heparin SC/SCDs for DVT prophylaxis  Heme: Stable H/H. .   ID: Cont zosyn, FU sensitivities.   Therapy: OOB/ambulate  OR today for CW exploration, poss rib resection.   Discussed with Cardiothoracic Team at AM rounds.

## 2019-04-02 ENCOUNTER — RESULT REVIEW (OUTPATIENT)
Age: 44
End: 2019-04-02

## 2019-04-02 LAB
ANION GAP SERPL CALC-SCNC: 14 MMO/L — SIGNIFICANT CHANGE UP (ref 7–14)
BACTERIA BLD CULT: SIGNIFICANT CHANGE UP
BACTERIA BLD CULT: SIGNIFICANT CHANGE UP
BUN SERPL-MCNC: 11 MG/DL — SIGNIFICANT CHANGE UP (ref 7–23)
CALCIUM SERPL-MCNC: 9.2 MG/DL — SIGNIFICANT CHANGE UP (ref 8.4–10.5)
CHLORIDE SERPL-SCNC: 101 MMOL/L — SIGNIFICANT CHANGE UP (ref 98–107)
CO2 SERPL-SCNC: 25 MMOL/L — SIGNIFICANT CHANGE UP (ref 22–31)
CREAT SERPL-MCNC: 0.95 MG/DL — SIGNIFICANT CHANGE UP (ref 0.5–1.3)
GLUCOSE SERPL-MCNC: 103 MG/DL — HIGH (ref 70–99)
GRAM STN WND: SIGNIFICANT CHANGE UP
HCT VFR BLD CALC: 41.7 % — SIGNIFICANT CHANGE UP (ref 39–50)
HGB BLD-MCNC: 13.3 G/DL — SIGNIFICANT CHANGE UP (ref 13–17)
MCHC RBC-ENTMCNC: 27.7 PG — SIGNIFICANT CHANGE UP (ref 27–34)
MCHC RBC-ENTMCNC: 31.9 % — LOW (ref 32–36)
MCV RBC AUTO: 86.7 FL — SIGNIFICANT CHANGE UP (ref 80–100)
NRBC # FLD: 0 K/UL — SIGNIFICANT CHANGE UP (ref 0–0)
PLATELET # BLD AUTO: 233 K/UL — SIGNIFICANT CHANGE UP (ref 150–400)
PMV BLD: 9.6 FL — SIGNIFICANT CHANGE UP (ref 7–13)
POTASSIUM SERPL-MCNC: 3.9 MMOL/L — SIGNIFICANT CHANGE UP (ref 3.5–5.3)
POTASSIUM SERPL-SCNC: 3.9 MMOL/L — SIGNIFICANT CHANGE UP (ref 3.5–5.3)
RBC # BLD: 4.81 M/UL — SIGNIFICANT CHANGE UP (ref 4.2–5.8)
RBC # FLD: 12.5 % — SIGNIFICANT CHANGE UP (ref 10.3–14.5)
SODIUM SERPL-SCNC: 140 MMOL/L — SIGNIFICANT CHANGE UP (ref 135–145)
SPECIMEN SOURCE: SIGNIFICANT CHANGE UP
WBC # BLD: 6.51 K/UL — SIGNIFICANT CHANGE UP (ref 3.8–10.5)
WBC # FLD AUTO: 6.51 K/UL — SIGNIFICANT CHANGE UP (ref 3.8–10.5)

## 2019-04-02 PROCEDURE — 21502 I&D DP ABS/HMTM NCK RIB OSTC: CPT

## 2019-04-02 PROCEDURE — 99233 SBSQ HOSP IP/OBS HIGH 50: CPT

## 2019-04-02 PROCEDURE — 71045 X-RAY EXAM CHEST 1 VIEW: CPT | Mod: 26

## 2019-04-02 PROCEDURE — 88304 TISSUE EXAM BY PATHOLOGIST: CPT | Mod: 26

## 2019-04-02 PROCEDURE — 71045 X-RAY EXAM CHEST 1 VIEW: CPT | Mod: 26,77

## 2019-04-02 PROCEDURE — 88311 DECALCIFY TISSUE: CPT | Mod: 26

## 2019-04-02 PROCEDURE — 88300 SURGICAL PATH GROSS: CPT | Mod: 26,59

## 2019-04-02 PROCEDURE — 97605 NEG PRS WND THER DME<=50SQCM: CPT | Mod: 59

## 2019-04-02 RX ORDER — HYDROMORPHONE HYDROCHLORIDE 2 MG/ML
0.5 INJECTION INTRAMUSCULAR; INTRAVENOUS; SUBCUTANEOUS
Qty: 0 | Refills: 0 | Status: DISCONTINUED | OUTPATIENT
Start: 2019-04-02 | End: 2019-04-03

## 2019-04-02 RX ORDER — SODIUM CHLORIDE 9 MG/ML
1000 INJECTION, SOLUTION INTRAVENOUS
Qty: 0 | Refills: 0 | Status: DISCONTINUED | OUTPATIENT
Start: 2019-04-02 | End: 2019-04-03

## 2019-04-02 RX ORDER — SENNA PLUS 8.6 MG/1
2 TABLET ORAL AT BEDTIME
Qty: 0 | Refills: 0 | Status: DISCONTINUED | OUTPATIENT
Start: 2019-04-02 | End: 2019-04-05

## 2019-04-02 RX ORDER — DOCUSATE SODIUM 100 MG
100 CAPSULE ORAL THREE TIMES A DAY
Qty: 0 | Refills: 0 | Status: DISCONTINUED | OUTPATIENT
Start: 2019-04-02 | End: 2019-04-05

## 2019-04-02 RX ORDER — HYDROMORPHONE HYDROCHLORIDE 2 MG/ML
30 INJECTION INTRAMUSCULAR; INTRAVENOUS; SUBCUTANEOUS
Qty: 0 | Refills: 0 | Status: DISCONTINUED | OUTPATIENT
Start: 2019-04-02 | End: 2019-04-02

## 2019-04-02 RX ORDER — NALOXONE HYDROCHLORIDE 4 MG/.1ML
0.1 SPRAY NASAL
Qty: 0 | Refills: 0 | Status: DISCONTINUED | OUTPATIENT
Start: 2019-04-02 | End: 2019-04-03

## 2019-04-02 RX ORDER — HYDROMORPHONE HYDROCHLORIDE 2 MG/ML
0.5 INJECTION INTRAMUSCULAR; INTRAVENOUS; SUBCUTANEOUS
Qty: 0 | Refills: 0 | Status: DISCONTINUED | OUTPATIENT
Start: 2019-04-02 | End: 2019-04-02

## 2019-04-02 RX ORDER — NALOXONE HYDROCHLORIDE 4 MG/.1ML
0.1 SPRAY NASAL
Qty: 0 | Refills: 0 | Status: DISCONTINUED | OUTPATIENT
Start: 2019-04-02 | End: 2019-04-02

## 2019-04-02 RX ORDER — SODIUM CHLORIDE 9 MG/ML
1000 INJECTION INTRAMUSCULAR; INTRAVENOUS; SUBCUTANEOUS
Qty: 0 | Refills: 0 | Status: DISCONTINUED | OUTPATIENT
Start: 2019-04-02 | End: 2019-04-02

## 2019-04-02 RX ORDER — HYDROMORPHONE HYDROCHLORIDE 2 MG/ML
30 INJECTION INTRAMUSCULAR; INTRAVENOUS; SUBCUTANEOUS
Qty: 0 | Refills: 0 | Status: DISCONTINUED | OUTPATIENT
Start: 2019-04-02 | End: 2019-04-03

## 2019-04-02 RX ORDER — DIPHENHYDRAMINE HCL 50 MG
25 CAPSULE ORAL EVERY 4 HOURS
Qty: 0 | Refills: 0 | Status: DISCONTINUED | OUTPATIENT
Start: 2019-04-02 | End: 2019-04-02

## 2019-04-02 RX ORDER — ONDANSETRON 8 MG/1
4 TABLET, FILM COATED ORAL EVERY 6 HOURS
Qty: 0 | Refills: 0 | Status: DISCONTINUED | OUTPATIENT
Start: 2019-04-02 | End: 2019-04-03

## 2019-04-02 RX ORDER — ONDANSETRON 8 MG/1
4 TABLET, FILM COATED ORAL EVERY 6 HOURS
Qty: 0 | Refills: 0 | Status: DISCONTINUED | OUTPATIENT
Start: 2019-04-02 | End: 2019-04-02

## 2019-04-02 RX ADMIN — HEPARIN SODIUM 5000 UNIT(S): 5000 INJECTION INTRAVENOUS; SUBCUTANEOUS at 05:01

## 2019-04-02 RX ADMIN — LINEZOLID 300 MILLIGRAM(S): 600 INJECTION, SOLUTION INTRAVENOUS at 21:18

## 2019-04-02 RX ADMIN — SODIUM CHLORIDE 3 MILLILITER(S): 9 INJECTION INTRAMUSCULAR; INTRAVENOUS; SUBCUTANEOUS at 21:09

## 2019-04-02 RX ADMIN — SODIUM CHLORIDE 3 MILLILITER(S): 9 INJECTION INTRAMUSCULAR; INTRAVENOUS; SUBCUTANEOUS at 13:30

## 2019-04-02 RX ADMIN — HYDROMORPHONE HYDROCHLORIDE 30 MILLILITER(S): 2 INJECTION INTRAMUSCULAR; INTRAVENOUS; SUBCUTANEOUS at 21:18

## 2019-04-02 RX ADMIN — LINEZOLID 300 MILLIGRAM(S): 600 INJECTION, SOLUTION INTRAVENOUS at 09:59

## 2019-04-02 RX ADMIN — SODIUM CHLORIDE 30 MILLILITER(S): 9 INJECTION, SOLUTION INTRAVENOUS at 21:17

## 2019-04-02 RX ADMIN — SODIUM CHLORIDE 3 MILLILITER(S): 9 INJECTION INTRAMUSCULAR; INTRAVENOUS; SUBCUTANEOUS at 05:00

## 2019-04-02 RX ADMIN — PANTOPRAZOLE SODIUM 40 MILLIGRAM(S): 20 TABLET, DELAYED RELEASE ORAL at 05:01

## 2019-04-02 RX ADMIN — OXYCODONE HYDROCHLORIDE 10 MILLIGRAM(S): 5 TABLET ORAL at 06:24

## 2019-04-02 RX ADMIN — SODIUM CHLORIDE 75 MILLILITER(S): 9 INJECTION INTRAMUSCULAR; INTRAVENOUS; SUBCUTANEOUS at 09:59

## 2019-04-02 RX ADMIN — OXYCODONE HYDROCHLORIDE 10 MILLIGRAM(S): 5 TABLET ORAL at 05:01

## 2019-04-02 NOTE — PROGRESS NOTE ADULT - SUBJECTIVE AND OBJECTIVE BOX
BRITTNEY WILLIAMSON  MRN-[id number]    Patient is a 43y old  Male who presents with a chief complaint of Fever, Tachycardia (01 Apr 2019 17:01)    HPI:  43 year old male with a history of empyema s/p thoracotomy/decortication and tracheostomy now decannulated, EtOH abuse and GERD presented with fever and tachycardia in the setting of an ongoing R chest wound and is now s/p wound exploration and washout.  Per records he had been admitted to [facility] on 7/25/18 for a loculated pleural effusion and his hospital course was complicated by sepsis, hypotension, EtOH withdrawal, respiratory failure, a hemothorax, and the need for a tracheostomy and a PEG. On 7/27/18 he underwent a FB, R VATS, R thoracotomy, drainage of empyema, R lung decortication.  On 8/6/18 he underwent a R thoracotomy, evacuation of hemothorax, FB, BAL.  On 8/9/18 he had a trach and a PEG placed.  He was decannulated on 9/7/18 and the PEG was removed on 9/11/18. He was discharged home on 9/12/18 with a right thorax VAC dressing.  The VAC dressing was continued until 2/7/19 when it was replaced with a dry sterile dressing.  He was put on a 7 day course of Levaquin at that time as well.  After that, he initially received wound care by the Visiting Nurse Service 3 times a week.  At his last visit on 3/7/19, he only reported an occasional nonproductive cough, mild incisional pain relieved by Ibuprofen, and dressings often becoming saturated between visits.  He was noted to have a superficial postoperative wound infection with beige drainage.  Silver nitrate was used on some granulation tissue and the wound was debrided and packed with dry gauze and covered with a dry sterile dressing.  He was prescribed a 5 day course of Bactrim and told to follow up in one month.  In the ER today, he is without new complaints.  He admits to mild pain at his wound, the copious drainage, and an occasional cough.  He denies any chills, SOB, chest pain, nausea, vomiting, or diarrhea.     PAST MEDICAL & SURGICAL HISTORY:  Empyema lung  Chronic GERD  History of tracheostomy  History of thoracotomy  S/P percutaneous endoscopic gastrostomy (PEG) tube placement    FAMILY HISTORY:  No pertinent family history in first degree relatives    Social History:  Denies smoking or illicit drug. Endorses frequent alcohol consumption, last drink the day prior to admission.     Allergies  vancomycin (Rash; Urticaria)    MEDICATIONS  (STANDING):  docusate sodium 100 milliGRAM(s) Oral three times a day  heparin  Injectable 5000 Unit(s) SubCutaneous every 12 hours  HYDROmorphone PCA (1 mG/mL) 30 milliLiter(s) PCA Continuous PCA Continuous  linezolid  IVPB 600 milliGRAM(s) IV Intermittent every 12 hours  pantoprazole    Tablet 40 milliGRAM(s) Oral before breakfast  senna 2 Tablet(s) Oral at bedtime  sodium chloride 0.9% lock flush 3 milliLiter(s) IV Push every 8 hours  sodium chloride 0.9%. 1000 milliLiter(s) (30 mL/Hr) IV Continuous <Continuous>    MEDICATIONS  (PRN):  acetaminophen   Tablet .. 650 milliGRAM(s) Oral every 6 hours PRN Mild Pain (1 - 3)  diphenhydrAMINE   Injectable 25 milliGRAM(s) IV Push every 4 hours PRN Pruritus  HYDROmorphone PCA (1 mG/mL) Rescue Clinician Bolus 0.5 milliGRAM(s) IV Push every 15 minutes PRN for Pain Scale GREATER THAN 6  naloxone Injectable 0.1 milliGRAM(s) IV Push every 3 minutes PRN For ANY of the following changes in patient status:  A. RR LESS THAN 10 breaths per minute, B. Oxygen saturation LESS THAN 90%, C. Sedation score of 6  ondansetron Injectable 4 milliGRAM(s) IV Push every 6 hours PRN Nausea    Review of Systems:  Constitutional:  Negative for weight change, fever, malaise  HEENT:  Negative for sinus pain, hoarseness, sore throat, dysphagia, vision changes  Cardiovascular:  Negative for chest pain, palpitations, dizziness  Respiratory:  Negative for cough, wheezing, dyspnea  Gastrointestinal:  Negative for nausea, vomiting, diarrhea, melena  Musculoskeletal:  Negative for pain, swelling, stiffness   Neuro:  Negative for weakness, numbness, headache  Psych:  Negative for anxiety, depression  Endocrine:  Negative for polyuria, polydipsia, temperature Intolerance    All other systems negative unless otherwise stated    ICU Vital Signs Last 24 Hrs  T(C): 36.9 (02 Apr 2019 17:20), Max: 36.9 (02 Apr 2019 04:59)  T(F): 98.5 (02 Apr 2019 17:20), Max: 98.5 (02 Apr 2019 04:59)  HR: 50 (02 Apr 2019 18:00) (50 - 75)  BP: 149/92 (02 Apr 2019 17:20) (105/63 - 149/92)  BP(mean): 109 (02 Apr 2019 17:20) (109 - 109)  ABP: 113/60 (02 Apr 2019 18:00) (113/60 - 128/70)  ABP(mean): 80 (02 Apr 2019 18:00) (80 - 92)  RR: 13 (02 Apr 2019 18:00) (12 - 18)  SpO2: 97% (02 Apr 2019 18:00) (95% - 100%)    Daily Height in cm: 172.7 (02 Apr 2019 12:10)    Daily   I&O's Summary    01 Apr 2019 07:01  -  02 Apr 2019 07:00  --------------------------------------------------------  IN: 0 mL / OUT: 900 mL / NET: -900 mL    02 Apr 2019 07:01  -  02 Apr 2019 19:18  --------------------------------------------------------  IN: 30 mL / OUT: 150 mL / NET: -120 mL    Physical Exam:  Gen: Alert, no apparent distress  CV: Regular rate and rhythm no murmurs, rubs or gallops  Pulm: Clear to auscultation bilaterally, no rales, rhonchi or wheezes  GI: Abd is soft, non-tender and non-distended with +BS  Ext: No clubbing, cyanosis or edema  Neuro: A+Ox3, follows commands and moves all extremities    Labs:                          13.3   6.51  )-----------( 233      ( 02 Apr 2019 06:50 )             41.7       04-02    140  |  101  |  11  ----------------------------<  103<H>  3.9   |  25  |  0.95    Ca    9.2      02 Apr 2019 06:50    Assessment/Plan: 43 year old male with a history of empyema s/p thoracotomy/decortication and tracheostomy now decannulated, EtOH abuse and GERD presented with fever and tachycardia in the setting of an ongoing R chest wound and is now s/p wound exploration and washout.    Neuro:   Pain control with PCA / Tylenol IV                                           Cardiovascular:    Stable hemodynamics  Not on any pressors  Continue hemodynamic monitoring    Respiratory:  Pt is comfortable on nasal cannula  Encourage incentive spirometry  Follow up AM CXR    GI:  Clears diet, advance as tolerated  Continue bowel regimen, Protonix  Continue Zofran for nausea - PRN	          Renal:  Continue LR 30CC/hr        Monitor I/Os and electrolytes    Hematologic / Oncology:  No signs of active bleeding, H/H stable  HSQ for DVT ppl    Infectious disease:  MRSA abscess on Zyvox with wound vac  No fever or other signs of infection     Endocrine:  Continue Accuchecks with coverage    All clinical, lab, hemodynamic and radiographic data were reviewed and the plan was discussed with CTICU team.     Kevin Sánchez MD

## 2019-04-03 LAB
ANION GAP SERPL CALC-SCNC: 12 MMO/L — SIGNIFICANT CHANGE UP (ref 7–14)
BASOPHILS # BLD AUTO: 0.04 K/UL — SIGNIFICANT CHANGE UP (ref 0–0.2)
BASOPHILS NFR BLD AUTO: 0.6 % — SIGNIFICANT CHANGE UP (ref 0–2)
BUN SERPL-MCNC: 8 MG/DL — SIGNIFICANT CHANGE UP (ref 7–23)
CALCIUM SERPL-MCNC: 8.7 MG/DL — SIGNIFICANT CHANGE UP (ref 8.4–10.5)
CHLORIDE SERPL-SCNC: 102 MMOL/L — SIGNIFICANT CHANGE UP (ref 98–107)
CO2 SERPL-SCNC: 23 MMOL/L — SIGNIFICANT CHANGE UP (ref 22–31)
CREAT SERPL-MCNC: 0.86 MG/DL — SIGNIFICANT CHANGE UP (ref 0.5–1.3)
CULTURE - ACID FAST SMEAR CONCENTRATED: SIGNIFICANT CHANGE UP
EOSINOPHIL # BLD AUTO: 0.28 K/UL — SIGNIFICANT CHANGE UP (ref 0–0.5)
EOSINOPHIL NFR BLD AUTO: 3.9 % — SIGNIFICANT CHANGE UP (ref 0–6)
GLUCOSE SERPL-MCNC: 100 MG/DL — HIGH (ref 70–99)
HCT VFR BLD CALC: 40.4 % — SIGNIFICANT CHANGE UP (ref 39–50)
HGB BLD-MCNC: 12.5 G/DL — LOW (ref 13–17)
IMM GRANULOCYTES NFR BLD AUTO: 0.7 % — SIGNIFICANT CHANGE UP (ref 0–1.5)
LYMPHOCYTES # BLD AUTO: 1.87 K/UL — SIGNIFICANT CHANGE UP (ref 1–3.3)
LYMPHOCYTES # BLD AUTO: 26.3 % — SIGNIFICANT CHANGE UP (ref 13–44)
MCHC RBC-ENTMCNC: 27.8 PG — SIGNIFICANT CHANGE UP (ref 27–34)
MCHC RBC-ENTMCNC: 30.9 % — LOW (ref 32–36)
MCV RBC AUTO: 89.8 FL — SIGNIFICANT CHANGE UP (ref 80–100)
MONOCYTES # BLD AUTO: 0.56 K/UL — SIGNIFICANT CHANGE UP (ref 0–0.9)
MONOCYTES NFR BLD AUTO: 7.9 % — SIGNIFICANT CHANGE UP (ref 2–14)
NEUTROPHILS # BLD AUTO: 4.31 K/UL — SIGNIFICANT CHANGE UP (ref 1.8–7.4)
NEUTROPHILS NFR BLD AUTO: 60.6 % — SIGNIFICANT CHANGE UP (ref 43–77)
NRBC # FLD: 0 K/UL — SIGNIFICANT CHANGE UP (ref 0–0)
PLATELET # BLD AUTO: 207 K/UL — SIGNIFICANT CHANGE UP (ref 150–400)
PMV BLD: 9.6 FL — SIGNIFICANT CHANGE UP (ref 7–13)
POTASSIUM SERPL-MCNC: 3.6 MMOL/L — SIGNIFICANT CHANGE UP (ref 3.5–5.3)
POTASSIUM SERPL-SCNC: 3.6 MMOL/L — SIGNIFICANT CHANGE UP (ref 3.5–5.3)
RBC # BLD: 4.5 M/UL — SIGNIFICANT CHANGE UP (ref 4.2–5.8)
RBC # FLD: 12.3 % — SIGNIFICANT CHANGE UP (ref 10.3–14.5)
SODIUM SERPL-SCNC: 137 MMOL/L — SIGNIFICANT CHANGE UP (ref 135–145)
SPECIMEN SOURCE: SIGNIFICANT CHANGE UP
WBC # BLD: 7.11 K/UL — SIGNIFICANT CHANGE UP (ref 3.8–10.5)
WBC # FLD AUTO: 7.11 K/UL — SIGNIFICANT CHANGE UP (ref 3.8–10.5)

## 2019-04-03 PROCEDURE — 99233 SBSQ HOSP IP/OBS HIGH 50: CPT

## 2019-04-03 PROCEDURE — 71045 X-RAY EXAM CHEST 1 VIEW: CPT | Mod: 26

## 2019-04-03 RX ORDER — OXYCODONE HYDROCHLORIDE 5 MG/1
10 TABLET ORAL
Qty: 0 | Refills: 0 | Status: DISCONTINUED | OUTPATIENT
Start: 2019-04-03 | End: 2019-04-05

## 2019-04-03 RX ORDER — ACETAMINOPHEN 500 MG
650 TABLET ORAL EVERY 6 HOURS
Qty: 0 | Refills: 0 | Status: DISCONTINUED | OUTPATIENT
Start: 2019-04-03 | End: 2019-04-05

## 2019-04-03 RX ORDER — POTASSIUM CHLORIDE 20 MEQ
40 PACKET (EA) ORAL ONCE
Qty: 0 | Refills: 0 | Status: COMPLETED | OUTPATIENT
Start: 2019-04-03 | End: 2019-04-03

## 2019-04-03 RX ORDER — OXYCODONE HYDROCHLORIDE 5 MG/1
5 TABLET ORAL
Qty: 0 | Refills: 0 | Status: DISCONTINUED | OUTPATIENT
Start: 2019-04-03 | End: 2019-04-05

## 2019-04-03 RX ADMIN — SODIUM CHLORIDE 3 MILLILITER(S): 9 INJECTION INTRAMUSCULAR; INTRAVENOUS; SUBCUTANEOUS at 11:46

## 2019-04-03 RX ADMIN — PANTOPRAZOLE SODIUM 40 MILLIGRAM(S): 20 TABLET, DELAYED RELEASE ORAL at 06:22

## 2019-04-03 RX ADMIN — HYDROMORPHONE HYDROCHLORIDE 30 MILLILITER(S): 2 INJECTION INTRAMUSCULAR; INTRAVENOUS; SUBCUTANEOUS at 07:31

## 2019-04-03 RX ADMIN — Medication 100 MILLIGRAM(S): at 11:28

## 2019-04-03 RX ADMIN — Medication 650 MILLIGRAM(S): at 18:00

## 2019-04-03 RX ADMIN — SODIUM CHLORIDE 3 MILLILITER(S): 9 INJECTION INTRAMUSCULAR; INTRAVENOUS; SUBCUTANEOUS at 22:24

## 2019-04-03 RX ADMIN — Medication 650 MILLIGRAM(S): at 11:27

## 2019-04-03 RX ADMIN — Medication 650 MILLIGRAM(S): at 17:06

## 2019-04-03 RX ADMIN — Medication 650 MILLIGRAM(S): at 23:15

## 2019-04-03 RX ADMIN — Medication 40 MILLIEQUIVALENT(S): at 07:06

## 2019-04-03 RX ADMIN — HEPARIN SODIUM 5000 UNIT(S): 5000 INJECTION INTRAVENOUS; SUBCUTANEOUS at 06:23

## 2019-04-03 RX ADMIN — LINEZOLID 300 MILLIGRAM(S): 600 INJECTION, SOLUTION INTRAVENOUS at 09:27

## 2019-04-03 RX ADMIN — Medication 650 MILLIGRAM(S): at 12:26

## 2019-04-03 RX ADMIN — SODIUM CHLORIDE 30 MILLILITER(S): 9 INJECTION, SOLUTION INTRAVENOUS at 07:31

## 2019-04-03 RX ADMIN — HEPARIN SODIUM 5000 UNIT(S): 5000 INJECTION INTRAVENOUS; SUBCUTANEOUS at 17:06

## 2019-04-03 RX ADMIN — LINEZOLID 300 MILLIGRAM(S): 600 INJECTION, SOLUTION INTRAVENOUS at 23:14

## 2019-04-03 NOTE — PROGRESS NOTE ADULT - SUBJECTIVE AND OBJECTIVE BOX
CC: f/u for right chest wall MRSA abscess now s/p wound exploration and vac placement     Patient reports feeling OK he report minimal pain    REVIEW OF SYSTEMS:  All other review of systems negative (Comprehensive ROS)      T(F): 98 (04-03-19 @ 08:00), Max: 98.5 (04-02-19 @ 17:20)  HR: 63 (04-03-19 @ 09:00)  BP: 115/76 (04-03-19 @ 09:00)  RR: 19 (04-03-19 @ 09:00)  SpO2: 98% (04-03-19 @ 09:00)  Wt(kg): --    PHYSICAL EXAM:  General: alert, no acute distress  Eyes:  anicteric, no conjunctival injection, no discharge  Oropharynx: no lesions or injection 	  Neck: supple, without adenopathy  Lungs: clear to auscultation  right chest wall posterior with a wound vac in place   Heart: regular rate and rhythm; no murmur, rubs or gallops  Abdomen: soft, nondistended, nontender, without mass or organomegaly  Skin: no lesions  Extremities: no clubbing, cyanosis, or edema  Neurologic: alert, oriented, moves all extremities    LAB RESULTS:                        12.5   7.11  )-----------( 207      ( 03 Apr 2019 02:48 )             40.4     04-03    137  |  102  |  8   ----------------------------<  100<H>  3.6   |  23  |  0.86    Ca    8.7      03 Apr 2019 02:40          MICROBIOLOGY:  RECENT CULTURES:  04-02 @ 17:45 TISSUE         03-29 @ 11:09 BACK Staph. aureus *MRSA*  Streptococcus Viridans Group            RADIOLOGY REVIEWED:        Antimicrobials Day #    linezolid  IVPB 600 milliGRAM(s) IV Intermittent every 12 hours    Other Medications Reviewed

## 2019-04-03 NOTE — DIETITIAN INITIAL EVALUATION ADULT. - FACTORS AFF FOOD INTAKE
had  difficulty swallowing during last admission , s/p MBS/ VFSST  AND initiated Mechanical soft texture with thin fluids on 9/7/18 , currently tolerating Regular consistency foods/none

## 2019-04-03 NOTE — PROGRESS NOTE ADULT - SUBJECTIVE AND OBJECTIVE BOX
ANESTHESIA POSTOP CHECK    43y Male POSTOP DAY 1    Vital Signs Last 24 Hrs  T(C): 36.7 (03 Apr 2019 08:00), Max: 36.9 (02 Apr 2019 17:20)  T(F): 98 (03 Apr 2019 08:00), Max: 98.5 (02 Apr 2019 17:20)  HR: 63 (03 Apr 2019 09:00) (50 - 78)  BP: 115/76 (03 Apr 2019 09:00) (101/69 - 149/92)  BP(mean): 89 (03 Apr 2019 09:00) (79 - 109)  RR: 19 (03 Apr 2019 09:00) (12 - 21)  SpO2: 98% (03 Apr 2019 09:00) (95% - 98%)  I&O's Summary    02 Apr 2019 07:01  -  03 Apr 2019 07:00  --------------------------------------------------------  IN: 720 mL / OUT: 910 mL / NET: -190 mL    03 Apr 2019 07:01  -  03 Apr 2019 10:34  --------------------------------------------------------  IN: 270 mL / OUT: 0 mL / NET: 270 mL        [X] NO NAUSEA  [X] PAIN WELL CONTROLLED  [X] NO APPARENT ANESTHESIA COMPLICATIONS

## 2019-04-03 NOTE — PROGRESS NOTE ADULT - SUBJECTIVE AND OBJECTIVE BOX
BRITTNEY WILLIAMSON  MRN-[id number]    Patient is a 43y old  Male who presents with a chief complaint of R Chest Wound Washout (02 Apr 2019 19:16)    HPI:  43 year old male with a history of empyema s/p thoracotomy/decortication and tracheostomy now decannulated, EtOH abuse and GERD presented with fever and tachycardia in the setting of an ongoing R chest wound and is now s/p wound exploration and washout.  Per records he had been admitted to [facility] on 7/25/18 for a loculated pleural effusion and his hospital course was complicated by sepsis, hypotension, EtOH withdrawal, respiratory failure, a hemothorax, and the need for a tracheostomy and a PEG. On 7/27/18 he underwent a FB, R VATS, R thoracotomy, drainage of empyema, R lung decortication.  On 8/6/18 he underwent a R thoracotomy, evacuation of hemothorax, FB, BAL.  On 8/9/18 he had a trach and a PEG placed.  He was decannulated on 9/7/18 and the PEG was removed on 9/11/18. He was discharged home on 9/12/18 with a right thorax VAC dressing.  The VAC dressing was continued until 2/7/19 when it was replaced with a dry sterile dressing.  He was put on a 7 day course of Levaquin at that time as well.  After that, he initially received wound care by the Visiting Nurse Service 3 times a week.  At his last visit on 3/7/19, he only reported an occasional nonproductive cough, mild incisional pain relieved by Ibuprofen, and dressings often becoming saturated between visits.  He was noted to have a superficial postoperative wound infection with beige drainage.  Silver nitrate was used on some granulation tissue and the wound was debrided and packed with dry gauze and covered with a dry sterile dressing.  He was prescribed a 5 day course of Bactrim and told to follow up in one month.  In the ER today, he is without new complaints.  He admits to mild pain at his wound, the copious drainage, and an occasional cough.  He denies any chills, SOB, chest pain, nausea, vomiting, or diarrhea.     PAST MEDICAL & SURGICAL HISTORY:  Empyema lung  Chronic GERD  History of tracheostomy  History of thoracotomy  S/P percutaneous endoscopic gastrostomy (PEG) tube placement    FAMILY HISTORY:  No pertinent family history in first degree relatives    Social History:  Denies smoking or illicit drug. Endorses frequent alcohol consumption, last drink the day prior to admission.     Allergies  vancomycin (Rash; Urticaria)    MEDICATIONS  (STANDING):  docusate sodium 100 milliGRAM(s) Oral three times a day  heparin  Injectable 5000 Unit(s) SubCutaneous every 12 hours  HYDROmorphone PCA (1 mG/mL) 30 milliLiter(s) PCA Continuous PCA Continuous  lactated ringers. 1000 milliLiter(s) (30 mL/Hr) IV Continuous <Continuous>  linezolid  IVPB 600 milliGRAM(s) IV Intermittent every 12 hours  pantoprazole    Tablet 40 milliGRAM(s) Oral before breakfast  potassium chloride    Tablet ER 40 milliEquivalent(s) Oral once  senna 2 Tablet(s) Oral at bedtime  sodium chloride 0.9% lock flush 3 milliLiter(s) IV Push every 8 hours    MEDICATIONS  (PRN):  acetaminophen   Tablet .. 650 milliGRAM(s) Oral every 6 hours PRN Mild Pain (1 - 3)  HYDROmorphone PCA (1 mG/mL) Rescue Clinician Bolus 0.5 milliGRAM(s) IV Push every 15 minutes PRN for Pain Scale GREATER THAN 6  naloxone Injectable 0.1 milliGRAM(s) IV Push every 3 minutes PRN For ANY of the following changes in patient status:  A. RR LESS THAN 10 breaths per minute, B. Oxygen saturation LESS THAN 90%, C. Sedation score of 6  ondansetron Injectable 4 milliGRAM(s) IV Push every 6 hours PRN Nausea    Review of Systems:  Constitutional:  Negative for weight change, fever, malaise  HEENT:  Negative for sinus pain, hoarseness, sore throat, dysphagia, vision changes  Cardiovascular:  Negative for chest pain, palpitations, dizziness  Respiratory:  Negative for cough, wheezing, dyspnea  Gastrointestinal:  Negative for nausea, vomiting, diarrhea, melena  Musculoskeletal:  Negative for pain, swelling, stiffness   Neuro:  Negative for weakness, numbness, headache  Psych:  Negative for anxiety, depression  Endocrine:  Negative for polyuria, polydipsia, temperature Intolerance    All other systems negative unless otherwise stated    ICU Vital Signs Last 24 Hrs  T(C): 36.8 (03 Apr 2019 04:00), Max: 36.9 (02 Apr 2019 17:20)  T(F): 98.2 (03 Apr 2019 04:00), Max: 98.5 (02 Apr 2019 17:20)  HR: 65 (03 Apr 2019 06:00) (50 - 72)  BP: 112/75 (03 Apr 2019 06:00) (101/69 - 149/92)  BP(mean): 87 (03 Apr 2019 06:00) (79 - 109)  ABP: 103/52 (03 Apr 2019 02:00) (103/52 - 128/70)  ABP(mean): 72 (03 Apr 2019 02:00) (70 - 92)  RR: 19 (03 Apr 2019 06:00) (12 - 19)  SpO2: 98% (03 Apr 2019 06:00) (95% - 98%)    Daily Height in cm: 172.7 (02 Apr 2019 12:10)    Daily   I&O's Summary    01 Apr 2019 07:01  -  02 Apr 2019 07:00  --------------------------------------------------------  IN: 0 mL / OUT: 900 mL / NET: -900 mL    02 Apr 2019 07:01  -  03 Apr 2019 06:37  --------------------------------------------------------  IN: 720 mL / OUT: 910 mL / NET: -190 mL      Physical Exam:  Gen: Alert, no apparent distress  CV: Regular rate and rhythm no murmurs, rubs or gallops  Pulm: Clear to auscultation bilaterally, no rales, rhonchi or wheezes  GI: Abd is soft, non-tender and non-distended with +BS  Ext: No clubbing, cyanosis or edema  Neuro: A+Ox3, follows commands and moves all extremities    Labs:                          12.5   7.11  )-----------( 207      ( 03 Apr 2019 02:48 )             40.4       04-03    137  |  102  |  8   ----------------------------<  100<H>  3.6   |  23  |  0.86    Ca    8.7      03 Apr 2019 02:40    Assessment/Plan: 43 year old male with a history of empyema s/p thoracotomy/decortication and tracheostomy now decannulated, EtOH abuse and GERD presented with fever and tachycardia in the setting of an ongoing R chest wound and is now s/p wound exploration and washout.    Neuro:   Pain control with PCA / Tylenol IV                                           Cardiovascular:    Stable hemodynamics  Not on any pressors  Continue hemodynamic monitoring    Respiratory:  Pt is comfortable on nasal cannula  Encourage incentive spirometry  Follow up AM CXR    GI:  Tolerating regular diet  Continue bowel regimen, Protonix  Continue Zofran for nausea - PRN	          Renal:  Continue LR 30CC/hr        Monitor I/Os and electrolytes    Hematologic / Oncology:  No signs of active bleeding, H/H stable  HSQ for DVT ppl    Infectious disease:  MRSA abscess on Zyvox with wound vac  No fever, leukocytosis or other signs of infection     Endocrine:  Continue Accuchecks with coverage    All clinical, lab, hemodynamic and radiographic data were reviewed and the plan was discussed with CTICU team.     Kevin Sánchez MD

## 2019-04-03 NOTE — PROGRESS NOTE ADULT - SUBJECTIVE AND OBJECTIVE BOX
Anesthesia Pain Management Service    SUBJECTIVE: Patient is doing well with IV PCA and no significant problems reported. Pain very well controlled, 3-4/10. Tolerating PO diet.     Pain Scale Score	At rest: 3/10 	With Activity: 4/10 	[X ] Refer to charted pain scores    THERAPY:    [ ] IV PCA Morphine		[ ] 5 mg/mL	[ ] 1 mg/mL  [X ] IV PCA Hydromorphone	[ ] 5 mg/mL	[X ] 1 mg/mL  [ ] IV PCA Fentanyl		[ ] 50 micrograms/mL    Demand dose __0.2_ lockout __6_ (minutes) Continuous Rate _0__ Total: 1.4  mg used (in past 24 hrs)      MEDICATIONS  (STANDING):  acetaminophen   Tablet .. 650 milliGRAM(s) Oral every 6 hours  docusate sodium 100 milliGRAM(s) Oral three times a day  heparin  Injectable 5000 Unit(s) SubCutaneous every 12 hours  lactated ringers. 1000 milliLiter(s) (30 mL/Hr) IV Continuous <Continuous>  linezolid  IVPB 600 milliGRAM(s) IV Intermittent every 12 hours  pantoprazole    Tablet 40 milliGRAM(s) Oral before breakfast  senna 2 Tablet(s) Oral at bedtime  sodium chloride 0.9% lock flush 3 milliLiter(s) IV Push every 8 hours    MEDICATIONS  (PRN):  acetaminophen   Tablet .. 650 milliGRAM(s) Oral every 6 hours PRN Mild Pain (1 - 3)  oxyCODONE    IR 5 milliGRAM(s) Oral every 3 hours PRN Moderate Pain (4 - 6)  oxyCODONE    IR 10 milliGRAM(s) Oral every 3 hours PRN Severe Pain (7 - 10)      OBJECTIVE:    Sedation Score:	[ X] Alert	[ ] Drowsy 	[ ] Arousable	[ ] Asleep	[ ] Unresponsive    Side Effects:	[X ] None	[ ] Nausea	[ ] Vomiting	[ ] Pruritus  		[ ] Other:    Vital Signs Last 24 Hrs  T(C): 36.7 (03 Apr 2019 08:00), Max: 36.9 (02 Apr 2019 17:20)  T(F): 98 (03 Apr 2019 08:00), Max: 98.5 (02 Apr 2019 17:20)  HR: 63 (03 Apr 2019 09:00) (50 - 78)  BP: 115/76 (03 Apr 2019 09:00) (101/69 - 149/92)  BP(mean): 89 (03 Apr 2019 09:00) (79 - 109)  RR: 19 (03 Apr 2019 09:00) (12 - 21)  SpO2: 98% (03 Apr 2019 09:00) (95% - 98%)    ASSESSMENT/ PLAN    Therapy to  be:	[ ] Continue   [ X] Discontinued   [X ] Change to prn Analgesics    Documentation and Verification of current medications:   [X] Done	[ ] Not done, not elligible    Comments: PRN Oral/IV opioids and/or Adjuvant non-opioid medication to be ordered at this point.

## 2019-04-03 NOTE — DIETITIAN INITIAL EVALUATION ADULT. - OTHER INFO
Nutrition assessment initiated for critical care LOS. Pt tolerating PO diet , noted pt. was in hospital towards the later part of last year and admit wt. was 79.3 kg and lost wt. during the course of admission and illness , currently regained the wt.

## 2019-04-03 NOTE — PROGRESS NOTE ADULT - ASSESSMENT
43 year old male who underwent on 7/27/18 he underwent a R VATS, R thoracotomy, drainage of empyema, R lung decortication.  On 8/6/18 he underwent a R thoracotomy, evacuation of hemothorax, FB, BAL.  He presents with a non healing thoracic wound, he was also noted to have an elevated white cell count   CT of chest reviewed and reported findings of right chest wall abscess/pneumonia  with OM of rib   POD #1 right chest wall washout and exploration with wound vac placement  Wound cx reviewed he is growing MRSA and rare strept viridans  reviewed his labs his white cell count is stable and cr stable    Plan   day 3 of antbiotics  continue Zyvox to cover for mrsa and streptococcus   follow up OR cx   as per report there is concern for OM of bone, rib therefor will need six week of abx  i call the micro lab to check for teflaro ss   wound care as per CTS

## 2019-04-04 ENCOUNTER — TRANSCRIPTION ENCOUNTER (OUTPATIENT)
Age: 44
End: 2019-04-04

## 2019-04-04 LAB
ANION GAP SERPL CALC-SCNC: 12 MMO/L — SIGNIFICANT CHANGE UP (ref 7–14)
BACTERIA WND CULT: SIGNIFICANT CHANGE UP
BUN SERPL-MCNC: 6 MG/DL — LOW (ref 7–23)
CALCIUM SERPL-MCNC: 8.9 MG/DL — SIGNIFICANT CHANGE UP (ref 8.4–10.5)
CHLORIDE SERPL-SCNC: 103 MMOL/L — SIGNIFICANT CHANGE UP (ref 98–107)
CO2 SERPL-SCNC: 21 MMOL/L — LOW (ref 22–31)
CREAT SERPL-MCNC: 0.84 MG/DL — SIGNIFICANT CHANGE UP (ref 0.5–1.3)
GLUCOSE SERPL-MCNC: 107 MG/DL — HIGH (ref 70–99)
HCT VFR BLD CALC: 42.5 % — SIGNIFICANT CHANGE UP (ref 39–50)
HGB BLD-MCNC: 12.9 G/DL — LOW (ref 13–17)
MCHC RBC-ENTMCNC: 27.8 PG — SIGNIFICANT CHANGE UP (ref 27–34)
MCHC RBC-ENTMCNC: 30.4 % — LOW (ref 32–36)
MCV RBC AUTO: 91.6 FL — SIGNIFICANT CHANGE UP (ref 80–100)
NRBC # FLD: 0 K/UL — SIGNIFICANT CHANGE UP (ref 0–0)
PLATELET # BLD AUTO: 118 K/UL — LOW (ref 150–400)
PMV BLD: 10.5 FL — SIGNIFICANT CHANGE UP (ref 7–13)
POTASSIUM SERPL-MCNC: 4.9 MMOL/L — SIGNIFICANT CHANGE UP (ref 3.5–5.3)
POTASSIUM SERPL-SCNC: 4.9 MMOL/L — SIGNIFICANT CHANGE UP (ref 3.5–5.3)
RBC # BLD: 4.64 M/UL — SIGNIFICANT CHANGE UP (ref 4.2–5.8)
RBC # FLD: 12.5 % — SIGNIFICANT CHANGE UP (ref 10.3–14.5)
SODIUM SERPL-SCNC: 136 MMOL/L — SIGNIFICANT CHANGE UP (ref 135–145)
WBC # BLD: 7.07 K/UL — SIGNIFICANT CHANGE UP (ref 3.8–10.5)
WBC # FLD AUTO: 7.07 K/UL — SIGNIFICANT CHANGE UP (ref 3.8–10.5)

## 2019-04-04 PROCEDURE — 71045 X-RAY EXAM CHEST 1 VIEW: CPT | Mod: 26

## 2019-04-04 RX ORDER — DAPTOMYCIN 500 MG/10ML
490 INJECTION, POWDER, LYOPHILIZED, FOR SOLUTION INTRAVENOUS EVERY 24 HOURS
Qty: 0 | Refills: 0 | Status: DISCONTINUED | OUTPATIENT
Start: 2019-04-04 | End: 2019-04-05

## 2019-04-04 RX ADMIN — Medication 100 MILLIGRAM(S): at 21:12

## 2019-04-04 RX ADMIN — Medication 650 MILLIGRAM(S): at 13:00

## 2019-04-04 RX ADMIN — Medication 650 MILLIGRAM(S): at 05:19

## 2019-04-04 RX ADMIN — DAPTOMYCIN 119.6 MILLIGRAM(S): 500 INJECTION, POWDER, LYOPHILIZED, FOR SOLUTION INTRAVENOUS at 17:16

## 2019-04-04 RX ADMIN — HEPARIN SODIUM 5000 UNIT(S): 5000 INJECTION INTRAVENOUS; SUBCUTANEOUS at 05:19

## 2019-04-04 RX ADMIN — SODIUM CHLORIDE 3 MILLILITER(S): 9 INJECTION INTRAMUSCULAR; INTRAVENOUS; SUBCUTANEOUS at 21:12

## 2019-04-04 RX ADMIN — Medication 650 MILLIGRAM(S): at 17:16

## 2019-04-04 RX ADMIN — PANTOPRAZOLE SODIUM 40 MILLIGRAM(S): 20 TABLET, DELAYED RELEASE ORAL at 05:19

## 2019-04-04 RX ADMIN — LINEZOLID 300 MILLIGRAM(S): 600 INJECTION, SOLUTION INTRAVENOUS at 09:12

## 2019-04-04 RX ADMIN — Medication 650 MILLIGRAM(S): at 18:10

## 2019-04-04 RX ADMIN — SODIUM CHLORIDE 3 MILLILITER(S): 9 INJECTION INTRAMUSCULAR; INTRAVENOUS; SUBCUTANEOUS at 05:09

## 2019-04-04 RX ADMIN — HEPARIN SODIUM 5000 UNIT(S): 5000 INJECTION INTRAVENOUS; SUBCUTANEOUS at 17:16

## 2019-04-04 RX ADMIN — SENNA PLUS 2 TABLET(S): 8.6 TABLET ORAL at 21:12

## 2019-04-04 RX ADMIN — Medication 650 MILLIGRAM(S): at 23:43

## 2019-04-04 RX ADMIN — SODIUM CHLORIDE 3 MILLILITER(S): 9 INJECTION INTRAMUSCULAR; INTRAVENOUS; SUBCUTANEOUS at 13:00

## 2019-04-04 RX ADMIN — Medication 650 MILLIGRAM(S): at 12:06

## 2019-04-04 NOTE — PROGRESS NOTE ADULT - SUBJECTIVE AND OBJECTIVE BOX
43 year old male presents today to the ER after his visiting nurse was concerned for fever and tachycardia.  She had been there for follow up wound care. He had been admitted to [facility] on 7/25/18 for a loculated pleural effusion and his hospital course was complicated by sepsis, hypotension, EtOH withdrawal, respiratory failure, a hemothorax, and the need for a tracheostomy and a PEG. On 7/27/18 he underwent a FB, R VATS, R thoracotomy, drainage of empyema, R lung decortication.  On 8/6/18 he underwent a R thoracotomy, evacuation of hemothorax, FB, BAL.  On 8/9/18 he had a trach and a PEG placed.  He was decannulated on 9/7/18 and the PEG was removed on 9/11/18. He was discharged home on 9/12/18 with a right thorax VAC dressing.  The VAC dressing was continued until 2/7/19 when it was replaced with a dry sterile dressing.  He was put on a 7 day course of Levaquin at that time as well.  After that, he initially received wound care by the Visiting Nurse Service 3 times a week.  At his last visit on 3/7/19, he only reported an occasional nonproductive cough, mild incisional pain relieved by Ibuprofen, and dressings often becoming saturated between visits.  He was noted to have a superficial postoperative wound infection with beige drainage.  Silver nitrate was used on some granulation tissue and the wound was debrided and packed with dry gauze and covered with a dry sterile dressing.  He was prescribed a 5 day course of Bactrim and told to follow up in one month.  In the ER today, he is without new complaints.  He admits to mild pain at his wound, the copious drainage, and an occasional cough.  He denies any chills, SOB, chest pain, nausea, vomiting, or diarrhea.    3/29-Local I&D of wound done. Culture sent. Packing placed. Continued on Antibx. Today with continued purulent drainage w tunneling at site  4/1-+wound cultures. Awaiting sensitivity. Vanco stopped yesterday for rash. Cont on Zosyn.   4/2 Pt underwent Debridement of Chest wall with sharp debridement of muscle and rib. initial cultures grew MRSA from wound with blood, urine NTD  4/3 Pt started on Zyvoxx with plans for 6 weeks of therapy  Subjective: Pt in bed without complaints, no overnight events    Vital Signs:  Vital Signs Last 24 Hrs  T(C): 36.7 (04-04-19 @ 08:27), Max: 37 (04-03-19 @ 19:40)  T(F): 98.1 (04-04-19 @ 08:27), Max: 98.6 (04-03-19 @ 19:40)  HR: 84 (04-04-19 @ 08:27) (60 - 84)  BP: 108/59 (04-04-19 @ 08:27) (105/65 - 137/81)  RR: 18 (04-04-19 @ 08:27) (18 - 19)  SpO2: 98% (04-04-19 @ 08:27) (97% - 100%) on (O2)    Telemetry/Alarms:  Relevant labs, radiology and Medications reviewed                        12.9   7.07  )-----------( 118      ( 04 Apr 2019 05:10 )             42.5     04-04    136  |  103  |  6<L>  ----------------------------<  107<H>  4.9   |  21<L>  |  0.84    Ca    8.9      04 Apr 2019 05:10        MEDICATIONS  (STANDING):  acetaminophen   Tablet .. 650 milliGRAM(s) Oral every 6 hours  docusate sodium 100 milliGRAM(s) Oral three times a day  heparin  Injectable 5000 Unit(s) SubCutaneous every 12 hours  linezolid  IVPB 600 milliGRAM(s) IV Intermittent every 12 hours  pantoprazole    Tablet 40 milliGRAM(s) Oral before breakfast  senna 2 Tablet(s) Oral at bedtime  sodium chloride 0.9% lock flush 3 milliLiter(s) IV Push every 8 hours    MEDICATIONS  (PRN):  oxyCODONE    IR 5 milliGRAM(s) Oral every 3 hours PRN Moderate Pain (4 - 6)  oxyCODONE    IR 10 milliGRAM(s) Oral every 3 hours PRN Severe Pain (7 - 10)      Physical exam  General: WN/WD NAD  Neurology: Awake, nonfocal, SWAN x 4  Eyes: Scleras clear, PERRLA/ EOMI, Gross vision intact  ENT:Gross hearing intact, grossly patent pharynx, no stridor  Neck: Neck supple, trachea midline, No JVD,   Respiratory: CTA B/L, No wheezing, rales, rhonchi  CV: RRR, S1S2, no murmurs, rubs or gallops  Abdominal: Soft, NT, ND +BS,   Extremities: No edema, + peripheral pulses  Skin: No Rashes, Hematoma, Ecchymosis  Lymphatic: No Neck, axilla, groin LAD  Psych: Oriented x 3, normal affect  Incisions: Right flank VAC intact with 150cc serosang      I&O's Summary    03 Apr 2019 07:01  -  04 Apr 2019 07:00  --------------------------------------------------------  IN: 660 mL / OUT: 1190 mL / NET: -530 mL    04 Apr 2019 07:01  -  04 Apr 2019 09:44  --------------------------------------------------------  IN: 540 mL / OUT: 20 mL / NET: 520 mL        Assessment  43y Male  w/ PAST MEDICAL & SURGICAL HISTORY:  Empyema lung  Chronic GERD  History of tracheostomy  History of thoracotomy  S/P percutaneous endoscopic gastrostomy (PEG) tube placement  admitted with complaints of Patient is a 43y old  Male who presents with a chief complaint of Fever, Tachycardia (03 Apr 2019 10:34)  43M pmhx: GERD , ETOH, w/ withdrawal on previous hospitalization, Empyema s/p R VATS Thoracotomy, draiange of empyema and right lung decortication complicated by RTOR for bleeding w/ Rt Thoracotomy, evac of hemothorax with prolonged hospital course with respiratory failure w/ hypercarbia/hypercapnea requiring tracheostomy and PEG placement for dysphagia on 8/9/18. Pt underwent a trach revision on 8/13/18. Pt was ultimately discharged and required frqent office visits for VAC changes. Pt represented with a CW abcess requiring I&D and subsequent CW debridement  PLAN  Neuro: Pain management on Oxy  Pulm: Encourage coughing, deep breathing and use of incentive spirometry. Wean off supplemental oxygen as able.  Cardio: Monitor telemetry/alarms  GI: Tolerating diet. Continue stool softeners.  Renal: monitor urine output, supplement electrolytes as needed  Vasc: Heparin SC/SCDs for DVT prophylaxis  Heme: Stable H/H. . PLTS 207->118 f/u CBC  ID: On Zyvoxx, f/u WBC. Will need PICC for 6 wk abx courseStable.  Therapy: OOB/ambulate  Tubes: Monitor VAC output, VAC change tomorrow in the OR, NPO p/ MN  Discussed with Cardiothoracic Team at AM rounds.

## 2019-04-04 NOTE — PROGRESS NOTE ADULT - ASSESSMENT
42 yo M 7/27/18 s/p R VATS, R thoracotomy, drainage of empyema, R lung decortication.    On 8/6/18 he underwent a R thoracotomy, evacuation of hemothorax, FB, BAL.  He presents with a non healing thoracic wound, leukocytosis  CT: right chest wall abscess/pneumonia  with OM of rib   s/p washout and exploration with wound vac placement  Wound cx: MRSA and rare strept viridans    Plan   day 4 of antibiotics  Zyvox now changed to Daopto to cover for mrsa and streptococcus   concern for OM of bone, rib, hence will need six week of abx  wound care as per CTS 44 yo M 7/27/18 s/p R VATS, R thoracotomy, drainage of empyema, R lung decortication.    On 8/6/18 he underwent a R thoracotomy, evacuation of hemothorax, FB, BAL.  He presents with a non healing thoracic wound, leukocytosis  CT: right chest wall abscess/pneumonia  with OM of rib   s/p washout and exploration with wound vac placement  Wound cx: MRSA and rare strept viridans    Plan   day 4 of antibiotics  Zyvox now changed to Dapto to cover for mrsa and streptococcus   concern for OM of bone, rib, hence will need six week of abx  wound care as per CTS

## 2019-04-04 NOTE — PROGRESS NOTE ADULT - SUBJECTIVE AND OBJECTIVE BOX
CC: f/u for right chest wall MRSA abscess now s/p wound exploration and vac placement     Patient reports no new c/o    REVIEW OF SYSTEMS:  All other review of systems negative (Comprehensive ROS)      Vital Signs Last 24 Hrs  T(C): 36.9 (04 Apr 2019 15:56), Max: 37 (03 Apr 2019 19:40)  T(F): 98.5 (04 Apr 2019 15:56), Max: 98.6 (03 Apr 2019 19:40)  HR: 65 (04 Apr 2019 15:56) (64 - 84)  BP: 108/66 (04 Apr 2019 15:56) (105/65 - 116/65)  BP(mean): --  RR: 18 (04 Apr 2019 15:56) (17 - 18)  SpO2: 98% (04 Apr 2019 15:56) (97% - 100%)    PHYSICAL EXAM:  General: alert, no acute distress  Eyes:  anicteric, no conjunctival injection, no discharge  Oropharynx: no lesions or injection 	  Neck: supple, without adenopathy  Lungs: clear to auscultation  right chest wall posterior with a wound vac in place   Heart: regular rate and rhythm; no murmur, rubs or gallops  Abdomen: soft, nondistended, nontender, without mass or organomegaly  Skin: no lesions  Extremities: no clubbing, cyanosis, or edema  Neurologic: alert, oriented, moves all extremities    LAB RESULTS:                                     12.9   7.07  )-----------( 118      ( 04 Apr 2019 05:10 )             42.5   04-04    136  |  103  |  6<L>  ----------------------------<  107<H>  4.9   |  21<L>  |  0.84    Ca    8.9      04 Apr 2019 05:10              MICROBIOLOGY:  RECENT CULTURES:  04-02 @ 17:45 TISSUE         03-29 @ 11:09 BACK Staph. aureus *MRSA*  Streptococcus Viridans Group            RADIOLOGY REVIEWED:    < from: Xray Chest 1 View- PORTABLE-Routine (04.04.19 @ 08:20) >  Stable right-sided postsurgical changes above.    Possible trace right pleural effusion.    < end of copied text >      Antimicrobials Day #    DAPTOmycin IVPB 490 milliGRAM(s) IV Intermittent every 24 hours      Other Medications Reviewed

## 2019-04-05 ENCOUNTER — APPOINTMENT (OUTPATIENT)
Dept: THORACIC SURGERY | Facility: HOSPITAL | Age: 44
End: 2019-04-05

## 2019-04-05 PROBLEM — K21.9 GASTRO-ESOPHAGEAL REFLUX DISEASE WITHOUT ESOPHAGITIS: Chronic | Status: ACTIVE | Noted: 2019-03-29

## 2019-04-05 LAB
ANION GAP SERPL CALC-SCNC: 11 MMO/L — SIGNIFICANT CHANGE UP (ref 7–14)
APTT BLD: 33.9 SEC — SIGNIFICANT CHANGE UP (ref 27.5–36.3)
BLD GP AB SCN SERPL QL: NEGATIVE — SIGNIFICANT CHANGE UP
BUN SERPL-MCNC: 8 MG/DL — SIGNIFICANT CHANGE UP (ref 7–23)
CALCIUM SERPL-MCNC: 9.6 MG/DL — SIGNIFICANT CHANGE UP (ref 8.4–10.5)
CHLORIDE SERPL-SCNC: 104 MMOL/L — SIGNIFICANT CHANGE UP (ref 98–107)
CO2 SERPL-SCNC: 25 MMOL/L — SIGNIFICANT CHANGE UP (ref 22–31)
CREAT SERPL-MCNC: 0.85 MG/DL — SIGNIFICANT CHANGE UP (ref 0.5–1.3)
GLUCOSE SERPL-MCNC: 96 MG/DL — SIGNIFICANT CHANGE UP (ref 70–99)
HCT VFR BLD CALC: 40.1 % — SIGNIFICANT CHANGE UP (ref 39–50)
HGB BLD-MCNC: 12.6 G/DL — LOW (ref 13–17)
INR BLD: 1.07 — SIGNIFICANT CHANGE UP (ref 0.88–1.17)
MCHC RBC-ENTMCNC: 27.8 PG — SIGNIFICANT CHANGE UP (ref 27–34)
MCHC RBC-ENTMCNC: 31.4 % — LOW (ref 32–36)
MCV RBC AUTO: 88.5 FL — SIGNIFICANT CHANGE UP (ref 80–100)
NRBC # FLD: 0 K/UL — SIGNIFICANT CHANGE UP (ref 0–0)
PLATELET # BLD AUTO: 201 K/UL — SIGNIFICANT CHANGE UP (ref 150–400)
PMV BLD: 10.4 FL — SIGNIFICANT CHANGE UP (ref 7–13)
POTASSIUM SERPL-MCNC: 4 MMOL/L — SIGNIFICANT CHANGE UP (ref 3.5–5.3)
POTASSIUM SERPL-SCNC: 4 MMOL/L — SIGNIFICANT CHANGE UP (ref 3.5–5.3)
PROTHROM AB SERPL-ACNC: 12.2 SEC — SIGNIFICANT CHANGE UP (ref 9.8–13.1)
RBC # BLD: 4.53 M/UL — SIGNIFICANT CHANGE UP (ref 4.2–5.8)
RBC # FLD: 12.9 % — SIGNIFICANT CHANGE UP (ref 10.3–14.5)
RH IG SCN BLD-IMP: NEGATIVE — SIGNIFICANT CHANGE UP
SODIUM SERPL-SCNC: 140 MMOL/L — SIGNIFICANT CHANGE UP (ref 135–145)
WBC # BLD: 5.85 K/UL — SIGNIFICANT CHANGE UP (ref 3.8–10.5)
WBC # FLD AUTO: 5.85 K/UL — SIGNIFICANT CHANGE UP (ref 3.8–10.5)

## 2019-04-05 PROCEDURE — 97607 NEG PRS WND THR NDME<=50SQCM: CPT | Mod: 59

## 2019-04-05 PROCEDURE — 21502 I&D DP ABS/HMTM NCK RIB OSTC: CPT | Mod: 58

## 2019-04-05 RX ORDER — ACETAMINOPHEN 500 MG
650 TABLET ORAL EVERY 6 HOURS
Qty: 0 | Refills: 0 | Status: DISCONTINUED | OUTPATIENT
Start: 2019-04-05 | End: 2019-04-09

## 2019-04-05 RX ORDER — HYDROMORPHONE HYDROCHLORIDE 2 MG/ML
0.5 INJECTION INTRAMUSCULAR; INTRAVENOUS; SUBCUTANEOUS
Qty: 0 | Refills: 0 | Status: DISCONTINUED | OUTPATIENT
Start: 2019-04-05 | End: 2019-04-05

## 2019-04-05 RX ORDER — HEPARIN SODIUM 5000 [USP'U]/ML
5000 INJECTION INTRAVENOUS; SUBCUTANEOUS EVERY 12 HOURS
Qty: 0 | Refills: 0 | Status: DISCONTINUED | OUTPATIENT
Start: 2019-04-05 | End: 2019-04-09

## 2019-04-05 RX ORDER — ONDANSETRON 8 MG/1
4 TABLET, FILM COATED ORAL ONCE
Qty: 0 | Refills: 0 | Status: DISCONTINUED | OUTPATIENT
Start: 2019-04-05 | End: 2019-04-05

## 2019-04-05 RX ORDER — SENNA PLUS 8.6 MG/1
2 TABLET ORAL AT BEDTIME
Qty: 0 | Refills: 0 | Status: DISCONTINUED | OUTPATIENT
Start: 2019-04-05 | End: 2019-04-09

## 2019-04-05 RX ORDER — OXYCODONE HYDROCHLORIDE 5 MG/1
5 TABLET ORAL
Qty: 0 | Refills: 0 | Status: DISCONTINUED | OUTPATIENT
Start: 2019-04-05 | End: 2019-04-09

## 2019-04-05 RX ORDER — HYDROMORPHONE HYDROCHLORIDE 2 MG/ML
1 INJECTION INTRAMUSCULAR; INTRAVENOUS; SUBCUTANEOUS
Qty: 0 | Refills: 0 | Status: DISCONTINUED | OUTPATIENT
Start: 2019-04-05 | End: 2019-04-05

## 2019-04-05 RX ORDER — HYDROMORPHONE HYDROCHLORIDE 2 MG/ML
0.5 INJECTION INTRAMUSCULAR; INTRAVENOUS; SUBCUTANEOUS ONCE
Qty: 0 | Refills: 0 | Status: DISCONTINUED | OUTPATIENT
Start: 2019-04-05 | End: 2019-04-05

## 2019-04-05 RX ORDER — DAPTOMYCIN 500 MG/10ML
490 INJECTION, POWDER, LYOPHILIZED, FOR SOLUTION INTRAVENOUS EVERY 24 HOURS
Qty: 0 | Refills: 0 | Status: DISCONTINUED | OUTPATIENT
Start: 2019-04-05 | End: 2019-04-09

## 2019-04-05 RX ORDER — PANTOPRAZOLE SODIUM 20 MG/1
40 TABLET, DELAYED RELEASE ORAL
Qty: 0 | Refills: 0 | Status: DISCONTINUED | OUTPATIENT
Start: 2019-04-05 | End: 2019-04-09

## 2019-04-05 RX ORDER — DOCUSATE SODIUM 100 MG
100 CAPSULE ORAL THREE TIMES A DAY
Qty: 0 | Refills: 0 | Status: DISCONTINUED | OUTPATIENT
Start: 2019-04-05 | End: 2019-04-09

## 2019-04-05 RX ORDER — OXYCODONE HYDROCHLORIDE 5 MG/1
10 TABLET ORAL
Qty: 0 | Refills: 0 | Status: DISCONTINUED | OUTPATIENT
Start: 2019-04-05 | End: 2019-04-09

## 2019-04-05 RX ADMIN — OXYCODONE HYDROCHLORIDE 10 MILLIGRAM(S): 5 TABLET ORAL at 18:06

## 2019-04-05 RX ADMIN — HYDROMORPHONE HYDROCHLORIDE 0.5 MILLIGRAM(S): 2 INJECTION INTRAMUSCULAR; INTRAVENOUS; SUBCUTANEOUS at 20:50

## 2019-04-05 RX ADMIN — Medication 650 MILLIGRAM(S): at 18:06

## 2019-04-05 RX ADMIN — OXYCODONE HYDROCHLORIDE 5 MILLIGRAM(S): 5 TABLET ORAL at 08:47

## 2019-04-05 RX ADMIN — Medication 650 MILLIGRAM(S): at 18:59

## 2019-04-05 RX ADMIN — OXYCODONE HYDROCHLORIDE 10 MILLIGRAM(S): 5 TABLET ORAL at 19:00

## 2019-04-05 RX ADMIN — DAPTOMYCIN 119.6 MILLIGRAM(S): 500 INJECTION, POWDER, LYOPHILIZED, FOR SOLUTION INTRAVENOUS at 18:16

## 2019-04-05 RX ADMIN — SODIUM CHLORIDE 3 MILLILITER(S): 9 INJECTION INTRAMUSCULAR; INTRAVENOUS; SUBCUTANEOUS at 05:06

## 2019-04-05 RX ADMIN — HYDROMORPHONE HYDROCHLORIDE 0.5 MILLIGRAM(S): 2 INJECTION INTRAMUSCULAR; INTRAVENOUS; SUBCUTANEOUS at 20:20

## 2019-04-05 RX ADMIN — HEPARIN SODIUM 5000 UNIT(S): 5000 INJECTION INTRAVENOUS; SUBCUTANEOUS at 05:06

## 2019-04-05 RX ADMIN — PANTOPRAZOLE SODIUM 40 MILLIGRAM(S): 20 TABLET, DELAYED RELEASE ORAL at 05:05

## 2019-04-05 RX ADMIN — Medication 650 MILLIGRAM(S): at 00:13

## 2019-04-05 RX ADMIN — HEPARIN SODIUM 5000 UNIT(S): 5000 INJECTION INTRAVENOUS; SUBCUTANEOUS at 18:14

## 2019-04-05 RX ADMIN — OXYCODONE HYDROCHLORIDE 5 MILLIGRAM(S): 5 TABLET ORAL at 09:30

## 2019-04-05 RX ADMIN — Medication 650 MILLIGRAM(S): at 05:05

## 2019-04-05 NOTE — PROGRESS NOTE ADULT - SUBJECTIVE AND OBJECTIVE BOX
CC: f/u for right chest wall MRSA abscess, s/p wound exploration and vac placement     Patient seen in PACU, s/p R chest VAC change earlier    REVIEW OF SYSTEMS:  All other review of systems negative (Comprehensive ROS)    Medications reviewed    Vital Signs Last 24 Hrs  T(F): 96.2 (05 Apr 2019 15:00), Max: 98.7 (04 Apr 2019 19:34)  HR: 71 (05 Apr 2019 15:15) (63 - 85)  BP: 104/66 (05 Apr 2019 15:00) (104/66 - 145/72)  BP(mean): 87 (05 Apr 2019 14:45) (87 - 98)  RR: 19 (05 Apr 2019 15:15) (11 - 20)  SpO2: 97% (05 Apr 2019 15:15) (94% - 100%)    PHYSICAL EXAM:  General: alert, no acute distress  Eyes:  anicteric, no conjunctival injection, no discharge  Oropharynx: no lesions or injection 	  Neck: supple, without adenopathy  Lungs: clear to auscultation  R chest wall VAC in place, no surrounding erythema  Heart: regular rate and rhythm; no murmur, rubs or gallops  Abdomen: soft, nondistended, nontender, without mass or organomegaly  Skin: no lesions  Extremities: no clubbing, cyanosis, or edema  Neurologic: alert, moves all extremities    LAB RESULTS:                        12.6   5.85  )-----------( 201      ( 05 Apr 2019 06:33 )             40.1   04-05    140  |  104  |  8   ----------------------------<  96  4.0   |  25  |  0.85    Ca    9.6      05 Apr 2019 06:33    MICROBIOLOGY:  RECENT CULTURES:  Culture - Surg Site Aerob/Anaer w/Gm St (04.02.19 @ 17:45)    Culture - Surgical Site:   NO ORGANISMS ISOLATED AT 24 HOURSViridans  NO ORGANISMS ISOLATED AT 48 HRS.     Culture - Wound (03.29.19 @ 11:09)    -  Trimethoprim/Sulfamethoxazole: R >2/38 ALVIN    -  Vancomycin: S 2 ALVIN    -  Oxacillin: R >2 ALVIN    -  Penicillin: R >8 ALVIN    -  Rifampin: S <=1 ALVIN    -  Tetra/Doxy: S <=1 ALVIN    -  Gentamicin: S <=1 ALVIN    -  Levofloxacin: R >4 ALVIN    -  Linezolid: S    -  Moxifloxacin(Aerobic): R 2 ALVIN    -  Cefazolin: R <=4 ALVIN    -  Clindamycin: S    -  Daptomycin: S    -  Erythromycin: S    -  Ciprofloxacin: R >2 ALVIN    Specimen Source: BACK    Organism Identification: Staph. aureus *MRSA*  Streptococcus Viridans Group    Organism: Streptococcus Viridans GroupCR6    RADIOLOGY REVIEWED:  Xray Chest 1 View- PORTABLE-Routine (04.04.19 @ 08:20) >  Stable right-sided postsurgical changes above.    Possible trace right pleural effusion.

## 2019-04-05 NOTE — DISCHARGE NOTE PROVIDER - CARE PROVIDERS DIRECT ADDRESSES
,[email or web address],DirectAddress_Unknown

## 2019-04-05 NOTE — BRIEF OPERATIVE NOTE - NSICDXBRIEFPREOP_GEN_ALL_CORE_FT
PRE-OP DIAGNOSIS:  Open wound of chest wall 02-Apr-2019 17:10:05  Sawyer Pete
PRE-OP DIAGNOSIS:  Open wound of chest wall 02-Apr-2019 17:10:05  Sawyer Pete

## 2019-04-05 NOTE — DISCHARGE NOTE PROVIDER - CARE PROVIDER_API CALL
Ginette Sorto)  Surgery; Thoracic Surgery  [address] [place] [address]  Phone: [phone]  Fax: [phone]  Follow Up Time: Ginette Sorto)  Surgery; Thoracic Surgery  [address] [place] [address]  Phone: [phone]  Fax: [phone]  Follow Up Time:     Pedro Petersen)  Infectious Disease; Internal Medicine  [address] [facility], [address]  [place] [address]  Phone: [phone]  Fax: [phone]  Follow Up Time:

## 2019-04-05 NOTE — DISCHARGE NOTE PROVIDER - PROVIDER TOKENS
PROVIDER:[TOKEN:[[id number]]] PROVIDER:[TOKEN:[[id number]]],PROVIDER:[TOKEN:[[id number]]]

## 2019-04-05 NOTE — DISCHARGE NOTE PROVIDER - NSDCHHNEEDSERVICEOTHER_GEN_ALL_CORE_FT
VAC change by RN; Antibiotics adminstration via PICC VAC change by RN; Antibiotics administration via PICC

## 2019-04-05 NOTE — DISCHARGE NOTE PROVIDER - NSDCACTIVITY_GEN_ALL_CORE
No restrictions/Sex allowed/Walking - Outdoors allowed/Stairs allowed/Walking - Indoors allowed/Showering allowed/Do not drive or operate machinery Showering allowed/Walking - Indoors allowed/Do not drive or operate machinery/Walking - Outdoors allowed/Stairs allowed/No heavy lifting/straining

## 2019-04-05 NOTE — BRIEF OPERATIVE NOTE - OPERATION/FINDINGS
vac change, hemostasis achieved, wound washed out and vac replaced
Open exploration and washout of right chest wall wound. Pulse irrigation, followed by VAC placement.

## 2019-04-05 NOTE — PROGRESS NOTE ADULT - SUBJECTIVE AND OBJECTIVE BOX
Patient s/p wound vac change, resting comfortably.  Wound vac in place, complaining of pain at site.   Patient tolerating po and voiding.  Vital Signs Last 24 Hrs  T(C): 37.1 (05 Apr 2019 16:28), Max: 37.1 (04 Apr 2019 19:34)  T(F): 98.7 (05 Apr 2019 16:28), Max: 98.7 (04 Apr 2019 19:34)  HR: 60 (05 Apr 2019 16:28) (60 - 85)  BP: 143/77 (05 Apr 2019 16:28) (104/66 - 145/72)  BP(mean): 87 (05 Apr 2019 14:45) (87 - 98)  RR: 18 (05 Apr 2019 16:28) (11 - 20)  SpO2: 100% (05 Apr 2019 16:28) (94% - 100%)    I&O's Detail    04 Apr 2019 07:01  -  05 Apr 2019 07:00  --------------------------------------------------------  IN:    IV PiggyBack: 350 mL    Oral Fluid: 1340 mL  Total IN: 1690 mL    OUT:    VAC (Vacuum Assisted Closure) System: 175 mL    Voided: 1225 mL  Total OUT: 1400 mL    Total NET: 290 mL      05 Apr 2019 07:01  -  05 Apr 2019 19:19  --------------------------------------------------------  IN:    Oral Fluid: 120 mL  Total IN: 120 mL    OUT:  Total OUT: 0 mL    Total NET: 120 mL    MEDICATIONS  (STANDING):  acetaminophen   Tablet .. 650 milliGRAM(s) Oral every 6 hours  DAPTOmycin IVPB 490 milliGRAM(s) IV Intermittent every 24 hours  docusate sodium 100 milliGRAM(s) Oral three times a day  heparin  Injectable 5000 Unit(s) SubCutaneous every 12 hours  pantoprazole    Tablet 40 milliGRAM(s) Oral before breakfast  senna 2 Tablet(s) Oral at bedtime

## 2019-04-05 NOTE — DISCHARGE NOTE PROVIDER - NSDCCPCAREPLAN_GEN_ALL_CORE_FT
PRINCIPAL DISCHARGE DIAGNOSIS  Diagnosis: Multifocal pneumonia  Assessment and Plan of Treatment:       SECONDARY DISCHARGE DIAGNOSES  Diagnosis: Multifocal pneumonia  Assessment and Plan of Treatment:

## 2019-04-05 NOTE — PROVIDER CONTACT NOTE (OTHER) - SITUATION
Patient's HR went to the 150s. Checked on him patient is walking around room. Hr is now in the 90s. Patient's HR went to the 150s sinus tach. Checked on him patient is walking around room. Hr is now in the 90s.

## 2019-04-05 NOTE — DISCHARGE NOTE PROVIDER - NSDCFUADDINST_GEN_ALL_CORE_FT
[facility] should come for you antibiotic administration and for your VAC dressing change. [facility] Services will come to your house for antibiotic administration and for your VAC dressing change starting tomorrow 4/10/19. [facility] Services will come to your house for antibiotic administration and for your VAC dressing change starting tomorrow 4/10/19.  Wound to right chest wall is with wet to dry dressing/packing upon discharge: Length of Wound is 15cm, Width is 5cm with tunneling underneath the scapula that needs to be packed.

## 2019-04-05 NOTE — DISCHARGE NOTE PROVIDER - NSDCFUADDAPPT_GEN_ALL_CORE_FT
Please follow up with Dr. Sorto in the office in two weeks.   Follow up with your primary care physician as well.

## 2019-04-05 NOTE — BRIEF OPERATIVE NOTE - NSICDXBRIEFPROCEDURE_GEN_ALL_CORE_FT
PROCEDURES:  Exploration, wound, chest 02-Apr-2019 17:09:52  Sawyer Pete  Incision and drainage, seroma, chest 29-Mar-2019 09:50:16  Stephon Hollingsworth
PROCEDURES:  Exploration, wound, chest 02-Apr-2019 17:09:52  Sawyer Pete

## 2019-04-05 NOTE — DISCHARGE NOTE PROVIDER - HOSPITAL COURSE
This 43 year old male presented to the ER 3/28/19 after his visiting nurse was concerned about a fever and tachycardia.  She had been there for follow up wound care. He had been admitted to [facility] on 7/25/18 for a loculated pleural effusion and his hospital course was complicated by sepsis, hypotension, EtOH withdrawal, respiratory failure, a hemothorax, and the need for a tracheostomy and a PEG. On 7/27/18 he underwent a FB, R VATS, R thoracotomy, drainage of empyema, R lung decortication.  On 8/6/18 he underwent a R thoracotomy, evacuation of hemothorax, FB, BAL.  On 8/9/18 he had a trach and a PEG placed.  He was decannulated on 9/7/18 and the PEG was removed on 9/11/18. He was discharged home on 9/12/18 with a right thorax VAC dressing.  The VAC dressing was continued until 2/7/19 when it was replaced with a dry sterile dressing.  He was put on a 7 day course of Levaquin at that time as well.  After that, he initially received wound care by the Visiting Nurse Service 3 times a week.  At his last visit on 3/7/19, he only reported an occasional nonproductive cough, mild incisional pain relieved by Ibuprofen, and dressings often becoming saturated between visits.  He was noted to have a superficial postoperative wound infection with beige drainage.  Silver nitrate was used on some granulation tissue and the wound was debrided and packed with dry gauze and covered with a dry sterile dressing.  He was prescribed a 5 day course of Bactrim and told to follow up in one month.  In the ER on 3/28/19, he was without new complaints.  He admitted to mild pain at his wound, the copious drainage, and an occasional cough.  He denied any chills, SOB, chest pain, nausea, vomiting, or diarrhea.  A CT scan of the     chest showed a 9 x 1.9 x 4.7 cm collection.  An I & D was performed at the bedside and the wound was packed. A possible allergic reaction to Vancomycin was noted ans the pt was changed to Zyvox when his culture grew MRSA and Strep viridans.  On 4/2 the wound was debrided in the OR and a VAC was placed.  This was repeated on 4/6.   A PICC line needed to be placed before discharge for 6 weeks of Daptomycin as per ID. This 43 year old male presented to the ER 3/28/19 after his visiting nurse was concerned about a fever and tachycardia.  She had been there for follow up wound care. He had been admitted to [facility] on 7/25/18 for a loculated pleural effusion and his hospital course was complicated by sepsis, hypotension, EtOH withdrawal, respiratory failure, a hemothorax, and the need for a tracheostomy and a PEG. On 7/27/18 he underwent a FB, R VATS, R thoracotomy, drainage of empyema, R lung decortication.  On 8/6/18 he underwent a R thoracotomy, evacuation of hemothorax, FB, BAL.  On 8/9/18 he had a trach and a PEG placed.  He was decannulated on 9/7/18 and the PEG was removed on 9/11/18. He was discharged home on 9/12/18 with a right thorax VAC dressing.  The VAC dressing was continued until 2/7/19 when it was replaced with a dry sterile dressing.  He was put on a 7 day course of Levaquin at that time as well.  After that, he initially received wound care by the Visiting Nurse Service 3 times a week.  At his last visit on 3/7/19, he only reported an occasional nonproductive cough, mild incisional pain relieved by Ibuprofen, and dressings often becoming saturated between visits.  He was noted to have a superficial postoperative wound infection with beige drainage.  Silver nitrate was used on some granulation tissue and the wound was debrided and packed with dry gauze and covered with a dry sterile dressing.  He was prescribed a 5 day course of Bactrim and told to follow up in one month.  In the ER on 3/28/19, he was without new complaints.  He admitted to mild pain at his wound, the copious drainage, and an occasional cough.  He denied any chills, SOB, chest pain, nausea, vomiting, or diarrhea.  A CT scan of the chest showed a 9 x 1.9 x 4.7 cm collection.  An I & D was performed at the bedside and the wound was packed. A possible allergic reaction to Vancomycin was noted ans the pt was changed to Zyvox when his culture grew MRSA and Strep viridans.  On 4/2 the wound was debrided in the OR and a VAC was placed.  This was repeated on 4/5.   A PICC line needed to be placed before discharge for 6 weeks of Daptomycin as per ID. This 43 year old male presented to the ER 3/28/19 after his visiting nurse was concerned about a fever and tachycardia.  She had been there for follow up wound care. He had been admitted to [facility] on 7/25/18 for a loculated pleural effusion and his hospital course was complicated by sepsis, hypotension, EtOH withdrawal, respiratory failure, a hemothorax, and the need for a tracheostomy and a PEG. On 7/27/18 he underwent a FB, R VATS, R thoracotomy, drainage of empyema, R lung decortication.  On 8/6/18 he underwent a R thoracotomy, evacuation of hemothorax, FB, BAL.  On 8/9/18 he had a trach and a PEG placed.  He was decannulated on 9/7/18 and the PEG was removed on 9/11/18. He was discharged home on 9/12/18 with a right thorax VAC dressing.  The VAC dressing was continued until 2/7/19 when it was replaced with a dry sterile dressing.  He was put on a 7 day course of Levaquin at that time as well.  After that, he initially received wound care by the Visiting Nurse Service 3 times a week.  At his last visit on 3/7/19, he only reported an occasional nonproductive cough, mild incisional pain relieved by Ibuprofen, and dressings often becoming saturated between visits.  He was noted to have a superficial postoperative wound infection with beige drainage.  Silver nitrate was used on some granulation tissue and the wound was debrided and packed with dry gauze and covered with a dry sterile dressing.  He was prescribed a 5 day course of Bactrim and told to follow up in one month.  In the ER on 3/28/19, he was without new complaints.  He admitted to mild pain at his wound, the copious drainage, and an occasional cough.  He denied any chills, SOB, chest pain, nausea, vomiting, or diarrhea.  A CT scan of the chest showed a 9 x 1.9 x 4.7 cm collection.  An I & D was performed at the bedside and the wound was packed. A possible allergic reaction to Vancomycin was noted ans the pt was changed to Zyvox when his culture grew MRSA and Strep viridans.  On 4/2 the wound was debrided in the OR and a VAC was placed.  This was repeated on 4/5.  A PICC line was placed on 4/8/19 before discharge due to his need  for 6 weeks of Daptomycin as per ID. His Wound Vac was changed on 4/9/19 to a wet to dry packing for sicharge today 4/9/149 with follow up chare at home tomorrow for placement of wound Vac by visiting nursing service.

## 2019-04-05 NOTE — PROGRESS NOTE ADULT - ASSESSMENT
44 yo M s/p remote R VATS, R thoracotomy, drainage of empyema, R lung decortication.    He presents with non healing thoracic wound, leukocytosis  CT: right chest wall abscess/pneumonia with OM of rib   S/p washout and exploration with wound vac placement  Wound cx: MRSA and rare Viridans Strep  Afebrile, comfortable, s/p VAC change    Plan:   Day 5 antibiotics- now IV Dapto for mrsa and strep- Rib osteomyelitis coverage  Options include completion of full 6 wk course with daily Dapto via PICC or Dalbavancin 1500 mg on Day 1 at home and then 1500 mg 7 days later- 2 dose regimen will provide levels for up to 6 wks and does not require PICC  Wound care as per CTS

## 2019-04-06 LAB — SPECIMEN SOURCE: SIGNIFICANT CHANGE UP

## 2019-04-06 RX ADMIN — OXYCODONE HYDROCHLORIDE 10 MILLIGRAM(S): 5 TABLET ORAL at 08:46

## 2019-04-06 RX ADMIN — HEPARIN SODIUM 5000 UNIT(S): 5000 INJECTION INTRAVENOUS; SUBCUTANEOUS at 17:25

## 2019-04-06 RX ADMIN — Medication 650 MILLIGRAM(S): at 12:14

## 2019-04-06 RX ADMIN — OXYCODONE HYDROCHLORIDE 10 MILLIGRAM(S): 5 TABLET ORAL at 15:19

## 2019-04-06 RX ADMIN — OXYCODONE HYDROCHLORIDE 10 MILLIGRAM(S): 5 TABLET ORAL at 09:15

## 2019-04-06 RX ADMIN — Medication 650 MILLIGRAM(S): at 18:07

## 2019-04-06 RX ADMIN — OXYCODONE HYDROCHLORIDE 10 MILLIGRAM(S): 5 TABLET ORAL at 02:46

## 2019-04-06 RX ADMIN — OXYCODONE HYDROCHLORIDE 10 MILLIGRAM(S): 5 TABLET ORAL at 21:33

## 2019-04-06 RX ADMIN — DAPTOMYCIN 119.6 MILLIGRAM(S): 500 INJECTION, POWDER, LYOPHILIZED, FOR SOLUTION INTRAVENOUS at 17:30

## 2019-04-06 RX ADMIN — PANTOPRAZOLE SODIUM 40 MILLIGRAM(S): 20 TABLET, DELAYED RELEASE ORAL at 05:09

## 2019-04-06 RX ADMIN — HEPARIN SODIUM 5000 UNIT(S): 5000 INJECTION INTRAVENOUS; SUBCUTANEOUS at 05:09

## 2019-04-06 RX ADMIN — Medication 650 MILLIGRAM(S): at 13:11

## 2019-04-06 RX ADMIN — Medication 650 MILLIGRAM(S): at 17:25

## 2019-04-06 RX ADMIN — OXYCODONE HYDROCHLORIDE 10 MILLIGRAM(S): 5 TABLET ORAL at 22:03

## 2019-04-06 RX ADMIN — Medication 650 MILLIGRAM(S): at 05:39

## 2019-04-06 RX ADMIN — Medication 650 MILLIGRAM(S): at 05:09

## 2019-04-06 NOTE — PROGRESS NOTE ADULT - SUBJECTIVE AND OBJECTIVE BOX
Subjective: 42 y/o male presents lying down in bed in NAD. States "I have some pain to my shoulder and near the wound but Im okay". He is ambulating without issue in his room, currently on isolation. Wound vac in place.    Vital Signs:  Vital Signs Last 24 Hrs  T(C): 36.7 (04-06-19 @ 05:05), Max: 37.1 (04-05-19 @ 16:28)  T(F): 98.1 (04-06-19 @ 05:05), Max: 98.7 (04-05-19 @ 16:28)  HR: 81 (04-06-19 @ 05:05) (60 - 100)  BP: 105/62 (04-06-19 @ 05:05) (104/66 - 145/72)  RR: 18 (04-06-19 @ 05:05) (11 - 20)  SpO2: 99% (04-06-19 @ 05:05) (94% - 100%) on (O2)    Pertinent Physical Exam:  Telemetry/Alarms: Sinus rhythm  General: WN/WD NAD  Neurology: Awake, nonfocal, SWAN x 4  Eyes: Scleras clear, PERRLA/ EOMI, Gross vision intact  ENT:Gross hearing intact, grossly patent pharynx, no stridor  Neck: Neck supple, trachea midline, No JVD,   Respiratory: CTA B/L, No wheezing, rales, rhonchi  CV: RRR, S1S2, no murmurs, rubs or gallops  Abdominal: Soft, NT, ND +BS,   Extremities: No edema, + peripheral pulses  Skin: No Rashes, Hematoma, Ecchymosis  Lymphatic: No Neck, axilla, groin LAD  Psych: Oriented x 3, normal affect  Incisions: Wound Vac in place, skin intact around vac placement     Drainage: 50cc    I&O's Summary    05 Apr 2019 07:01  -  06 Apr 2019 07:00  --------------------------------------------------------  IN: 600 mL / OUT: 275 mL / NET: 325 mL        Relevant labs, radiology and Medications reviewed                        12.6   5.85  )-----------( 201      ( 05 Apr 2019 06:33 )             40.1     04-05    140  |  104  |  8   ----------------------------<  96  4.0   |  25  |  0.85    Ca    9.6      05 Apr 2019 06:33      PT/INR - ( 05 Apr 2019 06:33 )   PT: 12.2 SEC;   INR: 1.07          PTT - ( 05 Apr 2019 06:33 )  PTT:33.9 SEC  MEDICATIONS  (STANDING):  acetaminophen   Tablet .. 650 milliGRAM(s) Oral every 6 hours  DAPTOmycin IVPB 490 milliGRAM(s) IV Intermittent every 24 hours  docusate sodium 100 milliGRAM(s) Oral three times a day  heparin  Injectable 5000 Unit(s) SubCutaneous every 12 hours  pantoprazole    Tablet 40 milliGRAM(s) Oral before breakfast  senna 2 Tablet(s) Oral at bedtime    MEDICATIONS  (PRN):  oxyCODONE    IR 5 milliGRAM(s) Oral every 3 hours PRN Moderate Pain (4 - 6)  oxyCODONE    IR 10 milliGRAM(s) Oral every 3 hours PRN Severe Pain (7 - 10)      Assessment  43y Male  w/ PAST MEDICAL & SURGICAL HISTORY:  Empyema lung  Chronic GERD  History of tracheostomy  History of thoracotomy  S/P percutaneous endoscopic gastrostomy (PEG) tube placement  admitted with complaints of Patient is a 43y old  Male who presents with a chief complaint of Fever, Tachycardia (05 Apr 2019 19:17)    43 year old male presents today to the ER after his visiting nurse was concerned for fever and tachycardia.  She had been there for follow up wound care. He had been admitted to [facility] on 7/25/18 for a loculated pleural effusion and his hospital course was complicated by sepsis, hypotension, EtOH withdrawal, respiratory failure, a hemothorax, and the need for a tracheostomy and a PEG. On 7/27/18 he underwent a FB, R VATS, R thoracotomy, drainage of empyema, R lung decortication.  On 8/6/18 he underwent a R thoracotomy, evacuation of hemothorax, FB, BAL.  On 8/9/18 he had a trach and a PEG placed.  He was decannulated on 9/7/18 and the PEG was removed on 9/11/18. He was discharged home on 9/12/18 with a right thorax VAC dressing.  The VAC dressing was continued until 2/7/19 when it was replaced with a dry sterile dressing.  He was put on a 7 day course of Levaquin at that time as well.  After that, he initially received wound care by the Visiting Nurse Service 3 times a week.  At his last visit on 3/7/19, he only reported an occasional nonproductive cough, mild incisional pain relieved by Ibuprofen, and dressings often becoming saturated between visits.  He was noted to have a superficial postoperative wound infection with beige drainage.  Silver nitrate was used on some granulation tissue and the wound was debrided and packed with dry gauze and covered with a dry sterile dressing.  He was prescribed a 5 day course of Bactrim and told to follow up in one month.  In the ER today, he is without new complaints.  He admits to mild pain at his wound, the copious drainage, and an occasional cough.  He denies any chills, SOB, chest pain, nausea, vomiting, or diarrhea.      3/29-Local I&D of wound done. Culture sent. Packing placed. Continued on Antibx. Today with continued purulent drainage w tunneling at site  4/1-+wound cultures. Awaiting sensitivity. Vanco stopped yesterday for rash. Cont on Zosyn.   4/2 Pt underwent Debridement of Chest wall with sharp debridement of muscle and rib. initial cultures grew MRSA from wound with blood, urine NTD  4/3 Pt started on Zyvoxx with plans for 6 weeks of therapy  4/6 POD 1 from RTOR for wound Vac change. Pending PICC line placement.        PLAN  Neuro: Pain management  Pulm: Encourage coughing, deep breathing and use of incentive spirometry. Cardio: Monitor telemetry/alarms  GI: Tolerating diet. Continue stool softeners.  Renal: monitor urine output, supplement electrolytes as needed  Vasc: Heparin SC/SCDs for DVT prophylaxis  Heme: Stable H/H.    ID: Continue with Daptomycin, ID following.   Therapy: OOB/ambulate  Tubes: Monitor Vac output  Disposition: Aim to have Bedside Wound Vac Change on monday  Discussed with Cardiothoracic Team at AM rounds.

## 2019-04-06 NOTE — PROGRESS NOTE ADULT - SUBJECTIVE AND OBJECTIVE BOX
ANESTHESIA POSTOP CHECK    43y Male POSTOP DAY 1 S/P general anesthesia for wound debridement, vac change on 04/05/2019    Vital Signs Last 24 Hrs  T(C): 36.4 (06 Apr 2019 08:41), Max: 37.1 (05 Apr 2019 16:28)  T(F): 97.6 (06 Apr 2019 08:41), Max: 98.7 (05 Apr 2019 16:28)  HR: 71 (06 Apr 2019 08:41) (60 - 100)  BP: 134/72 (06 Apr 2019 08:41) (104/66 - 145/72)  BP(mean): 87 (05 Apr 2019 14:45) (87 - 98)  RR: 17 (06 Apr 2019 08:41) (11 - 20)  SpO2: 98% (06 Apr 2019 08:41) (94% - 100%)  I&O's Summary    05 Apr 2019 07:01  -  06 Apr 2019 07:00  --------------------------------------------------------  IN: 600 mL / OUT: 275 mL / NET: 325 mL    06 Apr 2019 07:01  -  06 Apr 2019 10:33  --------------------------------------------------------  IN: 100 mL / OUT: 250 mL / NET: -150 mL        [X ] NO APPARENT ANESTHESIA COMPLICATIONS      Comments:

## 2019-04-07 LAB
ANION GAP SERPL CALC-SCNC: 12 MMO/L — SIGNIFICANT CHANGE UP (ref 7–14)
BUN SERPL-MCNC: 12 MG/DL — SIGNIFICANT CHANGE UP (ref 7–23)
CALCIUM SERPL-MCNC: 9.3 MG/DL — SIGNIFICANT CHANGE UP (ref 8.4–10.5)
CHLORIDE SERPL-SCNC: 107 MMOL/L — SIGNIFICANT CHANGE UP (ref 98–107)
CO2 SERPL-SCNC: 24 MMOL/L — SIGNIFICANT CHANGE UP (ref 22–31)
CREAT SERPL-MCNC: 0.85 MG/DL — SIGNIFICANT CHANGE UP (ref 0.5–1.3)
GLUCOSE SERPL-MCNC: 100 MG/DL — HIGH (ref 70–99)
HCT VFR BLD CALC: 39.2 % — SIGNIFICANT CHANGE UP (ref 39–50)
HGB BLD-MCNC: 12.2 G/DL — LOW (ref 13–17)
MCHC RBC-ENTMCNC: 28 PG — SIGNIFICANT CHANGE UP (ref 27–34)
MCHC RBC-ENTMCNC: 31.1 % — LOW (ref 32–36)
MCV RBC AUTO: 90.1 FL — SIGNIFICANT CHANGE UP (ref 80–100)
NRBC # FLD: 0 K/UL — SIGNIFICANT CHANGE UP (ref 0–0)
PLATELET # BLD AUTO: 191 K/UL — SIGNIFICANT CHANGE UP (ref 150–400)
PMV BLD: 10.3 FL — SIGNIFICANT CHANGE UP (ref 7–13)
POTASSIUM SERPL-MCNC: 3.5 MMOL/L — SIGNIFICANT CHANGE UP (ref 3.5–5.3)
POTASSIUM SERPL-SCNC: 3.5 MMOL/L — SIGNIFICANT CHANGE UP (ref 3.5–5.3)
RBC # BLD: 4.35 M/UL — SIGNIFICANT CHANGE UP (ref 4.2–5.8)
RBC # FLD: 13.5 % — SIGNIFICANT CHANGE UP (ref 10.3–14.5)
SODIUM SERPL-SCNC: 143 MMOL/L — SIGNIFICANT CHANGE UP (ref 135–145)
WBC # BLD: 8 K/UL — SIGNIFICANT CHANGE UP (ref 3.8–10.5)
WBC # FLD AUTO: 8 K/UL — SIGNIFICANT CHANGE UP (ref 3.8–10.5)

## 2019-04-07 RX ADMIN — Medication 650 MILLIGRAM(S): at 05:11

## 2019-04-07 RX ADMIN — HEPARIN SODIUM 5000 UNIT(S): 5000 INJECTION INTRAVENOUS; SUBCUTANEOUS at 17:16

## 2019-04-07 RX ADMIN — Medication 650 MILLIGRAM(S): at 23:02

## 2019-04-07 RX ADMIN — Medication 650 MILLIGRAM(S): at 13:14

## 2019-04-07 RX ADMIN — OXYCODONE HYDROCHLORIDE 10 MILLIGRAM(S): 5 TABLET ORAL at 23:01

## 2019-04-07 RX ADMIN — Medication 650 MILLIGRAM(S): at 12:17

## 2019-04-07 RX ADMIN — Medication 650 MILLIGRAM(S): at 18:15

## 2019-04-07 RX ADMIN — Medication 650 MILLIGRAM(S): at 17:17

## 2019-04-07 RX ADMIN — PANTOPRAZOLE SODIUM 40 MILLIGRAM(S): 20 TABLET, DELAYED RELEASE ORAL at 05:11

## 2019-04-07 RX ADMIN — HEPARIN SODIUM 5000 UNIT(S): 5000 INJECTION INTRAVENOUS; SUBCUTANEOUS at 05:11

## 2019-04-07 RX ADMIN — DAPTOMYCIN 119.6 MILLIGRAM(S): 500 INJECTION, POWDER, LYOPHILIZED, FOR SOLUTION INTRAVENOUS at 17:16

## 2019-04-07 NOTE — PROGRESS NOTE ADULT - SUBJECTIVE AND OBJECTIVE BOX
Subjective: 42 y/o male presents walking around in room, presently in NAD. Wound to wound vac, skin c/d/i around vac site. Patient denies sob, cp, fevers, chills, malaise.     Vital Signs:  Vital Signs Last 24 Hrs  T(C): 36.7 (04-07-19 @ 09:08), Max: 36.9 (04-06-19 @ 16:49)  T(F): 98.1 (04-07-19 @ 09:08), Max: 98.4 (04-06-19 @ 16:49)  HR: 82 (04-07-19 @ 09:08) (60 - 82)  BP: 114/71 (04-07-19 @ 09:08) (105/51 - 130/71)  RR: 17 (04-07-19 @ 09:08) (16 - 18)  SpO2: 98% (04-07-19 @ 09:08) (97% - 99%) on (O2)    Pertinent Physical Exam:  Telemetry/Alarms: Sinus rhythm  General: WN/WD NAD  Neurology: Awake, nonfocal, SWAN x 4  Eyes: Scleras clear, PERRLA/ EOMI, Gross vision intact  ENT:Gross hearing intact, grossly patent pharynx, no stridor  Neck: Neck supple, trachea midline, No JVD,   Respiratory: CTA B/L, No wheezing, rales, rhonchi  CV: RRR, S1S2, no murmurs, rubs or gallops  Abdominal: Soft, NT, ND +BS,   Extremities: No edema, + peripheral pulses  Skin: No Rashes, Hematoma, Ecchymosis  Lymphatic: No Neck, axilla, groin LAD  Psych: Oriented x 3, normal affect  Incisions: c/d/i wound vac in place    I&O's Summary    06 Apr 2019 07:01  -  07 Apr 2019 07:00  --------------------------------------------------------  IN: 1530 mL / OUT: 1375 mL / NET: 155 mL        Relevant labs, radiology and Medications reviewed                        12.2   8.00  )-----------( 191      ( 07 Apr 2019 06:10 )             39.2     04-07    143  |  107  |  12  ----------------------------<  100<H>  3.5   |  24  |  0.85    Ca    9.3      07 Apr 2019 06:10        MEDICATIONS  (STANDING):  acetaminophen   Tablet .. 650 milliGRAM(s) Oral every 6 hours  DAPTOmycin IVPB 490 milliGRAM(s) IV Intermittent every 24 hours  docusate sodium 100 milliGRAM(s) Oral three times a day  heparin  Injectable 5000 Unit(s) SubCutaneous every 12 hours  pantoprazole    Tablet 40 milliGRAM(s) Oral before breakfast  senna 2 Tablet(s) Oral at bedtime    MEDICATIONS  (PRN):  oxyCODONE    IR 5 milliGRAM(s) Oral every 3 hours PRN Moderate Pain (4 - 6)  oxyCODONE    IR 10 milliGRAM(s) Oral every 3 hours PRN Severe Pain (7 - 10)      Assessment  43y Male  w/ PAST MEDICAL & SURGICAL HISTORY:  Empyema lung  Chronic GERD  History of tracheostomy  History of thoracotomy  S/P percutaneous endoscopic gastrostomy (PEG) tube placement  admitted with complaints of Patient is a 43y old  Male who presents with a chief complaint of Fever, Tachycardia (06 Apr 2019 10:33)    43 year old male presents today to the ER after his visiting nurse was concerned for fever and tachycardia.  She had been there for follow up wound care. He had been admitted to [facility] on 7/25/18 for a loculated pleural effusion and his hospital course was complicated by sepsis, hypotension, EtOH withdrawal, respiratory failure, a hemothorax, and the need for a tracheostomy and a PEG. On 7/27/18 he underwent a FB, R VATS, R thoracotomy, drainage of empyema, R lung decortication.  On 8/6/18 he underwent a R thoracotomy, evacuation of hemothorax, FB, BAL.  On 8/9/18 he had a trach and a PEG placed.  He was decannulated on 9/7/18 and the PEG was removed on 9/11/18. He was discharged home on 9/12/18 with a right thorax VAC dressing.  The VAC dressing was continued until 2/7/19 when it was replaced with a dry sterile dressing.  He was put on a 7 day course of Levaquin at that time as well.  After that, he initially received wound care by the Visiting Nurse Service 3 times a week.  At his last visit on 3/7/19, he only reported an occasional nonproductive cough, mild incisional pain relieved by Ibuprofen, and dressings often becoming saturated between visits.  He was noted to have a superficial postoperative wound infection with beige drainage.  Silver nitrate was used on some granulation tissue and the wound was debrided and packed with dry gauze and covered with a dry sterile dressing.  He was prescribed a 5 day course of Bactrim and told to follow up in one month.  In the ER today, he is without new complaints.  He admits to mild pain at his wound, the copious drainage, and an occasional cough.  He denies any chills, SOB, chest pain, nausea, vomiting, or diarrhea.      3/29-Local I&D of wound done. Culture sent. Packing placed. Continued on Antibx. Today with continued purulent drainage w tunneling at site  4/1-+wound cultures. Awaiting sensitivity. Vanco stopped yesterday for rash. Cont on Zosyn.   4/2 Pt underwent Debridement of Chest wall with sharp debridement of muscle and rib. initial cultures grew MRSA from wound with blood, urine NTD  4/3 Pt started on Zyvoxx with plans for 6 weeks of therapy  4/6 POD 1 from [facility] for wound Vac change. Pending PICC line placement.      PLAN  Neuro: Pain management  Pulm: Encourage coughing, deep breathing and use of incentive spirometry.   Cardio: Monitor telemetry/alarms  GI: Tolerating diet. Continue stool softeners.  Renal: monitor urine output, supplement electrolytes as needed  Vasc: Heparin SC/SCDs for DVT prophylaxis  Heme: Stable H/H.   ID: Off antibiotics. Stable.  Therapy: OOB/ambulate  Disposition: Aim to have PICC line placed, bedside wound Vac change tomorrow and home with VNS  Discussed with Cardiothoracic Team at AM rounds.

## 2019-04-08 LAB — CULTURE - SURGICAL SITE: SIGNIFICANT CHANGE UP

## 2019-04-08 PROCEDURE — 99233 SBSQ HOSP IP/OBS HIGH 50: CPT

## 2019-04-08 PROCEDURE — 36573 INSJ PICC RS&I 5 YR+: CPT

## 2019-04-08 RX ADMIN — HEPARIN SODIUM 5000 UNIT(S): 5000 INJECTION INTRAVENOUS; SUBCUTANEOUS at 05:46

## 2019-04-08 RX ADMIN — OXYCODONE HYDROCHLORIDE 10 MILLIGRAM(S): 5 TABLET ORAL at 06:50

## 2019-04-08 RX ADMIN — OXYCODONE HYDROCHLORIDE 10 MILLIGRAM(S): 5 TABLET ORAL at 00:22

## 2019-04-08 RX ADMIN — OXYCODONE HYDROCHLORIDE 10 MILLIGRAM(S): 5 TABLET ORAL at 05:47

## 2019-04-08 RX ADMIN — PANTOPRAZOLE SODIUM 40 MILLIGRAM(S): 20 TABLET, DELAYED RELEASE ORAL at 05:47

## 2019-04-08 RX ADMIN — Medication 650 MILLIGRAM(S): at 13:00

## 2019-04-08 RX ADMIN — Medication 650 MILLIGRAM(S): at 12:38

## 2019-04-08 RX ADMIN — DAPTOMYCIN 119.6 MILLIGRAM(S): 500 INJECTION, POWDER, LYOPHILIZED, FOR SOLUTION INTRAVENOUS at 18:25

## 2019-04-08 RX ADMIN — HEPARIN SODIUM 5000 UNIT(S): 5000 INJECTION INTRAVENOUS; SUBCUTANEOUS at 17:28

## 2019-04-08 RX ADMIN — Medication 650 MILLIGRAM(S): at 00:22

## 2019-04-08 RX ADMIN — Medication 650 MILLIGRAM(S): at 05:46

## 2019-04-08 RX ADMIN — Medication 650 MILLIGRAM(S): at 17:28

## 2019-04-08 RX ADMIN — OXYCODONE HYDROCHLORIDE 10 MILLIGRAM(S): 5 TABLET ORAL at 16:55

## 2019-04-08 RX ADMIN — OXYCODONE HYDROCHLORIDE 10 MILLIGRAM(S): 5 TABLET ORAL at 09:20

## 2019-04-08 RX ADMIN — OXYCODONE HYDROCHLORIDE 10 MILLIGRAM(S): 5 TABLET ORAL at 09:05

## 2019-04-08 NOTE — PROGRESS NOTE ADULT - ASSESSMENT
44 yo M s/p remote R VATS, R thoracotomy, drainage of empyema, R lung decortication.    He presents with non healing thoracic wound, leukocytosis  CT: right chest wall abscess/pneumonia with OM of rib   S/p washout and exploration with wound vac placement  Wound cx: MRSA and rare Viridans Strep  Afebrile, comfortable, s/p VAC change  He reports no complaints and reports that he wants to go home    Plan:   Day 8 of 42  antibiotics- now IV Dapto for mrsa and strep- Rib osteomyelitis coverage  he will also need weekly cbc, cmp and ESR, CRP while he remains on antibiotics  he will need to follow up in our office   Wound care as per CTS

## 2019-04-08 NOTE — CHART NOTE - NSCHARTNOTEFT_GEN_A_CORE
PRE-INTERVENTIONAL RADIOLOGY PROCEDURE NOTE      Patient Age: 43    Patient Gender: m    Procedure: PICC line    Diagnosis/Indication: MRSA wound infection.     Interventional Radiology Attending Physician:    Ordering Attending Physician: Dr. Burns    Pertinent Medical History: wound infection, MRSA    Pertinent labs:                      12.2   8.00  )-----------( 191      ( 07 Apr 2019 06:10 )             39.2       04-07    143  |  107  |  12  ----------------------------<  100<H>  3.5   |  24  |  0.85    Ca    9.3      07 Apr 2019 06:10                Patient and Family Aware ? Yes

## 2019-04-08 NOTE — PROGRESS NOTE ADULT - SUBJECTIVE AND OBJECTIVE BOX
Subjective: "I really want to go home" pt denies SOB. minimal pain at VAC site. OOB w min assist.     Vital Signs:  Vital Signs Last 24 Hrs  T(C): 36.5 (04-08-19 @ 07:47), Max: 37.1 (04-07-19 @ 23:54)  T(F): 97.7 (04-08-19 @ 07:47), Max: 98.7 (04-07-19 @ 23:54)  HR: 68 (04-08-19 @ 07:47) (68 - 93)  BP: 105/77 (04-08-19 @ 07:47) (105/77 - 121/88)  RR: 16 (04-08-19 @ 07:47) (16 - 18)  SpO2: 100% (04-08-19 @ 07:47) (98% - 100%) on (O2)    Telemetry/Alarms:  General: WN/WD NAD  Neurology: Awake, nonfocal, SWAN x 4  Eyes: Scleras clear, PERRLA/ EOMI, Gross vision intact  ENT:Gross hearing intact, grossly patent pharynx, no stridor  Neck: Neck supple, trachea midline, No JVD,   Respiratory: CTA B/L, No wheezing, rales, rhonchi. Dec BS Rt. LL  CV: RRR, S1S2, no murmurs, rubs or gallops  Abdominal: Soft, NT, ND +BS, +bm  Extremities: No edema, + peripheral pulses  Skin: No Rashes, Hematoma, Ecchymosis  Lymphatic: No Neck, axilla, groin LAD  Psych: Oriented x 3, normal affect  Incisions: Rt. Thoracotomy incision w VAC sponge.   Tubes: none  Relevant labs, radiology and Medications reviewed                        12.2   8.00  )-----------( 191      ( 07 Apr 2019 06:10 )             39.2     04-07    143  |  107  |  12  ----------------------------<  100<H>  3.5   |  24  |  0.85    Ca    9.3      07 Apr 2019 06:10        MEDICATIONS  (STANDING):  acetaminophen   Tablet .. 650 milliGRAM(s) Oral every 6 hours  DAPTOmycin IVPB 490 milliGRAM(s) IV Intermittent every 24 hours  docusate sodium 100 milliGRAM(s) Oral three times a day  heparin  Injectable 5000 Unit(s) SubCutaneous every 12 hours  pantoprazole    Tablet 40 milliGRAM(s) Oral before breakfast  senna 2 Tablet(s) Oral at bedtime    MEDICATIONS  (PRN):  oxyCODONE    IR 5 milliGRAM(s) Oral every 3 hours PRN Moderate Pain (4 - 6)  oxyCODONE    IR 10 milliGRAM(s) Oral every 3 hours PRN Severe Pain (7 - 10)    Pertinent Physical Exam  I&O's Summary    07 Apr 2019 07:01  -  08 Apr 2019 07:00  --------------------------------------------------------  IN: 810 mL / OUT: 50 mL / NET: 760 mL    08 Apr 2019 07:01  -  08 Apr 2019 13:57  --------------------------------------------------------  IN: 0 mL / OUT: 100 mL / NET: -100 mL    Social: Lives w family  Former smoker  Social ETOH    Assessment  43y Male  w/ PAST MEDICAL & SURGICAL HISTORY:  Empyema lung  Chronic GERD  History of tracheostomy  History of thoracotomy  S/P percutaneous endoscopic gastrostomy (PEG) tube placement  admitted with complaints of Patient is a 43y old  Male who presents with a chief complaint of Fever, Tachycardia (07 Apr 2019 10:18)  43 year old male presents today to the ER after his visiting nurse was concerned for fever and tachycardia.  She had been there for follow up wound care. He had been admitted to [facility] on 7/25/18 for a loculated pleural effusion and his hospital course was complicated by sepsis, hypotension, EtOH withdrawal, respiratory failure, a hemothorax, and the need for a tracheostomy and a PEG. On 7/27/18 he underwent a FB, R VATS, R thoracotomy, drainage of empyema, R lung decortication.  On 8/6/18 he underwent a R thoracotomy, evacuation of hemothorax, FB, BAL.  On 8/9/18 he had a trach and a PEG placed.  He was decannulated on 9/7/18 and the PEG was removed on 9/11/18. He was discharged home on 9/12/18 with a right thorax VAC dressing.  The VAC dressing was continued until 2/7/19 when it was replaced with a dry sterile dressing.  He was put on a 7 day course of Levaquin at that time as well.  After that, he initially received wound care by the Visiting Nurse Service 3 times a week.  At his last visit on 3/7/19, he only reported an occasional nonproductive cough, mild incisional pain relieved by Ibuprofen, and dressings often becoming saturated between visits.  He was noted to have a superficial postoperative wound infection with beige drainage.  Silver nitrate was used on some granulation tissue and the wound was debrided and packed with dry gauze and covered with a dry sterile dressing.  He was prescribed a 5 day course of Bactrim and told to follow up in one month.  In the ER today, he is without new complaints.  He admits to mild pain at his wound, the copious drainage, and an occasional cough.  He denies any chills, SOB, chest pain, nausea, vomiting, or diarrhea.      3/29-Local I&D of wound done. Culture sent. Packing placed. Continued on Antibx. Today with continued purulent drainage w tunneling at site  4/1-+wound cultures. Awaiting sensitivity. Vanco stopped yesterday for rash. Cont on Zosyn.   4/2 Pt underwent Debridement of Chest wall with sharp debridement of muscle and rib. initial cultures grew MRSA from wound with blood, urine NTD  4/3 Pt started on Zyvoxx with plans for 6 weeks of therapy  4/6 POD 1 from [facility] for wound Vac change. Pending PICC line placement.  4/8- PICC placed  PLAN  Neuro: Pain management  Pulm: Encourage coughing, deep breathing and use of incentive spirometry.  Cardio: Monitor telemetry/alarms  GI: Tolerating diet. Continue stool softeners.  Renal: monitor urine output, supplement electrolytes as needed  Vasc: Heparin SC/SCDs for DVT prophylaxis  Heme: Stable H/H. .   ID: Continue Dapto per ID. PICC placed today.   Therapy: OOB/ambulate  Wound: VAC change today or tomorrow. Will go home on wet to dry dressing w VAC placement by VNS in home  Disposition: Aim to D/C to home once home care arranged and approved w visiting nurse.   Discussed with Cardiothoracic Team at AM rounds.

## 2019-04-08 NOTE — PROGRESS NOTE ADULT - SUBJECTIVE AND OBJECTIVE BOX
CC: f/u for right chest wall MRSA abscess, s/p wound exploration and vac placement     S: patient is awake and alert in bed, he has no complaints and reports that he wants to go home     REVIEW OF SYSTEMS:  All other review of systems negative (Comprehensive ROS)    Medications reviewed    Vital Signs Last 24 Hrs  T(C): 36.6 (08 Apr 2019 15:53), Max: 37.1 (07 Apr 2019 23:54)  T(F): 97.9 (08 Apr 2019 15:53), Max: 98.7 (07 Apr 2019 23:54)  HR: 64 (08 Apr 2019 15:53) (64 - 93)  BP: 102/62 (08 Apr 2019 15:53) (102/62 - 121/88)  BP(mean): --  RR: 16 (08 Apr 2019 15:53) (16 - 18)  SpO2: 96% (08 Apr 2019 15:53) (96% - 100%)    PHYSICAL EXAM:  General: alert, no acute distress  Eyes:  anicteric, no conjunctival injection, no discharge  Oropharynx: no lesions or injection 	  Neck: supple, without adenopathy  Lungs: clear to auscultation  R chest wall VAC in place, no surrounding erythema  Heart: regular rate and rhythm; no murmur, rubs or gallops  Abdomen: soft, nondistended, nontender, without mass or organomegaly  Skin: no lesions  Extremities: no clubbing, cyanosis, or edema  Neurologic: alert, moves all extremities    LAB RESULTS:                        12.2   8.00  )-----------( 191      ( 07 Apr 2019 06:10 )             39.2                           12.6   5.85  )-----------( 201      ( 05 Apr 2019 06:33 )             40.1     04-07    143  |  107  |  12  ----------------------------<  100<H>  3.5   |  24  |  0.85    Ca    9.3      07 Apr 2019 06:10        MICROBIOLOGY:  RECENT CULTURES:  Culture - Surg Site Aerob/Anaer w/Gm St (04.02.19 @ 17:45)    Culture - Surgical Site:   NO ORGANISMS ISOLATED AT 24 HOURSViridans  NO ORGANISMS ISOLATED AT 48 HRS.     Culture - Wound (03.29.19 @ 11:09)    -  Trimethoprim/Sulfamethoxazole: R >2/38 ALVIN    -  Vancomycin: S 2 ALVIN    -  Oxacillin: R >2 ALVIN    -  Penicillin: R >8 ALVIN    -  Rifampin: S <=1 ALVIN    -  Tetra/Doxy: S <=1 ALVIN    -  Gentamicin: S <=1 ALVIN    -  Levofloxacin: R >4 ALVIN    -  Linezolid: S    -  Moxifloxacin(Aerobic): R 2 ALVIN    -  Cefazolin: R <=4 ALVIN    -  Clindamycin: S    -  Daptomycin: S    -  Erythromycin: S    -  Ciprofloxacin: R >2 ALVIN    Specimen Source: BACK    Organism Identification: Staph. aureus *MRSA*  Streptococcus Viridans Group    Organism: Streptococcus Viridans GroupCR6    RADIOLOGY REVIEWED:  Xray Chest 1 View- PORTABLE-Routine (04.04.19 @ 08:20) >  Stable right-sided postsurgical changes above.    Possible trace right pleural effusion.

## 2019-04-09 ENCOUNTER — TRANSCRIPTION ENCOUNTER (OUTPATIENT)
Age: 44
End: 2019-04-09

## 2019-04-09 VITALS
OXYGEN SATURATION: 98 % | DIASTOLIC BLOOD PRESSURE: 68 MMHG | TEMPERATURE: 98 F | HEART RATE: 82 BPM | RESPIRATION RATE: 16 BRPM | SYSTOLIC BLOOD PRESSURE: 109 MMHG

## 2019-04-09 LAB — SPECIMEN SOURCE: SIGNIFICANT CHANGE UP

## 2019-04-09 PROCEDURE — 99238 HOSP IP/OBS DSCHRG MGMT 30/<: CPT

## 2019-04-09 RX ORDER — DOCUSATE SODIUM 100 MG
1 CAPSULE ORAL
Qty: 0 | Refills: 0 | DISCHARGE
Start: 2019-04-09

## 2019-04-09 RX ORDER — DAPTOMYCIN 500 MG/10ML
490 INJECTION, POWDER, LYOPHILIZED, FOR SOLUTION INTRAVENOUS
Qty: 0 | Refills: 0 | DISCHARGE
Start: 2019-04-09

## 2019-04-09 RX ORDER — OXYCODONE HYDROCHLORIDE 5 MG/1
1 TABLET ORAL
Qty: 16 | Refills: 0
Start: 2019-04-09 | End: 2019-04-12

## 2019-04-09 RX ORDER — OXYCODONE HYDROCHLORIDE 5 MG/1
1 TABLET ORAL
Qty: 0 | Refills: 0 | COMMUNITY
Start: 2019-04-09

## 2019-04-09 RX ADMIN — OXYCODONE HYDROCHLORIDE 10 MILLIGRAM(S): 5 TABLET ORAL at 00:31

## 2019-04-09 RX ADMIN — OXYCODONE HYDROCHLORIDE 10 MILLIGRAM(S): 5 TABLET ORAL at 07:52

## 2019-04-09 RX ADMIN — Medication 650 MILLIGRAM(S): at 11:30

## 2019-04-09 RX ADMIN — PANTOPRAZOLE SODIUM 40 MILLIGRAM(S): 20 TABLET, DELAYED RELEASE ORAL at 05:08

## 2019-04-09 RX ADMIN — Medication 650 MILLIGRAM(S): at 05:08

## 2019-04-09 RX ADMIN — OXYCODONE HYDROCHLORIDE 10 MILLIGRAM(S): 5 TABLET ORAL at 08:15

## 2019-04-09 RX ADMIN — DAPTOMYCIN 119.6 MILLIGRAM(S): 500 INJECTION, POWDER, LYOPHILIZED, FOR SOLUTION INTRAVENOUS at 17:29

## 2019-04-09 RX ADMIN — HEPARIN SODIUM 5000 UNIT(S): 5000 INJECTION INTRAVENOUS; SUBCUTANEOUS at 05:08

## 2019-04-09 RX ADMIN — Medication 650 MILLIGRAM(S): at 11:28

## 2019-04-09 RX ADMIN — OXYCODONE HYDROCHLORIDE 10 MILLIGRAM(S): 5 TABLET ORAL at 13:58

## 2019-04-09 RX ADMIN — OXYCODONE HYDROCHLORIDE 10 MILLIGRAM(S): 5 TABLET ORAL at 13:29

## 2019-04-09 RX ADMIN — Medication 650 MILLIGRAM(S): at 17:39

## 2019-04-09 RX ADMIN — HEPARIN SODIUM 5000 UNIT(S): 5000 INJECTION INTRAVENOUS; SUBCUTANEOUS at 17:39

## 2019-04-09 NOTE — DISCHARGE NOTE NURSING/CASE MANAGEMENT/SOCIAL WORK - NSDCDPATPORTLINK_GEN_ALL_CORE
You can access the [facility] Portal, offered by [facility], by registering with the following website: [email or web address]

## 2019-04-09 NOTE — PROGRESS NOTE ADULT - REASON FOR ADMISSION
Fever, Tachycardia
Chest Wound Exploration
Fever, Tachycardia
R Chest Wound Washout
Fever, Tachycardia

## 2019-04-09 NOTE — PROGRESS NOTE ADULT - PROVIDER SPECIALTY LIST ADULT
Anesthesia
Anesthesia
CT Surgery
Critical Care
Critical Care
Infectious Disease
Pain Medicine
Thoracic Surgery
Thoracic Surgery
CT Surgery

## 2019-04-09 NOTE — PROGRESS NOTE ADULT - SUBJECTIVE AND OBJECTIVE BOX
CC: f/u for  chest wall abscess and osteo  Patient reports  he feels fine  REVIEW OF SYSTEMS:  All other review of systems negative (Comprehensive ROS)    Antimicrobials Day #  :9/42  DAPTOmycin IVPB 490 milliGRAM(s) IV Intermittent every 24 hours    Other Medications Reviewed    T(F): 97.7 (04-09-19 @ 07:32), Max: 98.2 (04-08-19 @ 19:21)  HR: 70 (04-09-19 @ 07:32)  BP: 111/12 (04-09-19 @ 07:32)  RR: 16 (04-09-19 @ 07:32)  SpO2: 100% (04-09-19 @ 07:32)  Wt(kg): --    PHYSICAL EXAM:  General: alert, no acute distress  Eyes:  anicteric, no conjunctival injection, no discharge  Oropharynx: no lesions or injection 	  Neck: supple, without adenopathy  Lungs: decreased right  to auscultation, wound is clean  Heart: regular rate and rhythm; no murmur, rubs or gallops  Abdomen: soft, nondistended, nontender, without mass or organomegaly  Skin: no lesions  Extremities: no clubbing, cyanosis, or edema  Neurologic: alert, oriented, moves all extremities    LAB RESULTS:    Basic Metabolic Panel (04.07.19 @ 06:10)    Sodium, Serum: 143 mmol/L    Potassium, Serum: 3.5 mmol/L    Chloride, Serum: 107 mmol/L    Carbon Dioxide, Serum: 24 mmol/L    Anion Gap, Serum: 12 mmo/L    Blood Urea Nitrogen, Serum: 12 mg/dL    Creatinine, Serum: 0.85 mg/dL    Glucose, Serum: 100 mg/dL    Calcium, Total Serum: 9.3 mg/dL            MICROBIOLOGY:  RECENT CULTURES:  Culture - Surg Site Aerob/Anaer w/Gm St (04.02.19 @ 17:45)    Gram Stain Wound:   WBC^White Blood Cells  QNTY CELLS IN GRAM STAIN: FEW (2+)  NOS^No Organisms Seen    Culture - Surgical Site:   NO ORGANISMS ISOLATED AT 24 HOURS  NO ORGANISMS ISOLATED AT 48 HRS.  NO ORGANISMS ISOLATED AT 72 HRS.  NO GROWTH AFTER 4 DAYS INCUBATION  NO GROWTH AFTER 5 DAYS INCUBATION    Specimen Source: TISSUE        RADIOLOGY REVIEWED:        Gram Stain Wound (03.29.19 @ 11:09)    Specimen Source: BACK    Gram Stain Wound:   GPCPR^Gram Pos Cocci in Pairs  QUANTITY OF BACTERIA SEEN: MANY (4+)  GPR^Gram Positive Rods  QUANTITY OF BACTERIA SEEN: MANY (4+)  WBC^White Blood Cells  QNTY CELLS IN GRAM STAIN: MANY (4+)    < from: CT Chest w/ IV Cont (03.28.19 @ 20:44) >  IMPRESSION:     New right chest wall collection containing air and fluid measures   approximately 9.0 x 1.9 x 4.7 cm and extends into the intercostal and   extrapleural spaces with associated subpleural based collection as   detailed above, concerning for developing abscesses. Right third through   fifth chronic fracture deformities with new cortical erosions, suggestive   of osteomyelitis. Given air within the extrapleural space, probable   fistula is also a consideration. Subcutaneous air tract seen extending   along right posterolateral chest wall to the skin (image 66 of series 3),   likely in keeping with patient's reported drainage.     Patchy ground glass opacities in the bilateral lungs, left greater than   right. Left upper lobe and left lower lobe nodular consolidations.   Findings likely represent multifocal pneumonia.      < end of copied text >      Assessment: Patient with right chest wall abscess and osteo s/p debridement with growth of mrsa and viridans strept to finish 6 weeks of antibiotics now on iv daptomycin    Plan:  complete another 33 days of antibiotics now on dapto  weekly cbc with dif, cmp, cpk  follow up in our office in 2 weeks  local care with vac per ct surg  r/w dr hernandez

## 2019-04-10 LAB — SURGICAL PATHOLOGY STUDY: SIGNIFICANT CHANGE UP

## 2019-04-11 ENCOUNTER — APPOINTMENT (OUTPATIENT)
Dept: THORACIC SURGERY | Facility: CLINIC | Age: 44
End: 2019-04-11

## 2019-04-18 ENCOUNTER — OUTPATIENT (OUTPATIENT)
Dept: OUTPATIENT SERVICES | Facility: HOSPITAL | Age: 44
LOS: 1 days | End: 2019-04-18
Payer: MEDICAID

## 2019-04-18 ENCOUNTER — APPOINTMENT (OUTPATIENT)
Dept: RADIOLOGY | Facility: HOSPITAL | Age: 44
End: 2019-04-18

## 2019-04-18 ENCOUNTER — APPOINTMENT (OUTPATIENT)
Dept: THORACIC SURGERY | Facility: CLINIC | Age: 44
End: 2019-04-18
Payer: MEDICAID

## 2019-04-18 VITALS
OXYGEN SATURATION: 100 % | HEART RATE: 86 BPM | RESPIRATION RATE: 18 BRPM | TEMPERATURE: 98 F | DIASTOLIC BLOOD PRESSURE: 95 MMHG | SYSTOLIC BLOOD PRESSURE: 143 MMHG

## 2019-04-18 DIAGNOSIS — Z98.890 OTHER SPECIFIED POSTPROCEDURAL STATES: Chronic | ICD-10-CM

## 2019-04-18 DIAGNOSIS — Z93.1 GASTROSTOMY STATUS: Chronic | ICD-10-CM

## 2019-04-18 DIAGNOSIS — J86.9 PYOTHORAX WITHOUT FISTULA: ICD-10-CM

## 2019-04-18 PROCEDURE — 71046 X-RAY EXAM CHEST 2 VIEWS: CPT | Mod: 26

## 2019-04-18 PROCEDURE — 99024 POSTOP FOLLOW-UP VISIT: CPT

## 2019-04-30 LAB — FUNGUS SPEC QL CULT: SIGNIFICANT CHANGE UP

## 2019-05-04 NOTE — SWALLOW BEDSIDE ASSESSMENT ADULT - SWALLOW EVAL: RECOMMENDED DIET
Clear bilaterally, pupils equal, round and reactive to light.
1) Continue NPO with non-oral means of nutrition/hydration/medication via PEG; 2) MD to consider cinesophagram as secretion management improves to objectively assess swallow mechanism, r/o aspiration and determine tolerance for safe initiation of PO intake

## 2019-05-14 LAB — ACID FAST STN SPEC: SIGNIFICANT CHANGE UP

## 2019-05-16 ENCOUNTER — APPOINTMENT (OUTPATIENT)
Dept: THORACIC SURGERY | Facility: CLINIC | Age: 44
End: 2019-05-16
Payer: MEDICAID

## 2019-05-16 VITALS
TEMPERATURE: 98.1 F | RESPIRATION RATE: 18 BRPM | OXYGEN SATURATION: 100 % | HEART RATE: 76 BPM | SYSTOLIC BLOOD PRESSURE: 133 MMHG | BODY MASS INDEX: 31.15 KG/M2 | WEIGHT: 193 LBS | DIASTOLIC BLOOD PRESSURE: 92 MMHG

## 2019-05-16 DIAGNOSIS — G89.18 OTHER ACUTE POSTPROCEDURAL PAIN: ICD-10-CM

## 2019-05-16 PROCEDURE — 99024 POSTOP FOLLOW-UP VISIT: CPT

## 2019-06-27 ENCOUNTER — APPOINTMENT (OUTPATIENT)
Dept: THORACIC SURGERY | Facility: CLINIC | Age: 44
End: 2019-06-27
Payer: MEDICAID

## 2019-06-27 VITALS
SYSTOLIC BLOOD PRESSURE: 144 MMHG | HEART RATE: 86 BPM | BODY MASS INDEX: 32.28 KG/M2 | DIASTOLIC BLOOD PRESSURE: 102 MMHG | RESPIRATION RATE: 16 BRPM | WEIGHT: 200 LBS | TEMPERATURE: 98.6 F | OXYGEN SATURATION: 98 %

## 2019-06-27 PROCEDURE — 99024 POSTOP FOLLOW-UP VISIT: CPT

## 2019-06-27 RX ORDER — SULFAMETHOXAZOLE AND TRIMETHOPRIM 400; 80 MG/1; MG/1
400-80 TABLET ORAL DAILY
Qty: 5 | Refills: 0 | Status: COMPLETED | COMMUNITY
Start: 2019-03-07 | End: 2019-06-27

## 2019-06-27 RX ORDER — OXYCODONE AND ACETAMINOPHEN 5; 325 MG/1; MG/1
5-325 TABLET ORAL
Qty: 28 | Refills: 0 | Status: COMPLETED | COMMUNITY
Start: 2019-04-11 | End: 2019-06-27

## 2019-06-29 NOTE — PATIENT PROFILE ADULT - BRADEN SENSORY
Presbyopia OUDiscussed refractive status in detail with patient. New glasses Rx given today. Continue to monitor.
(4) no impairment

## 2019-07-24 PROBLEM — T81.49XA SUPERFICIAL POSTOPERATIVE WOUND INFECTION: Status: ACTIVE | Noted: 2019-03-07

## 2019-07-25 ENCOUNTER — APPOINTMENT (OUTPATIENT)
Dept: THORACIC SURGERY | Facility: CLINIC | Age: 44
End: 2019-07-25
Payer: MEDICAID

## 2019-07-25 VITALS
TEMPERATURE: 98.5 F | WEIGHT: 197 LBS | HEIGHT: 66 IN | DIASTOLIC BLOOD PRESSURE: 94 MMHG | RESPIRATION RATE: 15 BRPM | OXYGEN SATURATION: 98 % | HEART RATE: 89 BPM | BODY MASS INDEX: 31.66 KG/M2 | SYSTOLIC BLOOD PRESSURE: 135 MMHG

## 2019-07-25 DIAGNOSIS — T81.49XA INFECTION FOLLOWING A PROCEDURE, OTHER SURGICAL SITE, INITIAL ENCOUNTER: ICD-10-CM

## 2019-07-25 PROCEDURE — 99215 OFFICE O/P EST HI 40 MIN: CPT

## 2019-07-25 RX ORDER — OXYCODONE AND ACETAMINOPHEN 5; 325 MG/1; MG/1
5-325 TABLET ORAL EVERY 6 HOURS
Qty: 40 | Refills: 0 | Status: DISCONTINUED | COMMUNITY
Start: 2019-05-16 | End: 2019-07-25

## 2019-07-25 RX ORDER — IBUPROFEN 800 MG/1
TABLET, FILM COATED ORAL
Refills: 0 | Status: DISCONTINUED | COMMUNITY
End: 2019-07-25

## 2019-07-25 RX ORDER — PANTOPRAZOLE 40 MG/1
40 TABLET, DELAYED RELEASE ORAL DAILY
Qty: 28 | Refills: 1 | Status: DISCONTINUED | COMMUNITY
Start: 2019-01-10 | End: 2019-07-25

## 2019-07-25 NOTE — ASSESSMENT
[FreeTextEntry1] : 43 year old male, follow up wound assessment. s/p Right VATS, thoracotomy, decortication on 7/27/19, discharged home on 9/12/18 with wound VAC to right thorax, discontinued on 2/7/19, readmitted on 3/28/19 with fever and tachycardia, underwent multiple debridements of chest wall abscess, and required right thoracotomy, evacuation of chest wall abscess, rib resection, wound debridement and VAC application on 4/2/2019, continues with wound vac dressing changes via homecare RN 3x weekly.\par \par Right chest wound appears to be healing well.\par \par I have reviewed the patient's medical records and diagnostic images during the time of this office visit, and I have made the following recommendation:\par 1.  Discontinue wound vac to right chest wall. \par 2.  Wound care - wet to dry dressing to right chest wall daily. \par 3.  Return to office for follow up wound assessment in 4 weeks, no scans needed.\par 4.  Establish care with a primary care physician.  I have assisted him as requested with the name of a Polish speaking physician (Dr. Nicole Toussaint).\par \par \par Written by Yelena Vincent NP, acting as a scribe for Sierra Sorto MD.\par \par The documentation recorded by the scribe accurately reflects the service I personally performed and the decisions made by me. SIERRA SORTO MD

## 2019-07-25 NOTE — CONSULT LETTER
[Please see my note below.] : Please see my note below. [Sincerely,] : Sincerely, [FreeTextEntry3] : \par \par \par Ginette Sorto MD\par Attending Surgeon \par Division of Thoracic Surgery\par \par [employer] [facility]\par \par

## 2019-07-25 NOTE — PHYSICAL EXAM
[Sclera] : the sclera and conjunctiva were normal [PERRL With Normal Accommodation] : pupils were equal in size, round, and reactive to light [Neck Appearance] : the appearance of the neck was normal [] : no respiratory distress [Respiration, Rhythm And Depth] : normal respiratory rhythm and effort [Auscultation Breath Sounds / Voice Sounds] : lungs were clear to auscultation bilaterally [Heart Sounds] : normal S1 and S2 [Murmurs] : no murmurs [Abdomen Soft] : soft [Abdomen Tenderness] : non-tender [Cervical Lymph Nodes Enlarged Posterior Bilaterally] : posterior cervical [Cervical Lymph Nodes Enlarged Anterior Bilaterally] : anterior cervical [Supraclavicular Lymph Nodes Enlarged Bilaterally] : supraclavicular [Abnormal Walk] : normal gait [No Focal Deficits] : no focal deficits [Oriented To Time, Place, And Person] : oriented to person, place, and time [Affect] : the affect was normal [Mood] : the mood was normal [Skin Color & Pigmentation] : normal skin color and pigmentation [Skin Turgor] : normal skin turgor [FreeTextEntry1] : right thorax wound healing well, wound decreased in size, no redness or drainage noted

## 2019-07-25 NOTE — HISTORY OF PRESENT ILLNESS
[FreeTextEntry1] : Mr. Kim is a 43 year old male who presents today for follow up wound assessment.  He was admitted to [facility] on 7/25/18 for loculated pleural effusion and is s/p Right VATS, thoracotomy, decortication on 7/27/19. Hospital course was complicated by sepsis, hypotension, ETOH withdrawal, respiratory failure, hemothorax, tracheostomy (decannulated 9/7/18), and PEG (removed 9/11/18). He was discharged home on 9/12/18 with wound VAC to right thorax, discontinued on 2/7/19.  He returned to [facility] ER on 3/28/19 with fever and tachycardia, underwent multiple debridements of chest wall abscess, and required right thoracotomy, evacuation of chest wall abscess, rib resection, wound debridement and VAC application on 4/2/2019. He was discharged home on 4/9/19 completed 6 weeks IV antibiotics via PICC.  He continues with wound vac dressing changes via homecare RN 3x weekly. \par \par He denies any pain, fever, chills, cough, shortness of breath, chest pain, hemoptysis, or recent illness.  He reports minimal output from wound vac.  At this time, he has not established care with a primary care physician as previously recommended.\par

## 2019-08-22 ENCOUNTER — APPOINTMENT (OUTPATIENT)
Dept: WOUND CARE | Facility: CLINIC | Age: 44
End: 2019-08-22

## 2019-08-29 ENCOUNTER — APPOINTMENT (OUTPATIENT)
Dept: WOUND CARE | Facility: CLINIC | Age: 44
End: 2019-08-29
Payer: MEDICAID

## 2019-08-29 VITALS
HEART RATE: 74 BPM | RESPIRATION RATE: 18 BRPM | TEMPERATURE: 97.9 F | DIASTOLIC BLOOD PRESSURE: 95 MMHG | SYSTOLIC BLOOD PRESSURE: 136 MMHG

## 2019-08-29 PROCEDURE — 99204 OFFICE O/P NEW MOD 45 MIN: CPT | Mod: 25

## 2019-08-29 PROCEDURE — 11043 DBRDMT MUSC&/FSCA 1ST 20/<: CPT

## 2019-08-29 PROCEDURE — 11046 DBRDMT MUSC&/FSCA EA ADDL: CPT | Mod: 59

## 2019-09-03 LAB — BACTERIA TISS CULT: ABNORMAL

## 2019-09-03 NOTE — ASSESSMENT
[FreeTextEntry1] : 43 yr old  h/o thoracotomy and empyma with  h/o sepsis, shock, respi failure, VATS and decortication, trach and peg vac  dced in february\par The patient continues with deep wound to the right upper back \par Swelling noted to the extremity. \par eventiually will need flap plastic closure/ brdige with vac, may have osteo of rib, s/p rib resection\par \par   clinical sign of infection- culture obtained\par Recommendation:\par  culture sent\par Wash wound with ----0.9% saline- vac black and white foam 125 mm hg- cavilon\par Apply ----hypdrogel/ santyl/ medical grade honey/ alginate/foam/\par  Change dressing ---daily/ 3 times per week.\par Leg elevation as tolerated\par Encouraged ambulation or exercise.\par Optimization of nutrition.- protein load\par Offloading to the wound site.\par -----Wound supplies ordered via [employer]\par Patient given contact information\par -----Homecare orders in place\par Follow up appointment scheduled for 2 weeks\par \par

## 2019-09-03 NOTE — PHYSICAL EXAM
[Normal Breath Sounds] : Normal breath sounds [JVD] : no jugular venous distention  [de-identified] : mae [de-identified] : nad [de-identified] : open wound right 5/6th costal area [FreeTextEntry1] : Right posterior chest incision ulcer  [FreeTextEntry2] : 1.4 [FreeTextEntry3] : 4.2 [de-identified] : 1 mm deeper into muscle [FreeTextEntry4] : 3 [TWNoteComboBox1] : Right [de-identified] : undermining at 3 o'clock 2.2cm and 9 o'clock 2 cm.\par  tolerated currette debridement into deep wound\par no air or pneumothorax/ or leak [TWNoteComboBox5] : Yes [de-identified] : Yes [TWNoteComboBox6] : Surgical [de-identified] : <20% [de-identified] : Mild [de-identified] : >75% [de-identified] : Yes [de-identified] : Ligament [de-identified] : 1% Lidocaine Injection [TWNoteComboBox7] : Elaine [de-identified] : Debridement excisional

## 2019-09-03 NOTE — HISTORY OF PRESENT ILLNESS
[FreeTextEntry1] : Mr. BRITTNEY WILLIAMSON presents to the office with a wound for -1 year duration . \par The wound is located on the Right upper back surgical wound from lung infection/thoracotomy.\par  The patient has complaints of non healing \par april new surgery at  [facility]/ had vac in past did not like it\par The patient has been dressing the wound with alginate and gauze patient had a wound vac .\par The patient denies fevers or chills.\par  The patient has localized pain to the wound upon dressing changes.\par  The patient has no other complaints or associated symptoms. \par \par

## 2019-09-19 ENCOUNTER — APPOINTMENT (OUTPATIENT)
Dept: WOUND CARE | Facility: CLINIC | Age: 44
End: 2019-09-19
Payer: MEDICAID

## 2019-09-19 ENCOUNTER — APPOINTMENT (OUTPATIENT)
Dept: THORACIC SURGERY | Facility: CLINIC | Age: 44
End: 2019-09-19
Payer: MEDICAID

## 2019-09-19 VITALS
WEIGHT: 196 LBS | HEART RATE: 79 BPM | BODY MASS INDEX: 31.5 KG/M2 | SYSTOLIC BLOOD PRESSURE: 137 MMHG | OXYGEN SATURATION: 98 % | DIASTOLIC BLOOD PRESSURE: 93 MMHG | HEIGHT: 66 IN | TEMPERATURE: 97.9 F | RESPIRATION RATE: 17 BRPM

## 2019-09-19 PROCEDURE — 99213 OFFICE O/P EST LOW 20 MIN: CPT

## 2019-09-19 PROCEDURE — 99215 OFFICE O/P EST HI 40 MIN: CPT

## 2019-09-19 RX ORDER — SULFAMETHOXAZOLE AND TRIMETHOPRIM 800; 160 MG/1; MG/1
800-160 TABLET ORAL TWICE DAILY
Qty: 20 | Refills: 1 | Status: DISCONTINUED | COMMUNITY
Start: 2019-09-03 | End: 2019-09-19

## 2019-09-19 NOTE — ASSESSMENT
[FreeTextEntry1] : 43 year old male, follow up/wound check. Admitted to [facility] on 7/25/19 for loculated pleural effusion. Hospital course was complicated by sepsis, hypotension, ETOH withdrawal, hemothorax and respiratory failure s/p tracheostomy (decannulated 9/7/2018) and PEG (removed 9/11/2018).\par \par He is s/p Right VATS converted to thoracotomy, decortication on 7/28/18 for empyema. He had partial rib resection on 4/5/2019. He has the Right Thorax wound which was managed with wound VAC which was discontinued .  Currently wound care is with [facility].\par \par I have reviewed the patient's medical records and diagnostic images during the time of this office visit, and I have made the following recommendation:\par 1.  Continue with wound management as per recommendations of Dr. Hdez of wound care team\par 2.  Return to office for follow up in 6 weeks (no scans)\par \par \par Written by Yelena Vincent NP, acting as a scribe for Sierra Sorto MD.\par \par The documentation recorded by the scribe accurately reflects the service I personally performed and the decisions made by me. SIERRA SORTO MD

## 2019-09-19 NOTE — CONSULT LETTER
[( Thank you for referring [unfilled] for consultation for _____ )] : Thank you for referring [unfilled] for consultation for [unfilled] [Courtesy Letter:] : I had the pleasure of seeing your patient, [unfilled], in my office today. [Sincerely,] : Sincerely, [Please see my note below.] : Please see my note below. [FreeTextEntry2] : Dr. Cassidy Andrade (PCP)\par Dr. Judi Hdez (Wound Care)\par  [FreeTextEntry3] : \par \par \par Ginette Sorto MD\par Attending Surgeon \par Division of Thoracic Surgery\par \par [employer] [facility]\par \par

## 2019-09-19 NOTE — PHYSICAL EXAM
[Sclera] : the sclera and conjunctiva were normal [PERRL With Normal Accommodation] : pupils were equal in size, round, and reactive to light [Neck Appearance] : the appearance of the neck was normal [] : the neck was supple [Auscultation Breath Sounds / Voice Sounds] : lungs were clear to auscultation bilaterally [Respiration, Rhythm And Depth] : normal respiratory rhythm and effort [Heart Sounds] : normal S1 and S2 [Murmurs] : no murmurs [Examination Of The Chest] : the chest was normal in appearance [Abdomen Soft] : soft [Abdomen Tenderness] : non-tender [Cervical Lymph Nodes Enlarged Posterior Bilaterally] : posterior cervical [Cervical Lymph Nodes Enlarged Anterior Bilaterally] : anterior cervical [Supraclavicular Lymph Nodes Enlarged Bilaterally] : supraclavicular [Abnormal Walk] : normal gait [No Focal Deficits] : no focal deficits [Oriented To Time, Place, And Person] : oriented to person, place, and time [Affect] : the affect was normal [Mood] : the mood was normal [FreeTextEntry1] : right posterior chest wound

## 2019-09-19 NOTE — HISTORY OF PRESENT ILLNESS
[FreeTextEntry1] : Mr. Kim is a 43 year old male who presents today for follow up/wound check. He was initially admitted to [facility] on 7/25/19 for loculated pleural effusion. Hospital course was complicated by sepsis, hypotension, ETOH withdrawal, hemothorax and respiratory failure s/p tracheostomy (decannulated 9/7/2018) and PEG (removed 9/11/2018).\par \par He is s/p Right VATS converted to thoracotomy, decortication on 7/28/18 for empyema. He had partial rib resection on 4/5/2019. He has the Right Thorax wound which was managed with wound VAC which was discontinued .  Currently wound care is with [facility].\par \par Home Care RN's called the office last  month to report noncompliance with wound care , heavy alcohol intake, and hypertension.  Today, the patient reports no alcohol consumption or smoking.  He has established care with a PCP and has been started on Metoprolol.  He has established care with wound care team, Dr. Hdez.\par \par

## 2019-10-10 ENCOUNTER — APPOINTMENT (OUTPATIENT)
Dept: WOUND CARE | Facility: CLINIC | Age: 44
End: 2019-10-10
Payer: MEDICAID

## 2019-10-10 VITALS — HEART RATE: 59 BPM | TEMPERATURE: 97.9 F | SYSTOLIC BLOOD PRESSURE: 139 MMHG | DIASTOLIC BLOOD PRESSURE: 90 MMHG

## 2019-10-10 PROCEDURE — 99213 OFFICE O/P EST LOW 20 MIN: CPT | Mod: 25

## 2019-10-10 PROCEDURE — 11043 DBRDMT MUSC&/FSCA 1ST 20/<: CPT

## 2019-10-10 NOTE — ASSESSMENT
[FreeTextEntry1] : 43 yr old  h/o thoracotomy and empyma with  h/o sepsis, shock, respi failure, VATS and decortication, trach and peg vac  dced in february/ using vac this week\par The patient continues with deep wound to the right upper back -improved\par Swelling noted to the extremity. \par eventually will need flap plastic closure/ bridge with vac, may have osteo of rib, s/p rib resection\par plastics referral on file/await films\par  \par Wound dimensions improved, some periwound maceration noted\par Plan - cavilon to periwound, white foam into undermined areas, black foam in center, do not overpack,\par  orders for nurse given\par complex-low-lab, xr maria guadalupe,test reviewed\par risk- low- no sharp debridement\par \par

## 2019-10-10 NOTE — PHYSICAL EXAM
[Normal Breath Sounds] : Normal breath sounds [4 x 4] : 4 x 4  [JVD] : no jugular venous distention  [de-identified] : nad [de-identified] : mae [de-identified] : open wound right 5/6th costal area [FreeTextEntry1] : Right posterior chest incision ulcer  [FreeTextEntry2] : .7cm [FreeTextEntry3] : 3.4cm [FreeTextEntry4] : 1.5cm [FreeTextEntry5] : 4x4 [de-identified] : vac [de-identified] : undermining from  10-12 o'clock-3.2cm\par \par \par \par no air or pneumothorax/ or leak\par \par previous 1.4/4.2/3 [TWNoteComboBox1] : Right [TWNoteComboBox4] : Moderate [TWNoteComboBox5] : Yes [TWNoteComboBox6] : Surgical [de-identified] : Yes [de-identified] : Macerated [de-identified] : Mild [de-identified] : <20% [de-identified] : >75% [de-identified] : Yes [de-identified] : Ligament [de-identified] : False [TWNoteComboBox7] : Mechanical [de-identified] : False

## 2019-10-10 NOTE — REVIEW OF SYSTEMS
[Negative] : Heme/Lymph Ears: no ear pain and no hearing problems.Nose: no nasal congestion and no nasal drainage.Mouth/Throat: no dysphagia, no hoarseness and no throat pain.Neck: no lumps, no pain, no stiffness and no swollen glands.

## 2019-10-21 NOTE — H&P ADULT - HISTORY OF PRESENT ILLNESS
This 43 year old male presents today to the ER after his visiting nurse was concerned for fever and tachycardia.  She had been there for follow up wound care. He had been admitted to [facility] on 7/25/18 for a loculated pleural effusion and his hospital course was complicated by sepsis, hypotension, EtOH withdrawal, respiratory failure, a hemothorax, and the need for a tracheostomy and a PEG. On 7/27/18 he underwent a FB, R VATS, R thoracotomy, drainage of empyema, R lung decortication.  On 8/6/18 he underwent a R thoracotomy, evacuation of hemothorax, FB, BAL.  On 8/9/18 he had a trach and a PEG placed.  He was decannulated on 9/7/18 and the PEG was removed on 9/11/18. He was discharged home on 9/12/18 with a right thorax VAC dressing.  The VAC dressing was continued until 2/7/19 when it was replaced with a dry sterile dressing.  He was put on a 7 day course of Levaquin at that time as well.  After that, he initially received wound care by the Visiting Nurse Service 3 times a week.  At his last visit on 3/7/19, he only reported an occasional nonproductive cough, mild incisional pain relieved by Ibuprofen, and dressings often becoming saturated between visits.  He was noted to have a superficial postoperative wound infection with beige drainage.  Silver nitrate was used on some granulation tissue and the wound was debrided and packed with dry gauze and covered with a dry sterile dressing.  He was prescribed a 5 day course of Bactrim and told to follow up in one month.  In the ER today, he is without new complaints.  He admits to mild pain at his wound, the copious drainage, and an occasional cough.  He denies any chills, SOB, chest pain, nausea, vomiting, or diarrhea.
no

## 2019-10-31 ENCOUNTER — APPOINTMENT (OUTPATIENT)
Dept: WOUND CARE | Facility: CLINIC | Age: 44
End: 2019-10-31
Payer: MEDICAID

## 2019-10-31 VITALS
HEIGHT: 66 IN | HEART RATE: 86 BPM | DIASTOLIC BLOOD PRESSURE: 93 MMHG | BODY MASS INDEX: 31.5 KG/M2 | WEIGHT: 196 LBS | SYSTOLIC BLOOD PRESSURE: 132 MMHG | TEMPERATURE: 98.5 F

## 2019-10-31 DIAGNOSIS — Z83.3 FAMILY HISTORY OF DIABETES MELLITUS: ICD-10-CM

## 2019-10-31 DIAGNOSIS — Z72.89 OTHER PROBLEMS RELATED TO LIFESTYLE: ICD-10-CM

## 2019-10-31 PROCEDURE — 99213 OFFICE O/P EST LOW 20 MIN: CPT

## 2019-10-31 NOTE — HISTORY OF PRESENT ILLNESS
[FreeTextEntry1] : Mr. BRITTNEY WILLIAMSON presents to the office with a wound for -1 year duration . using vac\par still deep , has not seen plastics for flap\par Wound dimensions improved, some periwound maceration noted\par The wound is located on the Right upper back surgical wound from lung infection/thoracotomy.\par  The patient has complaints of non healing \par had mri  9/16  mri hayley villanueva/ malika solitario [phone]\par pcp urbano lr [phone]\par april new surgery at  [facility]/ had vac in past did not like it\par The patient has been dressing the wound with alginate and gauze patient had a wound vac .\par The patient denies fevers or chills.\par  The patient has localized pain to the wound upon dressing changes.\par  The patient has no other complaints or associated symptoms. \par \par

## 2019-10-31 NOTE — ASSESSMENT
[FreeTextEntry1] : 43 yr old  h/o thoracotomy and empyma with  h/o sepsis, shock, respi failure, VATS and decortication, trach and peg vac  dced in february/ using vac this week\par suggest plastics flap to get rib covered, no osteo on fims\par The patient continues with deep wound to the right upper back -improved\par  \par eventually will need flap plastic closure/ bridge with vac,  , s/p rib resection\par plastics referral on file \par  \par Wound dimensions improved, some periwound maceration noted\par Plan - cavilon to periwound, white foam into undermined areas, black foam in center, do not overpack,\par  orders for nurse given\par complex-low-lab, xr maria guadalupe,test reviewed\par risk- low- no sharp debridement\par \par

## 2019-10-31 NOTE — PHYSICAL EXAM
[Normal Breath Sounds] : Normal breath sounds [4 x 4] : 4 x 4  [JVD] : no jugular venous distention  [de-identified] : nad [de-identified] : mae [de-identified] : open wound right 5/6th costal area [FreeTextEntry1] : Right posterior chest incision ulcer  [FreeTextEntry2] : 2 [FreeTextEntry3] : 2 [FreeTextEntry4] : .7 [de-identified] : @ 9 - 4.5cm  [FreeTextEntry5] : 4x4 [de-identified] : VAC [de-identified] : \par \par \par \par no air or pneumothorax/ or leak\par \par previous 1.4/4.2/3 [TWNoteComboBox1] : Right [TWNoteComboBox4] : Moderate [TWNoteComboBox5] : Yes [TWNoteComboBox6] : Surgical [de-identified] : Macerated [de-identified] : Mild [de-identified] : <20% [de-identified] : >75% [de-identified] : Yes [de-identified] : Ligament [TWNoteComboBox7] : Mechanical [de-identified] : False

## 2019-11-06 NOTE — HISTORY OF PRESENT ILLNESS
[FreeTextEntry1] : Mr. BRITTNEY WILLIAMSON presents to the office with a wound for -1 year duration . using vac\par Wound dimensions improved, some periwound maceration noted\par The wound is located on the Right upper back surgical wound from lung infection/thoracotomy.\par  The patient has complaints of non healing \par had mri  9/16  mri hayley villanueva/ malika solitario [phone]\par pcp urbano lr [phone]\par april new surgery at  [facility]/ had vac in past did not like it\par The patient has been dressing the wound with alginate and gauze patient had a wound vac .\par The patient denies fevers or chills.\par  The patient has localized pain to the wound upon dressing changes.\par  The patient has no other complaints or associated symptoms. \par \par

## 2019-11-06 NOTE — PHYSICAL EXAM
[Normal Breath Sounds] : Normal breath sounds [4 x 4] : 4 x 4  [JVD] : no jugular venous distention  [de-identified] : nad [de-identified] : mae [de-identified] : open wound right 5/6th costal area [FreeTextEntry1] : Right posterior chest incision ulcer  [FreeTextEntry2] : 1.2 cm [FreeTextEntry3] : 2.4 cm [FreeTextEntry4] : 1.3  [de-identified] : @12 - 3 cm @ 9 - 4cm  [FreeTextEntry5] : 4x4 [de-identified] : vac [de-identified] : \par \par \par \par no air or pneumothorax/ or leak\par \par previous 1.4/4.2/3 [TWNoteComboBox1] : Right [TWNoteComboBox4] : Moderate [TWNoteComboBox5] : Yes [TWNoteComboBox6] : Surgical [de-identified] : Yes [de-identified] : Macerated [de-identified] : Mild [de-identified] : <20% [de-identified] : >75% [de-identified] : Yes [de-identified] : Ligament [TWNoteComboBox7] : Elaine

## 2019-11-21 ENCOUNTER — APPOINTMENT (OUTPATIENT)
Dept: THORACIC SURGERY | Facility: CLINIC | Age: 44
End: 2019-11-21
Payer: MEDICAID

## 2019-11-21 VITALS
WEIGHT: 188 LBS | OXYGEN SATURATION: 96 % | RESPIRATION RATE: 16 BRPM | TEMPERATURE: 98 F | BODY MASS INDEX: 30.34 KG/M2 | SYSTOLIC BLOOD PRESSURE: 137 MMHG | DIASTOLIC BLOOD PRESSURE: 90 MMHG | HEART RATE: 86 BPM

## 2019-11-21 DIAGNOSIS — Z86.79 PERSONAL HISTORY OF OTHER DISEASES OF THE CIRCULATORY SYSTEM: ICD-10-CM

## 2019-11-21 PROCEDURE — 99215 OFFICE O/P EST HI 40 MIN: CPT

## 2019-11-21 RX ORDER — VITAMIN B COMPLEX
VIAL (ML) INJECTION
Refills: 0 | Status: ACTIVE | COMMUNITY

## 2019-11-21 NOTE — HISTORY OF PRESENT ILLNESS
[FreeTextEntry1] : 44 year old male who presents today for follow up/wound check. He was initially admitted to [facility] on 7/25/19 for loculated pleural effusion. Hospital course was complicated by sepsis, hypotension, ETOH withdrawal, hemothorax and respiratory failure s/p tracheostomy (decannulated 9/7/2018) and PEG (removed 9/11/2018).\par \par He is s/p Right VATS converted to thoracotomy, decortication on 7/28/18 for empyema. He had partial rib resection on 4/5/2019. He has the Right Thorax wound which was managed with wound VAC but has since been discontinued because patient does not want wound vac. Currently wound care TIW. He is followed by Dr. Hdez for wound care. \par \par The patient denies shortness of breath, fever, chills, dysphagia or hemoptysis. He has established care with a PCP and has been started on Metoprolol. He has been compliant with follow up . He has appt with PCP tomorrow. \par \par

## 2019-11-21 NOTE — PHYSICAL EXAM
[Sclera] : the sclera and conjunctiva were normal [Extraocular Movements] : extraocular movements were intact [Neck Appearance] : the appearance of the neck was normal [Neck Cervical Mass (___cm)] : no neck mass was observed [Jugular Venous Distention Increased] : there was no jugular-venous distention [] : no respiratory distress [Respiration, Rhythm And Depth] : normal respiratory rhythm and effort [Exaggerated Use Of Accessory Muscles For Inspiration] : no accessory muscle use [Auscultation Breath Sounds / Voice Sounds] : lungs were clear to auscultation bilaterally [Heart Rate And Rhythm] : heart rate was normal and rhythm regular [Diminished Respiratory Excursion] : normal chest expansion [Bowel Sounds] : normal bowel sounds [Abdomen Soft] : soft [Abdomen Tenderness] : non-tender [Abnormal Walk] : normal gait [Skin Color & Pigmentation] : normal skin color and pigmentation [No Focal Deficits] : no focal deficits [Oriented To Time, Place, And Person] : oriented to person, place, and time [Impaired Insight] : insight and judgment were intact [Affect] : the affect was normal [Mood] : the mood was normal [FreeTextEntry1] : right posterior chest wall wound

## 2019-11-21 NOTE — CONSULT LETTER
[Dear  ___] : Dear  [unfilled], [Courtesy Letter:] : I had the pleasure of seeing your patient, [unfilled], in my office today. [Please see my note below.] : Please see my note below. [Sincerely,] : Sincerely, [FreeTextEntry2] : Dr. Correia\par Dr. Andrade (PCP) [FreeTextEntry3] : .cred

## 2019-11-21 NOTE — ASSESSMENT
[FreeTextEntry1] : 44 year old male who presents today for follow up/wound check. He was initially admitted to [facility] on 7/25/19 for loculated pleural effusion. Hospital course was complicated by sepsis, hypotension, ETOH withdrawal, hemothorax and respiratory failure s/p tracheostomy (decannulated 9/7/2018) and PEG (removed 9/11/2018).\par \par He is s/p Right VATS converted to thoracotomy, decortication on 7/28/18 for empyema. He had partial rib resection on 4/5/2019. He has the Right Thorax wound which was managed with wound VAC but has since been discontinued because patient does not want wound vac. Currently wound care TIW. He is followed by Dr. Hdez for wound care. \par \par The wound was measured assessed and measured during office visit. Wound currently measures 3cm length, 1.5 width, and 6.5cm deep. There are no signs of infection. Wound was packed and occlusive dressing applied. \par \par I discussed at length with the patient that without the wound vac, the wound will take longer to heal. He is adamant about no wound vac. He also does not want to consult with plastic surgeon for flap because he does not want another surgery. \par \par He will continue TIW wound care and RTO in 8 week to assess wound. \par \par Written by Katelin Samayoa NP, acting as a scribe for Dr. Ginette Sorto.\par The documentation recorded by the scribe accurately reflects the service I personally performed and the decisions made by me. Ginette Sorto MD

## 2019-12-05 ENCOUNTER — APPOINTMENT (OUTPATIENT)
Dept: WOUND CARE | Facility: CLINIC | Age: 44
End: 2019-12-05
Payer: MEDICAID

## 2019-12-05 DIAGNOSIS — T14.8XXA OTHER INJURY OF UNSPECIFIED BODY REGION, INITIAL ENCOUNTER: ICD-10-CM

## 2019-12-05 DIAGNOSIS — F10.10 ALCOHOL ABUSE, UNCOMPLICATED: ICD-10-CM

## 2019-12-05 DIAGNOSIS — L08.9 OTHER INJURY OF UNSPECIFIED BODY REGION, INITIAL ENCOUNTER: ICD-10-CM

## 2019-12-05 PROCEDURE — 99213 OFFICE O/P EST LOW 20 MIN: CPT | Mod: 25

## 2019-12-05 PROCEDURE — 11043 DBRDMT MUSC&/FSCA 1ST 20/<: CPT

## 2019-12-06 PROBLEM — F10.10 ETOH ABUSE: Status: RESOLVED | Noted: 2019-11-21 | Resolved: 2019-12-06

## 2019-12-06 PROBLEM — T14.8XXA WOUND INFECTION: Status: ACTIVE | Noted: 2019-11-21

## 2020-01-23 ENCOUNTER — APPOINTMENT (OUTPATIENT)
Dept: WOUND CARE | Facility: CLINIC | Age: 45
End: 2020-01-23
Payer: MEDICAID

## 2020-01-23 ENCOUNTER — APPOINTMENT (OUTPATIENT)
Dept: THORACIC SURGERY | Facility: CLINIC | Age: 45
End: 2020-01-23
Payer: MEDICAID

## 2020-01-23 VITALS
DIASTOLIC BLOOD PRESSURE: 89 MMHG | SYSTOLIC BLOOD PRESSURE: 136 MMHG | HEART RATE: 79 BPM | RESPIRATION RATE: 15 BRPM | TEMPERATURE: 97.6 F | OXYGEN SATURATION: 95 %

## 2020-01-23 VITALS
SYSTOLIC BLOOD PRESSURE: 141 MMHG | BODY MASS INDEX: 30.67 KG/M2 | HEART RATE: 77 BPM | TEMPERATURE: 97.1 F | DIASTOLIC BLOOD PRESSURE: 99 MMHG

## 2020-01-23 PROCEDURE — 99214 OFFICE O/P EST MOD 30 MIN: CPT

## 2020-01-23 PROCEDURE — 11042 DBRDMT SUBQ TIS 1ST 20SQCM/<: CPT

## 2020-01-23 PROCEDURE — 99213 OFFICE O/P EST LOW 20 MIN: CPT | Mod: 25

## 2020-01-23 NOTE — PHYSICAL EXAM
[Normal Breath Sounds] : Normal breath sounds [JVD] : no jugular venous distention  [de-identified] : nad [de-identified] : mae [de-identified] : open wound right 5/6th costal area [FreeTextEntry2] : .5 [FreeTextEntry1] : Right posterior chest incision ulcer  [FreeTextEntry3] : 1.5cm [FreeTextEntry5] : curette #3 1 mm deeperinto subcut [FreeTextEntry4] : 3 [de-identified] : @ 12 oclock 1.5 cm @10 o clock 3.7 cm [de-identified] : \par \par \par \par no air or pneumothorax/ or leak\par \par previous 2.7/1.1 [de-identified] : aquacel / border foam  [TWNoteComboBox1] : Right [TWNoteComboBox4] : Moderate [TWNoteComboBox5] : Yes [TWNoteComboBox6] : Surgical [de-identified] : Macerated [de-identified] : Mild [de-identified] : <20% [de-identified] : Yes [de-identified] : >75% [de-identified] : Ligament [TWNoteComboBox7] : Elaine

## 2020-01-23 NOTE — HISTORY OF PRESENT ILLNESS
[FreeTextEntry1] : Mr. Kim is a 44 year old male who presents today for follow up/wound check. He was initially admitted to [facility] on 7/25/19 for loculated pleural effusion. Hospital course was complicated by sepsis, hypotension, ETOH withdrawal, hemothorax and respiratory failure s/p tracheostomy (decannulated 9/7/2018) and PEG (removed 9/11/2018).\par \par He is s/p Right VATS converted to thoracotomy, decortication on 7/28/18 for empyema. He had partial rib resection on 4/5/2019. He has the Right Thorax wound which was managed with wound VAC but has since been discontinued because patient does not want wound vac. Currently wound care TIW. He is followed by Dr. Hdez for wound care, seen earlier today.\par \par He denies any fever, chills, cough, shortness of breath, chest pain, hemoptysis, or recent illness.  Of note, he states wound care recommended evaluation by plastic surgery (Dr. Carlton) for wound closure.

## 2020-01-23 NOTE — ASSESSMENT
[FreeTextEntry1] : 44 year old male, follow up/wound check. He was initially admitted to [facility] on 7/25/19 for loculated pleural effusion  s/p Right VATS converted to thoracotomy, decortication on 7/28/18 for empyema. He had partial rib resection on 4/5/2019. Right Thorax wound was managed with wound VAC, discontinued at patient request. Followed by Dr. Hdez for wound care, seen earlier today, pending plastics evaluation.\par \par I have reviewed the patient's medical records and diagnostic images during the time of this office visit, and I have made the following recommendation:\par 1.  Return to office for follow up after evaluation by plastic surgery (no scans needed).\par \par \par I personally performed the services described in the documentation, reviewed the documentation recorded by the scribe in my presence and it accurately and completely records my words and actions.\par \par I, Yelena Vincent, am scribing for and the presence of SIERRA Cruz the following sections HISTORY OF PRESENT ILLNESSES, PAST MEDICAL/FAMILY/SOCIAL HISTORY; REVIEW OF SYSTEMS; VITAL SIGNS; PHYSICAL EXAM; DISPOSITION.

## 2020-01-23 NOTE — ASSESSMENT
[FreeTextEntry1] : 43 yr old  h/o thoracotomy and empyma with  h/o sepsis, shock, respi failure, VATS and decortication, trach and peg vac  dced in february/s/p vac, using alginate\par \par reinforced- suggest plastics flap to get rib covered, no osteo on fims\par The patient continues with deep wound to the right upper back  \par  \par eventually will need flap plastic closure/   , s/p rib resection\par plastics referral on file \par  \par Wound dimensions improved, some periwound maceration noted\par Plan - cavilon to periwound, alginate foam\par  orders for nurse given\par complex-low-lab, xr maria guadalupe,test reviewed\par risk- low-   sharp debridement tolerated well\par \par

## 2020-01-23 NOTE — CONSULT LETTER
[Courtesy Letter:] : I had the pleasure of seeing your patient, [unfilled], in my office today. [Please see my note below.] : Please see my note below. [Consult Closing:] : Thank you very much for allowing me to participate in the care of this patient.  If you have any questions, please do not hesitate to contact me. [Sincerely,] : Sincerely, [FreeTextEntry2] : Dr. Judi Hdez (Wound Care)\par Dr. Cassidy Andrade (PCP) \par  [FreeTextEntry3] : \par \par \par Ginette Sorto\par Attending Surgeon\par Division of Thoracic Surgery\par \par [employer] [facility]

## 2020-01-23 NOTE — HISTORY OF PRESENT ILLNESS
[FreeTextEntry1] : Mr. BRITTNEY WILLIAMSON presents to the office with a wound for -1 year duration \par .s/p vac,still deep , has not seen plastics for flap uses alginate\par no fever\par Wound dimensions improved, some periwound maceration noted\par The wound is located on the Right upper back surgical wound from lung infection/thoracotomy.\par  The patient has complaints of non healing \par had mri  9/16  mri hayley villanueva/ malika solitario [phone]\par pcp urbano lr [phone]\par april new surgery at  [facility]/ had vac in past did not like it\par The patient has been dressing the wound with alginate and gauze patient had a wound vac .\par The patient denies fevers or chills.\par  The patient has localized pain to the wound upon dressing changes.\par  The patient has no other complaints or associated symptoms. \par \par

## 2020-01-23 NOTE — PHYSICAL EXAM
[Normal Breath Sounds] : Normal breath sounds [4 x 4] : 4 x 4  [JVD] : no jugular venous distention  [de-identified] : nad [de-identified] : mae [de-identified] : open wound right 5/6th costal area [FreeTextEntry1] : Right posterior chest incision ulcer  [FreeTextEntry3] : 1.1 cm [FreeTextEntry2] : 2.7 cm [FreeTextEntry4] : 2.5 [de-identified] : @ 12   6cm  [FreeTextEntry5] : 4x4 [de-identified] : \par \par \par \par no air or pneumothorax/ or leak\par \par previous 2/2 [de-identified] : VAC [TWNoteComboBox1] : Right [TWNoteComboBox4] : Moderate [TWNoteComboBox5] : Yes [TWNoteComboBox6] : Surgical [de-identified] : Mild [de-identified] : Macerated [de-identified] : <20% [de-identified] : >75% [de-identified] : Yes [de-identified] : Ligament [TWNoteComboBox7] : Mechanical

## 2020-01-23 NOTE — REASON FOR VISIT
[Consultation] : a consultation visit [Family Member] : family member [FreeTextEntry1] : Right posterior chest incision ulcer

## 2020-01-23 NOTE — HISTORY OF PRESENT ILLNESS
[FreeTextEntry1] : Mr. BRITTNEY WILLIAMSON presents to the office with a wound for -1 year duration . using vac\par still deep , has not seen plastics for flap\par Wound dimensions improved, some periwound maceration noted\par The wound is located on the Right upper back surgical wound from lung infection/thoracotomy.\par  The patient has complaints of non healing \par had mri  9/16  mri hayley villanueva/ malika solitario [phone]\par pcp urbano lr [phone]\par april new surgery at  [facility]/ had vac in past did not like it\par The patient has been dressing the wound with alginate and gauze patient had a wound vac .\par The patient denies fevers or chills.\par  The patient has localized pain to the wound upon dressing changes.\par  The patient has no other complaints or associated symptoms. \par \par

## 2020-01-23 NOTE — ASSESSMENT
[FreeTextEntry1] : 43 yr old  h/o thoracotomy and empyma with  h/o sepsis, shock, respi failure, VATS and decortication, trach and peg vac  dced in february/ using vac -dc alginate silver\par foam, flush well\par suggest plastics flap to get rib covered, no osteo on fims\par The patient continues with deep wound to the right upper back -improved\par  \par eventually will need flap plastic closure/ bridge with vac,  , s/p rib resection\par plastics referral on file \par  \par Wound dimensions improved, some periwound maceration noted\par Plan - cavilon to periwound,   orders for nurse given\par complex-low-lab, xr maria guadalupe,test reviewed\par risk- low- no sharp debridement\par \par

## 2020-01-23 NOTE — PHYSICAL EXAM
[PERRL With Normal Accommodation] : pupils were equal in size, round, and reactive to light [Sclera] : the sclera and conjunctiva were normal [Neck Appearance] : the appearance of the neck was normal [] : no respiratory distress [Respiration, Rhythm And Depth] : normal respiratory rhythm and effort [Heart Sounds] : normal S1 and S2 [Auscultation Breath Sounds / Voice Sounds] : lungs were clear to auscultation bilaterally [Murmurs] : no murmurs [Examination Of The Chest] : the chest was normal in appearance [Abdomen Tenderness] : non-tender [Abdomen Soft] : soft [Cervical Lymph Nodes Enlarged Posterior Bilaterally] : posterior cervical [Abnormal Walk] : normal gait [Cervical Lymph Nodes Enlarged Anterior Bilaterally] : anterior cervical [Supraclavicular Lymph Nodes Enlarged Bilaterally] : supraclavicular [No Focal Deficits] : no focal deficits [Affect] : the affect was normal [Mood] : the mood was normal [Oriented To Time, Place, And Person] : oriented to person, place, and time [FreeTextEntry1] : wound to right posterior chest with 3 cm tunneling noted at 10:00 position

## 2020-03-17 ENCOUNTER — APPOINTMENT (OUTPATIENT)
Dept: WOUND CARE | Facility: CLINIC | Age: 45
End: 2020-03-17

## 2020-03-24 ENCOUNTER — RESULT REVIEW (OUTPATIENT)
Age: 45
End: 2020-03-24

## 2020-03-24 ENCOUNTER — APPOINTMENT (OUTPATIENT)
Dept: WOUND CARE | Facility: CLINIC | Age: 45
End: 2020-03-24
Payer: MEDICAID

## 2020-03-24 VITALS — BODY MASS INDEX: 30.53 KG/M2 | TEMPERATURE: 98.8 F | HEIGHT: 66 IN | WEIGHT: 190 LBS

## 2020-03-24 PROCEDURE — 99214 OFFICE O/P EST MOD 30 MIN: CPT

## 2020-03-31 NOTE — HISTORY OF PRESENT ILLNESS
[FreeTextEntry1] : Mr. BRITTNEY WILLIAMSON presents to the office with a wound for -1 year duration \par .s/p vac,still deep , has not seen plastics for flap uses alginate\par no fever\par Wound dimensions improved, some periwound maceration noted\par The wound is located on the Right upper back surgical wound from lung infection/thoracotomy.\par  The patient has complaints of non healing \par had mri  9/16  mri hayley villanueva/ malika solitario [phone]\par pcp urbano lr [phone]\par april new surgery at  [facility]/ had vac in past did not like it\par The patient has been dressing the wound with alginate and gauze patient had a wound vac .\par The patient denies fevers or chills.\par  The patient has localized pain to the wound upon dressing changes.\par  The patient has no other complaints or associated symptoms. \par \par

## 2020-03-31 NOTE — ASSESSMENT
[FreeTextEntry1] : 43 yr old  h/o thoracotomy and empyema with  h/o sepsis, shock, respi failure, VATS and decortication, trach and peg vac  dced in 2/2020  s/p vac, using alginate\par \par reinforced- suggest plastics flap to get rib covered, no osteo on fims\par The patient continues with deep wound to the right upper back  opened today to allow for proper packing.  Multiple tunnels and undermining noted.\par  \par eventually will need flap plastic closure/   , s/p rib resection\par plastics referral on file \par  \par Wound dimensions improved, some periwound maceration noted\par Plan - cavilon to periwound, alginate foam\par  orders for nurse given\par complex-low-lab, xr maria guadalupe,test reviewed\par \par Patient's ex wife present and will change dressings.\par \par \par

## 2020-03-31 NOTE — PHYSICAL EXAM
[Normal Breath Sounds] : Normal breath sounds [JVD] : no jugular venous distention  [de-identified] : nad [de-identified] : mae [de-identified] : open wound right 5/6th costal area [FreeTextEntry1] : Right posterior chest incision ulcer  [FreeTextEntry2] : 1 cm [FreeTextEntry3] : 1.5 cm [FreeTextEntry4] : 3 cm [de-identified] : @ 12 oclock 4 cm @9 o clock 2.3  cm @3 oclock 1.5  [FreeTextEntry5] : curette / #10 scalpel [de-identified] : skin and subcutaneous tissue [de-identified] : aquacel / border foam  [de-identified] : \par \par \par \par no air or pneumothorax/ or leak\par \par previous.5/1.5 [TWNoteComboBox1] : Right [TWNoteComboBox3] : FT [TWNoteComboBox4] : Moderate [TWNoteComboBox5] : Yes [TWNoteComboBox6] : Surgical [de-identified] : Macerated [de-identified] : Mild [de-identified] : <20% [de-identified] : >75% [de-identified] : Yes [de-identified] : Ligament [de-identified] : 2.5% Lidocaine Topical [TWNoteComboBox7] : Elaine [de-identified] : Debridement performed of all devitalized tissue to bleeding viable tissue

## 2020-04-21 ENCOUNTER — APPOINTMENT (OUTPATIENT)
Dept: WOUND CARE | Facility: CLINIC | Age: 45
End: 2020-04-21

## 2020-04-28 ENCOUNTER — APPOINTMENT (OUTPATIENT)
Dept: WOUND CARE | Facility: CLINIC | Age: 45
End: 2020-04-28
Payer: MEDICAID

## 2020-04-28 PROCEDURE — 99213 OFFICE O/P EST LOW 20 MIN: CPT

## 2020-04-28 NOTE — HISTORY OF PRESENT ILLNESS
[FreeTextEntry1] : Mr. BRITTNEY WILLIAMSON presents to the office with a wound for -1 year duration \par .s/p vac,still deep , has not seen plastics for flap uses alginate\par He declines VAC placement.  His wound has improved.\par The wound is located on the Right upper back surgical wound from lung infection/thoracotomy.\par  The patient has complaints of non healing \par s/p wound vac .\par The patient denies fevers or chills.\par  The patient has localized pain to the wound upon dressing changes.\par  The patient has no other complaints or associated symptoms. \par had mri  9/16  mri hayley villanueva/ malika solitario [phone]\par pcp urbano lr [phone]\par \par \par

## 2020-04-28 NOTE — PHYSICAL EXAM
[Normal Breath Sounds] : Normal breath sounds [JVD] : no jugular venous distention  [de-identified] : mae [de-identified] : open wound right 5/6th costal area [de-identified] : nad [FreeTextEntry1] : Right posterior chest incision ulcer  [FreeTextEntry3] : 2.5cm [FreeTextEntry4] : 3 [FreeTextEntry2] : .6 [de-identified] : \par \par \par \par  [de-identified] : @10 o clock 5.5 cm [de-identified] : iodoform / border foam  [TWNoteComboBox4] : Moderate [TWNoteComboBox6] : Surgical [TWNoteComboBox1] : Right [TWNoteComboBox5] : Yes [de-identified] : Normal [de-identified] : <20% [de-identified] : None [de-identified] : >75% [de-identified] : Ligament [de-identified] : No [TWNoteComboBox7] : Elaine

## 2020-04-28 NOTE — ASSESSMENT
[FreeTextEntry1] : 43 yr old  h/o thoracotomy and empyma with  h/o sepsis, shock, respi failure, VATS and decortication, trach and peg vac  dced in february/s/p vac, using alginate\par \par The patient continues with deep wound to the right upper back  improved after opening tunnels\par  \par eventually will need flap plastic closure/   , s/p rib resection if the wound does not close\par plastics referral on file \par  \par 4/28/2020\par Wound dimensions improved reduced UM, some periwound maceration noted\par Plan - cavilon to periwound, iodoform packing an foam\par  orders for nurse given\par complex-low-lab, xr maria guadalupe,test reviewed\par risk- low-   sharp debridement tolerated well\par \par

## 2020-10-29 ENCOUNTER — APPOINTMENT (OUTPATIENT)
Dept: WOUND CARE | Facility: CLINIC | Age: 45
End: 2020-10-29
Payer: MEDICAID

## 2020-10-29 VITALS
DIASTOLIC BLOOD PRESSURE: 96 MMHG | WEIGHT: 190 LBS | SYSTOLIC BLOOD PRESSURE: 151 MMHG | TEMPERATURE: 98 F | HEIGHT: 66 IN | HEART RATE: 105 BPM | BODY MASS INDEX: 30.53 KG/M2

## 2020-10-29 PROCEDURE — 11043 DBRDMT MUSC&/FSCA 1ST 20/<: CPT

## 2020-10-29 PROCEDURE — 99214 OFFICE O/P EST MOD 30 MIN: CPT | Mod: 25

## 2020-10-29 PROCEDURE — 99072 ADDL SUPL MATRL&STAF TM PHE: CPT

## 2020-10-30 NOTE — ASSESSMENT
[FreeTextEntry1] : 44 yr old  h/o thoracotomy and empyma with  h/o sepsis, shock, respi failure, VATS and decortication, trach and peg vac  dced in february/s/p vac, using alginate\par sharp debbridement done , tolerated into muscle\par \par The patient continues with deep wound to the right upper back  improved after opening tunnels\par  \par eventually will need flap plastic closure/   , s/p rib resection if the wound does not close\par plastics referral on file \par  \par  Wound dimensions improved reduced UM, some periwound maceration noted\par Plan - cavilon to periwoundalginate foam\par  orders for nurse given\par complex-low-lab, xr maria guadalupe,test reviewed\par discussed eo2 patch wants to try\par riskmod-   sharp debridement tolerated well\par \par

## 2020-10-30 NOTE — PHYSICAL EXAM
[Normal Breath Sounds] : Normal breath sounds [JVD] : no jugular venous distention  [de-identified] : nad [de-identified] : mae [de-identified] : open wound right 5/6th costal area [FreeTextEntry1] : Right posterior chest incision ulcer  [FreeTextEntry2] : .6 [FreeTextEntry3] : 2.5cm [FreeTextEntry4] : 3 [de-identified] : @10 o clock 5.5 cm [de-identified] : iodoform / border foam  [de-identified] : \par \par \par \par  [TWNoteComboBox1] : Right [TWNoteComboBox4] : Moderate [TWNoteComboBox5] : Yes [TWNoteComboBox6] : Surgical [de-identified] : Normal [de-identified] : None [de-identified] : <20% [de-identified] : >75% [de-identified] : No

## 2020-10-30 NOTE — HISTORY OF PRESENT ILLNESS
[FreeTextEntry1] : Mr. BRITTNEY WILLIAMSON presents to the office with a wound for -1+ year duration \par does not want to go to plastics for repair using alginate has nurse\par \par .s/p vac,still deep , has not seen plastics for flap uses alginate\par He declines VAC placement.  His wound has improved.\par The wound is located on the Right upper back surgical wound from lung infection/thoracotomy.\par  The patient has complaints of non healing \par s/p wound vac .\par The patient denies fevers or chills.\par  The patient has localized pain to the wound upon dressing changes.\par  The patient has no other complaints or associated symptoms. \par had mri  9/16  mri hayley villanueva/ malika solitario [phone]\par pcp urbano lr [phone]\par \par \par  No

## 2020-11-12 ENCOUNTER — APPOINTMENT (OUTPATIENT)
Dept: WOUND CARE | Facility: CLINIC | Age: 45
End: 2020-11-12
Payer: MEDICAID

## 2020-11-12 VITALS
TEMPERATURE: 98 F | RESPIRATION RATE: 16 BRPM | HEART RATE: 89 BPM | SYSTOLIC BLOOD PRESSURE: 162 MMHG | DIASTOLIC BLOOD PRESSURE: 111 MMHG

## 2020-11-12 PROCEDURE — 99213 OFFICE O/P EST LOW 20 MIN: CPT | Mod: 25

## 2020-11-12 PROCEDURE — 11043 DBRDMT MUSC&/FSCA 1ST 20/<: CPT

## 2020-11-12 NOTE — PHYSICAL EXAM
[JVD] : no jugular venous distention  [Normal Breath Sounds] : Normal breath sounds [de-identified] : nad [de-identified] : mae [de-identified] : open wound right 5/6th costal area [FreeTextEntry1] : Right posterior chest incision ulcer  [FreeTextEntry2] : .6 [FreeTextEntry3] : 2.5cm [FreeTextEntry4] : 3 [de-identified] : @10 o clock 5.5 cm [de-identified] : iodoform / border foam  [de-identified] : \par \par \par \par  [TWNoteComboBox1] : Right [TWNoteComboBox4] : Moderate [TWNoteComboBox5] : Yes [TWNoteComboBox6] : Surgical [de-identified] : Normal [de-identified] : None [de-identified] : <20% [de-identified] : >75% [de-identified] : No

## 2020-12-02 ENCOUNTER — APPOINTMENT (OUTPATIENT)
Dept: WOUND CARE | Facility: CLINIC | Age: 45
End: 2020-12-02
Payer: MEDICAID

## 2020-12-02 PROCEDURE — 99213 OFFICE O/P EST LOW 20 MIN: CPT | Mod: 95

## 2020-12-02 NOTE — ASSESSMENT
[FreeTextEntry1] : 44 yr old  h/o thoracotomy and empyma with  h/o sepsis, shock, respi failure, VATS and decortication, trach and peg vac  dced in february/s/p vac, using alginate\par sharp debbridement done , tolerated into muscle\par \par The patient continues with deep wound to the right upper back  improved after opening tunnels\par  \par eventually will need flap plastic closure/   , s/p rib resection if the wound does not close\par plastics referral on file \par  \par  Wound dimensions improved reduced UM, some periwound maceration noted\par Plan - cavilon to periwoundalginate foam\par  orders for nurse given\par complex-low-lab, xr maria guadalupe,test reviewed\par discussed eo2 patch wants to try\par riskmod-   sharp debridement tolerated well\par \par 12/2/20 VN will submit photos\par advise CXR\par \par

## 2020-12-02 NOTE — PHYSICAL EXAM
[de-identified] : nad, sitting on bed with shirt off, not frail [de-identified] : open wound right 5/6th costal area, unlabored [de-identified] : ambulatory [de-identified] : awake , conversant [FreeTextEntry1] : Right posterior chest incision ulcer  [FreeTextEntry2] : .6 [FreeTextEntry3] : 2.5cm [FreeTextEntry4] : 3 [de-identified] : @10 o clock 5.5 cm [de-identified] : iodoform / border foam  [de-identified] : \par \par \par \par  [TWNoteComboBox1] : Right [TWNoteComboBox4] : Moderate [TWNoteComboBox5] : Yes [TWNoteComboBox6] : Surgical [de-identified] : Normal [de-identified] : None [de-identified] : <20% [de-identified] : >75% [de-identified] : No

## 2020-12-02 NOTE — HISTORY OF PRESENT ILLNESS
[FreeTextEntry1] : Mr. BRITTNEY WILLIAMSON presents to the office with a wound for -1+ year duration \par does not want to go to plastics for repair using alginate has nurse\par \par .s/p vac,still deep , has not seen plastics for flap uses alginate\par He declines VAC placement.  His wound has improved.\par The wound is located on the Right upper back surgical wound from lung infection/thoracotomy.\par  The patient has complaints of non healing \par s/p wound vac .\par The patient denies fevers or chills.\par  The patient has localized pain to the wound upon dressing changes.\par  The patient has no other complaints or associated symptoms. \par had mri  9/16  mri hayley villanueva/ malika solitario [phone]\par pcp urbano lr [phone]\par \par 12/2/20 Initial visit completed with video which was then lost. Has android phone\par DWIGHT See , reports is seen 3X each week , but there is still significant discharge , and wound edges are nmacerated. Instructed to wash wound 3X each week with soap and water , and to be vigorous within confines of patient comfort. No reported fever\par \par \par

## 2020-12-02 NOTE — REASON FOR VISIT
[Follow-Up: _____] : a [unfilled] follow-up visit [Formal Caregiver] : formal caregiver [Family Member] : family member [FreeTextEntry1] : Right posterior chest incision ulcer

## 2020-12-21 ENCOUNTER — NON-APPOINTMENT (OUTPATIENT)
Age: 45
End: 2020-12-21

## 2021-01-11 ENCOUNTER — NON-APPOINTMENT (OUTPATIENT)
Age: 46
End: 2021-01-11

## 2021-01-12 ENCOUNTER — APPOINTMENT (OUTPATIENT)
Dept: WOUND CARE | Facility: CLINIC | Age: 46
End: 2021-01-12
Payer: MEDICAID

## 2021-01-12 VITALS — TEMPERATURE: 97.2 F | BODY MASS INDEX: 30.53 KG/M2 | HEIGHT: 66 IN | WEIGHT: 190 LBS

## 2021-01-12 PROCEDURE — 11043 DBRDMT MUSC&/FSCA 1ST 20/<: CPT

## 2021-01-12 PROCEDURE — 99213 OFFICE O/P EST LOW 20 MIN: CPT | Mod: 25

## 2021-01-22 ENCOUNTER — APPOINTMENT (OUTPATIENT)
Dept: PLASTIC SURGERY | Facility: CLINIC | Age: 46
End: 2021-01-22
Payer: MEDICAID

## 2021-01-22 VITALS
TEMPERATURE: 98.8 F | HEART RATE: 97 BPM | HEIGHT: 78 IN | BODY MASS INDEX: 22.56 KG/M2 | DIASTOLIC BLOOD PRESSURE: 109 MMHG | WEIGHT: 195 LBS | SYSTOLIC BLOOD PRESSURE: 160 MMHG

## 2021-01-22 VITALS — HEIGHT: 66 IN | BODY MASS INDEX: 31.47 KG/M2

## 2021-01-22 DIAGNOSIS — Z78.9 OTHER SPECIFIED HEALTH STATUS: ICD-10-CM

## 2021-01-22 DIAGNOSIS — F17.200 NICOTINE DEPENDENCE, UNSPECIFIED, UNCOMPLICATED: ICD-10-CM

## 2021-01-22 PROCEDURE — 99072 ADDL SUPL MATRL&STAF TM PHE: CPT

## 2021-01-22 PROCEDURE — 99205 OFFICE O/P NEW HI 60 MIN: CPT

## 2021-01-22 NOTE — REASON FOR VISIT
[Consultation] : a consultation visit [FreeTextEntry1] : [id number] [FreeTextEntry2] : mostov [TWNoteComboBox1] : Polish

## 2021-01-22 NOTE — PHYSICAL EXAM
[de-identified] : NAD. BMI 31.5 [de-identified] : right posterior chest with ~ 12 cm x 4 cm open wound along posterior ribs. Unclear penetration into thoracic cavity. Significant surrounding scar. Surrounding tissue relatively mobile. Chronic erythema present around wound. [de-identified] : normal affect, appropriate.

## 2021-01-22 NOTE — HISTORY OF PRESENT ILLNESS
[FreeTextEntry1] : 45-year-old man presents with a over one year history of a right posterior chest wound following treatment of empyema with thoracotomy and decortication, with multiple debridements and vac changes. He had at least 2 ribs partially resected. He denies any significant current shortness of breath. He is currently treating the wound with local oxygen therapy. Previous trach/PEG. He denies radiation.\par \par He is a former  who has been out of work for the last couple of years following this illness. He denies any other prior surgical procedures.  He is accompanied by his ex-wife. His ex-wife states that she or his son will be available to assist him after surgery. He has a history of EtOH abuse and withdrawal during a prior admission. He continues to smoke a pack a day.

## 2021-01-22 NOTE — ASSESSMENT
[FreeTextEntry1] : Open wound of posterior chest wall. On XRay images, 2 posterior partial ribs appear to be missing. The patient will need a large excision of the wound and possible chest wall reconstruction. He may require multiple muscle flaps, and is at risk for lung damage, prolonged intubation, and postoperative DTs. His current active smoking is likely to contribute to poor wound healing. \par \par We will need a recent CT of the chest prior to proceeding. He will need medical clearance.

## 2021-01-29 NOTE — HISTORY OF PRESENT ILLNESS
[FreeTextEntry1] : Mr. BRITTNEY WILLIAMSON presents to the office with a wound for -1+ year duration \par does not want to go to plastics for repair using alginate has nurse\par \par .s/p vac,still deep , has not seen plastics for flap uses alginate\par He declines VAC placement.  His wound has improved.\par The wound is located on the Right upper back surgical wound from lung infection/thoracotomy.\par  The patient has complaints of non healing \par s/p wound vac .\par The patient denies fevers or chills.\par  The patient has localized pain to the wound upon dressing changes.\par  The patient has no other complaints or associated symptoms. \par had mri  9/16  mri hayley villanueva/ malika solitario [phone]\par pcp urbano lr [phone]\par \par \par

## 2021-01-29 NOTE — PHYSICAL EXAM
[Normal Breath Sounds] : Normal breath sounds [JVD] : no jugular venous distention  [de-identified] : nad [de-identified] : mae [de-identified] : open wound right 5/6th costal area [FreeTextEntry1] : Right posterior chest incision ulcer  [FreeTextEntry2] : .6 [FreeTextEntry3] : 2.5cm [de-identified] : @10 o clock 5.5 cm [FreeTextEntry4] : 3 [de-identified] : iodoform / border foam  [de-identified] : \par \par \par \par  [TWNoteComboBox1] : Right [TWNoteComboBox4] : Moderate [TWNoteComboBox5] : Yes [TWNoteComboBox6] : Surgical [de-identified] : Normal [de-identified] : None [de-identified] : <20% [de-identified] : >75% [de-identified] : No

## 2021-01-29 NOTE — ASSESSMENT
[FreeTextEntry1] : 44 yr old  h/o thoracotomy and empyma with  h/o sepsis, shock, respi failure, VATS and decortication, trach and peg vac  dced in february/s/p vac, using alginate\par sharp debbridement done , tolerated into muscle\par interested in EO2\par The patient continues with deep wound to the right upper back  improved after opening tunnels\par  \par eventually will need flap plastic closure/   , s/p rib resection if the wound does not close\par plastics referral on file \par  \par  Wound dimensions improved reduced UM, some periwound maceration noted\par Plan - cavilon to periwound alginate foam\par  orders for nurse given\par complex-low-lab, xr maria guadalupe,test reviewed\par discussed eo2 patch patient wants to try\par risk mod-   sharp debridement tolerated well\par \par \par \par

## 2021-02-02 ENCOUNTER — APPOINTMENT (OUTPATIENT)
Dept: WOUND CARE | Facility: CLINIC | Age: 46
End: 2021-02-02
Payer: MEDICAID

## 2021-02-02 VITALS
RESPIRATION RATE: 16 BRPM | SYSTOLIC BLOOD PRESSURE: 136 MMHG | DIASTOLIC BLOOD PRESSURE: 91 MMHG | TEMPERATURE: 95 F | HEART RATE: 100 BPM

## 2021-02-02 DIAGNOSIS — Z82.49 FAMILY HISTORY OF ISCHEMIC HEART DISEASE AND OTHER DISEASES OF THE CIRCULATORY SYSTEM: ICD-10-CM

## 2021-02-02 DIAGNOSIS — F17.210 NICOTINE DEPENDENCE, CIGARETTES, UNCOMPLICATED: ICD-10-CM

## 2021-02-02 PROCEDURE — 99213 OFFICE O/P EST LOW 20 MIN: CPT | Mod: 25

## 2021-02-02 PROCEDURE — 11043 DBRDMT MUSC&/FSCA 1ST 20/<: CPT

## 2021-02-02 PROCEDURE — 99072 ADDL SUPL MATRL&STAF TM PHE: CPT

## 2021-02-02 RX ORDER — METOPROLOL SUCCINATE 25 MG/1
25 TABLET, EXTENDED RELEASE ORAL
Refills: 0 | Status: COMPLETED | COMMUNITY
End: 2021-02-02

## 2021-02-02 NOTE — HISTORY OF PRESENT ILLNESS
[FreeTextEntry1] : Mr. BRITTNEY WILLIAMSON presents to the office with a wound for -1+ year duration \par does not want to go to plastics for repair using alginate has nurse\par saw plastics for possible scar revision and closure/ awaiting Dr Sorto\par started EO2 patch, still with drainage\par \par .s/p vac,still deep , has not seen plastics for flap uses alginate\par He declines VAC placement.  His wound has improved.\par The wound is located on the Right upper back surgical wound from lung infection/thoracotomy.\par  The patient has complaints of non healing \par s/p wound vac .\par The patient denies fevers or chills.\par  The patient has localized pain to the wound upon dressing changes.\par  The patient has no other complaints or associated symptoms. \par had mri  9/16  mri hayley villanueva/ malika solitario [phone]\par pcp urbano lr [phone]\par \par \par

## 2021-02-02 NOTE — PHYSICAL EXAM
[JVD] : no jugular venous distention  [de-identified] : nad [de-identified] : mae [de-identified] : open wound right 5/6th costal area [FreeTextEntry1] : Right posterior chest incision ulcer  [FreeTextEntry2] : .6 [FreeTextEntry3] : 2.5cm [FreeTextEntry4] : 3 [de-identified] : @10 o clock 5.5 cm [de-identified] : iodoform / border foam  [TWNoteComboBox1] : Right [de-identified] : \par \par \par \par  [TWNoteComboBox4] : Moderate [TWNoteComboBox5] : Yes [TWNoteComboBox6] : Surgical [de-identified] : Normal [de-identified] : None [de-identified] : <20% [de-identified] : >75% [de-identified] : No

## 2021-02-02 NOTE — ASSESSMENT
[FreeTextEntry1] : 45 yr old  h/o thoracotomy and empyma with  h/o sepsis, shock, respi failure, VATS and decortication, trach and peg vac  dced in february/s/p vac, using alginate\par sharp debbridement done , tolerated into muscle\par interested in EO2\par The patient continues with deep wound to the right upper back  \par improved after opening tunnels\par discussed smoking cessation\par  \par eventually will need flap plastic closure/   , s/p rib resection if the wound does not close\par plastics referral  \par  offered nicotine patch\par  Wound dimensions improved reduced UM, some periwound maceration noted\par Plan - cavilon to periwound alginate foam\par  orders for nurse given\par complex-low-lab, xr maria guadalupe,test reviewed\par discussed eo2 patch  \par risk mod-   sharp debridement tolerated well\par \par \par \par

## 2021-02-16 ENCOUNTER — APPOINTMENT (OUTPATIENT)
Dept: WOUND CARE | Facility: CLINIC | Age: 46
End: 2021-02-16
Payer: MEDICAID

## 2021-02-16 VITALS
HEART RATE: 75 BPM | TEMPERATURE: 96.6 F | SYSTOLIC BLOOD PRESSURE: 149 MMHG | DIASTOLIC BLOOD PRESSURE: 97 MMHG | RESPIRATION RATE: 17 BRPM

## 2021-02-16 PROCEDURE — 11043 DBRDMT MUSC&/FSCA 1ST 20/<: CPT

## 2021-02-16 PROCEDURE — 99213 OFFICE O/P EST LOW 20 MIN: CPT | Mod: 25

## 2021-02-16 PROCEDURE — 99072 ADDL SUPL MATRL&STAF TM PHE: CPT

## 2021-02-16 RX ORDER — OMEPRAZOLE 20 MG/1
20 CAPSULE, DELAYED RELEASE ORAL
Qty: 30 | Refills: 0 | Status: ACTIVE | COMMUNITY
Start: 2021-02-02

## 2021-02-16 RX ORDER — CLOTRIMAZOLE AND BETAMETHASONE DIPROPIONATE 10; .5 MG/G; MG/G
1-0.05 CREAM TOPICAL
Qty: 45 | Refills: 0 | Status: ACTIVE | COMMUNITY
Start: 2020-12-10

## 2021-02-16 NOTE — ASSESSMENT
[FreeTextEntry1] : 45 yr old  h/o thoracotomy and empyma with  h/o sepsis, shock, respi failure, VATS and decortication, trach and peg vac  dced in february/s/p vac\par  using alginate EO2\par sharp debbridement done , tolerated into muscle\par  \par The patient continues with deep wound to the right upper back  \par improved after opening tunnels\par discussed smoking cessation\par  \par eventually will need flap plastic closure/   , s/p rib resection if the wound does not close\par plastics referral  \par  offered nicotine patch\par  Wound dimensions improved reduced UM, some periwound maceration noted\par Plan - cavilon to periwound alginate foam\par  orders for nurse given\par complex-low-lab, xr maria guadalupe,test reviewed\par   eo2 patch  alginate\par risk mod-   sharp debridement tolerated well\par \par \par \par

## 2021-02-16 NOTE — HISTORY OF PRESENT ILLNESS
[FreeTextEntry1] : Mr. BRITTNEY WILLIAMSON presents to the office with a wound for -1+ year duration \par does not want to go to plastics for repair using alginate has nurse\par left message for plastics and thoracic\par cautioned not to smoke- on eo2\par saw plastics for possible scar revision and closure/ awaiting Dr Sorto\par started EO2 patch, still with drainage\par \par .s/p vac,still deep , has not seen plastics for flap uses alginate\par He declines VAC placement.  His wound has improved.\par The wound is located on the Right upper back surgical wound from lung infection/thoracotomy.\par  The patient has complaints of non healing \par s/p wound vac .\par The patient denies fevers or chills.\par  The patient has localized pain to the wound upon dressing changes.\par  The patient has no other complaints or associated symptoms. \par had mri  9/16  mri hayley villanueva/ malika solitario [phone]\par pcp urbano lr [phone]\par \par \par

## 2021-02-16 NOTE — PHYSICAL EXAM
[Normal Breath Sounds] : Normal breath sounds [de-identified] : open wound right 5/6th costal area [JVD] : no jugular venous distention  [Normal Rate and Rhythm] : normal rate and rhythm [de-identified] : nad [de-identified] : mae [FreeTextEntry1] : Right posterior chest incision ulcer  [FreeTextEntry2] : .6 [FreeTextEntry3] : 2.5cm [FreeTextEntry4] : 3 [de-identified] : @10 o clock 5.5 cm [de-identified] : iodoform / border foam  [de-identified] : \par \par \par \par  [TWNoteComboBox1] : Right [TWNoteComboBox4] : Moderate [TWNoteComboBox5] : Yes [TWNoteComboBox6] : Surgical [de-identified] : Normal [de-identified] : None [de-identified] : <20% [de-identified] : >75% [de-identified] : No

## 2021-03-05 ENCOUNTER — APPOINTMENT (OUTPATIENT)
Dept: WOUND CARE | Facility: CLINIC | Age: 46
End: 2021-03-05
Payer: MEDICAID

## 2021-03-05 VITALS
TEMPERATURE: 95.8 F | RESPIRATION RATE: 18 BRPM | HEART RATE: 94 BPM | SYSTOLIC BLOOD PRESSURE: 135 MMHG | DIASTOLIC BLOOD PRESSURE: 88 MMHG

## 2021-03-05 PROCEDURE — 11043 DBRDMT MUSC&/FSCA 1ST 20/<: CPT

## 2021-03-05 PROCEDURE — 99072 ADDL SUPL MATRL&STAF TM PHE: CPT

## 2021-03-05 PROCEDURE — 99214 OFFICE O/P EST MOD 30 MIN: CPT | Mod: 25

## 2021-03-05 NOTE — PHYSICAL EXAM
[Normal Breath Sounds] : Normal breath sounds [Normal Rate and Rhythm] : normal rate and rhythm [de-identified] : open wound right 5/6th costal area [JVD] : no jugular venous distention  [de-identified] : nad [de-identified] : mae [FreeTextEntry1] : Right posterior chest incision ulcer  [de-identified] : iodoform / border foam  [de-identified] : \par previous .6x2.5x3, tunnel aot 10ock 5.5\par \par \par  [TWNoteComboBox1] : Right [TWNoteComboBox4] : Moderate [TWNoteComboBox5] : Yes [TWNoteComboBox6] : Surgical [de-identified] : Normal [de-identified] : None [de-identified] : <20% [de-identified] : >75% [de-identified] : No

## 2021-03-05 NOTE — ASSESSMENT
[FreeTextEntry1] : 45 yr old  h/o thoracotomy and empyma with  h/o sepsis, shock, respi failure, VATS and decortication, trach and peg vac  dced in february/s/p vac , larger during 2020, now slight improvement off antibiotics\par no vna\par  using alginate EO2\par sharp debbridement done , tolerated into muscle\par  \par The patient continues with deep wound to the right upper back  \par improved after opening tunnels\par discussed smoking cessation\par  \par eventually will need flap plastic closure/   , s/p rib resection if the wound does not close\par plastics referral  - awaiting new ct imaging- to be scheduled, precert complete\par   nicotine patch\par  Wound dimensions improved reduced UM, some periwound maceration noted\par Plan - cavilon to periwound alginate foam\par  \par complex-low-lab, xr maria guadalupe,test reviewed\par   eo2 patch  alginate\par risk mod-   sharp debridement tolerated well\par \par \par \par

## 2021-03-05 NOTE — HISTORY OF PRESENT ILLNESS
[FreeTextEntry1] : Mr. BRITTNEY WILLIAMSON presents to the office with a wound for almost 2 years duration \par sent email to plastics and thoracic\par pending imagoing, no fever\par using eo2 patch, dimensions improved from january\par using smoking cessation patch\par \par does not want to go to plastics for repair using alginate\par left message for plastics and thoracic\par cautioned not to smoke- on eo2\par saw plastics for possible scar revision and closure/ awaiting Dr Sorto\par started EO2 patch, still with drainage\par \par .s/p vac,still deep , has not seen plastics for flap uses alginate\par He declines VAC placement.  His wound has improved.\par The wound is located on the Right upper back surgical wound from lung infection/thoracotomy.\par  The patient has complaints of non healing \par s/p wound vac .\par The patient denies fevers or chills.\par  The patient has localized pain to the wound upon dressing changes.\par  The patient has no other complaints or associated symptoms. \par had mri  9/16  mri hayley villanueva/ malika solitario [phone]\par pcp urbano lr [phone]\par \par \par

## 2021-03-09 ENCOUNTER — RX RENEWAL (OUTPATIENT)
Age: 46
End: 2021-03-09

## 2021-03-09 RX ORDER — NICOTINE 21 MG/24HR
21 PATCH, TRANSDERMAL 24 HOURS TRANSDERMAL DAILY
Qty: 28 | Refills: 0 | Status: ACTIVE | COMMUNITY
Start: 2021-02-02 | End: 1900-01-01

## 2021-03-19 ENCOUNTER — APPOINTMENT (OUTPATIENT)
Dept: WOUND CARE | Facility: CLINIC | Age: 46
End: 2021-03-19
Payer: MEDICAID

## 2021-03-19 VITALS
DIASTOLIC BLOOD PRESSURE: 104 MMHG | HEART RATE: 74 BPM | HEIGHT: 66 IN | TEMPERATURE: 95.9 F | SYSTOLIC BLOOD PRESSURE: 149 MMHG | RESPIRATION RATE: 20 BRPM | WEIGHT: 195 LBS | BODY MASS INDEX: 31.34 KG/M2

## 2021-03-19 PROCEDURE — 99072 ADDL SUPL MATRL&STAF TM PHE: CPT

## 2021-03-19 PROCEDURE — 99213 OFFICE O/P EST LOW 20 MIN: CPT

## 2021-03-19 RX ORDER — METOPROLOL SUCCINATE 25 MG/1
25 TABLET, EXTENDED RELEASE ORAL
Refills: 0 | Status: ACTIVE | COMMUNITY

## 2021-03-19 NOTE — ASSESSMENT
[FreeTextEntry1] : 45 yr old  h/o thoracotomy and empyma with  h/o sepsis, shock, respi failure, VATS and decortication, trach and peg vac  dced in february/s/p vac , larger during 2020, now slight improvement off antibiotics\par no vna\par  using alginate EO2.  undermining better\par ct of chest with some changes- will send  notification to dr Sorto\par  \par  \par The patient continues with deep wound to the right upper back  \par improved after opening tunnels\par discussed smoking cessation\par  \par eventually will need flap plastic closure/   , s/p rib resection if the wound does not close\par plastics referral   new ct imaging- done\par   nicotine patch\par  Wound dimensions improved reduced UM, some periwound maceration noted\par Plan - cavilon to periwound alginate foam\par  \par complex-low-lab, xr maria guadalupe,test reviewed\par   eo2 patch  alginate\par risk low\par \par \par \par

## 2021-03-19 NOTE — HISTORY OF PRESENT ILLNESS
[FreeTextEntry1] : Mr. BRITTNEY WILLIAMSNO presents to the office with a wound for almost 2 years duration \par sent email to plastics and thoracic\par pending imagoing, no fever\par using eo2 patch, dimensions improved from january\par using smoking cessation patch\par \par does not want to go to plastics for repair using alginate\par left message for plastics and thoracic\par cautioned not to smoke- on eo2\par saw plastics for possible scar revision and closure/ awaiting Dr Sorto\par started EO2 patch, still with drainage\par \par .s/p vac,still deep , has not seen plastics for flap uses alginate\par He declines VAC placement.  His wound has improved.\par The wound is located on the Right upper back surgical wound from lung infection/thoracotomy.\par  The patient has complaints of non healing \par s/p wound vac .\par The patient denies fevers or chills.\par  The patient has localized pain to the wound upon dressing changes.\par  The patient has no other complaints or associated symptoms. \par had mri  9/16  mri hayley villanueva/ malika solitario [phone]\par pcp urbano lr [phone]\par \par \par

## 2021-03-19 NOTE — PHYSICAL EXAM
[Normal Breath Sounds] : Normal breath sounds [Normal Rate and Rhythm] : normal rate and rhythm [de-identified] : open wound right 5/6th costal area [JVD] : no jugular venous distention  [de-identified] : nad [de-identified] : mae [FreeTextEntry1] : Right posterior chest incision ulcer  [de-identified] : iodoform / border foam  [de-identified] : \par previous .6x2.5x3, tunnel aot 10ock 5.5\par \par \par  [TWNoteComboBox1] : Right [TWNoteComboBox4] : Moderate [TWNoteComboBox5] : Yes [TWNoteComboBox6] : Surgical [de-identified] : Normal [de-identified] : None [de-identified] : <20% [de-identified] : >75% [de-identified] : No

## 2021-04-07 ENCOUNTER — NON-APPOINTMENT (OUTPATIENT)
Age: 46
End: 2021-04-07

## 2021-04-08 ENCOUNTER — APPOINTMENT (OUTPATIENT)
Dept: WOUND CARE | Facility: CLINIC | Age: 46
End: 2021-04-08
Payer: MEDICAID

## 2021-04-08 VITALS
RESPIRATION RATE: 18 BRPM | HEART RATE: 103 BPM | DIASTOLIC BLOOD PRESSURE: 102 MMHG | TEMPERATURE: 97.2 F | SYSTOLIC BLOOD PRESSURE: 143 MMHG

## 2021-04-08 PROCEDURE — 11043 DBRDMT MUSC&/FSCA 1ST 20/<: CPT

## 2021-04-08 PROCEDURE — 99213 OFFICE O/P EST LOW 20 MIN: CPT | Mod: 25

## 2021-04-08 PROCEDURE — 99072 ADDL SUPL MATRL&STAF TM PHE: CPT

## 2021-04-08 NOTE — HISTORY OF PRESENT ILLNESS
[FreeTextEntry1] : Mr. BRITTNEY WILLIAMSON presents to the office with a wound for almost 2 years duration \par sent email to plastics and thoracic- no plans yet\par using alginate- and eo2, sometimes uses regular foam\par \par   imaging, [facility] solitario  - done\par no fever\par using eo2 patch, dimensions improved from january\par using smoking cessation patch\par \par does not want to go to plastics for repair using alginate\par left message for plastics and thoracic\par cautioned not to smoke- on eo2\par saw plastics for possible scar revision and closure/ awaiting Dr Sorto\par started EO2 patch, still with drainage\par \par .s/p vac,still deep , has not seen plastics for flap uses alginate\par He declines VAC placement.  His wound has improved.\par The wound is located on the Right upper back surgical wound from lung infection/thoracotomy.\par  The patient has complaints of non healing \par s/p wound vac .\par The patient denies fevers or chills.\par  The patient has localized pain to the wound upon dressing changes.\par  The patient has no other complaints or associated symptoms. \par had mri  9/16  mri hayley villanueva/ malika solitario [phone]\par pcp urbano lr [phone]\par \par \par

## 2021-04-08 NOTE — ASSESSMENT
[FreeTextEntry1] : 45 yr old  h/o thoracotomy and empyma with  h/o sepsis, shock, respi failure, VATS and decortication, trach and peg vac  dced in february/s/p vac , larger during 2020, now slight improvement off antibiotics\par no vna\par  using alginate EO2.  undermining better\par ct of chest with some changes- will send  notification to dr Sorto\par told patient to make appointment\par  \par  \par   deep wound to the right upper back  with tunnel 9ock\par  discussed smoking cessation\par  \par eventually will need flap plastic closure/   , s/p rib resection if the wound does not close\par plastics referral   new ct imaging- done\par   nicotine patch\par  Wound dimensions improved reduced UM, some periwound maceration noted\par Plan - cavilon to periwound alginate foam\par  \par complex-low-lab, xr maria guadalupe,test reviewed\par   eo2 patch  alginate- or plain iodofor packing\par risk low\par \par \par \par

## 2021-04-08 NOTE — PHYSICAL EXAM
[Normal Breath Sounds] : Normal breath sounds [de-identified] : open wound right 5/6th costal area [JVD] : no jugular venous distention  [Normal Rate and Rhythm] : normal rate and rhythm [de-identified] : nad [de-identified] : mae [FreeTextEntry1] : Right posterior chest incision ulcer  [de-identified] : iodoform / border foam  [de-identified] : \par previous .6x2.5x3, tunnel aot 10ock 5.5\par \par \par  [TWNoteComboBox1] : Right [TWNoteComboBox4] : Moderate [TWNoteComboBox5] : Yes [TWNoteComboBox6] : Surgical [de-identified] : Normal [de-identified] : None [de-identified] : >75% [de-identified] : <20% [de-identified] : No

## 2021-08-04 PROBLEM — L98.493: Status: ACTIVE | Noted: 2019-08-30

## 2021-08-05 ENCOUNTER — APPOINTMENT (OUTPATIENT)
Dept: THORACIC SURGERY | Facility: CLINIC | Age: 46
End: 2021-08-05
Payer: MEDICAID

## 2021-08-05 VITALS
DIASTOLIC BLOOD PRESSURE: 112 MMHG | OXYGEN SATURATION: 96 % | BODY MASS INDEX: 30.16 KG/M2 | WEIGHT: 199 LBS | HEIGHT: 68 IN | HEART RATE: 100 BPM | SYSTOLIC BLOOD PRESSURE: 155 MMHG | TEMPERATURE: 98.6 F | RESPIRATION RATE: 18 BRPM

## 2021-08-05 DIAGNOSIS — L98.493 NON-PRESSURE CHRONIC ULCER OF SKIN OF OTHER SITES WITH NECROSIS OF MUSCLE: ICD-10-CM

## 2021-08-05 PROCEDURE — 99215 OFFICE O/P EST HI 40 MIN: CPT

## 2021-08-05 RX ORDER — ADHESIVE TAPE 3"X 2.3 YD
4"X4" TAPE, NON-MEDICATED TOPICAL
Qty: 1 | Refills: 1 | Status: ACTIVE | COMMUNITY
Start: 2021-08-05 | End: 1900-01-01

## 2021-08-05 RX ORDER — ADHESIVE TAPE 3"X 2.3 YD
4"X4" TAPE, NON-MEDICATED TOPICAL
Qty: 1 | Refills: 3 | Status: ACTIVE | COMMUNITY
Start: 2021-08-05 | End: 1900-01-01

## 2021-08-09 NOTE — PHYSICAL EXAM
[Respiration, Rhythm And Depth] : normal respiratory rhythm and effort [Exaggerated Use Of Accessory Muscles For Inspiration] : no accessory muscle use [Auscultation Breath Sounds / Voice Sounds] : lungs were clear to auscultation bilaterally [Examination Of The Chest] : the chest was normal in appearance [Chest Visual Inspection Thoracic Asymmetry] : no chest asymmetry [Diminished Respiratory Excursion] : normal chest expansion [Bowel Sounds] : normal bowel sounds [Abdomen Soft] : soft [Abdomen Tenderness] : non-tender [Abdomen Mass (___ Cm)] : no abdominal mass palpated [Abnormal Walk] : normal gait [Nail Clubbing] : no clubbing  or cyanosis of the fingernails [Involuntary Movements] : no involuntary movements were seen [Musculoskeletal - Swelling] : no joint swelling seen [Motor Tone] : muscle strength and tone were normal [Skin Color & Pigmentation] : normal skin color and pigmentation [Skin Turgor] : normal skin turgor [Skin Lesions] : no skin lesions [] : no rash [Impaired Insight] : insight and judgment were intact [Oriented To Time, Place, And Person] : oriented to person, place, and time [Affect] : the affect was normal [Mood] : the mood was normal [Memory Recent] : recent memory was not impaired [Memory Remote] : remote memory was not impaired [FreeTextEntry1] : Partially opened surgical wound to patient's left flank. No purulent drainage noted from site. Surrounding skin clean, dry, intact.

## 2021-08-09 NOTE — ASSESSMENT
[FreeTextEntry1] : Mr. Kim is a 45 year old male who was initially admitted to [facility] on 7/25/19 for loculated pleural effusion. Hospital course was complicated by sepsis, hypotension, ETOH withdrawal, hemothorax and respiratory failure s/p tracheostomy (decannulated 9/7/2018) and PEG (removed 9/11/2018).\par \par He is s/p Right VATS converted to thoracotomy, decortication on 7/28/18 for empyema. He had partial rib resection on 4/5/2019. He has the Right Thorax wound which was managed with wound VAC but has since been discontinued because patient does not want wound vac. Currently wound care TIW. He is followed by Dr. Hdez for wound care. \par \par Pt has been seen Surg for wound care and management. As per Sug note, the wound eventually will need flap closure by Plastic Sx and Thoracic Sx. \par \par I have reviewed the patient's medical records and diagnostic images at time of this office consultation and have made the following recommendation:\par 1. Chest wall debridement; Joint case with Dr. Rios. Risks, benefits and alternatives explained to patient; All questions answered and patient agrees to proceed with surgery. \par 2. Patient hypertensive in office. Encouraged to follow up with PCP for BP control. \par \par I personally performed the services described in the documentation, reviewed the documentation recorded by the scribe in my presence and it accurately and completely records my words and actions.\par \par I, Jena Woodson ANP-C, am scribing for and the presence of SIERRA Cruz, the following sections HISTORY OF PRESENT ILLNESS, PAST MEDICAL/FAMILY/SOCIAL HISTORY; REVIEW OF SYSTEMS; VITAL SIGNS; PHYSICAL EXAM; DISPOSITION.\par \par

## 2021-08-09 NOTE — ADDENDUM
[FreeTextEntry1] : patient with longstanding history of alcohol use and heavy smoking - reported he had been contacted by Dr. Rios's office but did not want to have surgery  and still has to wait until after September 20th when his friend (who accompanies him throughout his visits and hospitalizations) is back from vacation. encouraged him to meet with Dr. Rios to have everything ready including medical/cardiac clearance given his substance abuse history. \par discussed with him my involvement including possible resection of rib(s) or decortication as needed. \par he understood and was agreeable to proceed. Discussed with Dr. Rios. \par

## 2021-08-09 NOTE — CONSULT LETTER
[Dear  ___] : Dear  [unfilled], [Courtesy Letter:] : I had the pleasure of seeing your patient, [unfilled], in my office today. [Please see my note below.] : Please see my note below. [Sincerely,] : Sincerely, [FreeTextEntry2] : Dr. Judi Hdez (Wound Care)\par Dr. Cassidy Andrade (PCP)  [FreeTextEntry3] : Ginette Sorto MD\par Attending Surgeon\par Division of Thoracic Surgery\par , [facility] [employer] at [facility]\par

## 2021-08-09 NOTE — HISTORY OF PRESENT ILLNESS
[FreeTextEntry1] : Mr. Kim is a 45 year old male who was initially admitted to [facility] on 7/25/19 for loculated pleural effusion. Hospital course was complicated by sepsis, hypotension, ETOH withdrawal, hemothorax and respiratory failure s/p tracheostomy (decannulated 9/7/2018) and PEG (removed 9/11/2018).\par \par He is s/p Right VATS converted to thoracotomy, decortication on 7/28/18 for empyema. He had partial rib resection on 4/5/2019. He has the Right Thorax wound which was managed with wound VAC but has since been discontinued because patient does not want wound vac. Currently wound care TIW. He is followed by Dr. Hdez for wound care. \par \par Pt has been seen Surg for wound care and management. As per Sug note, the wound eventually will need flap closure by Plastic Sx and Thoracic Sx. \par \par Pt presents today for follow up to discuss surgical intervention. Patient and his close friend are both very poor historians. Referred back to office by Dr. Rios. Endorses wound dressing changes every other day. Denies worsening SOB, chest pain, cough, hemoptysis, fever, chills, night sweats, lightheadedness or dizziness. \par  none

## 2021-08-19 ENCOUNTER — APPOINTMENT (OUTPATIENT)
Dept: WOUND CARE | Facility: CLINIC | Age: 46
End: 2021-08-19
Payer: MEDICAID

## 2021-08-19 VITALS
TEMPERATURE: 97.2 F | HEART RATE: 83 BPM | SYSTOLIC BLOOD PRESSURE: 124 MMHG | RESPIRATION RATE: 16 BRPM | DIASTOLIC BLOOD PRESSURE: 89 MMHG

## 2021-08-19 VITALS — WEIGHT: 200 LBS | BODY MASS INDEX: 30.41 KG/M2

## 2021-08-19 PROCEDURE — 99213 OFFICE O/P EST LOW 20 MIN: CPT | Mod: 25

## 2021-08-19 PROCEDURE — 11042 DBRDMT SUBQ TIS 1ST 20SQCM/<: CPT

## 2021-08-19 RX ORDER — CLOBETASOL PROPIONATE 0.5 MG/G
0.05 CREAM TOPICAL TWICE DAILY
Qty: 1 | Refills: 2 | Status: ACTIVE | COMMUNITY
Start: 2021-08-19 | End: 1900-01-01

## 2021-08-20 NOTE — PHYSICAL EXAM
[Normal Breath Sounds] : Normal breath sounds [Normal Rate and Rhythm] : normal rate and rhythm [de-identified] : open wound right 5/6th costal area [JVD] : no jugular venous distention  [de-identified] : nad [de-identified] : mae [FreeTextEntry1] : Right posterior chest incision ulcer  [de-identified] : iodoform / border foam  [de-identified] : \par previous .6x2.5x3, tunnel aot 10ock 5.5\par \par \par  [TWNoteComboBox1] : Right [TWNoteComboBox4] : Moderate [TWNoteComboBox5] : Yes [TWNoteComboBox6] : Surgical [de-identified] : Normal [de-identified] : None [de-identified] : <20% [de-identified] : >75% [de-identified] : No

## 2021-08-20 NOTE — HISTORY OF PRESENT ILLNESS
[FreeTextEntry1] : Mr. BRITTNEY WILLIAMSON presents to the office with a wound for almost 2 years duration \par sent email to plastics and thoracic - seeing thoracic and plastic has eo2\par using alginate- and eo2, sometimes uses regular foam\par \par   imaging, malika solitario  - done\par no fever\par using eo2 patch, dimensions improved from january\par using smoking cessation patch\par \par does not want to go to plastics for repair using alginate\par left message for plastics and thoracic\par cautioned not to smoke- on eo2\par saw plastics for possible scar revision and closure/ awaiting Dr Sorto\par started EO2 patch, still with drainage\par \par .s/p vac,still deep , has not seen plastics for flap uses alginate\par He declines VAC placement.  His wound has improved.\par The wound is located on the Right upper back surgical wound from lung infection/thoracotomy.\par  The patient has complaints of non healing \par s/p wound vac .\par The patient denies fevers or chills.\par  The patient has localized pain to the wound upon dressing changes.\par  The patient has no other complaints or associated symptoms. \par had mri  9/16  mri hayley villanueva/ malika solitario [phone]\par pcp urbano lr [phone]\par \par \par

## 2021-08-20 NOTE — ASSESSMENT
[FreeTextEntry1] : 45 yr old  h/o thoracotomy and empyma with  h/o sepsis, shock, respi failure, VATS and decortication, trach and peg vac  dced in february/s/p vac , larger during 2020, now slight improvement off antibiotics\par no vna\par  using alginate EO2.  undermining better\par ct of chest with some changes-  dr Sorto/plastics to see\par told patient to make appointment\par  \par  clobetasol periwound\par  deep wound to the right upper back  with tunnel 9ock\par  discussed smoking cessation\par  \par eventually will need flap plastic closure/   , s/p rib resection if the wound does not close\par plastics referral   new ct imaging- done\par   nicotine patch\par  Wound dimensions improved reduced UM, some periwound maceration noted\par Plan - cavilon to periwound alginate foam\par  \par complex-low-lab, xr maria guadalupe,test reviewed\par   eo2 patch  alginate- or plain iodofor packing\par risk low\par \par \par \par

## 2021-11-02 NOTE — PROGRESS NOTE ADULT - I WAS PHYSICALLY PRESENT FOR THE KEY PORTIONS OF THE EVALUATION AND MANAGEMENT (E/M) SERVICE PROVIDED.  I AGREE WITH THE ABOVE HISTORY, PHYSICAL, AND PLAN WHICH I HAVE REVIEWED AND EDITED WHERE APPROPRIATE
Patient in bed AAOx2 to person and place. MEW's Score=4, FH=893,BP=93/58,afebrile,patient denies chest pain ,no SOB noted. MD  ,was notified no new orders.  Will continues to monitor Statement Selected

## 2021-12-06 ENCOUNTER — APPOINTMENT (OUTPATIENT)
Dept: THORACIC SURGERY | Facility: CLINIC | Age: 46
End: 2021-12-06
Payer: MEDICAID

## 2021-12-06 VITALS
OXYGEN SATURATION: 99 % | SYSTOLIC BLOOD PRESSURE: 95 MMHG | RESPIRATION RATE: 17 BRPM | BODY MASS INDEX: 31.02 KG/M2 | WEIGHT: 193 LBS | HEIGHT: 66 IN | HEART RATE: 79 BPM | DIASTOLIC BLOOD PRESSURE: 62 MMHG

## 2021-12-06 PROCEDURE — 99215 OFFICE O/P EST HI 40 MIN: CPT

## 2021-12-06 NOTE — ASSESSMENT
[FreeTextEntry1] : Mr. Kim is a 46 year old male who was initially admitted to [facility] on 7/25/19 for loculated pleural effusion. Hospital course was complicated by sepsis, hypotension, ETOH withdrawal, hemothorax and respiratory failure s/p tracheostomy (decannulated 9/7/2018) and PEG (removed 9/11/2018).\par \par He is s/p Right VATS converted to thoracotomy, decortication on 7/28/18 for empyema. He had partial rib resection on 4/5/2019. He has the Right Thorax wound which was managed with wound VAC but has since been discontinued because patient does not want wound vac. Currently wound care TIW. He is followed by Dr. Hdez for wound care. \par \par Pt has been seen Surg for wound care and management. As per Sug note, the wound eventually will need flap closure by Plastic Sx and Thoracic Sx. \par \par On last visit, recommended chest wall debridement, joint case with Dr. Rios. \par \par I have reviewed the patient's medical records and diagnostic images at time of this office consultation and have made the following recommendation:\par 1. Wound measured today in the office, 3 cm at 10 o'clock, 1 cm in depth, 3.5 cm in length and 2 cm tunnelling superiorly, wound cleased with NS, packed and covered with sterile gauze. \par 2. Pt to see Dr. Rios on Wednesday and my office will coordinate with Dr. Rios's office for surgery\par 3. Cardiac clearance prior to surgery, refer pt to Dr. King Hall\par \par \par I personally performed the services described in the documentation, reviewed the documentation recorded by the scribe in my presence and it accurately and completely records my words and actions. \par \par I, Chasidy Pelayo, NP, am scribing for and the presence of Dr. Gomez Rice the following sections, HISTORY OF PRESENT ILLNESS, PAST MEDICAL/FAMILY/SOCIAL HISTORY; REVIEW OF SYSTEMS; VITAL SIGNS; PHYSICAL EXAM; DISPOSITION.\par

## 2021-12-06 NOTE — CONSULT LETTER
[Dear  ___] : Dear  [unfilled], [Courtesy Letter:] : I had the pleasure of seeing your patient, [unfilled], in my office today. [Please see my note below.] : Please see my note below. [Sincerely,] : Sincerely, [FreeTextEntry2] : Dr. Judi Hdez (Wound Care)\par Dr. Cassidy Andrade (PCP)  [FreeTextEntry3] : Ginette Sorto MD\par Attending Surgeon\par Division of Thoracic Surgery\par , [facility] [employer] at [facility]\par

## 2021-12-06 NOTE — HISTORY OF PRESENT ILLNESS
[FreeTextEntry1] : Mr. Kim is a 46 year old male who was initially admitted to [facility] on 7/25/19 for loculated pleural effusion. Hospital course was complicated by sepsis, hypotension, ETOH withdrawal, hemothorax and respiratory failure s/p tracheostomy (decannulated 9/7/2018) and PEG (removed 9/11/2018).\par \par He is s/p Right VATS converted to thoracotomy, decortication on 7/28/18 for empyema. He had partial rib resection on 4/5/2019. He has the Right Thorax wound which was managed with wound VAC but has since been discontinued because patient does not want wound vac. Currently wound care TIW. He is followed by Dr. Hdez for wound care. \par \par Pt has been seen Surg for wound care and management. As per Sug note, the wound eventually will need flap closure by Plastic Sx and Thoracic Sx. \par \par On last visit, recommended chest wall debridement, joint case with Dr. Rios. \par \par Pt presents today for follow up. Pt will see Dr. Rios this Wednesday for planning surgery. \par \par

## 2021-12-08 ENCOUNTER — APPOINTMENT (OUTPATIENT)
Dept: PLASTIC SURGERY | Facility: CLINIC | Age: 46
End: 2021-12-08
Payer: MEDICAID

## 2021-12-08 PROCEDURE — 99213 OFFICE O/P EST LOW 20 MIN: CPT

## 2021-12-12 NOTE — HISTORY OF PRESENT ILLNESS
[FreeTextEntry1] : The patient has recently been seen by Dr. Sorto.  He states he is ready for reconstruction of his right back wound.  He continues to smoke a pack a day.  He is due for medical clearance for the procedure.

## 2021-12-12 NOTE — REASON FOR VISIT
[Family Member] : family member [[employer] ] : provided by [employer]   [FreeTextEntry1] : From Dr. Elizabeth, for an open back wound. \par     flap, possible chest wall reconstruction, possible skin graft from any site, possible\par     vac placement [Interpreters_IDNumber] : [id number] [Interpreters_FullName] : Joel [TWNoteComboBox1] : Polish

## 2021-12-12 NOTE — PHYSICAL EXAM
[de-identified] : Open right posterior chest infraclavicular wound.  It is somewhat decreased in size from before.  No surrounding erythema or tenderness.

## 2021-12-12 NOTE — ASSESSMENT
[FreeTextEntry1] : We'll again plan for reconstruction of right posterior chest wound, likely with right pectoralis pedicle flap.  We'll plan to do this with both Dr. Sorto and Dr. Mihai Carlton.  The patient agrees through  that we may contact his son to help coordinate surgery if we have difficulty doing so through the patient.

## 2021-12-17 ENCOUNTER — APPOINTMENT (OUTPATIENT)
Dept: WOUND CARE | Facility: CLINIC | Age: 46
End: 2021-12-17
Payer: MEDICAID

## 2021-12-17 PROCEDURE — 99213 OFFICE O/P EST LOW 20 MIN: CPT

## 2021-12-20 NOTE — HISTORY OF PRESENT ILLNESS
[FreeTextEntry1] : Mr. BRITTNEY WILLIAMSON presents to the office with a wound for  2 years duration \par set up for surgery in 2022\par \par sent email to plastics and thoracic - seeing thoracic and plastic has eo2\par using alginate- and eo2, sometimes uses regular foam\par \par   imaging, malika solitario  - done\par no fever\par using eo2 patch, dimensions improved from january\par using smoking cessation patch\par \par does not want to go to plastics for repair using alginate\par left message for plastics and thoracic\par cautioned not to smoke- on eo2\par saw plastics for possible scar revision and closure/ awaiting Dr Sorto\par started EO2 patch, still with drainage\par \par .s/p vac,still deep , has not seen plastics for flap uses alginate\par He declines VAC placement.  His wound has improved.\par The wound is located on the Right upper back surgical wound from lung infection/thoracotomy.\par  The patient has complaints of non healing \par s/p wound vac .\par The patient denies fevers or chills.\par  The patient has localized pain to the wound upon dressing changes.\par  The patient has no other complaints or associated symptoms. \par had mri  9/16  mri hayley villanueva/ malika solitario [phone]\par pcp urbano lr [phone]\par \par \par

## 2021-12-20 NOTE — ASSESSMENT
[FreeTextEntry1] : 46 yr old  h/o thoracotomy and empyma with  h/o sepsis, shock, respi failure, VATS and decortication, trach and peg vac  dced in february/s/p vac , larger during 2020,   off antibiotics\par no vna\par s/p using alginate EO2.  undermining better\par ct of chest with some changes-  dr Sorto/plastics to do repair\par  \par  clobetasol periwound\par  deep wound to the right upper back  with tunnel  \par  discussed smoking cessation\par  \par   will need flap plastic closure/   , s/p rib resection if the wound does not close\par plastics referral   new ct imaging- done\par   nicotine patch\par  Wound dimensions improved reduced UM,  minimal periwound maceration noted\par Plan - cavilon to periwound alginate foam\par \par  complex-low-lab, xr maria guadalupe,test reviewed\par   eo2 patch  alginate- or plain iodofor packing\par risk low\par 9ock 3cm previous tunneling at 11oc - 3cm\par \par \par

## 2021-12-20 NOTE — PLAN
[FreeTextEntry1] : 12/17/21 \par will f/u with plastics for flap to wound site will be set up in january \par continue with wound care packing aquacel \par dry dressing  qod or as neeeded if outter dressing is wet \par supplies ordered

## 2021-12-20 NOTE — PHYSICAL EXAM
[Normal Rate and Rhythm] : normal rate and rhythm [Normal Breath Sounds] : Normal breath sounds [JVD] : no jugular venous distention  [de-identified] : nad [de-identified] : mae [de-identified] : open wound right 5/6th costal area [FreeTextEntry1] : Right posterior chest incision ulcer  [de-identified] : iodoform / border foam  [de-identified] : \par previous .6x2.5x3, tunnel aot 10ock 5.5\par \par \par  [TWNoteComboBox1] : Right [TWNoteComboBox4] : Moderate [TWNoteComboBox5] : Yes [TWNoteComboBox6] : Surgical [de-identified] : Normal [de-identified] : None [de-identified] : <20% [de-identified] : >75% [de-identified] : No

## 2021-12-23 NOTE — BRIEF OPERATIVE NOTE - ASSISTANT(S)
Pt wife is calling, said pt was told by Dr Ly he would call and schedule him for infusion on Monday.   Wife was informed PSR heard there was a hold on scheduling infusion treatments- but a message would be sent to triage nurses.       Please advise, thank you    Lissa Bowden Young

## 2022-01-06 ENCOUNTER — NON-APPOINTMENT (OUTPATIENT)
Age: 47
End: 2022-01-06

## 2022-01-06 ENCOUNTER — APPOINTMENT (OUTPATIENT)
Dept: CARDIOLOGY | Facility: CLINIC | Age: 47
End: 2022-01-06
Payer: MEDICAID

## 2022-01-06 VITALS
BODY MASS INDEX: 31.64 KG/M2 | HEIGHT: 66 IN | SYSTOLIC BLOOD PRESSURE: 125 MMHG | DIASTOLIC BLOOD PRESSURE: 82 MMHG | HEART RATE: 91 BPM | OXYGEN SATURATION: 98 %

## 2022-01-06 VITALS — BODY MASS INDEX: 31.64 KG/M2 | WEIGHT: 196 LBS

## 2022-01-06 PROCEDURE — 93000 ELECTROCARDIOGRAM COMPLETE: CPT

## 2022-01-06 PROCEDURE — 99204 OFFICE O/P NEW MOD 45 MIN: CPT

## 2022-01-06 RX ORDER — VALSARTAN 40 MG/1
40 TABLET, COATED ORAL DAILY
Qty: 90 | Refills: 3 | Status: ACTIVE | COMMUNITY
Start: 2022-01-06 | End: 1900-01-01

## 2022-01-28 ENCOUNTER — APPOINTMENT (OUTPATIENT)
Dept: WOUND CARE | Facility: CLINIC | Age: 47
End: 2022-01-28

## 2022-01-31 NOTE — PROCEDURE NOTE - NSNEEDLEGAUGE_GEN_A_CORE
01/31/2022 called patient, no answer, was unable to leave a message. Patient can contact the office back to reschedule her appointment.        Electronically signed by Hollie Goncalves MA on 1/31/2022 at 9:16 AM 21

## 2022-02-15 DIAGNOSIS — Z01.818 ENCOUNTER FOR OTHER PREPROCEDURAL EXAMINATION: ICD-10-CM

## 2022-03-16 ENCOUNTER — OUTPATIENT (OUTPATIENT)
Dept: OUTPATIENT SERVICES | Facility: HOSPITAL | Age: 47
LOS: 1 days | End: 2022-03-16

## 2022-03-16 VITALS
RESPIRATION RATE: 16 BRPM | TEMPERATURE: 98 F | WEIGHT: 199.08 LBS | HEIGHT: 67 IN | HEART RATE: 91 BPM | DIASTOLIC BLOOD PRESSURE: 90 MMHG | SYSTOLIC BLOOD PRESSURE: 122 MMHG | OXYGEN SATURATION: 98 %

## 2022-03-16 DIAGNOSIS — Z98.890 OTHER SPECIFIED POSTPROCEDURAL STATES: Chronic | ICD-10-CM

## 2022-03-16 DIAGNOSIS — J86.9 PYOTHORAX WITHOUT FISTULA: ICD-10-CM

## 2022-03-16 DIAGNOSIS — I10 ESSENTIAL (PRIMARY) HYPERTENSION: ICD-10-CM

## 2022-03-16 DIAGNOSIS — Z93.1 GASTROSTOMY STATUS: Chronic | ICD-10-CM

## 2022-03-16 LAB
ANION GAP SERPL CALC-SCNC: 14 MMOL/L — SIGNIFICANT CHANGE UP (ref 7–14)
BLD GP AB SCN SERPL QL: NEGATIVE — SIGNIFICANT CHANGE UP
BUN SERPL-MCNC: 11 MG/DL — SIGNIFICANT CHANGE UP (ref 7–23)
CALCIUM SERPL-MCNC: 9.6 MG/DL — SIGNIFICANT CHANGE UP (ref 8.4–10.5)
CHLORIDE SERPL-SCNC: 103 MMOL/L — SIGNIFICANT CHANGE UP (ref 98–107)
CO2 SERPL-SCNC: 22 MMOL/L — SIGNIFICANT CHANGE UP (ref 22–31)
CREAT SERPL-MCNC: 0.84 MG/DL — SIGNIFICANT CHANGE UP (ref 0.5–1.3)
EGFR: 108 ML/MIN/1.73M2 — SIGNIFICANT CHANGE UP
GLUCOSE SERPL-MCNC: 82 MG/DL — SIGNIFICANT CHANGE UP (ref 70–99)
HCT VFR BLD CALC: 48.6 % — SIGNIFICANT CHANGE UP (ref 39–50)
HGB BLD-MCNC: 16.4 G/DL — SIGNIFICANT CHANGE UP (ref 13–17)
MCHC RBC-ENTMCNC: 31.8 PG — SIGNIFICANT CHANGE UP (ref 27–34)
MCHC RBC-ENTMCNC: 33.7 GM/DL — SIGNIFICANT CHANGE UP (ref 32–36)
MCV RBC AUTO: 94.4 FL — SIGNIFICANT CHANGE UP (ref 80–100)
NRBC # BLD: 0 /100 WBCS — SIGNIFICANT CHANGE UP
NRBC # FLD: 0 K/UL — SIGNIFICANT CHANGE UP
PLATELET # BLD AUTO: 196 K/UL — SIGNIFICANT CHANGE UP (ref 150–400)
POTASSIUM SERPL-MCNC: 4.2 MMOL/L — SIGNIFICANT CHANGE UP (ref 3.5–5.3)
POTASSIUM SERPL-SCNC: 4.2 MMOL/L — SIGNIFICANT CHANGE UP (ref 3.5–5.3)
RBC # BLD: 5.15 M/UL — SIGNIFICANT CHANGE UP (ref 4.2–5.8)
RBC # FLD: 11.9 % — SIGNIFICANT CHANGE UP (ref 10.3–14.5)
RH IG SCN BLD-IMP: NEGATIVE — SIGNIFICANT CHANGE UP
SODIUM SERPL-SCNC: 139 MMOL/L — SIGNIFICANT CHANGE UP (ref 135–145)
WBC # BLD: 8.68 K/UL — SIGNIFICANT CHANGE UP (ref 3.8–10.5)
WBC # FLD AUTO: 8.68 K/UL — SIGNIFICANT CHANGE UP (ref 3.8–10.5)

## 2022-03-16 RX ORDER — METOPROLOL TARTRATE 50 MG
1 TABLET ORAL
Qty: 0 | Refills: 0 | DISCHARGE

## 2022-03-16 RX ORDER — PANTOPRAZOLE SODIUM 20 MG/1
1 TABLET, DELAYED RELEASE ORAL
Qty: 0 | Refills: 0 | DISCHARGE

## 2022-03-16 RX ORDER — ACETAMINOPHEN 500 MG
2 TABLET ORAL
Qty: 0 | Refills: 0 | DISCHARGE

## 2022-03-16 NOTE — H&P PST ADULT - PROBLEM SELECTOR PLAN 1
preop for chest wall debridement; Dr. Rios Right back wound debridement and reconstruction with regional flap, possible chest wall reconstruction, possible skin graft from any site, possible VAC placement on 3/24/22  preop instructions given, pt and son verbalized understanding  pt will take prescribed omeprazole AM of surgery for GI prophylaxis  pt is scheduled for COVID testing preop  updated cardiac evaluation requested with copy of recent ECHO preop for chest wall debridement; Dr. Rios Right back wound debridement and reconstruction with regional flap, possible chest wall reconstruction, possible skin graft from any site, possible VAC placement on 3/24/22  preop instructions given, pt and son verbalized understanding  pt will take prescribed omeprazole AM of surgery for GI prophylaxis  pt is scheduled for COVID testing preop  updated cardiac evaluation requested with pending ECHO report

## 2022-03-16 NOTE — H&P PST ADULT - HISTORY OF PRESENT ILLNESS
45 y/o Polish speaking male presents to PST preop for chest wall debridement; Dr. Rios Right back wound debridement and reconstruction with regional flap, possible chest wall reconstruction, possible skin graft from any site, possible VAC placement. pt s/p right VATS converted to thoracotomy, decortication in 2018 for empyema. pt reports open chest wound since his surgery. pt with prior wound VAC therapy with improvement.     pt s/p partial rib resection April 2019.     **pt offered  services but declined asking we use his son for translation  47 y/o Polish speaking male presents to PST preop for chest wall debridement; Dr. Rios Right back wound debridement and reconstruction with regional flap, possible chest wall reconstruction, possible skin graft from any site, possible VAC placement. pt s/p right VATS converted to thoracotomy, decortication in 2018 for empyema. pt reports open chest wound since his surgery. he had prior wound VAC therapy with improvement.       **pt offered  services but declined asking we use his son for translation.

## 2022-03-16 NOTE — H&P PST ADULT - NSICDXPASTMEDICALHX_GEN_ALL_CORE_FT
PAST MEDICAL HISTORY:  Chronic GERD     Empyema right    Empyema lung     ETOH abuse as per surgeons note    H/O sepsis in 2019    History of respiratory failure 2019 s/p trach    HTN (hypertension)     Pyothorax     Wound of right side of back      PAST MEDICAL HISTORY:  Chronic GERD     Empyema lung right lung    ETOH abuse as per surgeons note    H/O sepsis in 2019    History of respiratory failure 2019 s/p trach    HTN (hypertension)     Pyothorax     Wound of right side of back

## 2022-03-16 NOTE — H&P PST ADULT - NSICDXPASTSURGICALHX_GEN_ALL_CORE_FT
PAST SURGICAL HISTORY:  H/O resection of rib s/p partial rib resection April 2019    History of lung surgery s/p R VATS converted to thoracotomy, decortication in 2018  s/p R VATS re-do thoractomy, flex broch, evacuation of hematoma Aug 2018    History of thoracotomy     History of tracheostomy     S/P percutaneous endoscopic gastrostomy (PEG) tube placement

## 2022-03-16 NOTE — H&P PST ADULT - ATTENDING COMMENTS
patient for muscle flap/graft by plastics. possible thoracotomy rib resection/decortication.   discussed risks including and not limited to bleeding,, infection, scarring, injury to surrounding structures, nonhealing wound.  patient agreeable to proceed

## 2022-03-17 DIAGNOSIS — J86.9 PYOTHORAX WITHOUT FISTULA: ICD-10-CM

## 2022-03-21 LAB — SARS-COV-2 N GENE NPH QL NAA+PROBE: NOT DETECTED

## 2022-03-23 NOTE — ASU PATIENT PROFILE, ADULT - FALL HARM RISK - UNIVERSAL INTERVENTIONS
Bed in lowest position, wheels locked, appropriate side rails in place/Call bell, personal items and telephone in reach/Instruct patient to call for assistance before getting out of bed or chair/Non-slip footwear when patient is out of bed/[place] to call system/Physically safe environment - no spills, clutter or unnecessary equipment/Purposeful Proactive Rounding/Room/bathroom lighting operational, light cord in reach

## 2022-03-23 NOTE — ASU PATIENT PROFILE, ADULT - NSICDXPASTMEDICALHX_GEN_ALL_CORE_FT
PAST MEDICAL HISTORY:  Chronic GERD     Empyema lung right lung    ETOH abuse as per surgeons note    H/O sepsis in 2019    History of respiratory failure 2019 s/p trach    HTN (hypertension)     Pyothorax     Wound of right side of back

## 2022-03-24 ENCOUNTER — APPOINTMENT (OUTPATIENT)
Dept: PLASTIC SURGERY | Facility: HOSPITAL | Age: 47
End: 2022-03-24

## 2022-04-06 PROBLEM — J86.9 PYOTHORAX WITHOUT FISTULA: Chronic | Status: ACTIVE | Noted: 2022-03-16

## 2022-04-06 PROBLEM — Z86.19 PERSONAL HISTORY OF OTHER INFECTIOUS AND PARASITIC DISEASES: Chronic | Status: ACTIVE | Noted: 2022-03-16

## 2022-04-06 PROBLEM — I10 ESSENTIAL (PRIMARY) HYPERTENSION: Chronic | Status: ACTIVE | Noted: 2022-03-16

## 2022-04-06 PROBLEM — Z87.09 PERSONAL HISTORY OF OTHER DISEASES OF THE RESPIRATORY SYSTEM: Chronic | Status: ACTIVE | Noted: 2022-03-16

## 2022-04-06 PROBLEM — J86.9 PYOTHORAX WITHOUT FISTULA: Chronic | Status: ACTIVE | Noted: 2019-03-29

## 2022-04-06 PROBLEM — S21.201A UNSPECIFIED OPEN WOUND OF RIGHT BACK WALL OF THORAX WITHOUT PENETRATION INTO THORACIC CAVITY, INITIAL ENCOUNTER: Chronic | Status: ACTIVE | Noted: 2022-03-16

## 2022-04-27 ENCOUNTER — OUTPATIENT (OUTPATIENT)
Dept: OUTPATIENT SERVICES | Facility: HOSPITAL | Age: 47
LOS: 1 days | End: 2022-04-27
Payer: MEDICAID

## 2022-04-27 VITALS
OXYGEN SATURATION: 99 % | HEIGHT: 67 IN | WEIGHT: 199.96 LBS | SYSTOLIC BLOOD PRESSURE: 136 MMHG | HEART RATE: 98 BPM | RESPIRATION RATE: 16 BRPM | DIASTOLIC BLOOD PRESSURE: 84 MMHG | TEMPERATURE: 99 F

## 2022-04-27 DIAGNOSIS — J86.0 PYOTHORAX WITH FISTULA: ICD-10-CM

## 2022-04-27 DIAGNOSIS — Z93.1 GASTROSTOMY STATUS: Chronic | ICD-10-CM

## 2022-04-27 DIAGNOSIS — Z98.890 OTHER SPECIFIED POSTPROCEDURAL STATES: Chronic | ICD-10-CM

## 2022-04-27 DIAGNOSIS — R06.83 SNORING: ICD-10-CM

## 2022-04-27 DIAGNOSIS — R52 PAIN, UNSPECIFIED: ICD-10-CM

## 2022-04-27 LAB
ALBUMIN SERPL ELPH-MCNC: 4.9 G/DL — SIGNIFICANT CHANGE UP (ref 3.3–5)
ALP SERPL-CCNC: 67 U/L — SIGNIFICANT CHANGE UP (ref 40–120)
ALT FLD-CCNC: 55 U/L — HIGH (ref 4–41)
ANION GAP SERPL CALC-SCNC: 16 MMOL/L — HIGH (ref 7–14)
AST SERPL-CCNC: 72 U/L — HIGH (ref 4–40)
BILIRUB SERPL-MCNC: 0.4 MG/DL — SIGNIFICANT CHANGE UP (ref 0.2–1.2)
BLD GP AB SCN SERPL QL: NEGATIVE — SIGNIFICANT CHANGE UP
BUN SERPL-MCNC: 16 MG/DL — SIGNIFICANT CHANGE UP (ref 7–23)
CALCIUM SERPL-MCNC: 9.6 MG/DL — SIGNIFICANT CHANGE UP (ref 8.4–10.5)
CHLORIDE SERPL-SCNC: 99 MMOL/L — SIGNIFICANT CHANGE UP (ref 98–107)
CO2 SERPL-SCNC: 24 MMOL/L — SIGNIFICANT CHANGE UP (ref 22–31)
CREAT SERPL-MCNC: 1.14 MG/DL — SIGNIFICANT CHANGE UP (ref 0.5–1.3)
EGFR: 80 ML/MIN/1.73M2 — SIGNIFICANT CHANGE UP
GLUCOSE SERPL-MCNC: 89 MG/DL — SIGNIFICANT CHANGE UP (ref 70–99)
HCT VFR BLD CALC: 49.4 % — SIGNIFICANT CHANGE UP (ref 39–50)
HGB BLD-MCNC: 16.1 G/DL — SIGNIFICANT CHANGE UP (ref 13–17)
MCHC RBC-ENTMCNC: 31.1 PG — SIGNIFICANT CHANGE UP (ref 27–34)
MCHC RBC-ENTMCNC: 32.6 GM/DL — SIGNIFICANT CHANGE UP (ref 32–36)
MCV RBC AUTO: 95.4 FL — SIGNIFICANT CHANGE UP (ref 80–100)
NRBC # BLD: 0 /100 WBCS — SIGNIFICANT CHANGE UP
NRBC # FLD: 0 K/UL — SIGNIFICANT CHANGE UP
PLATELET # BLD AUTO: 178 K/UL — SIGNIFICANT CHANGE UP (ref 150–400)
POTASSIUM SERPL-MCNC: 4.4 MMOL/L — SIGNIFICANT CHANGE UP (ref 3.5–5.3)
POTASSIUM SERPL-SCNC: 4.4 MMOL/L — SIGNIFICANT CHANGE UP (ref 3.5–5.3)
PROT SERPL-MCNC: 8.3 G/DL — SIGNIFICANT CHANGE UP (ref 6–8.3)
RBC # BLD: 5.18 M/UL — SIGNIFICANT CHANGE UP (ref 4.2–5.8)
RBC # FLD: 12.9 % — SIGNIFICANT CHANGE UP (ref 10.3–14.5)
RH IG SCN BLD-IMP: NEGATIVE — SIGNIFICANT CHANGE UP
SODIUM SERPL-SCNC: 139 MMOL/L — SIGNIFICANT CHANGE UP (ref 135–145)
WBC # BLD: 6.97 K/UL — SIGNIFICANT CHANGE UP (ref 3.8–10.5)
WBC # FLD AUTO: 6.97 K/UL — SIGNIFICANT CHANGE UP (ref 3.8–10.5)

## 2022-04-27 PROCEDURE — 93010 ELECTROCARDIOGRAM REPORT: CPT

## 2022-04-27 RX ORDER — VALSARTAN 80 MG/1
1 TABLET ORAL
Qty: 0 | Refills: 0 | DISCHARGE

## 2022-04-27 RX ORDER — SODIUM CHLORIDE 9 MG/ML
1000 INJECTION, SOLUTION INTRAVENOUS
Refills: 0 | Status: DISCONTINUED | OUTPATIENT
Start: 2022-05-12 | End: 2022-05-12

## 2022-04-27 RX ORDER — PANTOPRAZOLE SODIUM 20 MG/1
1 TABLET, DELAYED RELEASE ORAL
Qty: 0 | Refills: 0 | DISCHARGE

## 2022-04-27 RX ORDER — METOPROLOL TARTRATE 50 MG
1 TABLET ORAL
Qty: 0 | Refills: 0 | DISCHARGE

## 2022-04-27 RX ORDER — OMEPRAZOLE 10 MG/1
1 CAPSULE, DELAYED RELEASE ORAL
Qty: 0 | Refills: 0 | DISCHARGE

## 2022-04-27 NOTE — H&P PST ADULT - NEGATIVE HEMATOLOGY SYMPTOMS
B UE and B LE >3+/5 upon functional assessment against gravity./grossly assessed due to
no gum bleeding/no nose bleeding

## 2022-04-27 NOTE — H&P PST ADULT - HISTORY OF PRESENT ILLNESS
This is a 47 y/o male whose native language is Polish. Patient refused hospital  and requested ex wife function as . Patient had  been scheduled for surgery one month ago but was cancelled due to an emergency by the surgeon. Patient presents with right chest wall infection. Has had workup with CT scan. At present, minimal drainage from right chest wall infection. Has h/o right VATS converted to thoracotomy, decortication in 2018 for empyema. Has had chest wall wound since surgery and has had VAC therapy with some improvement. Further intervention warranted. Scheduled for chest wall debridement, right back wound debridement and reconstruction with regional flap, possible chest wall reconstruction, possible skin graft from any site, possible VAC placement

## 2022-04-27 NOTE — H&P PST ADULT - SOURCE OF INFORMATION, PROFILE
ex wife, Altagracia, cell [phone]; son, Carlos, cell [phone]; patient cell who speaks Polish, cell [phone]/patient

## 2022-04-27 NOTE — H&P PST ADULT - ATTENDING COMMENTS
patient for muscle flap/skin graft, possible thoracotomy/decortication/rib resection  discussed risks including and not limited to bleeding, infection, scarring, injury to surrounding structures, nonhealing wound.   patient agreeable

## 2022-04-27 NOTE — H&P PST ADULT - PROBLEM SELECTOR PLAN 1
This is a 39 y/o male who is scheduled for chest wall debridement, right back wound debridement and reconstruction with regional flap, possible chest wall reconstruction, possible skin graft from any site, possible VAC placement on 5-12-22  * Instructed to speak with surgeon regarding covid testing  * Given preop instructions  * Await echo done through pcp  * Instructed to take normal am dose of omeprazole, Metoprolol, and valsartan the am of surgery

## 2022-04-27 NOTE — H&P PST ADULT - NSICDXPASTMEDICALHX_GEN_ALL_CORE_FT
PAST MEDICAL HISTORY:  Chronic GERD     Empyema lung right lung    ETOH abuse as per surgeons note--at PAST appointment 4-27-22, patient argued with wife when she stated he can have 4-8 beers/day    H/O sepsis in 2019    History of respiratory failure 2019 s/p trach    HTN (hypertension)     Language barrier native language Polish; comprehends and verbalizes minimal English    Pyothorax     Wound of right side of back

## 2022-04-29 PROBLEM — F10.10 ALCOHOL ABUSE, UNCOMPLICATED: Chronic | Status: ACTIVE | Noted: 2022-03-16

## 2022-04-29 PROBLEM — Z78.9 OTHER SPECIFIED HEALTH STATUS: Chronic | Status: ACTIVE | Noted: 2022-04-27

## 2022-05-03 ENCOUNTER — APPOINTMENT (OUTPATIENT)
Dept: CARDIOLOGY | Facility: CLINIC | Age: 47
End: 2022-05-03
Payer: MEDICAID

## 2022-05-03 DIAGNOSIS — Z13.6 ENCOUNTER FOR SCREENING FOR CARDIOVASCULAR DISORDERS: ICD-10-CM

## 2022-05-03 DIAGNOSIS — Z01.810 ENCOUNTER FOR PREPROCEDURAL CARDIOVASCULAR EXAMINATION: ICD-10-CM

## 2022-05-03 PROCEDURE — 99214 OFFICE O/P EST MOD 30 MIN: CPT | Mod: 95

## 2022-05-07 PROBLEM — Z13.6 SCREENING FOR CARDIOVASCULAR CONDITION: Status: ACTIVE | Noted: 2022-01-06

## 2022-05-07 PROBLEM — Z01.810 PRE-OPERATIVE CARDIOVASCULAR EXAMINATION: Status: ACTIVE | Noted: 2022-01-06

## 2022-05-09 LAB — SARS-COV-2 N GENE NPH QL NAA+PROBE: NOT DETECTED

## 2022-05-11 ENCOUNTER — TRANSCRIPTION ENCOUNTER (OUTPATIENT)
Age: 47
End: 2022-05-11

## 2022-05-11 NOTE — ASU PATIENT PROFILE, ADULT - FALL HARM RISK - UNIVERSAL INTERVENTIONS
Bed in lowest position, wheels locked, appropriate side rails in place/Call bell, personal items and telephone in reach/Instruct patient to call for assistance before getting out of bed or chair/Non-slip footwear when patient is out of bed/[place]ide to call system/Physically safe environment - no spills, clutter or unnecessary equipment/Purposeful Proactive Rounding/Room/bathroom lighting operational, light cord in reach

## 2022-05-12 ENCOUNTER — APPOINTMENT (OUTPATIENT)
Dept: THORACIC SURGERY | Facility: HOSPITAL | Age: 47
End: 2022-05-12

## 2022-05-12 ENCOUNTER — RESULT REVIEW (OUTPATIENT)
Age: 47
End: 2022-05-12

## 2022-05-12 ENCOUNTER — APPOINTMENT (OUTPATIENT)
Dept: PLASTIC SURGERY | Facility: HOSPITAL | Age: 47
End: 2022-05-12

## 2022-05-12 ENCOUNTER — OUTPATIENT (OUTPATIENT)
Dept: INPATIENT UNIT | Facility: HOSPITAL | Age: 47
LOS: 1 days | Discharge: HOME | End: 2022-05-12
Payer: MEDICAID

## 2022-05-12 ENCOUNTER — TRANSCRIPTION ENCOUNTER (OUTPATIENT)
Age: 47
End: 2022-05-12

## 2022-05-12 VITALS
HEART RATE: 91 BPM | OXYGEN SATURATION: 97 % | DIASTOLIC BLOOD PRESSURE: 96 MMHG | HEIGHT: 67 IN | SYSTOLIC BLOOD PRESSURE: 147 MMHG | RESPIRATION RATE: 18 BRPM | TEMPERATURE: 98 F | WEIGHT: 199.96 LBS

## 2022-05-12 DIAGNOSIS — Z98.890 OTHER SPECIFIED POSTPROCEDURAL STATES: Chronic | ICD-10-CM

## 2022-05-12 DIAGNOSIS — J86.0 PYOTHORAX WITH FISTULA: ICD-10-CM

## 2022-05-12 DIAGNOSIS — Z93.1 GASTROSTOMY STATUS: Chronic | ICD-10-CM

## 2022-05-12 LAB
GRAM STN FLD: SIGNIFICANT CHANGE UP
NIGHT BLUE STAIN TISS: SIGNIFICANT CHANGE UP
SPECIMEN SOURCE: SIGNIFICANT CHANGE UP

## 2022-05-12 PROCEDURE — 88305 TISSUE EXAM BY PATHOLOGIST: CPT | Mod: 26

## 2022-05-12 PROCEDURE — 88304 TISSUE EXAM BY PATHOLOGIST: CPT | Mod: 26

## 2022-05-12 PROCEDURE — 88311 DECALCIFY TISSUE: CPT | Mod: 26

## 2022-05-12 RX ORDER — METOPROLOL TARTRATE 50 MG
1 TABLET ORAL
Qty: 0 | Refills: 0 | DISCHARGE

## 2022-05-12 RX ORDER — VALSARTAN 80 MG/1
1 TABLET ORAL
Qty: 0 | Refills: 0 | DISCHARGE

## 2022-05-12 RX ORDER — CEFAZOLIN SODIUM 1 G
1000 VIAL (EA) INJECTION EVERY 8 HOURS
Refills: 0 | Status: DISCONTINUED | OUTPATIENT
Start: 2022-05-12 | End: 2022-05-13

## 2022-05-12 RX ORDER — OXYCODONE HYDROCHLORIDE 5 MG/1
5 TABLET ORAL ONCE
Refills: 0 | Status: DISCONTINUED | OUTPATIENT
Start: 2022-05-12 | End: 2022-05-12

## 2022-05-12 RX ORDER — SENNA PLUS 8.6 MG/1
2 TABLET ORAL AT BEDTIME
Refills: 0 | Status: DISCONTINUED | OUTPATIENT
Start: 2022-05-12 | End: 2022-05-13

## 2022-05-12 RX ORDER — FENTANYL CITRATE 50 UG/ML
25 INJECTION INTRAVENOUS
Refills: 0 | Status: DISCONTINUED | OUTPATIENT
Start: 2022-05-12 | End: 2022-05-12

## 2022-05-12 RX ORDER — HEPARIN SODIUM 5000 [USP'U]/ML
5000 INJECTION INTRAVENOUS; SUBCUTANEOUS EVERY 8 HOURS
Refills: 0 | Status: DISCONTINUED | OUTPATIENT
Start: 2022-05-12 | End: 2022-05-13

## 2022-05-12 RX ORDER — ONDANSETRON 8 MG/1
4 TABLET, FILM COATED ORAL ONCE
Refills: 0 | Status: DISCONTINUED | OUTPATIENT
Start: 2022-05-12 | End: 2022-05-12

## 2022-05-12 RX ORDER — HYDROMORPHONE HYDROCHLORIDE 2 MG/ML
0.5 INJECTION INTRAMUSCULAR; INTRAVENOUS; SUBCUTANEOUS
Refills: 0 | Status: DISCONTINUED | OUTPATIENT
Start: 2022-05-12 | End: 2022-05-12

## 2022-05-12 RX ORDER — OMEPRAZOLE 10 MG/1
1 CAPSULE, DELAYED RELEASE ORAL
Qty: 0 | Refills: 0 | DISCHARGE

## 2022-05-12 RX ADMIN — SENNA PLUS 2 TABLET(S): 8.6 TABLET ORAL at 22:00

## 2022-05-12 RX ADMIN — HEPARIN SODIUM 5000 UNIT(S): 5000 INJECTION INTRAVENOUS; SUBCUTANEOUS at 22:00

## 2022-05-12 RX ADMIN — Medication 100 MILLIGRAM(S): at 22:00

## 2022-05-12 RX ADMIN — OXYCODONE HYDROCHLORIDE 5 MILLIGRAM(S): 5 TABLET ORAL at 21:53

## 2022-05-12 RX ADMIN — HYDROMORPHONE HYDROCHLORIDE 0.5 MILLIGRAM(S): 2 INJECTION INTRAMUSCULAR; INTRAVENOUS; SUBCUTANEOUS at 10:20

## 2022-05-12 RX ADMIN — Medication 100 MILLIGRAM(S): at 14:14

## 2022-05-12 RX ADMIN — HYDROMORPHONE HYDROCHLORIDE 0.5 MILLIGRAM(S): 2 INJECTION INTRAMUSCULAR; INTRAVENOUS; SUBCUTANEOUS at 12:18

## 2022-05-12 RX ADMIN — HEPARIN SODIUM 5000 UNIT(S): 5000 INJECTION INTRAVENOUS; SUBCUTANEOUS at 14:50

## 2022-05-12 RX ADMIN — HYDROMORPHONE HYDROCHLORIDE 0.5 MILLIGRAM(S): 2 INJECTION INTRAMUSCULAR; INTRAVENOUS; SUBCUTANEOUS at 10:00

## 2022-05-12 RX ADMIN — OXYCODONE HYDROCHLORIDE 5 MILLIGRAM(S): 5 TABLET ORAL at 21:17

## 2022-05-12 NOTE — DISCHARGE NOTE PROVIDER - CARE PROVIDERS DIRECT ADDRESSES
[email or web address]

## 2022-05-12 NOTE — DISCHARGE NOTE PROVIDER - HOSPITAL COURSE
This is a 47 y/o male with a right chest wall infection with minimal drainage from the right chest wall.  He has h/o a right VATS converted to thoracotomy, decortication in 2018 for empyema and has had a chest wall wound since surgery for which VAC therapy has offered some improvement.  On 5/12/22 he underwent a chest wall debridement with Plastics closure.  He had two drains that had been placed in the OR. This is a 47 y/o male with a right chest wall infection with minimal drainage from the right chest wall.  He has h/o a right VATS converted to thoracotomy, decortication in 2018 for empyema and has had a chest wall wound since surgery for which VAC therapy has offered some improvement.  On 5/12/22 he underwent a chest wall debridement with Plastics closure.  He had two drains that had been placed in the OR.  Dressing remains intact.  PT to go home with 2 drains to bulbs. Teaching given to pt and pts son. Will d/c on Augmentin for 12 days. FU Plastic surgery in office next week. Pt feels well. Amb ad ivonne.   Wound c/d/i.   Vital Signs Last 24 Hrs  T(C): 36.6 (13 May 2022 11:20), Max: 37 (12 May 2022 23:59)  T(F): 97.8 (13 May 2022 11:20), Max: 98.6 (12 May 2022 23:59)  HR: 77 (13 May 2022 11:20) (72 - 79)  BP: 115/77 (13 May 2022 11:20) (91/67 - 119/78)  BP(mean): --  RR: 18 (13 May 2022 11:20) (17 - 18)  SpO2: 98% (13 May 2022 11:20) (95% - 98%)

## 2022-05-12 NOTE — DISCHARGE NOTE PROVIDER - PROVIDER TOKENS
PROVIDER:[TOKEN:[[id number]],FOLLOWUP:[2 weeks],ESTABLISHEDPATIENT:[T]] PROVIDER:[TOKEN:[[id number]],FOLLOWUP:[2 weeks],ESTABLISHEDPATIENT:[T]],PROVIDER:[TOKEN:[[id number]]]

## 2022-05-12 NOTE — PROGRESS NOTE ADULT - SUBJECTIVE AND OBJECTIVE BOX
Pt c/o minor pain after his procedure.  He has already voided and tolerated food.    VSS  R chest has an Aquacel dressing with 2 Sonny drains to bulb suction; The drainage is serosanguinous  A; s/p R chest wall debridement and Plastics closure.        Monitor drain outputs- for removal or for discharge with drains depending on the output.        Follow up OR cultures

## 2022-05-12 NOTE — DISCHARGE NOTE PROVIDER - NSDCCPTREATMENT_GEN_ALL_CORE_FT
PRINCIPAL PROCEDURE  Procedure: Debridement, skin  Findings and Treatment: R chest wall debridement wht Plastics closure

## 2022-05-12 NOTE — DISCHARGE NOTE PROVIDER - NSDCCPCAREPLAN_GEN_ALL_CORE_FT
PRINCIPAL DISCHARGE DIAGNOSIS  Diagnosis: Chest wall symptom or complaint  Assessment and Plan of Treatment: R chest wall infection

## 2022-05-12 NOTE — DISCHARGE NOTE PROVIDER - NSDCFUADDAPPT_GEN_ALL_CORE_FT
See Dr Sorto in 2 weeks- call for an appointment and bring a new chest X-ray with you when you come.   See Dr Sorto in 2 weeks- call for an appointment [phone]  See Dr. Rios, Plastic surgeon, next Wednesday the 18th in the office.   Call to confirm your apt.   Empty both drains 2 x per day and keep a log with date, time and quantity. Bring output log with you to see Dr. Rios.

## 2022-05-12 NOTE — DISCHARGE NOTE PROVIDER - NSDCMRMEDTOKEN_GEN_ALL_CORE_FT
metoprolol tartrate 25 mg oral tablet: 1 tab(s) orally 2 times a day  omeprazole 20 mg oral delayed release capsule: 1 cap(s) orally once a day  valsartan 40 mg oral tablet: 1 tab(s) orally once a day   amoxicillin-clavulanate 875 mg-125 mg oral tablet: 1 tab(s) orally every 12 hours   metoprolol tartrate 25 mg oral tablet: 1 tab(s) orally 2 times a day  omeprazole 20 mg oral delayed release capsule: 1 cap(s) orally once a day  oxycodone-acetaminophen 5 mg-325 mg oral tablet: 2 tab(s) orally every 6 hours, As Needed -Severe Pain (7 - 10) MDD:8 Can take 1 tab for moderate pain.   senna oral tablet: 2 tab(s) orally once a day (at bedtime)  Tylenol 325 mg oral tablet: 2 tab(s) orally every 6 hours, As Needed  **Be aware that each Percocet has 1 Tylenol in it**  valsartan 40 mg oral tablet: 1 tab(s) orally once a day

## 2022-05-12 NOTE — DISCHARGE NOTE PROVIDER - NSDCFUADDINST_GEN_ALL_CORE_FT
Keep the Aquacel dressing in place.   Keep the Aquacel dressing in place.  Do not get wet or shower for now with this dressing in place. Plastic surgeon will remove dressing in office on Wednesday.

## 2022-05-12 NOTE — PATIENT PROFILE ADULT - ARE SIGNIFICANT INDICATORS COMPLETE.
"Amaury Blanton  Chief Complaint   Patient presents with   • Headache     intermittent, starting Saturday   • Abdominal Pain     starting Sunday, CO periumbilical pain   • Fever     starting Sunday, tactile   Patient awake, alert, interactive, NAD.   BP 98/61   Pulse 98   Temp 37.6 °C (99.6 °F) (Temporal)   Resp 24   Ht 1.321 m (4' 4\")   Wt 30.3 kg (66 lb 12.8 oz)   SpO2 100%   BMI 17.37 kg/m²   Patient to lobby. Instructed to notify RN of any changes or worsening in condition. Educated on triage process. Pt informed of wait times.Thanked for patience.      "
Yes

## 2022-05-12 NOTE — PATIENT PROFILE ADULT - NSPROHMSYMPCOND_GEN_A_NUR
In 2016 I performed bidirectional endoscopy for this patient.  She had some erythema in the antrum endoscopically but biopsies were negative for Helicobacter.  Colonoscopy was significant only for diverticulosis. - She had an egd/colonoscopy about 3-4 weeks ago with another gastroenterologist in [place] for unclear reasons. no records from that work up were provided. - in november or december she started to have nausea. she says that she never vomits but just nausea all the time. eating makes it better. a cup of yogurt in the morning will help. cardiovascular

## 2022-05-12 NOTE — DISCHARGE NOTE PROVIDER - CARE PROVIDER_API CALL
Ginette Sorto)  Surgery; Thoracic Surgery  [phone] [place] [address]  Phone: [phone]  Fax: [phone]  Established Patient  Follow Up Time: 2 weeks   Ginette Sorto)  Surgery; Thoracic Surgery  [phone] [place] [address], [place] [address]  Phone: [phone]  Fax: [phone]  Established Patient  Follow Up Time: 2 weeks    Jose Rios)  Surgery  PlasticReconstruct  [address] [facility], [facility] [address]  [place] [address]  Phone: [phone]  Fax: [phone]  Follow Up Time:

## 2022-05-13 ENCOUNTER — TRANSCRIPTION ENCOUNTER (OUTPATIENT)
Age: 47
End: 2022-05-13

## 2022-05-13 VITALS
TEMPERATURE: 98 F | HEART RATE: 72 BPM | DIASTOLIC BLOOD PRESSURE: 71 MMHG | RESPIRATION RATE: 17 BRPM | OXYGEN SATURATION: 96 % | SYSTOLIC BLOOD PRESSURE: 127 MMHG

## 2022-05-13 LAB
ANION GAP SERPL CALC-SCNC: 12 MMOL/L — SIGNIFICANT CHANGE UP (ref 7–14)
BASOPHILS # BLD AUTO: 0.02 K/UL — SIGNIFICANT CHANGE UP (ref 0–0.2)
BASOPHILS NFR BLD AUTO: 0.4 % — SIGNIFICANT CHANGE UP (ref 0–2)
BUN SERPL-MCNC: 8 MG/DL — SIGNIFICANT CHANGE UP (ref 7–23)
CALCIUM SERPL-MCNC: 9.1 MG/DL — SIGNIFICANT CHANGE UP (ref 8.4–10.5)
CHLORIDE SERPL-SCNC: 100 MMOL/L — SIGNIFICANT CHANGE UP (ref 98–107)
CO2 SERPL-SCNC: 25 MMOL/L — SIGNIFICANT CHANGE UP (ref 22–31)
CREAT SERPL-MCNC: 0.8 MG/DL — SIGNIFICANT CHANGE UP (ref 0.5–1.3)
EGFR: 110 ML/MIN/1.73M2 — SIGNIFICANT CHANGE UP
EOSINOPHIL # BLD AUTO: 0.13 K/UL — SIGNIFICANT CHANGE UP (ref 0–0.5)
EOSINOPHIL NFR BLD AUTO: 2.5 % — SIGNIFICANT CHANGE UP (ref 0–6)
GLUCOSE BLDC GLUCOMTR-MCNC: 106 MG/DL — HIGH (ref 70–99)
GLUCOSE SERPL-MCNC: 97 MG/DL — SIGNIFICANT CHANGE UP (ref 70–99)
HCT VFR BLD CALC: 48 % — SIGNIFICANT CHANGE UP (ref 39–50)
HGB BLD-MCNC: 15.7 G/DL — SIGNIFICANT CHANGE UP (ref 13–17)
IANC: 3.03 K/UL — SIGNIFICANT CHANGE UP (ref 1.8–7.4)
IMM GRANULOCYTES NFR BLD AUTO: 0.2 % — SIGNIFICANT CHANGE UP (ref 0–1.5)
LYMPHOCYTES # BLD AUTO: 1.38 K/UL — SIGNIFICANT CHANGE UP (ref 1–3.3)
LYMPHOCYTES # BLD AUTO: 26.2 % — SIGNIFICANT CHANGE UP (ref 13–44)
MCHC RBC-ENTMCNC: 31.3 PG — SIGNIFICANT CHANGE UP (ref 27–34)
MCHC RBC-ENTMCNC: 32.7 GM/DL — SIGNIFICANT CHANGE UP (ref 32–36)
MCV RBC AUTO: 95.6 FL — SIGNIFICANT CHANGE UP (ref 80–100)
MONOCYTES # BLD AUTO: 0.69 K/UL — SIGNIFICANT CHANGE UP (ref 0–0.9)
MONOCYTES NFR BLD AUTO: 13.1 % — SIGNIFICANT CHANGE UP (ref 2–14)
NEUTROPHILS # BLD AUTO: 3.03 K/UL — SIGNIFICANT CHANGE UP (ref 1.8–7.4)
NEUTROPHILS NFR BLD AUTO: 57.6 % — SIGNIFICANT CHANGE UP (ref 43–77)
NRBC # BLD: 0 /100 WBCS — SIGNIFICANT CHANGE UP
NRBC # FLD: 0 K/UL — SIGNIFICANT CHANGE UP
PLATELET # BLD AUTO: 115 K/UL — LOW (ref 150–400)
POTASSIUM SERPL-MCNC: 4.2 MMOL/L — SIGNIFICANT CHANGE UP (ref 3.5–5.3)
POTASSIUM SERPL-SCNC: 4.2 MMOL/L — SIGNIFICANT CHANGE UP (ref 3.5–5.3)
RBC # BLD: 5.02 M/UL — SIGNIFICANT CHANGE UP (ref 4.2–5.8)
RBC # FLD: 13 % — SIGNIFICANT CHANGE UP (ref 10.3–14.5)
SODIUM SERPL-SCNC: 137 MMOL/L — SIGNIFICANT CHANGE UP (ref 135–145)
WBC # BLD: 5.26 K/UL — SIGNIFICANT CHANGE UP (ref 3.8–10.5)
WBC # FLD AUTO: 5.26 K/UL — SIGNIFICANT CHANGE UP (ref 3.8–10.5)

## 2022-05-13 PROCEDURE — 71045 X-RAY EXAM CHEST 1 VIEW: CPT | Mod: 26

## 2022-05-13 RX ORDER — OXYCODONE AND ACETAMINOPHEN 5; 325 MG/1; MG/1
1 TABLET ORAL EVERY 4 HOURS
Refills: 0 | Status: DISCONTINUED | OUTPATIENT
Start: 2022-05-13 | End: 2022-05-13

## 2022-05-13 RX ORDER — SENNA PLUS 8.6 MG/1
2 TABLET ORAL
Qty: 0 | Refills: 0 | DISCHARGE
Start: 2022-05-13

## 2022-05-13 RX ORDER — OXYCODONE HYDROCHLORIDE 5 MG/1
5 TABLET ORAL ONCE
Refills: 0 | Status: DISCONTINUED | OUTPATIENT
Start: 2022-05-13 | End: 2022-05-13

## 2022-05-13 RX ORDER — OXYCODONE AND ACETAMINOPHEN 5; 325 MG/1; MG/1
2 TABLET ORAL EVERY 4 HOURS
Refills: 0 | Status: DISCONTINUED | OUTPATIENT
Start: 2022-05-13 | End: 2022-05-13

## 2022-05-13 RX ADMIN — HEPARIN SODIUM 5000 UNIT(S): 5000 INJECTION INTRAVENOUS; SUBCUTANEOUS at 06:12

## 2022-05-13 RX ADMIN — OXYCODONE AND ACETAMINOPHEN 2 TABLET(S): 5; 325 TABLET ORAL at 12:04

## 2022-05-13 RX ADMIN — OXYCODONE AND ACETAMINOPHEN 2 TABLET(S): 5; 325 TABLET ORAL at 13:00

## 2022-05-13 RX ADMIN — Medication 100 MILLIGRAM(S): at 06:13

## 2022-05-13 RX ADMIN — HEPARIN SODIUM 5000 UNIT(S): 5000 INJECTION INTRAVENOUS; SUBCUTANEOUS at 13:17

## 2022-05-13 RX ADMIN — OXYCODONE HYDROCHLORIDE 5 MILLIGRAM(S): 5 TABLET ORAL at 00:52

## 2022-05-13 RX ADMIN — Medication 100 MILLIGRAM(S): at 13:17

## 2022-05-13 RX ADMIN — OXYCODONE HYDROCHLORIDE 5 MILLIGRAM(S): 5 TABLET ORAL at 01:48

## 2022-05-13 NOTE — PROGRESS NOTE ADULT - SUBJECTIVE AND OBJECTIVE BOX
Plastic Surgery Progress Note (pg [facility]: [id number], NS: [phone])    SUBJECTIVE  The patient was seen and examined. No acute events overnight.    OBJECTIVE  ___________________________________________________  VITAL SIGNS / I&O's   Vital Signs Last 24 Hrs  T(C): 36.6 (13 May 2022 04:33), Max: 37 (12 May 2022 23:59)  T(F): 97.9 (13 May 2022 04:33), Max: 98.6 (12 May 2022 23:59)  HR: 76 (13 May 2022 04:33) (72 - 101)  BP: 119/78 (13 May 2022 04:33) (91/67 - 127/88)  BP(mean): 75 (12 May 2022 12:00) (75 - 94)  RR: 17 (13 May 2022 04:33) (14 - 23)  SpO2: 98% (13 May 2022 04:33) (93% - 98%)      12 May 2022 07:01  -  13 May 2022 07:00  --------------------------------------------------------  IN:    IV PiggyBack: 100 mL    Oral Fluid: 360 mL  Total IN: 460 mL    OUT:    Bulb (mL): 80 mL    Bulb (mL): 40 mL    Voided (mL): 700 mL  Total OUT: 820 mL    Total NET: -360 mL        ___________________________________________________  PHYSICAL EXAM    -- CONSTITUTIONAL: NAD  Back: Dressing CDI no collections drains SS  ___________________________________________________  LABS            CAPILLARY BLOOD GLUCOSE              ___________________________________________________  MICRO  Recent Cultures:  Specimen Source: .Tissue 3-SCAPULAR TIP RIGHT CHEST, 05-12 @ 10:15; Results   Testing in progress; Gram Stain:   No polymorphonuclear leukocytes  No organisms seen per oil power field; Organism: --  Specimen Source: .Tissue 2-RIGHT CHEST WOUND DEEP TISSUE CULTURE, 05-12 @ 08:40; Results   Testing in progress; Gram Stain:   No polymorphonuclear leukocytes  No organisms seen per oil power field; Organism: --  Specimen Source: .Tissue 1-RIGHT CHEST TISSUE WOUND CULTURE SUPERFICIAL, 05-12 @ 08:37; Results   Testing in progress; Gram Stain:   Rare polymorphonuclear leukocytes  Moderate Gram positive cocci in pairs per oil power field  Few Gram Negative Rods per oil power field; Organism: --    ___________________________________________________  MEDICATIONS  (STANDING):  ceFAZolin   IVPB 1000 milliGRAM(s) IV Intermittent every 8 hours  heparin   Injectable 5000 Unit(s) SubCutaneous every 8 hours  senna 2 Tablet(s) Oral at bedtime    MEDICATIONS  (PRN):

## 2022-05-13 NOTE — PROGRESS NOTE ADULT - ASSESSMENT
ASSESSMENT/PLAN: 46M  s/p right chest wall debridement with complex closure  - OK for discharge with drains and Abx from PRS perspective  - Leave dressing in place until follow up  - Patient to follow up with Dr Rios on Wednesday  - Pain control  - IS  - Ambulate  - DVT prophylaxis    Plastic Surgery (pg [facility]: [id number], NS: [phone])

## 2022-05-13 NOTE — DISCHARGE NOTE NURSING/CASE MANAGEMENT/SOCIAL WORK - PATIENT PORTAL LINK FT
You can access the FollowMyHealth Patient Portal offered by [facility] by registering at the following website: [email or web address]. By joining [employer]’s FollowMyHealth portal, you will also be able to view your health information using other applications (apps) compatible with our system.

## 2022-05-13 NOTE — DISCHARGE NOTE NURSING/CASE MANAGEMENT/SOCIAL WORK - NSSCNAMETXT_GEN_ALL_CORE
[facility] [place] [phone] initial visit will be day after discharge home. A nurse will call prior to the home visit.

## 2022-05-13 NOTE — DISCHARGE NOTE NURSING/CASE MANAGEMENT/SOCIAL WORK - NSDCFUADDAPPT_GEN_ALL_CORE_FT
See Dr Sorto in 2 weeks- call for an appointment [phone]  See Dr. Rios, Plastic surgeon, next Wednesday the 18th in the office.   Call to confirm your apt.   Empty both drains 2 x per day and keep a log with date, time and quantity. Bring output log with you to see Dr. Rios.

## 2022-05-14 LAB
-  AMIKACIN: SIGNIFICANT CHANGE UP
-  AMPICILLIN/SULBACTAM: SIGNIFICANT CHANGE UP
-  AMPICILLIN/SULBACTAM: SIGNIFICANT CHANGE UP
-  AZTREONAM: SIGNIFICANT CHANGE UP
-  CEFAZOLIN: SIGNIFICANT CHANGE UP
-  CEFAZOLIN: SIGNIFICANT CHANGE UP
-  CEFEPIME: SIGNIFICANT CHANGE UP
-  CEFTAZIDIME: SIGNIFICANT CHANGE UP
-  CIPROFLOXACIN: SIGNIFICANT CHANGE UP
-  CLINDAMYCIN: SIGNIFICANT CHANGE UP
-  CLINDAMYCIN: SIGNIFICANT CHANGE UP
-  ERYTHROMYCIN: SIGNIFICANT CHANGE UP
-  ERYTHROMYCIN: SIGNIFICANT CHANGE UP
-  GENTAMICIN: SIGNIFICANT CHANGE UP
-  IMIPENEM: SIGNIFICANT CHANGE UP
-  LEVOFLOXACIN: SIGNIFICANT CHANGE UP
-  MEROPENEM: SIGNIFICANT CHANGE UP
-  OXACILLIN: SIGNIFICANT CHANGE UP
-  OXACILLIN: SIGNIFICANT CHANGE UP
-  PENICILLIN: SIGNIFICANT CHANGE UP
-  PIPERACILLIN/TAZOBACTAM: SIGNIFICANT CHANGE UP
-  RIFAMPIN: SIGNIFICANT CHANGE UP
-  RIFAMPIN: SIGNIFICANT CHANGE UP
-  TETRACYCLINE: SIGNIFICANT CHANGE UP
-  TETRACYCLINE: SIGNIFICANT CHANGE UP
-  TOBRAMYCIN: SIGNIFICANT CHANGE UP
-  TRIMETHOPRIM/SULFAMETHOXAZOLE: SIGNIFICANT CHANGE UP
-  TRIMETHOPRIM/SULFAMETHOXAZOLE: SIGNIFICANT CHANGE UP
-  VANCOMYCIN: SIGNIFICANT CHANGE UP
-  VANCOMYCIN: SIGNIFICANT CHANGE UP
METHOD TYPE: SIGNIFICANT CHANGE UP

## 2022-05-15 LAB
-  AMPICILLIN/SULBACTAM: SIGNIFICANT CHANGE UP
-  CEFAZOLIN: SIGNIFICANT CHANGE UP
-  CLINDAMYCIN: SIGNIFICANT CHANGE UP
-  ERYTHROMYCIN: SIGNIFICANT CHANGE UP
-  GENTAMICIN: SIGNIFICANT CHANGE UP
-  OXACILLIN: SIGNIFICANT CHANGE UP
-  PENICILLIN: SIGNIFICANT CHANGE UP
-  RIFAMPIN: SIGNIFICANT CHANGE UP
-  TETRACYCLINE: SIGNIFICANT CHANGE UP
-  TRIMETHOPRIM/SULFAMETHOXAZOLE: SIGNIFICANT CHANGE UP
-  VANCOMYCIN: SIGNIFICANT CHANGE UP
METHOD TYPE: SIGNIFICANT CHANGE UP

## 2022-05-16 RX ORDER — ACETAMINOPHEN 500 MG
2 TABLET ORAL
Qty: 0 | Refills: 0 | DISCHARGE

## 2022-05-17 LAB
CULTURE RESULTS: SIGNIFICANT CHANGE UP
ORGANISM # SPEC MICROSCOPIC CNT: SIGNIFICANT CHANGE UP
SPECIMEN SOURCE: SIGNIFICANT CHANGE UP

## 2022-05-18 ENCOUNTER — APPOINTMENT (OUTPATIENT)
Dept: PLASTIC SURGERY | Facility: CLINIC | Age: 47
End: 2022-05-18
Payer: MEDICAID

## 2022-05-18 PROCEDURE — 99024 POSTOP FOLLOW-UP VISIT: CPT

## 2022-05-20 ENCOUNTER — APPOINTMENT (OUTPATIENT)
Dept: PLASTIC SURGERY | Facility: CLINIC | Age: 47
End: 2022-05-20
Payer: MEDICAID

## 2022-05-20 PROBLEM — S21.201A WOUND OF RIGHT SIDE OF BACK: Status: ACTIVE | Noted: 2020-10-29

## 2022-05-20 PROCEDURE — 99024 POSTOP FOLLOW-UP VISIT: CPT

## 2022-05-20 RX ORDER — CEPHALEXIN 500 MG/1
500 TABLET ORAL 3 TIMES DAILY
Qty: 48 | Refills: 0 | Status: ACTIVE | COMMUNITY
Start: 2022-05-20 | End: 1900-01-01

## 2022-05-20 NOTE — ASSESSMENT
[FreeTextEntry1] : Wound care, activity restrictions, and drain maintenance were reviewed.  He should follow-up next Wednesday for reevaluation and possible drain removal.\par \par As the patient had bone cultures demonstrating staph epidermidis, I will recommend at least a total oral antibiotic course of 4 weeks.  I have given him an additional prescription for cephalexin for 16 days, to be started after the amoxicillin clavulanic acid is finished.  He will also be referred via [facility] to infectious disease for confirmation of treatment of osteomyelitis.\par \par He has an appointment with Dr. Sorto next week as well.\par \par

## 2022-05-20 NOTE — PHYSICAL EXAM
[de-identified] : Incision intact.  Drain in place with serosanguineous fluid and minimal surrounding erythema, no tenderness at drain site.

## 2022-05-20 NOTE — HISTORY OF PRESENT ILLNESS
[FreeTextEntry1] : The patient returns for additional follow-up following right chest wound debridement and muscle flap closure.  He denies significant pain at the site.  He is accompanied by his friend, Jenna. \par \par Drain output continues to be about 25 cc a day.  Intraoperative cultures, including from the scapula bone tip, demonstrate staph epidermidis, susceptible to cephalexin.  Only the superficial wound demonstrated Pseudomonas.  The patient currently has a prescription for amoxicillin clavulanic acid until 5/24/2022.

## 2022-05-24 ENCOUNTER — APPOINTMENT (OUTPATIENT)
Dept: THORACIC SURGERY | Facility: CLINIC | Age: 47
End: 2022-05-24
Payer: MEDICAID

## 2022-05-24 VITALS — TEMPERATURE: 96.3 F

## 2022-05-24 DIAGNOSIS — S21.201A UNSPECIFIED OPEN WOUND OF RIGHT BACK WALL OF THORAX W/OUT PENETRATION INTO THORACIC CAVITY, INITIAL ENCOUNTER: ICD-10-CM

## 2022-05-24 PROCEDURE — 99215 OFFICE O/P EST HI 40 MIN: CPT

## 2022-05-24 NOTE — DATA REVIEWED
[FreeTextEntry1] : Intraoperative Cultures: \par Only superficial cultures : \par Staph epidermidis and Pseudomonas\par \par Deep tissue and scapular tip \par Staph epidermidis

## 2022-05-24 NOTE — HISTORY OF PRESENT ILLNESS
[FreeTextEntry1] : Mr. Kim is a 46 year old male who was initially admitted to [facility] on 7/25/19 for loculated pleural effusion. Hospital course was complicated by sepsis, hypotension, ETOH withdrawal, hemothorax and respiratory failure s/p tracheostomy (decannulated 9/7/2018) and PEG (removed 9/11/2018).\par \par He is s/p Right VATS converted to thoracotomy, decortication on 7/28/18 for empyema. He had partial rib resection on 4/5/2019. He has the Right Thorax wound which was managed with wound VAC but has since been discontinued because patient does not want wound vac. Currently wound care TIW. He is followed by Dr. Hdez for wound care. \par \par Pt has been seen Surg for wound care and management. As per Sug note, the wound eventually will need flap closure by Plastic Sx and Thoracic Sx. \par \par Patient is s/p debridement of right chest wound including tip of scapula bone and closure with muscle flap on 05/12/2022 by Dr. Jose Rios. I was standby for Dr. Rios and Dr. Carlton for a possible right thoracotomy, decortication, or rib resection.  The patient underwent a debridement of right chest wound and muscle closure with Dr. Carlton  and Dr. Rios. \par \par Patient is here today for a follow up. He reports minimal pain at incision but tolerable. Overall feel wells, no fevers/chills. Drained 35 cc of serosanguinous fluid in 24 hrs. \par was supposed to see Dr. Rios tomorrow for possible drain removal. \par \par

## 2022-05-24 NOTE — CONSULT LETTER
[Dear  ___] : Dear  [unfilled], [Courtesy Letter:] : I had the pleasure of seeing your patient, [unfilled], in my office today. [Please see my note below.] : Please see my note below. [Sincerely,] : Sincerely, [FreeTextEntry2] : Dr. Judi Hdez (Wound Care)\par Dr. Cassidy Andrade (PCP)  [FreeTextEntry3] : Ginette Sorto MD\par Attending Surgeon\par Division of Thoracic Surgery\par , [facility] [employer] [facility]\par

## 2022-05-24 NOTE — PHYSICAL EXAM
[Extraocular Movements] : extraocular movements were intact [Neck Appearance] : the appearance of the neck was normal [] : no respiratory distress [Exaggerated Use Of Accessory Muscles For Inspiration] : no accessory muscle use [Auscultation Breath Sounds / Voice Sounds] : lungs were clear to auscultation bilaterally [Heart Rate And Rhythm] : heart rate was normal and rhythm regular [FreeTextEntry1] : incision well healed, - no erythema or fluctuance, nontender, drain with serosanguinous fluid - dressing changes and steristrips removed  [Bowel Sounds] : normal bowel sounds [Abdomen Soft] : soft [Abdomen Tenderness] : non-tender [Abnormal Walk] : normal gait [Oriented To Time, Place, And Person] : oriented to person, place, and time [Impaired Insight] : insight and judgment were intact [Affect] : the affect was normal

## 2022-05-24 NOTE — ASSESSMENT
[FreeTextEntry1] : Mr. Kim is a 46 year old male who was initially admitted to [facility] on 7/25/19 for loculated pleural effusion. Hospital course was complicated by sepsis, hypotension, ETOH withdrawal, hemothorax and respiratory failure s/p tracheostomy (decannulated 9/7/2018) and PEG (removed 9/11/2018).\par \par He is s/p Right VATS converted to thoracotomy, decortication on 7/28/18 for empyema. He had partial rib resection on 4/5/2019. He has the Right Thorax wound which was managed with wound VAC but has since been discontinued because patient does not want wound vac. Currently wound care TIW. He is followed by Dr. Hdez for wound care. \par \par Pt has been seen Surg for wound care and management. As per Sug note, the wound eventually will need flap closure by Plastic Sx and Thoracic Sx. \par \par Patient is s/p debridement of right chest wound including tip of scapula bone and closure with muscle flap on 05/12/2022 by Dr. Jose Rios. I was standby for Dr. Rios and Dr. Carlton for a possible right thoracotomy, decortication, or rib resection.  The patient underwent a debridement of right chest wound and muscle closure with Dr. Carlton  and Dr. Rios. \par \par Patient doing well - pending drainage removal once decreases amount/24 hours. \par Patient to follow up with ID for further recommendations regarding abx. \par Patient can RTO in 3 months \par \par Recommendations reviewed with patient during this office visit, and all questions answered; Patient instructed on the importance of follow up and verbalizes understanding. \par  \par

## 2022-05-25 ENCOUNTER — OUTPATIENT (OUTPATIENT)
Dept: OUTPATIENT SERVICES | Facility: HOSPITAL | Age: 47
LOS: 1 days | End: 2022-05-25
Payer: MEDICAID

## 2022-05-25 ENCOUNTER — APPOINTMENT (OUTPATIENT)
Dept: INFECTIOUS DISEASE | Facility: CLINIC | Age: 47
End: 2022-05-25
Payer: MEDICAID

## 2022-05-25 VITALS
BODY MASS INDEX: 31.5 KG/M2 | WEIGHT: 196 LBS | TEMPERATURE: 98 F | HEART RATE: 85 BPM | DIASTOLIC BLOOD PRESSURE: 98 MMHG | OXYGEN SATURATION: 98 % | RESPIRATION RATE: 16 BRPM | SYSTOLIC BLOOD PRESSURE: 136 MMHG | HEIGHT: 66 IN

## 2022-05-25 DIAGNOSIS — B97.89 OTHER VIRAL AGENTS AS THE CAUSE OF DISEASES CLASSIFIED ELSEWHERE: ICD-10-CM

## 2022-05-25 DIAGNOSIS — Z98.890 OTHER SPECIFIED POSTPROCEDURAL STATES: Chronic | ICD-10-CM

## 2022-05-25 DIAGNOSIS — Z93.1 GASTROSTOMY STATUS: Chronic | ICD-10-CM

## 2022-05-25 PROCEDURE — G0463: CPT

## 2022-05-25 PROCEDURE — 99204 OFFICE O/P NEW MOD 45 MIN: CPT

## 2022-05-25 RX ORDER — SULFAMETHOXAZOLE AND TRIMETHOPRIM 800; 160 MG/1; MG/1
800-160 TABLET ORAL TWICE DAILY
Qty: 60 | Refills: 2 | Status: ACTIVE | COMMUNITY
Start: 2022-05-25 | End: 1900-01-01

## 2022-05-25 NOTE — ASSESSMENT
[FreeTextEntry1] : Patient grew coagulase-negative Staphylococcus in 2 different deep cultures.  There is no pathology available for review.  The concern is that the patient has osteomyelitis.  Bone infections typically require relatively long course of antibiotics, and high doses of medication.  It is possible that these cultures could represent skin contamination, but the fact that they are present in the deep cultures, and given the clinical concern stated by the surgical team I cannot discount them.  The superficial culture also grew Pseudomonas aeruginosa, but as this was an open chronic wound I do not think that this merits treatment at this point in time.  I did review with the patient the various options here, including no treatment.  I would not advise the latter.  The patient has had a PICC line before for treatment of MRSA, and is adamant that he does not want intravenous therapy.  I reviewed with him that IV therapy would be a reasonable thing here, as it gets high drug levels.  However he definitively does not want this.  As such reviewed the various oral options.  Cephalosporins appear to have in vitro activity, but drug levels typically not as high as other medicines.  He has an allergy to tigecycline making the tetracycline class of drugs a poor choice.  Trimethoprim/sulfamethoxazole is a good option.  Has high bony levels, good oral bioavailability, and is only twice a day instead of 3 or 4 times a day.  The risk benefits alternatives of this medication were discussed with the patient in detail and all questions were answered to the best of my ability.  These included but were not limited to rash, allergy, stomach upset, diarrhea, photosensitivity.  The use of probiotics, as well as their limitations were addressed as well.\par \par Suggestions begin Bactrim 1 double strength tablet twice a day\par Follow-up here 1 month\par Repeat blood work at that point in time

## 2022-05-25 NOTE — PHYSICAL EXAM
[General Appearance - Well-Appearing] : healthy appearing [Sclera] : the sclera and conjunctiva were normal [Neck Appearance] : the appearance of the neck was normal [Auscultation Breath Sounds / Voice Sounds] : lungs were clear to auscultation bilaterally [Heart Sounds] : normal S1 and S2 [Bowel Sounds] : normal bowel sounds [Abdomen Tenderness] : non-tender [FreeTextEntry1] : Right chest wall with healing thoracotomy scar/flat.  Drain with serosanguineous fluid. [] : no rash [Oriented To Time, Place, And Person] : oriented to person, place, and time

## 2022-05-25 NOTE — HISTORY OF PRESENT ILLNESS
[FreeTextEntry1] : This is a 46-year-old man who speaks primarily Polish.  He declines translation services.  He prefers to use his family member at bedside.  The patient is status post plastic surgical closure of a chronic chest wound.  This is a consequence of prior VATS for empyema (MRSA previously).  No culture data from the initial surgeries are available for review.  In reviewing the operative note, there was concern for osteomyelitis involving the tip of the scapula.  There is no pathology available in the hospital computer system.  Surface cultures grew Pseudomonas and a coagulase-negative staph, well deeper cultures including of the purported osteomyelitis grew a coagulase-negative Staphylococcus.\par \par The patient is healing well after surgery and was without complaints.  He has no fevers chills or rigors.  Pain is not an issue.  The drain is still in situ.  He is following up with thoracic and plastic surgery.

## 2022-05-27 DIAGNOSIS — M86.9 OSTEOMYELITIS, UNSPECIFIED: ICD-10-CM

## 2022-05-30 NOTE — REASON FOR VISIT
[Post Op: _________] : a [unfilled] post op visit [Other: _____] : [unfilled] [FreeTextEntry1] : Dop: 5/12/22 S/P: chest wall debridement, joint with Dr. Carlton.

## 2022-05-30 NOTE — PHYSICAL EXAM
[de-identified] : Dressing removed completely. incision intact without erythema, fullness, or fluctuance.  Steri-Strips placed.  Superficial drain removed.  Site care for deep drain was demonstrated.

## 2022-05-30 NOTE — ASSESSMENT
[FreeTextEntry1] : No evidence of infection or collection.  Continue drain for now.  Follow-up in 2 days for possible drain removal.

## 2022-05-31 LAB — SURGICAL PATHOLOGY STUDY: SIGNIFICANT CHANGE UP

## 2022-06-01 ENCOUNTER — APPOINTMENT (OUTPATIENT)
Dept: PLASTIC SURGERY | Facility: CLINIC | Age: 47
End: 2022-06-01
Payer: MEDICAID

## 2022-06-01 PROCEDURE — 99024 POSTOP FOLLOW-UP VISIT: CPT

## 2022-06-01 PROCEDURE — T1013: CPT

## 2022-06-04 NOTE — REASON FOR VISIT
[Post Op: _________] : a [unfilled] post op visit [Family Member] : family member [[employer] ] : provided by [employer]   [Time Spent: ____ minutes] : Total time spent using  services: [unfilled] minutes. The patient's primary language is not English thus required  services. [FreeTextEntry1] : Dop: 5/12/22 S/P: chest wall debridement, joint with Dr. Carlton.  [Interpreters_IDNumber] : [id number] [Interpreters_FullName] : bhavya [TWNoteComboBox1] : Polish

## 2022-06-04 NOTE — PHYSICAL EXAM
[de-identified] : Incision intact.  No fullness or fluctuance.  No erythema.  No significant tenderness.  Drain removed completely.  Wound care demonstrated

## 2022-06-04 NOTE — HISTORY OF PRESENT ILLNESS
[FreeTextEntry1] : Drain output has been 20 cc or lower for the last 2 days.  The patient has established care with infectious disease and is on long-term oral antibiotics.  Denies any issues with the wound.

## 2022-06-04 NOTE — ASSESSMENT
[FreeTextEntry1] : No evidence of infection or collection.  Reviewed wound care and activity restrictions via .  Follow-up in 2 weeks for reassessment.

## 2022-06-11 LAB
CULTURE RESULTS: SIGNIFICANT CHANGE UP
SPECIMEN SOURCE: SIGNIFICANT CHANGE UP

## 2022-06-15 ENCOUNTER — APPOINTMENT (OUTPATIENT)
Dept: PLASTIC SURGERY | Facility: CLINIC | Age: 47
End: 2022-06-15
Payer: MEDICAID

## 2022-06-15 PROCEDURE — 99024 POSTOP FOLLOW-UP VISIT: CPT

## 2022-06-16 ENCOUNTER — NON-APPOINTMENT (OUTPATIENT)
Age: 47
End: 2022-06-16

## 2022-06-16 NOTE — ASSESSMENT
[FreeTextEntry1] : Seroma at right chest wound closure site. No evidence of infection. Continuing tx for possible OM. Will refer to IR for drainage and, if needed, sclerotherapy.

## 2022-06-16 NOTE — REASON FOR VISIT
[Post Op: _________] : a [unfilled] post op visit [Family Member] : family member [FreeTextEntry1] : Dop: 5/12/22 S/P: chest wall debridement, joint with Dr. Carlton.  [Interpreters_IDNumber] : [id number] [TWNoteComboBox1] : Polish [Interpreters_FullName] : beatrice

## 2022-06-16 NOTE — HISTORY OF PRESENT ILLNESS
[FreeTextEntry1] : Pt returns for follow-up. Denies pain or drainage from site. Notices fluid under the skin in the area.

## 2022-06-16 NOTE — PHYSICAL EXAM
[de-identified] : Right chest wall incision intact, no erythema, no tenderness. Significant fluctuance in area. Drain site with small crust.

## 2022-06-29 ENCOUNTER — APPOINTMENT (OUTPATIENT)
Dept: INFECTIOUS DISEASE | Facility: CLINIC | Age: 47
End: 2022-06-29

## 2022-07-01 ENCOUNTER — APPOINTMENT (OUTPATIENT)
Dept: PLASTIC SURGERY | Facility: CLINIC | Age: 47
End: 2022-07-01

## 2022-07-02 LAB
CULTURE RESULTS: SIGNIFICANT CHANGE UP
SPECIMEN SOURCE: SIGNIFICANT CHANGE UP

## 2022-07-06 ENCOUNTER — OUTPATIENT (OUTPATIENT)
Dept: OUTPATIENT SERVICES | Facility: HOSPITAL | Age: 47
LOS: 1 days | End: 2022-07-06
Payer: MEDICAID

## 2022-07-06 ENCOUNTER — APPOINTMENT (OUTPATIENT)
Dept: ULTRASOUND IMAGING | Facility: CLINIC | Age: 47
End: 2022-07-06

## 2022-07-06 DIAGNOSIS — Z98.890 OTHER SPECIFIED POSTPROCEDURAL STATES: Chronic | ICD-10-CM

## 2022-07-06 DIAGNOSIS — Z93.1 GASTROSTOMY STATUS: Chronic | ICD-10-CM

## 2022-07-06 DIAGNOSIS — Z00.8 ENCOUNTER FOR OTHER GENERAL EXAMINATION: ICD-10-CM

## 2022-07-06 DIAGNOSIS — L76.34 POSTPROCEDURAL SEROMA OF SKIN AND SUBCUTANEOUS TISSUE FOLLOWING OTHER PROCEDURE: ICD-10-CM

## 2022-07-06 PROCEDURE — 76604 US EXAM CHEST: CPT | Mod: 26

## 2022-07-06 PROCEDURE — 76604 US EXAM CHEST: CPT

## 2022-07-20 ENCOUNTER — APPOINTMENT (OUTPATIENT)
Dept: INFECTIOUS DISEASE | Facility: CLINIC | Age: 47
End: 2022-07-20

## 2022-07-20 ENCOUNTER — RESULT REVIEW (OUTPATIENT)
Age: 47
End: 2022-07-20

## 2022-07-20 ENCOUNTER — OUTPATIENT (OUTPATIENT)
Dept: OUTPATIENT SERVICES | Facility: HOSPITAL | Age: 47
LOS: 1 days | End: 2022-07-20

## 2022-07-20 VITALS
BODY MASS INDEX: 30.53 KG/M2 | HEIGHT: 66 IN | WEIGHT: 190 LBS | SYSTOLIC BLOOD PRESSURE: 131 MMHG | TEMPERATURE: 98.6 F | HEART RATE: 109 BPM | DIASTOLIC BLOOD PRESSURE: 91 MMHG | OXYGEN SATURATION: 96 %

## 2022-07-20 DIAGNOSIS — M86.9 OSTEOMYELITIS, UNSPECIFIED: ICD-10-CM

## 2022-07-20 DIAGNOSIS — Z98.890 OTHER SPECIFIED POSTPROCEDURAL STATES: Chronic | ICD-10-CM

## 2022-07-20 DIAGNOSIS — Z93.1 GASTROSTOMY STATUS: Chronic | ICD-10-CM

## 2022-07-20 DIAGNOSIS — L76.34 POSTPROCEDURAL SEROMA OF SKIN AND SUBCUTANEOUS TISSUE FOLLOWING OTHER PROCEDURE: ICD-10-CM

## 2022-07-20 LAB
GRAM STN FLD: SIGNIFICANT CHANGE UP
SPECIMEN SOURCE: SIGNIFICANT CHANGE UP

## 2022-07-20 PROCEDURE — 99213 OFFICE O/P EST LOW 20 MIN: CPT

## 2022-07-20 PROCEDURE — 76942 ECHO GUIDE FOR BIOPSY: CPT | Mod: 26

## 2022-07-20 PROCEDURE — 10160 PNXR ASPIR ABSC HMTMA BULLA: CPT

## 2022-07-20 NOTE — HISTORY OF PRESENT ILLNESS
[FreeTextEntry1] : This is a 46-year-old man who speaks primarily Polish and again declines translation services.  His son translates. The patient is status post plastic surgical closure of a chronic chest wound and concern of underlying osteomyelitis involving the tip of the scapula. Surface cultures grew Pseudomonas and a coagulase-negative staph, well deeper cultures including of the purported osteomyelitis grew a coagulase-negative Staphylococcus.\par He declined IV antibiotics and at the time of his initial assessment here on May 25 2022 was placed on PO bactrim.\par \par Since last seen, he has been tolerating the antibiotics without issues.  He has had no fevers chills or rigors.  He denies any shortness of breath or chest pain.  There is no cough.  He said no diarrhea.  He has had no rash related to the antibiotics.\par \par He has followed up with plastic surgery, and today underwent interventional radiology drainage of a seroma at the site of the prior incision.  He tolerated it without incident.\par

## 2022-07-20 NOTE — ASSESSMENT
[FreeTextEntry1] : Appears to be tolerating antibiotics without issues.  Status post drainage of seroma at the site.  Unclear if any sclerosant was given.\par \par Follow-up here September\par Complete antibiotics in August\par Will check CBC and differential, basic metabolic profile today–looking for issues related to the antibiotic, not infection.

## 2022-07-20 NOTE — PHYSICAL EXAM
[FreeTextEntry1] : Right chest wall with healed thoracotomy.  There is a dressing in place at the site of today's aspiration.  There is no drain in situ.  There is no increased calor.  There is no tenderness.  There is no crepitus or fluctuance.  There is nothing devitalized.  There is no erythema.  There is no tenderness.

## 2022-07-21 LAB
ANION GAP SERPL CALC-SCNC: 17 MMOL/L
BASOPHILS # BLD AUTO: 0.01 K/UL
BASOPHILS NFR BLD AUTO: 0.1 %
BUN SERPL-MCNC: 9 MG/DL
CALCIUM SERPL-MCNC: 10.1 MG/DL
CHLORIDE SERPL-SCNC: 99 MMOL/L
CO2 SERPL-SCNC: 24 MMOL/L
CREAT SERPL-MCNC: 1.08 MG/DL
EGFR: 85 ML/MIN/1.73M2
EOSINOPHIL # BLD AUTO: 0.06 K/UL
EOSINOPHIL NFR BLD AUTO: 0.7 %
GLUCOSE SERPL-MCNC: 107 MG/DL
HCT VFR BLD CALC: 52.7 %
HGB BLD-MCNC: 17.1 G/DL
IMM GRANULOCYTES NFR BLD AUTO: 0.3 %
LYMPHOCYTES # BLD AUTO: 1.36 K/UL
LYMPHOCYTES NFR BLD AUTO: 15.7 %
MAN DIFF?: NORMAL
MCHC RBC-ENTMCNC: 31.5 PG
MCHC RBC-ENTMCNC: 32.4 GM/DL
MCV RBC AUTO: 97.1 FL
MONOCYTES # BLD AUTO: 1.05 K/UL
MONOCYTES NFR BLD AUTO: 12.1 %
NEUTROPHILS # BLD AUTO: 6.14 K/UL
NEUTROPHILS NFR BLD AUTO: 71.1 %
PLATELET # BLD AUTO: 143 K/UL
POTASSIUM SERPL-SCNC: 4.9 MMOL/L
RBC # BLD: 5.43 M/UL
RBC # FLD: 15.2 %
SODIUM SERPL-SCNC: 141 MMOL/L
WBC # FLD AUTO: 8.65 K/UL

## 2022-07-25 LAB
CULTURE RESULTS: SIGNIFICANT CHANGE UP
SPECIMEN SOURCE: SIGNIFICANT CHANGE UP

## 2022-07-27 DIAGNOSIS — L76.34 POSTPROCEDURAL SEROMA OF SKIN AND SUBCUTANEOUS TISSUE FOLLOWING OTHER PROCEDURE: ICD-10-CM

## 2022-08-19 ENCOUNTER — APPOINTMENT (OUTPATIENT)
Dept: PLASTIC SURGERY | Facility: CLINIC | Age: 47
End: 2022-08-19

## 2022-08-19 DIAGNOSIS — S21.101A UNSPECIFIED OPEN WOUND OF RIGHT FRONT WALL OF THORAX W/OUT PENETRATION INTO THORACIC CAVITY, INITIAL ENCOUNTER: ICD-10-CM

## 2022-08-19 DIAGNOSIS — L76.34 POSTPROCEDURAL SEROMA OF SKIN AND SUBCUTANEOUS TISSUE FOLLOWING OTHER PROCEDURE: ICD-10-CM

## 2022-08-19 DIAGNOSIS — I10 ESSENTIAL (PRIMARY) HYPERTENSION: ICD-10-CM

## 2022-08-19 DIAGNOSIS — Z87.19 PERSONAL HISTORY OF OTHER DISEASES OF THE DIGESTIVE SYSTEM: ICD-10-CM

## 2022-08-19 DIAGNOSIS — J86.9 PYOTHORAX W/OUT FISTULA: ICD-10-CM

## 2022-08-19 PROCEDURE — 99212 OFFICE O/P EST SF 10 MIN: CPT

## 2022-08-20 LAB
CULTURE RESULTS: SIGNIFICANT CHANGE UP
SPECIMEN SOURCE: SIGNIFICANT CHANGE UP

## 2022-08-24 PROBLEM — I10 BENIGN HYPERTENSION: Status: RESOLVED | Noted: 2022-01-06 | Resolved: 2022-08-24

## 2022-08-24 PROBLEM — Z87.19 HISTORY OF GASTROESOPHAGEAL REFLUX (GERD): Status: RESOLVED | Noted: 2018-09-27 | Resolved: 2022-08-24

## 2022-08-24 PROBLEM — L76.34 POSTOPERATIVE SEROMA OF SUBCUTANEOUS TISSUE AFTER NON-DERMATOLOGIC PROCEDURE: Status: RESOLVED | Noted: 2022-06-16 | Resolved: 2022-08-24

## 2022-08-24 PROBLEM — J86.9 EMPYEMA, RIGHT: Status: RESOLVED | Noted: 2018-09-27 | Resolved: 2022-08-24

## 2022-08-24 PROBLEM — S21.101A: Status: RESOLVED | Noted: 2020-03-31 | Resolved: 2022-08-24

## 2022-08-24 NOTE — HISTORY OF PRESENT ILLNESS
[FreeTextEntry1] : The patient states the collection at the right chest wound has resolved.  He states he has no pain at the site.  He has returned to normal activities.

## 2022-08-24 NOTE — ASSESSMENT
[FreeTextEntry1] : Interval resolution of right chest wall collection following IR drainage.  There is no evidence of infection or residual collection.  Continue scar massage to soften the area.  Follow-up as needed.

## 2022-09-27 NOTE — PRE-OP CHECKLIST - ASSESSMENT, HISTORY & PHYSICAL COMPLETED AND ON MEDICAL RECORD
Triage    Pt here from home for right sided flank pain  Pt had kidney stone in July and was given flomax and pain went away  2 days ago pain started again  Pt denies pain with urination, but states it feels \"weird\"  A&Ox4  Fully COVID19 vaccinated  
done

## 2022-09-28 NOTE — OCCUPATIONAL THERAPY INITIAL EVALUATION ADULT - STANDING BALANCE: STATIC
Order/Referral Request    Who is requesting: patient    Orders being requested: full lipid panel    Reason service is needed/diagnosis:   Pt has already had the blood drawn for lipid, just need to have the order placed    When are orders needed by: ASAP    Has this been discussed with Provider: No    Does patient have a preference on a Group/Provider/Facility? n/a    Does patient have an appointment scheduled?: No    Where to send orders: N/A    Could we send this information to you in [place] or would you prefer to receive a phone call?:   Patient would prefer a phone call   Okay to leave a detailed message?: Yes at Cell number on file:    Telephone Information:   Mobile [phone]      fair minus

## 2022-10-13 NOTE — REASON FOR VISIT
Medical Necessity Information: It is in your best interest to select a reason for this procedure from the list below. All of these items fulfill various CMS LCD requirements except the new and changing color options. [Follow-Up: _____] : a [unfilled] follow-up visit [Spouse] : spouse

## 2022-11-29 NOTE — REASON FOR VISIT
[FreeTextEntry1] : Right posterior chest incision ulcer  Prednisone Counseling:  I discussed with the patient the risks of prolonged use of prednisone including but not limited to weight gain, insomnia, osteoporosis, mood changes, diabetes, susceptibility to infection, glaucoma and high blood pressure.  In cases where prednisone use is prolonged, patients should be monitored with blood pressure checks, serum glucose levels and an eye exam.  Additionally, the patient may need to be placed on GI prophylaxis, PCP prophylaxis, and calcium and vitamin D supplementation and/or a bisphosphonate.  The patient verbalized understanding of the proper use and the possible adverse effects of prednisone.  All of the patient's questions and concerns were addressed.

## 2023-02-01 NOTE — H&P PST ADULT - VENOUS THROMBOEMBOLISM CURRENT STATUS
knee (1) other risk factor (includes escalating BMI, pack-years of smoking, diabetes requiring insulin, chemotherapy, female gender and length of surgery)

## 2023-07-06 NOTE — ASU PREOP CHECKLIST - HEIGHT IN INCHES
well developed, well nourished , in no acute distress , ambulating without difficulty , normal communication ability
7

## 2023-07-12 NOTE — SWALLOW BEDSIDE ASSESSMENT ADULT - POSITIONING
DISPLAY PLAN FREE TEXT
upright (90 degrees)
DISPLAY PLAN FREE TEXT

## 2023-08-31 NOTE — HISTORY OF PRESENT ILLNESS
116.1 [FreeTextEntry1] : Mr. BRITTNEY WILLIAMSON presents to the office with a wound for -1 year duration . using vac\par Wound dimensions improved, some periwound maceration noted\par The wound is located on the Right upper back surgical wound from lung infection/thoracotomy.\par  The patient has complaints of non healing \par april new surgery at  [facility]/ had vac in past did not like it\par The patient has been dressing the wound with alginate and gauze patient had a wound vac .\par The patient denies fevers or chills.\par  The patient has localized pain to the wound upon dressing changes.\par  The patient has no other complaints or associated symptoms. \par \par
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
## 2024-04-01 NOTE — H&P PST ADULT - REASON FOR ADMISSION
[Follow-Up: _____] : a [unfilled] follow-up visit they are operating on the right side where the infection is

## 2024-04-03 NOTE — ASU PATIENT PROFILE, ADULT - BLOOD TRANSFUSION, PREVIOUS, PROFILE
Thank you for connecting with us today on the [facility] Virtual [facility] service of [facility]. If you have any questions after your visit, please feel free to contact us at [phone].    What to do in an Emergency:  If you are experiencing a medical emergency, call 911 immediately. Symptoms that require immediate attention require a visit at Urgent Care ([place]), Immediate Care Center ([place]) or the Emergency Room of a nearby hospital.    When to contact a provider:  Seek in-person treatment if there is no improvement of symptoms.     Do not have a primary care provider?   If you do not have a primary care provider or have any questions about your visit, please call the Virtual Health RN at [phone].    Billing Questions:   If you have any billing concerns, please contact our Billing Department at [phone]. Hours: Mon. - Thu. 7:30 am - 6 pm and Friday 7:30 pm to 5 pm.    Thank you for entrusting us with your care.     You can connect by Video with a [facility] Care provider 24/7 for common and non-urgent symptoms. You can also get care by completing an E-Visit questionnaire. Complete a questionnaire by choosing from a list of common health concerns*. A [facility] Care provider will respond to your questionnaire answers within an 1 hour (24/7). You will receive a treatment plan, including a prescription if you need one, and/or testing for COVID, Influenza, Mono, RSV, Strep and urine, depending on your symptoms.     Cold Symptoms Behavioral   Health Skin Concerns Women's and Men's   Health   Everything Else   Cough*    Anxiety* Acne                                 Birth Control Abdominal Discomfort     COVID treatment* Depression *  Bug Bites   Emergency Contraception Acid Reflux   Nasal congestion* Insomnia Cold Sores Erectile Dysfunction                      Excessive Sweating (underarms)          Red/pink eye  Dandruff Smoking Cessation    Headache or migraine*        Sore throat*  Eczema Urinary  Symptoms* Joint pain or stiffness       Sinus infection*    Minor Skin Injuries Vaginal Itching*  Medication Refills*         Poison Ivy Vaginal Discharge* Nausea, vomiting and diarrhea         Rash   Neck and Back Pain*       Shingles  Seasonal Allergies          Tick bites          no

## 2024-06-18 NOTE — H&P PST ADULT - MALLAMPATI CLASS
Abdomen , soft, nontender, nondistended , no guarding or rigidity , no masses palpable , normal bowel sounds , Liver and Spleen , no hepatosplenomegaly , liver nontender , spleen not palpable Class II - visualization of the soft palate, fauces, and uvula

## 2024-09-30 NOTE — PRE-OP CHECKLIST - ANTIBIOTIC
Lvm to call and schedule appoimtment per dr russell  
Please give him a call have not seen him for a while and I cannot keep sending his meds and without an office visit  
Pt recv'd vanco / Zosyn per orders
yes

## 2024-11-03 NOTE — BRIEF OPERATIVE NOTE - NSICDXBRIEFPOSTOP_GEN_ALL_CORE_FT
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 She is here today for follow up and disease management.       Oncology History:      Patient initially presented as a 28-year-old woman seen in referral due to a Pap smear showing high-grade squamous intraepithelial lesion that was positive for high risk HPV subtype in 2007. This Pap smear had been taken during her six week postpartum visit. Patient did have a remote history of abnormal Pap smears back in  while serving in [place] for the [employer].  Pap smears during her first pregnancy were all negative. Her Pap smear at the beginning of the most recent pregnancy was normal. Patient did have a colposcopy for evaluation of this abnormal Pap smear on 2007 and cervical biopsies of 6 and 12 o'clock position showed features consistent with papillary serous adenocarcinoma. Endocervical curettings were also taken which showed fragments of papillary serous carcinoma. The patient then underwent a LEEP procedure on 10/15/07 which showed microinvasive adenocarcinoma with a depth of invasion of 0.4 mm. Patient then made the decision to proceed with radical hysterectomy. She subsequently underwent a radical hysterectomy, bilateral salpingectomy, bilateral pelvic and periaortic lymph node dissection on 10/19/07. Patient's operative course was otherwise uncomplicated and she recoverred uneventfully.  Pathology did reveal patient to be stage IA2 adenocarcinoma of the cervix.      She underwent routine surveillence through  complicated only by SIOBHAN 1      She presented to the ED at [facility] on 2019 with severe 8.5/10 RUQ pain associated with nausea, one episode of emesis, and decreased appetite. RUQ demonstrated cholelithiasis without 
cholecystitis, so an abdominal and pelvic CT scan was obtained. This revealed post-surgical changes consistent with prior hysterectomy, left adnexal cysts thought to be ovarian, and an enlarged pericaval lymph node suspicious for metastatic disease vs primary lymphoma. Her condition stabilized and she was discharged home with referrals to general surgery and oncology for biliary colic and further management of the lymphadenopathy, respectively. CT-guided right retroperitoneal lymph node biopsy on 10/15/2019 was consistent with metastatic cervical carcinoma     She underwent surgery to remove the mass and determine the extent of disease on 11/13/19     PATH:  FINAL DIAGNOSIS:   LYMPH NODES, PARA-AORTIC, RESECTION:   - Positive for carcinoma in three of four lymph nodes examined (3/4),   consistent with recurrent endocervical   adenocarcinoma   - Largest metastatic focus is 3.5 cm, positive for extra yuridia extension         Chemo-Potentiated RT 12/2019-1/2020 2/2020 CT:  IMPRESSION: Postoperative changes of hysterectomy and lymph node  resection for cervical cancer. The metastatic right pelvic lymph nodes  which were previously identified have decreased in size. No new  metastases identified.      She recently presented to the ED for an acute RUQ pain episode, which has greatly improved despite no clear diagnosis.  During this visit she underwent a CAP CT which did not demonstrate evidence of cancer but a slightly enlarged spleen. She had a mild eosinophilia (~2%) but has not traveled or eaten exotic food recently, and has not had any new contacts to suggest mono.      10/13/2020 CT:  IMPRESSION:  1.  Recurrent right external iliac lymphadenopathy.  2.  Rebound thymic hyperplasia.     10/30/2020 PET  IMPRESSION:   In this patient with history of metastatic cervical cancer status-post  radical hysterectomy and chemoradiation, there is evidence of  recurrent disease:  1. Increased size of a hypermetabolic right 
external iliac chain lymph node since 2/27/2020.  2. Hypermetabolic right subpectoral and axillary lymph nodes.  Recommend follow-up in the breast center for axillary ultrasound and  possible tissue sampling.  3. Idiopathic thymic activation/hyperplasia.     She was evaluated in the breast clinic with plan made for biopsy - however at biopsy the node had shruck considerably and was thus felt to be c/w inflammation (no biopsy obtained)     3/8/2021 MRI Brain (for dizziness)  IMPRESSION:  1. Heterogeneous mixed solid and cystic 2.7 cm mass in the right cerebellum, most likely a solitary metastatic lesion. There is moderate surrounding vasogenic edema as well as mass effect on the fourth ventricle. No obstructive hydrocephalus.    2. Unremarkable MR angiogram of the head and neck.     [facility]  Midline suboccipital craniotomy for resection of mass  Right frontal ventriculostomy  Stereotactic navigation  Use of operating microscope     4/16/21 Completed 5 fx of gamma knife ([employer])     Plan: Paclitaxel, Cisplatin, Avastin     4/28/2021: Cycle #1 Paclitaxel, Cisplatin, Avastin  5/19/2021: Cycle #2 Paclitaxel/Cisplatin/Avastin +Neulasta  6/9/2021: Cycle #3 Paclitaxel/Cisplatin/Avastin +Neulasta  6/30/2021: Cycle #4 Paclitaxel/Cisplatin/Avastin +Neulasta    6/28/2021 plan: PET scan for interval assessment.  Will plan for 6 cycles followed by possible use of Keytruda versus observation.     7/15/2021: PET CT: IMPRESSION: In this patient with history of cervical cancer who  completed chemoradiation:  1. Resolved uptake to the right pelvic lymph node, consistent with  response to treatment.  2. No new hypermetabolic lesions.  3. No definite abnormal intracranial uptake. Please note that PET-CT  is less sensitive for intracranial disease due to normal uptake to the  brain. Consider continued following of the previously seen lesions and  surgical changes with MRI if clinically indicated.      7/21/2021: 
Cycle #5 Paclitaxel/Cisplatin/Avastin +Neulasta  8/11/2021: Cycle #6 Taxotere/Cisplatin/Avastin + Neulasta (Change to Taxotere due to neuropathy) deferred due to insurance  8/18/2021: Cycle #6 Paclitaxel/Cisplatin/Avastin (dose reduced Taxol to 135 mg/m2 due to insurance coverage with Taxotere and patient in agreement with plan- neuropathy mild and improved) +Neulasta    8/21/2021 MRI (HEAD):  Impression: In this patient with history of metastatic carcinoma to  the right cerebellum:  There is no definite evidence of disease recurrence. Decreased  resection cavity enhancement. Previous right occipital lobe  enhancement is completely resolved. No new enhancing intracranial  lesions.    12/6/21 MRI brain - DIANNE  12/8/21 PET - negative for focal uptake    6/17/22 MRI (BRAIN)  IMPRESSION: In this patient with history of metastatic cervical cancer  to the cerebellum, status post resection and chemoradiation, there is  no evidence of recurrence:  Stable postsurgical changes of right cerebellar mass resection without  evidence of local recurrence. No new enhancing intracranial lesions.    PET  1. No abnormal FDG uptake within the body and neck.     2. Postsurgical changes of total abdominal hysterectomy, bilateral  salpingo-oophorectomy and periaortic and pelvic lymph node dissection  without evidence of disease recurrence.    4/28/23 CT (CAP): IMPRESSION:   1.  No evidence of recurrent or metastatic disease.     MRI (brain): IMPRESSION: Stable surgical changes of cerebellar mass resection. No   evidence of local recurrence or new intracranial metastatic disease.     10/2023 MRI  Impression: Stable postsurgical suboccipital craniotomy changes for  resection of underlying cerebellar mass. No new abnormal intracranial  enhancement.    4/23/24 PET  IMPRESSION: In this patient with history of cervical cancer:     1. Post surgical changes of radical hysterectomy, bilateral  salpingectomy and periaortic and pelvic lymph node 
dissection without  evidence of local recurrence.  2. No evidence of abnormal FDG uptake to suggest distant metastases.  3. Focal FDG uptake in the left thyroid gland without discrete nodule,  new compared to 6/17/2022 PET/CT. Recommend further evaluation with  ultrasound.  10/31/24 MRI  IMPRESSION:  New tiny focus of enhancement in the inferior right cerebellum most  suspicious for metastasis, although this could conceivably represent a  subacute infarct. Recommend follow-up MRI in 2-3 months to evaluate  stability which would also help delineate between metastasis and  subacute infarct.    ROS: No bleeding, weight changes, mild constipation.  Serial URI's this winter (mild and self limited)    Past Medical History:    Past Medical History:   Diagnosis Date    Chronic cholecystitis 09/25/2019    History of cervical cancer 2007    Metastasis to brain (H) 03/08/2021    Metastatic adenocarcinoma to cervix (H) 10/28/2019    Added automatically from request for surgery [id number]         Past Surgical History:    Past Surgical History:   Procedure Laterality Date    CHOLECYSTECTOMY, LAPOROSCOPIC  09/25/2019    HYSTERECTOMY RADICAL  2007    PAUL    INSERT PORT VASCULAR ACCESS Right 5/5/2021    Procedure: INSERTION, VASCULAR ACCESS PORT;  Surgeon: Oral Toledo MD;  Location: [facility] OR    IR CHEST PORT PLACEMENT > 5 YRS OF AGE  5/5/2021    IR PORT REMOVAL RIGHT  12/21/2023    LAPAROSCOPIC LYMPHADENECTOMY N/A 11/13/2019    Procedure: LAPAROSCOPY, resection of of paraaortic lymph node, lysis of adhesions;  Surgeon: Jluis Canela MD;  Location: [facility] OR    OPTICAL TRACKING SYSTEM CRANIOTOMY, EXCISE TUMOR, COMBINED Bilateral 3/15/2021    Procedure: stealth assisted Midline suboccipital craniectomy/craniotomy for tumor;  Surgeon: Martín Myrick MD;  Location: [facility] OR    OPTICAL TRACKING SYSTEM VENTRICULOSTOMY Right 3/15/2021    Procedure: stealth assisted  right frontal ventriculostomy;  Surgeon: Elen 
Martín Young MD;  Location:  OR    REMOVE PORT VASCULAR ACCESS Right 12/21/2023    Procedure: Remove port vascular access right;  Surgeon: Adali Miguel PA-C;  [place] [facility] [place]         Health Maintenance Due   Topic Date Due    YEARLY PREVENTIVE VISIT  Never done    ADVANCE CARE PLANNING  Never done    Pneumococcal Vaccine: Pediatrics (0 to 5 Years) and At-Risk Patients (6 to 64 Years) (1 of 2 - PCV) Never done    COLORECTAL CANCER SCREENING  Never done    HIV SCREENING  Never done    HEPATITIS C SCREENING  Never done    HEPATITIS B IMMUNIZATION (1 of 3 - 19+ 3-dose series) Never done    LIPID  Never done    MAMMO SCREENING  11/09/2021    PAP  08/31/2024    INFLUENZA VACCINE (1) 09/01/2024    COVID-19 Vaccine (3 - 2024-25 season) 09/01/2024       Current Medications:     Current Outpatient Medications   Medication Sig Dispense Refill    acetaminophen (TYLENOL) 325 MG tablet Take 2 tablets (650 mg) by mouth every 6 hours 24 tablet 0    Ascorbic Acid (VITAMIN C GUMMIE PO) Take by mouth daily      dexamethasone (DECADRON) 4 MG tablet Take 2 tablets (8 mg) by mouth daily (with breakfast) Take 2 tablets by mouth with food on days 2-4 after chemotherapy. 12 tablet 3    DULoxetine (CYMBALTA) 30 MG capsule Take 1 capsule (30 mg) by mouth 2 times daily 180 capsule 2    famotidine (PEPCID) 10 MG tablet Take 1 tablet (10 mg) by mouth 2 times daily 60 tablet 1    gabapentin (NEURONTIN) 300 MG capsule Take 1 capsule (300 mg) by mouth At Bedtime 30 capsule 2    ibuprofen (ADVIL/MOTRIN) 200 MG tablet Take 200 mg by mouth every 4 hours as needed for mild pain      lidocaine-prilocaine (EMLA) 2.5-2.5 % external cream Apply topically as needed for moderate pain 30 g 1    loperamide (IMODIUM A-D) 2 MG tablet Take 1 tablet (2 mg) by mouth 4 times daily as needed for diarrhea Can take one 2 mg capsule after each loose stool up to 4 times a day as needed for diarrhea. This can be increased to 4 mg 4 times a day or 2 mg up to 8 times a 
"day. 60 tablet 3    LORazepam (ATIVAN) 0.5 MG tablet Take 1 tablet (0.5 mg) by mouth every 6 hours as needed for nausea 20 tablet 0    multivitamin w/minerals (THERA-VIT-M) tablet Take 1 tablet by mouth daily      OLANZapine (ZYPREXA) 5 MG tablet Take 1 tablet (5 mg) by mouth At Bedtime 30 tablet 0    SENNA-docusate sodium (SENNA S) 8.6-50 MG tablet Take 1 tablet by mouth At Bedtime 30 tablet 3         Allergies:        Allergies   Allergen Reactions    Emend [Aprepitant]     Prochlorperazine      \"i was told I turn blue\"        Social History:     Social History     Tobacco Use    Smoking status: Former     Types: Cigarettes    Smokeless tobacco: Never   Substance Use Topics    Alcohol use: Yes     Comment: one drink a week       History   Drug Use No         Family History:     The patient's family history is notable for     Family History   Problem Relation Age of Onset    Aneurysm Mother     Breast Cancer Paternal Grandmother         bilat mastectomies    Breast Cancer Maternal Aunt         stage 1    Thyroid Cancer Sister 23         Physical Exam:     PS 0  VS: There were no vitals taken for this visit.     General Appearance: healthy and alert, no distress, overweight     HEENT: no thyromegaly, no palpable nodules or masses        Cardiovascular: regular rate and rhythm, no gallops, rubs or murmurs     Respiratory: lungs clear, no rales, rhonchi or wheezes    Musculoskeletal: extremities non tender and without edema    Skin: no lesions or rashes     Neurological: normal gait, no gross defects     Psychiatric: appropriate mood and affect                               Hematological: normal cervical, supraclavicular lymph nodes     Gastrointestinal:       abdomen soft, non-tender, non-distended, no organomegaly or masses, obese.    Genitourinary: Normal BUS, no lesions in vagina seen. BME no palpable masses or nodules.  Rectal examination negative      Assessment:    Francesca Rowland is a woman with a diagnosis "
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 She is here today for follow up and disease management.     30 minutes spent on the date of the encounter doing chart review, history and exam, documentation, and further activities as noted above.        Plan:     1.)         Rec. Cx Cancer with brain met: s/p primary therapy:  She has a new cerebellar spot which is suspicious for possible met.  The recommendation is re-scan in 8-12 weeks for stability.      - MRI in 10 week   - PET scan this week to exclude other mets    She has moved to annual visits with Neurosurgery after her negative scan in 10/2023       2.)  Genetic risk factors were assessed again and the patient has multiple family members with cancer diagnosed younger than age 50 including herself (age 40, but with likely HPV related disease) and her sister (thyroid cancer at age 23) among others. She met with genetics 1/2021, but did not proceed until 12/2021 (NEGATIVE)     3.) Labs and/or tests ordered include: PET scan; head MRI     4.)   Health maintenance issues addressed today include annual health maintenance and non-gynecologic issues with PCP.         Jluis Canela MD    Division of Gynecologic Oncology  [facility]        CC  Patient Care Team:  Tyrese Bowie MD as PCP - General (Family Medicine)  Jluis Canela MD as MD (Gynecologic Oncology)  Jluis Canela MD as Assigned Cancer Care Provider  Danielle Trivedi APRN CNP as Nurse Practitioner (Neurological Surgery)  Summer Patel PA-C as Physician Assistant (Neurological Surgery)  Charlotte Wren MD as MD (Surgery)  Rashmi London MD as Assigned Neuroscience Provider  Charlotte Wren MD as Assigned Surgical Provider    
POST-OP DIAGNOSIS:  Open wound of chest wall 02-Apr-2019 17:10:30  Sawyer Pete
POST-OP DIAGNOSIS:  Open wound of chest wall 02-Apr-2019 17:10:30  Sawyer Pete

## 2024-11-22 NOTE — PATIENT PROFILE ADULT - FALL HARM RISK TYPE OF ASSESSMENT
Department of Anesthesiology  Postprocedure Note    Patient: Jac Duggan  MRN: [id number]  YOB: 1985  Date of evaluation: 11/22/2024    Procedure Summary       Date: 11/22/24 Room / Location: [address]    Anesthesia Start: 1019 Anesthesia Stop:     Procedure: CYSTOSCOPY RETROGRADE PYELOGRAM URETEROSCOPY J STENT LASER LITHOTRIPSY LEFT (Left) Diagnosis:       Hydroureter      Renal colic      (Hydroureter [N13.4])      (Renal colic [N23])    Surgeons: Anand Hernandez DO Responsible Provider: Abhay Israel DO    Anesthesia Type: MAC ASA Status: 2            Anesthesia Type: No value filed.    Yesi Phase I: Yesi Score: 10    Yesi Phase II:      Anesthesia Post Evaluation    Patient location during evaluation: PACU  Patient participation: complete - patient participated  Level of consciousness: awake  Pain score: 2  Airway patency: patent  Nausea & Vomiting: no nausea and no vomiting  Cardiovascular status: hemodynamically stable  Respiratory status: acceptable  Hydration status: euvolemic  Comments: Patient seen and examined.  Progressing as expected.  No anesthetic related questions or concerns at this time.  Pain management: adequate      No notable events documented.  
admission

## 2024-12-05 NOTE — PHYSICAL THERAPY INITIAL EVALUATION ADULT - ASR EQUIP NEEDS DISCH PT EVAL
Male
to be determined after gait assessment-->please follow PT notes
no necessary equipment needed at this time.

## 2025-01-21 NOTE — PHYSICAL THERAPY INITIAL EVALUATION ADULT - RANGE OF MOTION EXAMINATION, REHAB EVAL
bilateral upper extremity ROM was WFL (within functional limits)/bilateral lower extremity ROM was WFL (within functional limits)
 used
Right UE ROM <50% of available ROM, Left UE <25% of available ROM. bilateral LEs <25% of available ROM

## 2025-04-09 NOTE — PROGRESS NOTE ADULT - SUBJECTIVE AND OBJECTIVE BOX
Surgery Progress Note    S: Patient seen and examined. No acute events overnight.     O:  Vital Signs Last 24 Hrs  T(C): 38.4 (27 Jul 2018 08:45), Max: 38.4 (27 Jul 2018 08:45)  T(F): 101.2 (27 Jul 2018 08:45), Max: 101.2 (27 Jul 2018 08:45)  HR: 96 (27 Jul 2018 09:00) (81 - 131)  BP: 115/82 (27 Jul 2018 09:00) (85/61 - 146/89)  BP(mean): 89 (27 Jul 2018 09:00) (65 - 99)  RR: 15 (27 Jul 2018 09:00) (15 - 36)  SpO2: 97% (27 Jul 2018 09:00) (92% - 100%)    I&O's Detail    26 Jul 2018 07:01  -  27 Jul 2018 07:00  --------------------------------------------------------  IN:    dexmedetomidine Infusion: 74.6 mL    heparin Infusion: 162 mL    heparin Infusion: 123.5 mL    IV PiggyBack: 1300 mL    lactated ringers.: 2000 mL    propofol Infusion: 13 mL  Total IN: 3673.1 mL    OUT:    Chest Tube: 180 mL    Voided: 850 mL  Total OUT: 1030 mL    Total NET: 2643.1 mL      27 Jul 2018 07:01  -  27 Jul 2018 09:08  --------------------------------------------------------  IN:    dexmedetomidine Infusion: 67.5 mL    heparin Infusion: 43.5 mL    IV PiggyBack: 650 mL    propofol Infusion: 67.5 mL  Total IN: 828.5 mL    OUT:    Chest Tube: 30 mL    Indwelling Catheter - Urethral: 85 mL  Total OUT: 115 mL    Total NET: 713.5 mL          MEDICATIONS  (STANDING):  acetaminophen  IVPB 1000 milliGRAM(s) IV Intermittent once  ALBUTerol    90 MICROgram(s) HFA Inhaler 2 Puff(s) Inhalation every 6 hours  dexmedetomidine Infusion 0.5 MICROgram(s)/kG/Hr (9.375 mL/Hr) IV Continuous <Continuous>  docusate sodium 100 milliGRAM(s) Oral three times a day  heparin  Infusion 1350 Unit(s)/Hr (14.5 mL/Hr) IV Continuous <Continuous>  ipratropium 17 MICROgram(s) HFA Inhaler 2 Puff(s) Inhalation every 6 hours  pantoprazole  Injectable 40 milliGRAM(s) IV Push daily  piperacillin/tazobactam IVPB. 3.375 Gram(s) IV Intermittent every 8 hours  potassium chloride  10 mEq/100 mL IVPB 10 milliEquivalent(s) IV Intermittent every 1 hour  propofol Infusion 5 MICROgram(s)/kG/Min (2.25 mL/Hr) IV Continuous <Continuous>  sodium chloride 0.9% lock flush 3 milliLiter(s) IV Push every 8 hours  thiamine Injectable      thiamine Injectable 100 milliGRAM(s) IV Push daily  vancomycin  IVPB 1000 milliGRAM(s) IV Intermittent every 12 hours  vancomycin  IVPB        MEDICATIONS  (PRN):  ketorolac   Injectable 15 milliGRAM(s) IV Push every 6 hours PRN Moderate Pain (4 - 6)  oxyCODONE    5 mG/acetaminophen 325 mG 2 Tablet(s) Oral every 6 hours PRN Severe Pain (7 - 10)  senna 2 Tablet(s) Oral at bedtime PRN Constipation                            13.4   9.94  )-----------( 173      ( 27 Jul 2018 04:20 )             39.9       07-27    137  |  100  |  16  ----------------------------<  90  3.9   |  23  |  0.50    Ca    8.2<L>      27 Jul 2018 04:20  Phos  1.7     07-27  Mg     2.3     07-27    TPro  5.7<L>  /  Alb  2.0<L>  /  TBili  0.9  /  DBili  x   /  AST  57<H>  /  ALT  49<H>  /  AlkPhos  63  07-26      Physical Exam:  Gen: Laying in bed, NAD  Resp: Unlabored breathing  Abd: soft, NTND  Extremities: feet cool L > R, 2-3 second capillary refill bilaterally  Vascular: R leg with palpable femoral, popliteal, PT, and DP; L leg palpable femoral, doppler popliteal, doppler AT & DP MANUEL HUNTER

## 2025-04-23 NOTE — DISCHARGE NOTE NURSING/CASE MANAGEMENT/SOCIAL WORK - NSPROEXTENSIONSOFSELF_GEN_A_NUR
Airway       Patient location during procedure: OR       Procedure Start/Stop Times: 4/23/2025 9:15 AM  Staff -        Anesthesiologist:  Dennis Thompson MD       CRNA: Esme Leal APRN CRNA       Performed By: CRNA  Consent for Airway        Urgency: elective  Indications and Patient Condition       Indications for airway management: cande-procedural       Induction type:intravenous       Mask difficulty assessment: 2 - vent by mask + OA or adjuvant +/- NMBA    Final Airway Details       Final airway type: endotracheal airway       Successful airway: ETT - single  Endotracheal Airway Details        ETT size (mm): 8.0       Cuffed: yes       Successful intubation technique: video laryngoscopy       VL Blade Size: Glidescope 4       Grade View of Cords: 1       Adjucts: stylet       Position: Right       Measured from: lips       Secured at (cm): 23       Bite block used: None    Post intubation assessment        Placement verified by: capnometry, equal breath sounds and chest rise        Number of attempts at approach: 1       Secured with: tape       Ease of procedure: easy       Dentition: Intact and Unchanged    Medication(s) Administered   Medication Administration Time: 4/23/2025 9:15 AM         none

## 2025-06-17 NOTE — END OF VISIT
Occupational Therapy Discharge     Visit Type: Daily Treatment Note- Discharge Summary  Visit: 10  Referring Provider: Ca Rodriguez PA-C  Medical Diagnosis (from order): S46.911D - Strain of right shoulder, subsequent encounter     SUBJECTIVE                                                                                                               Mechanism of Onset: Per occupational health 4/11/25, \"Patient states that the initial date of injury was on 4/3/2025.  At that time, patient states that she was training someone and noticed a stack of soda crates that looked unstable; she went to try to prevent it from falling over however reports it did, and she ended up holding onto a crate full of soda that slid off. She describes most of weight of the full crate putting pressure onto her right wrist and arm and pulling her right shoulder downward as the crate fell. Reports pain and arnie throughout her right arm Continued to work over the next few days but with pain. Seen in  on 4/9 with unremarkable xrays and started on medrol dose heidi. Has not noticed much of a difference yet. Reports pain throughout her right arm and that it feels generally weak. Some numbness and tingling in her right shoulder and R wrist. She is right hand dominant. Wearing wrist brace which does help a little. Has tried some heat on the shoulder and gentle massage.\"    Patient agreeable to discharge.   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    Pain / Symptoms  - Pain rating (out of 10): Current: 2     Worker's Compensation Information  Occupation Information  - Employer:  [employer] ( [employer])  [address] [address]     - Restrictions:  Restrictions are to be followed at work and at home.  Restrictions are in effect until next follow-up visit.   No lifting/pushing/pulling/carrying > 5 lbs with the right arm No overhead work or reaching with the right arm  No forceful grasping or twisting with the right hand  Wear brace as needed  - Current Work Status: working, restricted duty due to current condition    Job Related Testing  Lift Floor to Waist     - Patient ability: 20     - Job demand: 20  Lift Waist to Chest      - Patient ability: 15     - Job demand: 10  Lift Waist to Overhead      - Patient ability: 15     - Job demands: 15  2 Hand Carry     - Patient ability: 10     - Job demands: 10  Push/Pull     - Patient ability: empty sled + 150     - Job demands: 150  Sustained Standing     - Job demands: constantly (2/3-the full day)    OBJECTIVE                                                                                                                     Range of Motion (ROM)   (degrees unless noted; active unless noted; norms in ( ); negative=lacking to 0, positive=beyond 0)  Shoulder:    - Flexion (180):         Right: 150    - Abduction (180):         Right: 144    Strength  (out of 5 unless noted, standard test position unless noted)   Shoulder:     - Flexion:          Left: 5          Right: 4     - Extension:          Left: 5         Right: 4    - Abduction:         Left: 5         Right: 4    - Adduction:          Left: 5         Right: 4    - Internal Rotation:           Left (at 0°): 5         Right (at 0°): 4+    - External Rotation:          Left (at 0°): 5         Right (at 0°): 4+   : (lbs)    - Neutral:         Left: Trial(s): 55, 65, 60, Average: 60         Right: Trial(s): 45, 50, 40, Average: 45                 Outcome/Assessments  Outcome Measures:   Quick Disabilities of the Arm, Shoulder and Hand: QuickDash Total Score (Score will not calculate if more then 2 questions are left blank): 34.09   (scored 0-100; a higher  [Time Spent: ___ minutes] : I have spent [unfilled] minutes of time on the encounter. score indicates greater disability) see flowsheet for additional documentation       Treatment     Therapeutic Exercise  Warm up on UBE on level 5.0 x 6 minutes (reverse at 3 minutes).  Rolling 8.8 pound ball up wall with BUEs x 10 x 2.     Therapeutic Activity  Floor to waist lift with 10 pounds x 10,  15 pounds x 8, and 20 pounds x 10.   Waist to chest lift with 10 pounds x 10 and 15 pounds x 10 x 2.  Waist to overhead lift with 15 pounds x 5 x 2.  Push/pull with sled+ 65 pounds x 20 feet x 5 and sled +150 pounds x 5.     Skilled input: verbal instruction/cues and demonstration    Writer verbally educated and received verbal consent for hand placement, positioning of patient, and techniques to be performed today from patient for hand placement and palpation for techniques, clothing adjustments for techniques and therapist position for techniques as described above and how they are pertinent to the patient's plan of care.  Home Exercise Program  Access Code: [email or web address]  URL: [email or web address]  Date: 05/21/2025  Prepared by: Dania Wolff    Exercises  - Seated Scapular Retraction  - 4 x daily - 7 x weekly - 3 sets - 10 reps  - Shoulder Flexion Wall Slide with Towel  - 4 x daily - 7 x weekly - 3 sets - 10 reps  - Standing 'L' Stretch at Counter  - 4 x daily - 7 x weekly - 3 sets - 10 reps  - Sleeper Stretch  - 4 x daily - 7 x weekly - 3 sets - 10 reps  - Seated Upper Trapezius Stretch  - 4 x daily - 7 x weekly - 3 reps - 20 hold  - Shoulder External Rotation and Scapular Retraction with Resistance  - 3 x daily - 7 x weekly - 2 sets - 10 reps  - Standing Shoulder Row with Anchored Resistance  - 3 x daily - 7 x weekly - 2 sets - 10 reps  - Standing Shoulder Flexion with Resistance  - 3 x daily - 7 x weekly - 2 sets - 10 reps  - Single Arm Scaption with Resistance  - 3 x daily - 7 x weekly - 2 sets - 10 reps  - Seated Shoulder Abduction with Resistance  - 3 x daily - 7 x weekly - 2 sets - 10  reps      ASSESSMENT                                                                                                            Gains in skilled therapy to date as expected in strength, range of motion, activity tolerance, and ability to reach job demands.  Patient attends therapy as recommended.  Patient reports performing HEP as prescribed.  Progress toward discharge/long term goals (see below for specific status updates): good progress  Pain/symptoms after session (out of 10): 2  Education:   - Results of above outlined education: Verbalizes understanding and Needs reinforcement    PLAN                                                                                                                           Discharge from skilled therapy with instructions/recommendations to: continue home exercise program, follow up with referring provider    Suggestions for next session as indicated: Progress per plan of care    Goals  Long Term Goals: to be met by end of plan of care   1. Patient/Client will be able to lift 20 pounds from floor to waist with proper body mechanics to assist with lifting activities at work.Status: met   2. Patient/Client will be able to lift 10 pounds from waist to eye level with proper body mechanics to assist with lifting overhead activities at work.Status: met   3. Patient/Client will be able to lift 15 pounds overhead  with proper body mechanics to assist with lifting overhead activities at work. Status: met   4. Patient/Client will be able to push/pull 150 pounds 20 feet to assist with pushing and pulling activities at work.Status: met   5.  Quick DASH: Patient will score 20 or lower on The Quick DASH to indicate a decreased level of impairment with lifting, carrying, household and self-care activity. (minimal clinically important difference: 14 of calculated score)Status: partially met Patient quickdash down to 34.09.   6.  Patient will be independent with progressed and modified home  exercise program.Status: met       Therapy procedure time and total treatment time can be found documented on the Time Entry flowsheet